# Patient Record
Sex: FEMALE | Race: WHITE | Employment: OTHER | URBAN - METROPOLITAN AREA
[De-identification: names, ages, dates, MRNs, and addresses within clinical notes are randomized per-mention and may not be internally consistent; named-entity substitution may affect disease eponyms.]

---

## 2017-01-17 ENCOUNTER — PROCEDURE VISIT (OUTPATIENT)
Dept: PHYSICAL MEDICINE AND REHAB | Age: 67
End: 2017-01-17

## 2017-01-17 DIAGNOSIS — M79.7 FIBROMYALGIA: Primary | ICD-10-CM

## 2017-01-17 PROCEDURE — 20553 NJX 1/MLT TRIGGER POINTS 3/>: CPT | Performed by: PHYSICAL MEDICINE & REHABILITATION

## 2017-01-26 ENCOUNTER — OFFICE VISIT (OUTPATIENT)
Dept: PHYSICAL MEDICINE AND REHAB | Age: 67
End: 2017-01-26

## 2017-01-26 VITALS
WEIGHT: 210 LBS | BODY MASS INDEX: 39.65 KG/M2 | SYSTOLIC BLOOD PRESSURE: 130 MMHG | HEIGHT: 61 IN | DIASTOLIC BLOOD PRESSURE: 76 MMHG

## 2017-01-26 DIAGNOSIS — M51.36 DDD (DEGENERATIVE DISC DISEASE), LUMBAR: ICD-10-CM

## 2017-01-26 DIAGNOSIS — G56.80 SUPRASCAPULAR ENTRAPMENT NEUROPATHY, UNSPECIFIED LATERALITY: ICD-10-CM

## 2017-01-26 DIAGNOSIS — G89.29 CHRONIC LEFT-SIDED LOW BACK PAIN WITH LEFT-SIDED SCIATICA: ICD-10-CM

## 2017-01-26 DIAGNOSIS — Z79.899 HIGH RISK MEDICATION USE: ICD-10-CM

## 2017-01-26 DIAGNOSIS — G89.29 CHRONIC MIDLINE LOW BACK PAIN WITH LEFT-SIDED SCIATICA: ICD-10-CM

## 2017-01-26 DIAGNOSIS — G89.29 CHRONIC BILATERAL THORACIC BACK PAIN: ICD-10-CM

## 2017-01-26 DIAGNOSIS — M54.42 CHRONIC MIDLINE LOW BACK PAIN WITH LEFT-SIDED SCIATICA: ICD-10-CM

## 2017-01-26 DIAGNOSIS — M54.42 CHRONIC LEFT-SIDED LOW BACK PAIN WITH LEFT-SIDED SCIATICA: ICD-10-CM

## 2017-01-26 DIAGNOSIS — M54.6 CHRONIC BILATERAL THORACIC BACK PAIN: ICD-10-CM

## 2017-01-26 DIAGNOSIS — F41.9 ANXIETY: ICD-10-CM

## 2017-01-26 DIAGNOSIS — M79.10 MYALGIA: Primary | ICD-10-CM

## 2017-01-26 DIAGNOSIS — M54.42 BILATERAL LOW BACK PAIN WITH LEFT-SIDED SCIATICA, UNSPECIFIED CHRONICITY: ICD-10-CM

## 2017-01-26 PROCEDURE — 99214 OFFICE O/P EST MOD 30 MIN: CPT | Performed by: PHYSICAL MEDICINE & REHABILITATION

## 2017-01-26 RX ORDER — TRAMADOL HYDROCHLORIDE 50 MG/1
50 TABLET ORAL EVERY 6 HOURS PRN
Qty: 50 TABLET | Refills: 0 | Status: SHIPPED | OUTPATIENT
Start: 2017-01-26 | End: 2017-03-15

## 2017-01-26 ASSESSMENT — ENCOUNTER SYMPTOMS
CONSTIPATION: 0
CHEST TIGHTNESS: 0
APNEA: 1
BACK PAIN: 1
PHOTOPHOBIA: 0
WHEEZING: 0
STRIDOR: 0
NAUSEA: 0
EYES NEGATIVE: 1
VOMITING: 0
CHOKING: 0
DIARRHEA: 0
SHORTNESS OF BREATH: 1
ALLERGIC/IMMUNOLOGIC NEGATIVE: 1

## 2017-02-06 ENCOUNTER — OFFICE VISIT (OUTPATIENT)
Dept: FAMILY MEDICINE CLINIC | Age: 67
End: 2017-02-06

## 2017-02-06 VITALS
TEMPERATURE: 98 F | HEIGHT: 61 IN | DIASTOLIC BLOOD PRESSURE: 72 MMHG | OXYGEN SATURATION: 96 % | BODY MASS INDEX: 39.53 KG/M2 | SYSTOLIC BLOOD PRESSURE: 128 MMHG | RESPIRATION RATE: 23 BRPM | WEIGHT: 209.4 LBS | HEART RATE: 110 BPM

## 2017-02-06 DIAGNOSIS — H81.93 LABYRINTHINE DISORDER, BILATERAL: Primary | ICD-10-CM

## 2017-02-06 PROCEDURE — 99213 OFFICE O/P EST LOW 20 MIN: CPT | Performed by: FAMILY MEDICINE

## 2017-02-06 RX ORDER — MECLIZINE HYDROCHLORIDE 25 MG/1
TABLET ORAL
Qty: 20 TABLET | Refills: 0 | Status: ON HOLD | OUTPATIENT
Start: 2017-02-06 | End: 2019-01-05 | Stop reason: HOSPADM

## 2017-02-06 ASSESSMENT — ENCOUNTER SYMPTOMS: BACK PAIN: 0

## 2017-02-20 RX ORDER — LIDOCAINE HYDROCHLORIDE 5 MG/ML
50 INJECTION, SOLUTION INFILTRATION; INTRAVENOUS ONCE
Status: COMPLETED | OUTPATIENT
Start: 2017-02-20 | End: 2017-02-21

## 2017-02-20 RX ORDER — METHYLPREDNISOLONE ACETATE 40 MG/ML
40 INJECTION, SUSPENSION INTRA-ARTICULAR; INTRALESIONAL; INTRAMUSCULAR; SOFT TISSUE ONCE
Status: COMPLETED | OUTPATIENT
Start: 2017-02-20 | End: 2017-02-21

## 2017-02-21 ENCOUNTER — HOSPITAL ENCOUNTER (OUTPATIENT)
Dept: INTERVENTIONAL RADIOLOGY/VASCULAR | Age: 67
Discharge: HOME | End: 2017-02-21
Payer: MEDICARE

## 2017-02-21 VITALS
RESPIRATION RATE: 20 BRPM | DIASTOLIC BLOOD PRESSURE: 87 MMHG | OXYGEN SATURATION: 98 % | HEART RATE: 72 BPM | SYSTOLIC BLOOD PRESSURE: 161 MMHG

## 2017-02-21 DIAGNOSIS — M51.36 DDD (DEGENERATIVE DISC DISEASE), LUMBAR: ICD-10-CM

## 2017-02-21 PROCEDURE — 62323 NJX INTERLAMINAR LMBR/SAC: CPT | Performed by: PHYSICAL MEDICINE & REHABILITATION

## 2017-02-21 PROCEDURE — 6360000004 HC RX CONTRAST MEDICATION: Performed by: PHYSICAL MEDICINE & REHABILITATION

## 2017-02-21 PROCEDURE — 6360000002 HC RX W HCPCS: Performed by: PHYSICAL MEDICINE & REHABILITATION

## 2017-02-21 PROCEDURE — 2500000003 HC RX 250 WO HCPCS: Performed by: PHYSICAL MEDICINE & REHABILITATION

## 2017-02-21 RX ADMIN — LIDOCAINE HYDROCHLORIDE 50 ML: 5 INJECTION, SOLUTION INFILTRATION; INTRAVENOUS at 11:19

## 2017-02-21 RX ADMIN — METHYLPREDNISOLONE ACETATE 40 MG: 40 INJECTION, SUSPENSION INTRA-ARTICULAR; INTRALESIONAL; INTRAMUSCULAR; SOFT TISSUE at 11:21

## 2017-02-21 RX ADMIN — IOPAMIDOL 3 ML: 408 INJECTION, SOLUTION INTRATHECAL at 11:20

## 2017-03-07 ENCOUNTER — OFFICE VISIT (OUTPATIENT)
Dept: FAMILY MEDICINE CLINIC | Age: 67
End: 2017-03-07

## 2017-03-07 VITALS
DIASTOLIC BLOOD PRESSURE: 70 MMHG | OXYGEN SATURATION: 97 % | WEIGHT: 213 LBS | RESPIRATION RATE: 26 BRPM | HEIGHT: 61 IN | SYSTOLIC BLOOD PRESSURE: 130 MMHG | HEART RATE: 100 BPM | BODY MASS INDEX: 40.22 KG/M2 | TEMPERATURE: 97.6 F

## 2017-03-07 DIAGNOSIS — E55.9 VITAMIN D DEFICIENCY: ICD-10-CM

## 2017-03-07 DIAGNOSIS — E11.59 TYPE 2 DIABETES MELLITUS WITH OTHER CIRCULATORY COMPLICATIONS (HCC): ICD-10-CM

## 2017-03-07 DIAGNOSIS — I10 ESSENTIAL HYPERTENSION: ICD-10-CM

## 2017-03-07 DIAGNOSIS — J44.1 COPD WITH EXACERBATION (HCC): Primary | ICD-10-CM

## 2017-03-07 LAB
ANION GAP SERPL CALCULATED.3IONS-SCNC: 13 MEQ/L (ref 7–13)
BUN BLDV-MCNC: 13 MG/DL (ref 8–23)
CALCIUM SERPL-MCNC: 9.2 MG/DL (ref 8.6–10.2)
CHLORIDE BLD-SCNC: 104 MEQ/L (ref 98–107)
CO2: 23 MEQ/L (ref 22–29)
CREAT SERPL-MCNC: 0.64 MG/DL (ref 0.5–0.9)
CREATININE URINE: 41.7 MG/DL
GFR AFRICAN AMERICAN: >60
GFR NON-AFRICAN AMERICAN: >60
GLUCOSE BLD-MCNC: 251 MG/DL (ref 74–109)
HBA1C MFR BLD: 8.4 % (ref 4.8–5.9)
MICROALBUMIN UR-MCNC: <1.2 MG/DL
MICROALBUMIN/CREAT UR-RTO: NORMAL MG/G (ref 0–30)
POTASSIUM SERPL-SCNC: 3.9 MEQ/L (ref 3.5–5.1)
SODIUM BLD-SCNC: 140 MEQ/L (ref 132–144)
VITAMIN D 25-HYDROXY: 26.6 NG/ML (ref 30–100)

## 2017-03-07 PROCEDURE — 99213 OFFICE O/P EST LOW 20 MIN: CPT | Performed by: FAMILY MEDICINE

## 2017-03-07 RX ORDER — PREDNISONE 10 MG/1
TABLET ORAL
Qty: 30 TABLET | Refills: 0 | Status: SHIPPED | OUTPATIENT
Start: 2017-03-07 | End: 2017-06-23 | Stop reason: ALTCHOICE

## 2017-03-07 RX ORDER — LANCETS 30 GAUGE
EACH MISCELLANEOUS
Qty: 300 EACH | Refills: 1 | Status: SHIPPED | OUTPATIENT
Start: 2017-03-07 | End: 2017-08-08 | Stop reason: SDUPTHER

## 2017-03-07 RX ORDER — GLUCOSAMINE HCL/CHONDROITIN SU 500-400 MG
CAPSULE ORAL
Qty: 300 STRIP | Refills: 1 | Status: SHIPPED | OUTPATIENT
Start: 2017-03-07 | End: 2017-08-08 | Stop reason: SDUPTHER

## 2017-03-07 RX ORDER — MOXIFLOXACIN HYDROCHLORIDE 400 MG/1
400 TABLET ORAL DAILY
Qty: 7 TABLET | Refills: 0 | Status: SHIPPED | OUTPATIENT
Start: 2017-03-07 | End: 2017-03-14

## 2017-03-07 RX ORDER — FORMOTEROL FUMARATE 20 UG/2ML
2 SOLUTION RESPIRATORY (INHALATION) 2 TIMES DAILY
COMMUNITY
Start: 2016-02-03 | End: 2018-08-01 | Stop reason: SDUPTHER

## 2017-03-07 ASSESSMENT — ENCOUNTER SYMPTOMS
CHEST TIGHTNESS: 1
ABDOMINAL PAIN: 0

## 2017-03-15 ENCOUNTER — OFFICE VISIT (OUTPATIENT)
Dept: FAMILY MEDICINE CLINIC | Age: 67
End: 2017-03-15

## 2017-03-15 ENCOUNTER — CLINICAL DOCUMENTATION (OUTPATIENT)
Dept: FAMILY MEDICINE CLINIC | Age: 67
End: 2017-03-15

## 2017-03-15 VITALS
HEIGHT: 61 IN | OXYGEN SATURATION: 97 % | SYSTOLIC BLOOD PRESSURE: 140 MMHG | DIASTOLIC BLOOD PRESSURE: 64 MMHG | BODY MASS INDEX: 40.22 KG/M2 | WEIGHT: 213 LBS | HEART RATE: 92 BPM | RESPIRATION RATE: 25 BRPM | TEMPERATURE: 98.9 F

## 2017-03-15 DIAGNOSIS — E11.69 DIABETES MELLITUS TYPE 2 IN OBESE (HCC): Primary | ICD-10-CM

## 2017-03-15 DIAGNOSIS — I70.1 RENAL ARTERY STENOSIS (HCC): ICD-10-CM

## 2017-03-15 DIAGNOSIS — E66.01 MORBID OBESITY WITH BMI OF 40.0-44.9, ADULT (HCC): ICD-10-CM

## 2017-03-15 DIAGNOSIS — E66.9 DIABETES MELLITUS TYPE 2 IN OBESE (HCC): Primary | ICD-10-CM

## 2017-03-15 DIAGNOSIS — F41.9 ANXIETY: ICD-10-CM

## 2017-03-15 DIAGNOSIS — E55.9 VITAMIN D DEFICIENCY: ICD-10-CM

## 2017-03-15 PROCEDURE — 99213 OFFICE O/P EST LOW 20 MIN: CPT | Performed by: FAMILY MEDICINE

## 2017-03-15 RX ORDER — LORAZEPAM 0.5 MG/1
0.5 TABLET ORAL EVERY 8 HOURS PRN
Qty: 30 TABLET | Refills: 0 | Status: SHIPPED | OUTPATIENT
Start: 2017-03-15 | End: 2017-10-30 | Stop reason: SDUPTHER

## 2017-03-15 RX ORDER — CHOLECALCIFEROL (VITAMIN D3) 1250 MCG
CAPSULE ORAL
Qty: 8 CAPSULE | Refills: 1 | Status: SHIPPED | OUTPATIENT
Start: 2017-03-15 | End: 2018-05-17

## 2017-03-15 ASSESSMENT — ENCOUNTER SYMPTOMS: ABDOMINAL PAIN: 0

## 2017-03-23 ENCOUNTER — OFFICE VISIT (OUTPATIENT)
Dept: PHYSICAL MEDICINE AND REHAB | Age: 67
End: 2017-03-23

## 2017-03-23 VITALS
WEIGHT: 207 LBS | DIASTOLIC BLOOD PRESSURE: 76 MMHG | SYSTOLIC BLOOD PRESSURE: 138 MMHG | BODY MASS INDEX: 39.08 KG/M2 | HEIGHT: 61 IN

## 2017-03-23 DIAGNOSIS — Z99.89 OSA ON CPAP: ICD-10-CM

## 2017-03-23 DIAGNOSIS — M79.10 MYALGIA: ICD-10-CM

## 2017-03-23 DIAGNOSIS — G47.33 OSA ON CPAP: ICD-10-CM

## 2017-03-23 DIAGNOSIS — M54.6 CHRONIC BILATERAL THORACIC BACK PAIN: ICD-10-CM

## 2017-03-23 DIAGNOSIS — Z79.899 HIGH RISK MEDICATION USE: ICD-10-CM

## 2017-03-23 DIAGNOSIS — M54.42 CHRONIC LEFT-SIDED LOW BACK PAIN WITH LEFT-SIDED SCIATICA: Primary | ICD-10-CM

## 2017-03-23 DIAGNOSIS — G89.29 CHRONIC BILATERAL THORACIC BACK PAIN: ICD-10-CM

## 2017-03-23 DIAGNOSIS — G89.29 CHRONIC LEFT-SIDED LOW BACK PAIN WITH LEFT-SIDED SCIATICA: Primary | ICD-10-CM

## 2017-03-23 DIAGNOSIS — G56.80 SUPRASCAPULAR ENTRAPMENT NEUROPATHY, UNSPECIFIED LATERALITY: ICD-10-CM

## 2017-03-23 DIAGNOSIS — M51.36 DDD (DEGENERATIVE DISC DISEASE), LUMBAR: ICD-10-CM

## 2017-03-23 PROCEDURE — 99213 OFFICE O/P EST LOW 20 MIN: CPT | Performed by: PHYSICAL MEDICINE & REHABILITATION

## 2017-03-23 RX ORDER — TRAMADOL HYDROCHLORIDE 50 MG/1
50 TABLET ORAL EVERY 6 HOURS PRN
Qty: 50 TABLET | Refills: 0 | Status: SHIPPED | OUTPATIENT
Start: 2017-03-23 | End: 2018-02-08 | Stop reason: SDUPTHER

## 2017-03-23 ASSESSMENT — ENCOUNTER SYMPTOMS
NAUSEA: 0
EYES NEGATIVE: 1
APNEA: 1
CHOKING: 0
VOMITING: 0
SHORTNESS OF BREATH: 1
ALLERGIC/IMMUNOLOGIC NEGATIVE: 1
STRIDOR: 0
BACK PAIN: 1
PHOTOPHOBIA: 0
CONSTIPATION: 0
CHEST TIGHTNESS: 0
DIARRHEA: 0
WHEEZING: 1

## 2017-03-28 ENCOUNTER — PROCEDURE VISIT (OUTPATIENT)
Dept: PHYSICAL MEDICINE AND REHAB | Age: 67
End: 2017-03-28

## 2017-03-28 DIAGNOSIS — M79.10 MYALGIA: Primary | ICD-10-CM

## 2017-03-28 PROCEDURE — 20553 NJX 1/MLT TRIGGER POINTS 3/>: CPT | Performed by: PHYSICAL MEDICINE & REHABILITATION

## 2017-04-28 ENCOUNTER — PROCEDURE VISIT (OUTPATIENT)
Dept: PHYSICAL MEDICINE AND REHAB | Age: 67
End: 2017-04-28

## 2017-04-28 DIAGNOSIS — M79.10 MYALGIA: Primary | ICD-10-CM

## 2017-04-28 PROCEDURE — 20553 NJX 1/MLT TRIGGER POINTS 3/>: CPT | Performed by: PHYSICAL MEDICINE & REHABILITATION

## 2017-05-23 ENCOUNTER — OFFICE VISIT (OUTPATIENT)
Dept: PHYSICAL MEDICINE AND REHAB | Age: 67
End: 2017-05-23

## 2017-05-23 VITALS
BODY MASS INDEX: 39.65 KG/M2 | SYSTOLIC BLOOD PRESSURE: 138 MMHG | DIASTOLIC BLOOD PRESSURE: 64 MMHG | WEIGHT: 210 LBS | HEIGHT: 61 IN

## 2017-05-23 DIAGNOSIS — M79.10 MYALGIA: ICD-10-CM

## 2017-05-23 DIAGNOSIS — M79.641 PAIN IN BOTH HANDS: ICD-10-CM

## 2017-05-23 DIAGNOSIS — M54.42 CHRONIC BILATERAL LOW BACK PAIN WITH LEFT-SIDED SCIATICA: ICD-10-CM

## 2017-05-23 DIAGNOSIS — M51.36 DDD (DEGENERATIVE DISC DISEASE), LUMBAR: ICD-10-CM

## 2017-05-23 DIAGNOSIS — G89.29 CHRONIC BILATERAL LOW BACK PAIN WITH LEFT-SIDED SCIATICA: ICD-10-CM

## 2017-05-23 DIAGNOSIS — M54.42 CHRONIC LEFT-SIDED LOW BACK PAIN WITH LEFT-SIDED SCIATICA: Primary | ICD-10-CM

## 2017-05-23 DIAGNOSIS — Z79.899 HIGH RISK MEDICATION USE: ICD-10-CM

## 2017-05-23 DIAGNOSIS — M79.642 PAIN IN BOTH HANDS: ICD-10-CM

## 2017-05-23 DIAGNOSIS — G89.29 CHRONIC LEFT-SIDED LOW BACK PAIN WITH LEFT-SIDED SCIATICA: Primary | ICD-10-CM

## 2017-05-23 DIAGNOSIS — F41.9 ANXIETY: ICD-10-CM

## 2017-05-23 DIAGNOSIS — M25.561 ACUTE PAIN OF RIGHT KNEE: ICD-10-CM

## 2017-05-23 DIAGNOSIS — G56.03 BILATERAL CARPAL TUNNEL SYNDROME: ICD-10-CM

## 2017-05-23 DIAGNOSIS — M54.2 NECK PAIN: ICD-10-CM

## 2017-05-23 PROCEDURE — 99215 OFFICE O/P EST HI 40 MIN: CPT | Performed by: PHYSICAL MEDICINE & REHABILITATION

## 2017-05-23 ASSESSMENT — ENCOUNTER SYMPTOMS
APNEA: 1
ABDOMINAL PAIN: 0
STRIDOR: 0
DIARRHEA: 0
ALLERGIC/IMMUNOLOGIC NEGATIVE: 1
NAUSEA: 1
WHEEZING: 1
CHOKING: 0
CONSTIPATION: 1
CHEST TIGHTNESS: 1
BACK PAIN: 1
VOMITING: 0
PHOTOPHOBIA: 0
SHORTNESS OF BREATH: 1
EYES NEGATIVE: 1

## 2017-05-30 DIAGNOSIS — Z79.899 HIGH RISK MEDICATION USE: ICD-10-CM

## 2017-05-30 LAB
AMPHETAMINE SCREEN, URINE: NORMAL
BARBITURATE SCREEN URINE: NORMAL
BENZODIAZEPINE SCREEN, URINE: NORMAL
CANNABINOID SCREEN URINE: NORMAL
COCAINE METABOLITE SCREEN URINE: NORMAL
Lab: NORMAL
OPIATE SCREEN URINE: NORMAL
PHENCYCLIDINE SCREEN URINE: NORMAL

## 2017-06-01 ENCOUNTER — OFFICE VISIT (OUTPATIENT)
Dept: FAMILY MEDICINE CLINIC | Age: 67
End: 2017-06-01

## 2017-06-01 VITALS
SYSTOLIC BLOOD PRESSURE: 136 MMHG | TEMPERATURE: 98.6 F | DIASTOLIC BLOOD PRESSURE: 60 MMHG | HEART RATE: 102 BPM | WEIGHT: 212.3 LBS | HEIGHT: 61 IN | OXYGEN SATURATION: 93 % | BODY MASS INDEX: 40.08 KG/M2

## 2017-06-01 DIAGNOSIS — J44.1 COPD WITH EXACERBATION (HCC): Primary | ICD-10-CM

## 2017-06-01 PROCEDURE — 99213 OFFICE O/P EST LOW 20 MIN: CPT | Performed by: FAMILY MEDICINE

## 2017-06-01 RX ORDER — MEPOLIZUMAB 100 MG/ML
INJECTION, POWDER, FOR SOLUTION SUBCUTANEOUS
COMMUNITY
Start: 2017-05-08 | End: 2017-08-18 | Stop reason: SDUPTHER

## 2017-06-01 RX ORDER — MOXIFLOXACIN HYDROCHLORIDE 400 MG/1
400 TABLET ORAL DAILY
Qty: 7 TABLET | Refills: 0 | Status: SHIPPED | OUTPATIENT
Start: 2017-06-01 | End: 2017-06-08

## 2017-06-01 RX ORDER — PREDNISONE 10 MG/1
TABLET ORAL
Qty: 30 TABLET | Refills: 0 | Status: SHIPPED | OUTPATIENT
Start: 2017-06-01 | End: 2017-06-23 | Stop reason: ALTCHOICE

## 2017-06-01 RX ORDER — DIGOXIN 250 MCG
TABLET ORAL
COMMUNITY
Start: 2017-03-21 | End: 2017-09-28

## 2017-06-01 ASSESSMENT — ENCOUNTER SYMPTOMS: ABDOMINAL PAIN: 0

## 2017-06-06 ENCOUNTER — PROCEDURE VISIT (OUTPATIENT)
Dept: PHYSICAL MEDICINE AND REHAB | Age: 67
End: 2017-06-06

## 2017-06-06 DIAGNOSIS — M79.7 FIBROMYALGIA: Primary | ICD-10-CM

## 2017-06-06 DIAGNOSIS — M79.10 MYALGIA: ICD-10-CM

## 2017-06-06 PROCEDURE — 20553 NJX 1/MLT TRIGGER POINTS 3/>: CPT | Performed by: PHYSICAL MEDICINE & REHABILITATION

## 2017-06-09 ENCOUNTER — TELEPHONE (OUTPATIENT)
Dept: PHARMACY | Facility: CLINIC | Age: 67
End: 2017-06-09

## 2017-06-15 DIAGNOSIS — E11.69 DIABETES MELLITUS TYPE 2 IN OBESE (HCC): ICD-10-CM

## 2017-06-15 DIAGNOSIS — E55.9 VITAMIN D DEFICIENCY: ICD-10-CM

## 2017-06-15 DIAGNOSIS — E66.9 DIABETES MELLITUS TYPE 2 IN OBESE (HCC): ICD-10-CM

## 2017-06-15 LAB
HBA1C MFR BLD: 9.3 % (ref 4.8–5.9)
T4 FREE: 1.22 NG/DL (ref 0.93–1.7)
TSH SERPL DL<=0.05 MIU/L-ACNC: 2.07 UIU/ML (ref 0.27–4.2)
VITAMIN D 25-HYDROXY: 32.8 NG/ML (ref 30–100)

## 2017-06-19 ENCOUNTER — PROCEDURE VISIT (OUTPATIENT)
Dept: PHYSICAL MEDICINE AND REHAB | Age: 67
End: 2017-06-19

## 2017-06-19 DIAGNOSIS — M54.31 SCIATICA OF RIGHT SIDE: Primary | ICD-10-CM

## 2017-06-19 PROCEDURE — 64445 NJX AA&/STRD SCIATIC NRV IMG: CPT | Performed by: PHYSICAL MEDICINE & REHABILITATION

## 2017-06-23 ENCOUNTER — OFFICE VISIT (OUTPATIENT)
Dept: FAMILY MEDICINE CLINIC | Age: 67
End: 2017-06-23

## 2017-06-23 VITALS
DIASTOLIC BLOOD PRESSURE: 78 MMHG | RESPIRATION RATE: 16 BRPM | TEMPERATURE: 98.3 F | HEART RATE: 93 BPM | WEIGHT: 213.1 LBS | OXYGEN SATURATION: 96 % | HEIGHT: 61 IN | BODY MASS INDEX: 40.23 KG/M2 | SYSTOLIC BLOOD PRESSURE: 144 MMHG

## 2017-06-23 DIAGNOSIS — F41.9 ANXIETY: ICD-10-CM

## 2017-06-23 DIAGNOSIS — E66.9 DIABETES MELLITUS TYPE 2 IN OBESE (HCC): ICD-10-CM

## 2017-06-23 DIAGNOSIS — L30.9 ECZEMA, UNSPECIFIED TYPE: ICD-10-CM

## 2017-06-23 DIAGNOSIS — E11.69 DIABETES MELLITUS TYPE 2 IN OBESE (HCC): Primary | ICD-10-CM

## 2017-06-23 DIAGNOSIS — Z76.0 MEDICATION REFILL: ICD-10-CM

## 2017-06-23 DIAGNOSIS — E11.69 DIABETES MELLITUS TYPE 2 IN OBESE (HCC): ICD-10-CM

## 2017-06-23 DIAGNOSIS — E66.9 DIABETES MELLITUS TYPE 2 IN OBESE (HCC): Primary | ICD-10-CM

## 2017-06-23 LAB
CREATININE URINE: 59.3 MG/DL
MICROALBUMIN UR-MCNC: <1.2 MG/DL
MICROALBUMIN/CREAT UR-RTO: NORMAL MG/G (ref 0–30)

## 2017-06-23 PROCEDURE — 99213 OFFICE O/P EST LOW 20 MIN: CPT | Performed by: FAMILY MEDICINE

## 2017-06-23 RX ORDER — FLUOCINONIDE 0.5 MG/G
OINTMENT TOPICAL
Qty: 1 TUBE | Refills: 1 | Status: SHIPPED | OUTPATIENT
Start: 2017-06-23 | End: 2017-06-30

## 2017-06-23 RX ORDER — ESOMEPRAZOLE MAGNESIUM 40 MG/1
40 CAPSULE, DELAYED RELEASE ORAL DAILY
Qty: 90 CAPSULE | Refills: 1 | Status: SHIPPED | OUTPATIENT
Start: 2017-06-23 | End: 2018-09-07 | Stop reason: SDUPTHER

## 2017-06-23 ASSESSMENT — ENCOUNTER SYMPTOMS: ABDOMINAL PAIN: 0

## 2017-07-11 ENCOUNTER — PROCEDURE VISIT (OUTPATIENT)
Dept: PHYSICAL MEDICINE AND REHAB | Age: 67
End: 2017-07-11

## 2017-07-11 DIAGNOSIS — M79.10 MYALGIA: ICD-10-CM

## 2017-07-11 DIAGNOSIS — M79.7 FIBROMYALGIA: Primary | ICD-10-CM

## 2017-07-11 PROCEDURE — 20553 NJX 1/MLT TRIGGER POINTS 3/>: CPT | Performed by: PHYSICAL MEDICINE & REHABILITATION

## 2017-08-08 ENCOUNTER — OFFICE VISIT (OUTPATIENT)
Dept: PHYSICAL MEDICINE AND REHAB | Age: 67
End: 2017-08-08

## 2017-08-08 VITALS
HEIGHT: 61 IN | DIASTOLIC BLOOD PRESSURE: 72 MMHG | WEIGHT: 206.8 LBS | SYSTOLIC BLOOD PRESSURE: 138 MMHG | BODY MASS INDEX: 39.04 KG/M2

## 2017-08-08 DIAGNOSIS — M79.10 MYALGIA: ICD-10-CM

## 2017-08-08 DIAGNOSIS — G56.80 SUPRASCAPULAR ENTRAPMENT NEUROPATHY, UNSPECIFIED LATERALITY: ICD-10-CM

## 2017-08-08 DIAGNOSIS — M51.36 DDD (DEGENERATIVE DISC DISEASE), LUMBAR: ICD-10-CM

## 2017-08-08 DIAGNOSIS — Z79.899 HIGH RISK MEDICATION USE: ICD-10-CM

## 2017-08-08 DIAGNOSIS — M54.42 MIDLINE LOW BACK PAIN WITH LEFT-SIDED SCIATICA, UNSPECIFIED CHRONICITY: Primary | ICD-10-CM

## 2017-08-08 DIAGNOSIS — G56.03 BILATERAL CARPAL TUNNEL SYNDROME: ICD-10-CM

## 2017-08-08 DIAGNOSIS — M54.2 NECK PAIN: ICD-10-CM

## 2017-08-08 PROCEDURE — 99213 OFFICE O/P EST LOW 20 MIN: CPT | Performed by: PHYSICAL MEDICINE & REHABILITATION

## 2017-08-08 ASSESSMENT — ENCOUNTER SYMPTOMS
ALLERGIC/IMMUNOLOGIC NEGATIVE: 1
VOMITING: 0
CONSTIPATION: 0
ABDOMINAL PAIN: 0
PHOTOPHOBIA: 0
NAUSEA: 0
EYES NEGATIVE: 1
CHOKING: 0
STRIDOR: 0
SHORTNESS OF BREATH: 1
WHEEZING: 1
DIARRHEA: 0
BACK PAIN: 1
CHEST TIGHTNESS: 0
APNEA: 1

## 2017-08-14 ENCOUNTER — PROCEDURE VISIT (OUTPATIENT)
Dept: PHYSICAL MEDICINE AND REHAB | Age: 67
End: 2017-08-14

## 2017-08-14 DIAGNOSIS — M79.10 MYALGIA: ICD-10-CM

## 2017-08-14 DIAGNOSIS — R53.82 CHRONIC FATIGUE: ICD-10-CM

## 2017-08-14 DIAGNOSIS — M79.7 FIBROMYALGIA: Primary | ICD-10-CM

## 2017-08-14 PROCEDURE — 20553 NJX 1/MLT TRIGGER POINTS 3/>: CPT | Performed by: PHYSICAL MEDICINE & REHABILITATION

## 2017-08-18 ENCOUNTER — CARE COORDINATION (OUTPATIENT)
Dept: CARE COORDINATION | Age: 67
End: 2017-08-18

## 2017-08-18 RX ORDER — FLUOCINONIDE 0.5 MG/G
OINTMENT TOPICAL
COMMUNITY
Start: 2017-08-10 | End: 2017-12-31 | Stop reason: SDUPTHER

## 2017-08-18 RX ORDER — CYANOCOBALAMIN 1000 UG/ML
1000 INJECTION INTRAMUSCULAR; SUBCUTANEOUS ONCE
Status: COMPLETED | OUTPATIENT
Start: 2017-08-18 | End: 2017-08-18

## 2017-08-18 RX ADMIN — CYANOCOBALAMIN 1000 MCG: 1000 INJECTION INTRAMUSCULAR; SUBCUTANEOUS at 08:29

## 2017-08-25 RX ORDER — INSULIN GLARGINE 100 [IU]/ML
INJECTION, SOLUTION SUBCUTANEOUS
Qty: 15 ML | Refills: 1 | Status: SHIPPED | OUTPATIENT
Start: 2017-08-25 | End: 2017-09-21 | Stop reason: SDUPTHER

## 2017-09-11 ENCOUNTER — PROCEDURE VISIT (OUTPATIENT)
Dept: PHYSICAL MEDICINE AND REHAB | Age: 67
End: 2017-09-11

## 2017-09-11 DIAGNOSIS — M79.10 MYALGIA: ICD-10-CM

## 2017-09-11 DIAGNOSIS — M79.7 FIBROMYALGIA: Primary | ICD-10-CM

## 2017-09-11 DIAGNOSIS — R53.82 CHRONIC FATIGUE: ICD-10-CM

## 2017-09-11 PROCEDURE — 20553 NJX 1/MLT TRIGGER POINTS 3/>: CPT | Performed by: PHYSICAL MEDICINE & REHABILITATION

## 2017-09-11 PROCEDURE — 96372 THER/PROPH/DIAG INJ SC/IM: CPT | Performed by: PHYSICAL MEDICINE & REHABILITATION

## 2017-09-20 RX ORDER — CYANOCOBALAMIN 1000 UG/ML
1000 INJECTION INTRAMUSCULAR; SUBCUTANEOUS ONCE
Status: COMPLETED | OUTPATIENT
Start: 2017-09-20 | End: 2017-09-20

## 2017-09-20 RX ADMIN — CYANOCOBALAMIN 1000 MCG: 1000 INJECTION INTRAMUSCULAR; SUBCUTANEOUS at 10:20

## 2017-09-21 ENCOUNTER — CARE COORDINATOR VISIT (OUTPATIENT)
Dept: CARE COORDINATION | Age: 67
End: 2017-09-21

## 2017-09-21 ENCOUNTER — OFFICE VISIT (OUTPATIENT)
Dept: FAMILY MEDICINE CLINIC | Age: 67
End: 2017-09-21

## 2017-09-21 VITALS
HEART RATE: 84 BPM | SYSTOLIC BLOOD PRESSURE: 144 MMHG | DIASTOLIC BLOOD PRESSURE: 78 MMHG | BODY MASS INDEX: 40.22 KG/M2 | WEIGHT: 213 LBS | TEMPERATURE: 98.3 F | HEIGHT: 61 IN

## 2017-09-21 DIAGNOSIS — Z76.0 MEDICATION REFILL: ICD-10-CM

## 2017-09-21 DIAGNOSIS — H60.501 ACUTE OTITIS EXTERNA OF RIGHT EAR, UNSPECIFIED TYPE: Primary | ICD-10-CM

## 2017-09-21 PROCEDURE — 99212 OFFICE O/P EST SF 10 MIN: CPT | Performed by: FAMILY MEDICINE

## 2017-09-21 RX ORDER — CEFUROXIME AXETIL 250 MG/1
250 TABLET ORAL 2 TIMES DAILY
Qty: 20 TABLET | Refills: 0 | Status: SHIPPED | OUTPATIENT
Start: 2017-09-21 | End: 2017-09-28

## 2017-09-21 RX ORDER — FLUCONAZOLE 150 MG/1
150 TABLET ORAL ONCE
Qty: 1 TABLET | Refills: 0 | Status: SHIPPED | OUTPATIENT
Start: 2017-09-21 | End: 2017-09-21

## 2017-09-21 RX ORDER — CIPROFLOXACIN AND DEXAMETHASONE 3; 1 MG/ML; MG/ML
4 SUSPENSION/ DROPS AURICULAR (OTIC) 2 TIMES DAILY
Qty: 1 BOTTLE | Refills: 0 | Status: SHIPPED | OUTPATIENT
Start: 2017-09-21 | End: 2017-09-28

## 2017-09-21 ASSESSMENT — ENCOUNTER SYMPTOMS
VOICE CHANGE: 0
NAUSEA: 0
SHORTNESS OF BREATH: 0
SORE THROAT: 0

## 2017-09-28 ENCOUNTER — OFFICE VISIT (OUTPATIENT)
Dept: PULMONOLOGY | Age: 67
End: 2017-09-28

## 2017-09-28 VITALS
DIASTOLIC BLOOD PRESSURE: 70 MMHG | OXYGEN SATURATION: 92 % | TEMPERATURE: 98 F | HEART RATE: 88 BPM | SYSTOLIC BLOOD PRESSURE: 110 MMHG | BODY MASS INDEX: 39.08 KG/M2 | WEIGHT: 207 LBS | HEIGHT: 61 IN

## 2017-09-28 DIAGNOSIS — J45.50 UNCOMPLICATED SEVERE PERSISTENT ASTHMA: ICD-10-CM

## 2017-09-28 DIAGNOSIS — J44.9 CHRONIC OBSTRUCTIVE PULMONARY DISEASE, UNSPECIFIED COPD TYPE (HCC): Primary | ICD-10-CM

## 2017-09-28 DIAGNOSIS — E66.9 OBESITY (BMI 30-39.9): ICD-10-CM

## 2017-09-28 DIAGNOSIS — R09.02 HYPOXEMIA: ICD-10-CM

## 2017-09-28 DIAGNOSIS — G47.33 OSA ON CPAP: ICD-10-CM

## 2017-09-28 DIAGNOSIS — Z99.89 OSA ON CPAP: ICD-10-CM

## 2017-09-28 PROCEDURE — 99214 OFFICE O/P EST MOD 30 MIN: CPT | Performed by: INTERNAL MEDICINE

## 2017-09-28 RX ORDER — THEOPHYLLINE 600 MG/1
TABLET, EXTENDED RELEASE ORAL
Qty: 30 TABLET | Refills: 5 | Status: SHIPPED | OUTPATIENT
Start: 2017-09-28 | End: 2019-01-01

## 2017-09-28 ASSESSMENT — ENCOUNTER SYMPTOMS
EYE DISCHARGE: 0
VOICE CHANGE: 0
COUGH: 1
SINUS PRESSURE: 0
EYE ITCHING: 0
TROUBLE SWALLOWING: 0
WHEEZING: 1
SHORTNESS OF BREATH: 1
DIARRHEA: 0
VOMITING: 0
SORE THROAT: 0
ABDOMINAL PAIN: 0
RHINORRHEA: 0
CHEST TIGHTNESS: 0
NAUSEA: 0

## 2017-10-03 NOTE — CARE COORDINATION
esomeprazole (NEXIUM) 40 MG delayed release capsule Take 1 capsule by mouth daily 6/23/17   Alba Russo MD   traMADol (ULTRAM) 50 MG tablet Take 1 tablet by mouth every 6 hours as needed for Pain MAX #50 MONTHLY.  DO NOT GET NARCOTIC MEDICATIONS FROM ANY OTHER PROVIDER. 3/23/17   Starling Love, DO   LORazepam (ATIVAN) 0.5 MG tablet Take 1 tablet by mouth every 8 hours as needed for Anxiety 3/15/17   Alba Russo MD   Cholecalciferol (VITAMIN D3) 28068 UNITS CAPS 1 po twice weekly 3/15/17   Alba Russo MD   formoterol (PERFOROMIST) 20 MCG/2ML nebulizer solution Inhale 2 mLs into the lungs 2 times daily Inhale 2 mLs into the lungs 2/3/16   Historical Provider, MD   meclizine (ANTIVERT) 25 MG tablet 1 po bid x 10 days 2/6/17   Alba Russo MD   lisinopril (PRINIVIL;ZESTRIL) 20 MG tablet Take 1 tab po QD 11/21/16   Historical Provider, MD   atorvastatin (LIPITOR) 40 MG tablet Take 40 mg by mouth daily    Historical Provider, MD   canagliflozin (INVOKANA) 300 MG TABS tablet Take 1 tablet by mouth every morning (before breakfast) 11/28/16   Alba Russo MD   glucose blood VI test strips (ONE TOUCH ULTRA TEST) strip USE TO TEST TWICE A DAY AND AS NEEDED, IDDM - Dx: E11.9 11/28/16   Alba Russo MD   ONE TOUCH LANCETS MISC USE TO TEST TWICE A DAY AND AS NEEDED, IDDM - Dx: E11.9 11/28/16   Alba Russo MD   Blood Glucose Monitoring Suppl MARCELLUS One Touch Ultra - To Test BID - IDDM - Dx: E11.9 11/28/16   Alba Russo MD   metFORMIN (GLUCOPHAGE) 1000 MG tablet TAKE 1 TABLET TWICE A DAY WITH MEALS 11/16/16   Alba Russo MD   guaiFENesin (MUCINEX) 600 MG extended release tablet Take 1 tablet by mouth 2 times daily  Patient taking differently: Take 600 mg by mouth as needed  10/18/16   Brian Mckoy MD   tiotropium (SPIRIVA RESPIMAT) 2.5 MCG/ACT AERS inhaler Inhale 5 mcg into the lungs Inhale 5 mcg into the lungs 7/13/16   Historical Provider, MD   isosorbide mononitrate (IMDUR) 60 MG CR tablet Take 60 mg by mouth daily  12/22/15   Historical Provider, MD   ipratropium-albuterol (DUONEB) 0.5-2.5 (3) MG/3ML SOLN nebulizer solution Inhale 3 mLs into the lungs every 6 hours as needed for Shortness of Breath 1/5/16   Keerthi Junior MD   budesonide (PULMICORT) 0.5 MG/2ML nebulizer suspension Take 1 ampule by nebulization 2 times daily    Historical Provider, MD   Zoledronic Acid (RECLAST IV) Infuse intravenously    Historical Provider, MD   digoxin (DIGOX) 125 MCG tablet Take 125 mcg by mouth daily    Historical Provider, MD   nitroGLYCERIN (NITROSTAT) 0.4 MG SL tablet Place 0.4 mg under the tongue every 5 minutes as needed. Historical Provider, MD   clopidogrel (PLAVIX) 75 MG tablet Take 75 mg by mouth daily. Historical Provider, MD   fenofibrate (TRICOR) 145 MG tablet Take 145 mg by mouth daily.       Historical Provider, MD   verapamil (VERELAN PM) 240 MG CR capsule Take 240 mg by mouth 2 times daily     Historical Provider, MD       Future Appointments  Date Time Provider Espinoza Morgan   10/23/2017 11:15 AM SCHEDULE, LAB CELESTE ACOSTA PCP TC [facility] LAB [facility] EMERGENCY [facility] AT [place]   10/30/2017 11:30 AM MD CELESTE [facility]   11/8/2017 1:00 PM Valente Blue DO [address]   1/3/2018 1:45 PM Ernesto Liriano MD [facility]

## 2017-10-04 ENCOUNTER — CARE COORDINATION (OUTPATIENT)
Dept: CARE COORDINATION | Age: 67
End: 2017-10-04

## 2017-10-04 NOTE — CARE COORDINATION
bloated and increasing her difficulty breathing and to lessen her hunger. She verbalized understanding and is agreeable to trying. I will mail out the information we discussed today. Care Coordination Interventions    Program Enrollment:  Complex Care   Referral from Primary Care Provider:  No   Suggested Interventions and Community Resources   Diabetes Education:  Declined   Zone Management Tools: In Process (Comment: COPD, DM II)             Goals Addressed             Most Recent     Follow Diabetic Diet   Not on track (10/4/2017)             Care Coordination Goal: Nutrition  Goal: I will follow the recommended diet- diabetic Will limit total carbohydrate intake to 11carb servings (165 grams) per day. Barriers: fear of failure, lack of motivation, overwhelmed by complexity of regimen, stress and medication side effects  Plan for overcoming my barriers: I will keep a food diary. I will read food labels. I ill practice carb counting  Confidence: 7/10            Prior to Admission medications    Medication Sig Start Date End Date Taking? Authorizing Provider   theophylline (UNIPHYL) 600 MG extended release tablet TAKE 1 TABLET ONCE DAILY 9/28/17  Yes Dorothy Santoyo MD   albuterol sulfate (PROAIR RESPICLICK) 127 (90 Base) MCG/ACT aerosol powder inhalation Inhale 2 puffs into the lungs every 6 hours as needed for Wheezing or Shortness of Breath 9/28/17  Yes Dorothy Santoyo MD   insulin glargine (LANTUS SOLOSTAR) 100 UNIT/ML injection pen INJECT 70 UNITS UNDER THE  SKIN NIGHTLY 9/26/17  Yes Samm Romo MD   Insulin Pen Needle (B-D UF III MINI PEN NEEDLES) 31G X 5 MM MISC USE 1 DAILY TO INJECT LANTUS 9/26/17  Yes Samm Romo MD   fluocinonide (LIDEX) 0.05 % ointment  8/10/17  Yes Historical Provider, MD   mepolizumab (NUCALA) 100 MG SOLR injection Inject 100 mg into the skin Received from: [id number]: Inject 100 mg subcutaneously one time only.  monthly   Yes Historical

## 2017-10-23 DIAGNOSIS — E11.69 DIABETES MELLITUS TYPE 2 IN OBESE (HCC): ICD-10-CM

## 2017-10-23 DIAGNOSIS — E66.9 DIABETES MELLITUS TYPE 2 IN OBESE (HCC): ICD-10-CM

## 2017-10-23 LAB — HBA1C MFR BLD: 10.5 % (ref 4.8–5.9)

## 2017-10-30 ENCOUNTER — OFFICE VISIT (OUTPATIENT)
Dept: FAMILY MEDICINE CLINIC | Age: 67
End: 2017-10-30

## 2017-10-30 ENCOUNTER — CARE COORDINATOR VISIT (OUTPATIENT)
Dept: CARE COORDINATION | Age: 67
End: 2017-10-30

## 2017-10-30 VITALS
HEART RATE: 88 BPM | WEIGHT: 213.6 LBS | HEIGHT: 61 IN | OXYGEN SATURATION: 97 % | TEMPERATURE: 97.9 F | SYSTOLIC BLOOD PRESSURE: 138 MMHG | BODY MASS INDEX: 40.33 KG/M2 | RESPIRATION RATE: 15 BRPM | DIASTOLIC BLOOD PRESSURE: 68 MMHG

## 2017-10-30 DIAGNOSIS — E11.69 DIABETES MELLITUS TYPE 2 IN OBESE (HCC): Primary | ICD-10-CM

## 2017-10-30 DIAGNOSIS — E66.9 DIABETES MELLITUS TYPE 2 IN OBESE (HCC): Primary | ICD-10-CM

## 2017-10-30 DIAGNOSIS — F41.9 ANXIETY: ICD-10-CM

## 2017-10-30 DIAGNOSIS — M81.0 OSTEOPOROSIS WITHOUT CURRENT PATHOLOGICAL FRACTURE, UNSPECIFIED OSTEOPOROSIS TYPE: ICD-10-CM

## 2017-10-30 PROCEDURE — 99213 OFFICE O/P EST LOW 20 MIN: CPT | Performed by: FAMILY MEDICINE

## 2017-10-30 RX ORDER — ZOLEDRONIC ACID 5 MG/100ML
5 INJECTION, SOLUTION INTRAVENOUS ONCE
Qty: 100 ML | Refills: 0 | Status: SHIPPED | OUTPATIENT
Start: 2017-10-30 | End: 2017-11-08 | Stop reason: ALTCHOICE

## 2017-10-30 RX ORDER — LORAZEPAM 0.5 MG/1
0.5 TABLET ORAL EVERY 8 HOURS PRN
Qty: 30 TABLET | Refills: 0 | Status: SHIPPED | OUTPATIENT
Start: 2017-10-30 | End: 2018-09-07 | Stop reason: SDUPTHER

## 2017-10-30 ASSESSMENT — ENCOUNTER SYMPTOMS: ABDOMINAL PAIN: 0

## 2017-10-30 NOTE — CARE COORDINATION
Admission medications    Medication Sig Start Date End Date Taking? Authorizing Provider   zoledronic acid (RECLAST) 5 MG/100ML SOLN Infuse 100 mLs intravenously once for 1 dose 10/30/17 10/30/17  Deandre Roberts MD   LORazepam (ATIVAN) 0.5 MG tablet Take 1 tablet by mouth every 8 hours as needed for Anxiety 10/30/17   Deandre Roberts MD   theophylline (UNIPHYL) 600 MG extended release tablet TAKE 1 TABLET ONCE DAILY 9/28/17   Alex Lares MD   albuterol sulfate (PROAIR RESPICLICK) 370 (90 Base) MCG/ACT aerosol powder inhalation Inhale 2 puffs into the lungs every 6 hours as needed for Wheezing or Shortness of Breath 9/28/17   Alex Lares MD   insulin glargine (LANTUS SOLOSTAR) 100 UNIT/ML injection pen INJECT 70 UNITS UNDER THE  SKIN NIGHTLY 9/26/17   Deandre Roberts MD   Insulin Pen Needle (B-D UF III MINI PEN NEEDLES) 31G X 5 MM MISC USE 1 DAILY TO INJECT LANTUS 9/26/17   Deandre Roberts MD   fluocinonide (LIDEX) 0.05 % ointment  8/10/17   Historical Provider, MD   mepolizumab (NUCALA) 100 MG SOLR injection Inject 100 mg into the skin Received from: [id number]: Inject 100 mg subcutaneously one time only. monthly    Historical Provider, MD   esomeprazole (NEXIUM) 40 MG delayed release capsule Take 1 capsule by mouth daily 6/23/17   Deandre Roberts MD   traMADol (ULTRAM) 50 MG tablet Take 1 tablet by mouth every 6 hours as needed for Pain MAX #50 MONTHLY.  DO NOT GET NARCOTIC MEDICATIONS FROM ANY OTHER PROVIDER. 3/23/17   Julio Mckeon, DO   Cholecalciferol (VITAMIN D3) 82303 UNITS CAPS 1 po twice weekly 3/15/17   Deanrde Roberts MD   formoterol (PERFOROMIST) 20 MCG/2ML nebulizer solution Inhale 2 mLs into the lungs 2 times daily Inhale 2 mLs into the lungs 2/3/16   Historical Provider, MD   meclizine (ANTIVERT) 25 MG tablet 1 po bid x 10 days 2/6/17   Deandre Roberts MD   lisinopril (PRINIVIL;ZESTRIL) 20 MG tablet Take 1 tab po QD 11/21/16   Historical Provider, MD atorvastatin (LIPITOR) 40 MG tablet Take 40 mg by mouth daily    Historical Provider, MD   canagliflozin (INVOKANA) 300 MG TABS tablet Take 1 tablet by mouth every morning (before breakfast) 11/28/16   Shaquille Remy MD   glucose blood VI test strips (ONE TOUCH ULTRA TEST) strip USE TO TEST TWICE A DAY AND AS NEEDED, IDDM - Dx: E11.9 11/28/16   Shaquille Remy MD   ONE TOUCH LANCETS MISC USE TO TEST TWICE A DAY AND AS NEEDED, IDDM - Dx: E11.9 11/28/16   Shaquille Remy MD   Blood Glucose Monitoring Suppl MARCELLUS One Touch Ultra - To Test BID - IDDM - Dx: E11.9 11/28/16   Shaquille Remy MD   metFORMIN (GLUCOPHAGE) 1000 MG tablet TAKE 1 TABLET TWICE A DAY WITH MEALS 11/16/16   Shaquille Remy MD   zoledronic acid (RECLAST) 5 MG/100ML SOLN Infuse 100 mLs intravenously once for 1 dose 10/31/16 10/30/18  Shaquille Remy MD   guaiFENesin ([address]) 600 MG extended release tablet Take 1 tablet by mouth 2 times daily  Patient taking differently: Take 600 mg by mouth as needed  10/18/16   Danika Jensen MD   tiotropium (SPIRIVA RESPIMAT) 2.5 MCG/ACT AERS inhaler Inhale 5 mcg into the lungs Inhale 5 mcg into the lungs 7/13/16   Historical Provider, MD   isosorbide mononitrate (IMDUR) 60 MG CR tablet Take 60 mg by mouth daily  12/22/15   Historical Provider, MD   ipratropium-albuterol (DUONEB) 0.5-2.5 (3) MG/3ML SOLN nebulizer solution Inhale 3 mLs into the lungs every 6 hours as needed for Shortness of Breath 1/5/16   Shaquille Remy MD   budesonide (PULMICORT) 0.5 MG/2ML nebulizer suspension Take 1 ampule by nebulization 2 times daily    Historical Provider, MD   digoxin (DIGOX) 125 MCG tablet Take 125 mcg by mouth daily    Historical Provider, MD   nitroGLYCERIN (NITROSTAT) 0.4 MG SL tablet Place 0.4 mg under the tongue every 5 minutes as needed. Historical Provider, MD   clopidogrel (PLAVIX) 75 MG tablet Take 75 mg by mouth daily.       Historical Provider, MD   fenofibrate (TRICOR) 145 MG tablet Take 145

## 2017-10-30 NOTE — PROGRESS NOTES
into the skin Received from: [id number]: Inject 100 mg subcutaneously one time only. monthly      esomeprazole (NEXIUM) 40 MG delayed release capsule Take 1 capsule by mouth daily 90 capsule 1    traMADol (ULTRAM) 50 MG tablet Take 1 tablet by mouth every 6 hours as needed for Pain MAX #50 MONTHLY. DO NOT GET NARCOTIC MEDICATIONS FROM ANY OTHER PROVIDER.  50 tablet 0    LORazepam (ATIVAN) 0.5 MG tablet Take 1 tablet by mouth every 8 hours as needed for Anxiety 30 tablet 0    Cholecalciferol (VITAMIN D3) 90804 UNITS CAPS 1 po twice weekly 8 capsule 1    formoterol (PERFOROMIST) 20 MCG/2ML nebulizer solution Inhale 2 mLs into the lungs 2 times daily Inhale 2 mLs into the lungs      meclizine (ANTIVERT) 25 MG tablet 1 po bid x 10 days 20 tablet 0    lisinopril (PRINIVIL;ZESTRIL) 20 MG tablet Take 1 tab po QD      atorvastatin (LIPITOR) 40 MG tablet Take 40 mg by mouth daily      canagliflozin (INVOKANA) 300 MG TABS tablet Take 1 tablet by mouth every morning (before breakfast) 90 tablet 1    glucose blood VI test strips (ONE TOUCH ULTRA TEST) strip USE TO TEST TWICE A DAY AND AS NEEDED, IDDM - Dx: E11.9 100 each 1    ONE TOUCH LANCETS MISC USE TO TEST TWICE A DAY AND AS NEEDED, IDDM - Dx: E11.9 200 each 3    Blood Glucose Monitoring Suppl MARCELLUS One Touch Ultra - To Test BID - IDDM - Dx: E11.9 1 Device 0    metFORMIN (GLUCOPHAGE) 1000 MG tablet TAKE 1 TABLET TWICE A DAY WITH MEALS 180 tablet 0    guaiFENesin (MUCINEX) 600 MG extended release tablet Take 1 tablet by mouth 2 times daily (Patient taking differently: Take 600 mg by mouth as needed ) 20 tablet 0    tiotropium (SPIRIVA RESPIMAT) 2.5 MCG/ACT AERS inhaler Inhale 5 mcg into the lungs Inhale 5 mcg into the lungs      isosorbide mononitrate (IMDUR) 60 MG CR tablet Take 60 mg by mouth daily       ipratropium-albuterol (DUONEB) 0.5-2.5 (3) MG/3ML SOLN nebulizer solution Inhale 3 mLs into the lungs every 6 hours as needed for Shortness of Breath 36 mL 0    budesonide (PULMICORT) 0.5 MG/2ML nebulizer suspension Take 1 ampule by nebulization 2 times daily      Zoledronic Acid (RECLAST IV) Infuse intravenously      digoxin (DIGOX) 125 MCG tablet Take 125 mcg by mouth daily      nitroGLYCERIN (NITROSTAT) 0.4 MG SL tablet Place 0.4 mg under the tongue every 5 minutes as needed.  clopidogrel (PLAVIX) 75 MG tablet Take 75 mg by mouth daily.  fenofibrate (TRICOR) 145 MG tablet Take 145 mg by mouth daily.  verapamil (VERELAN PM) 240 MG CR capsule Take 240 mg by mouth 2 times daily        No current facility-administered medications for this visit. Family History   Problem Relation Age of Onset    High Blood Pressure Mother     Heart Disease Mother     Cancer Father      LUNG    High Blood Pressure Brother      Past Medical History:   Diagnosis Date    Anxiety     Asthma     Back pain     Bronchitis     CAD (coronary artery disease)     Carpal tunnel syndrome     COPD [facility]chronic obstructive pulmonary disease) [facility])     uses CPAP at night    Emphysema of lung ([address]     Fibromyalgia     GERD (gastroesophageal reflux disease)     Hypertension     Hypothyroidism     Obesity     ROGELIO (obstructive sleep apnea)     Osteoarthritis     Osteoporosis     Restless legs syndrome     SOB (shortness of breath)     Type II or unspecified type diabetes mellitus without mention of complication, not stated as uncontrolled     Unspecified sleep apnea     UTI (urinary tract infection)    Controlled Substances Monitoring:     Attestation: The Prescription Monitoring Report for this patient was reviewed today. Traci Miller MD)  Documentation: Medication contract signed today.  Traci Miller MD)  Objective:   BP (!) 144/68 (Site: Left Arm, Position: Sitting, Cuff Size: Large Adult)   Pulse 88   Temp 97.9 °F (36.6 °C) (Tympanic)   Resp 15   Ht 5' 1\" (1.549 m)   Wt 213 lb 9.6 oz (96.9 kg)   LMP  (LMP Unknown)   SpO2 97%   BMI 40.36 kg/m²     Physical Exam      Lungs:clear and equal breath sounds. No wheezes or rales. Heart:rate reg. No murmur. No gallops     Extremities:no edema in either leg  Gen: In no acute distress      Patient with appropriate affect. Alert  Thought content appropriate  Good eye contact  r  Dorsalis pedis and posterior tibial pulses are symmetric. No fissures between the toes. No open sores on the feet. Tactile sensation intact    Assessment:         1. Diabetes mellitus type 2 in obese ([address]   DIABETES FOOT Garcia Martini MD   2. Anxiety     3.  Osteoporosis without current pathological fracture, unspecified osteoporosis type        Plan:    continue medications for now   Orders Placed This Encounter   Procedures   Adithya Luna MD     Referral Priority:   Urgent     Referral Type:   Consult for Advice and Opinion     Referral Reason:   Specialty Services Required     Referred to Provider:   Aiyana Nicholson MD     Requested Specialty:   Endocrinology     Number of Visits Requested:   1     DIABETES FOOT EXAM     Orders Placed This Encounter   Medications    zoledronic acid (RECLAST) 5 MG/100ML SOLN     Sig: Infuse 100 mLs intravenously once for 1 dose     Dispense:  100 mL     Refill:  0     [facility]    LORazepam (ATIVAN) 0.5 MG tablet     Sig: Take 1 tablet by mouth every 8 hours as needed for Anxiety     Dispense:  30 tablet     Refill:  0   f/u in 3 months

## 2017-11-02 ENCOUNTER — TELEPHONE (OUTPATIENT)
Dept: PHARMACY | Facility: CLINIC | Age: 67
End: 2017-11-02

## 2017-11-02 ENCOUNTER — HOSPITAL ENCOUNTER (OUTPATIENT)
Dept: INFUSION THERAPY | Age: 67
Setting detail: INFUSION SERIES
Discharge: HOME | End: 2017-11-02
Payer: MEDICARE

## 2017-11-02 VITALS — HEART RATE: 94 BPM | DIASTOLIC BLOOD PRESSURE: 67 MMHG | SYSTOLIC BLOOD PRESSURE: 152 MMHG | TEMPERATURE: 97.8 F

## 2017-11-02 PROCEDURE — 96365 THER/PROPH/DIAG IV INF INIT: CPT

## 2017-11-02 PROCEDURE — 96374 THER/PROPH/DIAG INJ IV PUSH: CPT

## 2017-11-02 PROCEDURE — 6360000002 HC RX W HCPCS: Performed by: FAMILY MEDICINE

## 2017-11-02 RX ORDER — ZOLEDRONIC ACID 5 MG/100ML
5 INJECTION, SOLUTION INTRAVENOUS ONCE
Status: COMPLETED | OUTPATIENT
Start: 2017-11-02 | End: 2017-11-02

## 2017-11-02 RX ORDER — SODIUM CHLORIDE 0.9 % (FLUSH) 0.9 %
10 SYRINGE (ML) INJECTION PRN
Status: DISCONTINUED | OUTPATIENT
Start: 2017-11-02 | End: 2017-11-03 | Stop reason: HOSPADM

## 2017-11-02 RX ADMIN — ZOLEDRONIC ACID 5 MG: 5 INJECTION, SOLUTION INTRAVENOUS at 14:55

## 2017-11-02 NOTE — TELEPHONE ENCOUNTER
CLINICAL PHARMACY CONSULT: MEDICATION RECONCILIATION/REVIEW    Jaquelin Martinez is a 75 y.o. female referred to clinical pharmacist for Medication Review. Identified as DM care gap for [employer]: statin therapy. NOTE: Current Outpatient Prescriptions   Medication Sig Dispense Refill    atorvastatin (LIPITOR) 40 MG tablet Take 40 mg by mouth daily       Lab Results   Component Value Date    TRIG 220 (H) 05/16/2015    TRIG 457 (H) 04/06/2015    TRIG 175 11/05/2014     Lab Results   Component Value Date    LDLCALC 73 05/16/2015    [address] see below 04/06/2015    LDLCALC 134 (H) 11/05/2014       Lab Results   Component Value Date    ALT 26 10/13/2016    AST 21 10/13/2016    ALKPHOS 91 10/13/2016    BILITOT 0.4 10/13/2016      The 10-year ASCVD risk score ([address] et al., 2013) is: 25.5%    Values used to calculate the score:      Age: 75 years      Sex: Female      Is Non- : No      Diabetic: Yes      Tobacco smoker: No      Systolic Blood Pressure: 109 mmHg      Is BP treated: Yes      HDL Cholesterol: 40 mg/dL      Total Cholesterol: 157 mg/dL     ASSESSMENT:   - Lipids: Patient has a 10-yr ASCVD risk of >7.5% with DM and is therefore a candidate for high-intensity statin therapy based on updated guidelines.   Appears atorvastatin may be prescribed (entered as historical 11/28/16, possibly ordered by an outside cardiologist?), however Chris Jean is reporting patient has not filled any statin therapy in 2017.     PLAN:  - Follow up with patient and/or patient's pharmacy

## 2017-11-02 NOTE — TELEPHONE ENCOUNTER
Maryan Joseph,  Can you please follow up with patient and/or patient's pharmacy to determine if she is taking atorvastatin (or any statin)?     Thank you,  Noni Da Silva, PharmD, [address]  Direct: [phone]  Department, toll free: [phone], option 7

## 2017-11-07 NOTE — TELEPHONE ENCOUNTER
Patient returning below call. States she is taking atorvastatin 20mg daily - from Dr Trevor Damico and uses [facility]. Asked her what the date is on her current bottle - she states it's 11/19/16 - asked if she's run out, but she states she pours new bottles into old bottles and hasn't run out. Advised her to avoid doing that and that her insurance is reporting she hasn't filled atorvastatin all year - states she just saw cardiology last week and received all new rx's that are being sent to her from [facility]. Is certain that atorvastatin is included in these. Asked that she call back if atorvastatin is not in the prescriptions once received.

## 2017-11-08 ENCOUNTER — OFFICE VISIT (OUTPATIENT)
Dept: PHYSICAL MEDICINE AND REHAB | Age: 67
End: 2017-11-08

## 2017-11-08 VITALS
BODY MASS INDEX: 39.84 KG/M2 | DIASTOLIC BLOOD PRESSURE: 76 MMHG | SYSTOLIC BLOOD PRESSURE: 156 MMHG | WEIGHT: 211 LBS | HEIGHT: 61 IN

## 2017-11-08 DIAGNOSIS — G89.29 CHRONIC BILATERAL LOW BACK PAIN WITH BILATERAL SCIATICA: ICD-10-CM

## 2017-11-08 DIAGNOSIS — M54.2 NECK PAIN: ICD-10-CM

## 2017-11-08 DIAGNOSIS — M79.10 MYALGIA: ICD-10-CM

## 2017-11-08 DIAGNOSIS — M54.42 CHRONIC BILATERAL LOW BACK PAIN WITH BILATERAL SCIATICA: ICD-10-CM

## 2017-11-08 DIAGNOSIS — G89.29 CHRONIC THORACIC SPINE PAIN: ICD-10-CM

## 2017-11-08 DIAGNOSIS — Z79.899 HIGH RISK MEDICATION USE: Primary | ICD-10-CM

## 2017-11-08 DIAGNOSIS — M51.36 DDD (DEGENERATIVE DISC DISEASE), LUMBAR: ICD-10-CM

## 2017-11-08 DIAGNOSIS — M81.8 OTHER OSTEOPOROSIS, UNSPECIFIED PATHOLOGICAL FRACTURE PRESENCE: ICD-10-CM

## 2017-11-08 DIAGNOSIS — G89.29 CHRONIC LEFT-SIDED LOW BACK PAIN WITH LEFT-SIDED SCIATICA: ICD-10-CM

## 2017-11-08 DIAGNOSIS — G56.03 BILATERAL CARPAL TUNNEL SYNDROME: ICD-10-CM

## 2017-11-08 DIAGNOSIS — M54.42 CHRONIC LEFT-SIDED LOW BACK PAIN WITH LEFT-SIDED SCIATICA: ICD-10-CM

## 2017-11-08 DIAGNOSIS — R07.81 RIB PAIN ON RIGHT SIDE: ICD-10-CM

## 2017-11-08 DIAGNOSIS — G56.82 SUPRASCAPULAR ENTRAPMENT NEUROPATHY OF LEFT SIDE: ICD-10-CM

## 2017-11-08 DIAGNOSIS — M54.41 CHRONIC BILATERAL LOW BACK PAIN WITH BILATERAL SCIATICA: ICD-10-CM

## 2017-11-08 DIAGNOSIS — M54.6 CHRONIC THORACIC SPINE PAIN: ICD-10-CM

## 2017-11-08 PROCEDURE — 99213 OFFICE O/P EST LOW 20 MIN: CPT | Performed by: PHYSICAL MEDICINE & REHABILITATION

## 2017-11-08 RX ORDER — HYDROCHLOROTHIAZIDE 25 MG/1
1 TABLET ORAL DAILY
COMMUNITY
Start: 2017-11-06 | End: 2018-05-03

## 2017-11-08 RX ORDER — NITROGLYCERIN 400 UG/1
1 SPRAY ORAL PRN
COMMUNITY
Start: 2017-11-07

## 2017-11-08 RX ORDER — ATORVASTATIN CALCIUM 80 MG/1
1 TABLET, FILM COATED ORAL NIGHTLY
COMMUNITY
Start: 2017-11-06

## 2017-11-08 RX ORDER — IPRATROPIUM BROMIDE 42 UG/1
2 SPRAY, METERED NASAL 2 TIMES DAILY PRN
COMMUNITY
Start: 2017-11-07 | End: 2019-04-18 | Stop reason: ALTCHOICE

## 2017-11-08 ASSESSMENT — ENCOUNTER SYMPTOMS
ALLERGIC/IMMUNOLOGIC NEGATIVE: 1
BACK PAIN: 1
CHEST TIGHTNESS: 0
SHORTNESS OF BREATH: 1
ABDOMINAL PAIN: 0
VOMITING: 0
PHOTOPHOBIA: 0
NAUSEA: 1
CHOKING: 0
CONSTIPATION: 0
DIARRHEA: 0
WHEEZING: 1
EYES NEGATIVE: 1
APNEA: 1
STRIDOR: 0

## 2017-11-08 NOTE — PROGRESS NOTES
Subjective  Rachael Rubio, 75 y.o. female presents today with:  Back Pain TP injections 09/11/17 gave 85-90% reduction in pain lasting 3 weeks and would like to schedule for more     Neck Pain     Leg Pain bilateral    Discuss Medications Tramadol, did not bring medication and advised to bring to every F/U appt. STill doing well with meds /Ultram and injections- she rarely takes the Ativan that he is on. Still no signs of drug abuse or misuse. Back Pain   This is a chronic problem. The current episode started more than 1 year ago. The problem occurs constantly. The problem is unchanged. The pain is present in the lumbar spine, thoracic spine and gluteal. Radiates to: Bilateral legs-pain will alternate from leg to leg. The pain is at a severity of 7/10. The pain is moderate. The pain is the same all the time. The symptoms are aggravated by lying down and sitting. Associated symptoms include leg pain, numbness and weakness. Pertinent negatives include no abdominal pain, chest pain, headaches, paresis, pelvic pain or tingling. Risk factors include history of osteoporosis, obesity and lack of exercise. She has tried home exercises (Tramadol, Tylenol, PT 8 sessions, Injections ) for the symptoms. The treatment provided significant relief. Neck Pain    This is a chronic problem. The current episode started more than 1 year ago. The problem occurs intermittently. The problem has been unchanged. The pain is present in the midline, left side and right side. The quality of the pain is described as aching. The pain is at a severity of 9/10. The pain is severe. The symptoms are aggravated by bending, position and twisting. Stiffness is present all day. Associated symptoms include leg pain, numbness and weakness. Pertinent negatives include no chest pain, headaches, pain with swallowing, paresis, photophobia or tingling.  She has tried home exercises and heat (Tramadol, Tylenol, PT 15 sessions, History Main Topics    Smoking status: Former Smoker     Packs/day: 1.50     Years: 32.00     Types: Cigarettes     Quit date: 2/21/1999    Smokeless tobacco: Never Used    Alcohol use No    Drug use: No    Sexual activity: Yes     Partners: Male     Other Topics Concern    None     Social History Narrative    None     Family History   Problem Relation Age of Onset    High Blood Pressure Mother     Heart Disease Mother     Cancer Father      LUNG    High Blood Pressure Brother        Allergies   Allergen Reactions    Biaxin [Clarithromycin]     Codeine     Morphine Nausea And Vomiting     Effects stomach    Percocet [Oxycodone-Acetaminophen] Nausea And Vomiting       Review of Systems   Constitutional: Positive for fatigue. Negative for activity change. HENT: Positive for postnasal drip. Negative for congestion. Eyes: Negative. Negative for photophobia. Respiratory: Positive for apnea, shortness of breath and wheezing. Negative for choking, chest tightness and stridor. Cardiovascular: Negative for chest pain, palpitations and leg swelling. Gastrointestinal: Positive for nausea. Negative for abdominal pain, constipation, diarrhea and vomiting. Endocrine: Positive for heat intolerance. Negative for cold intolerance. Genitourinary: Negative. Negative for pelvic pain. Musculoskeletal: Positive for arthralgias, back pain, joint swelling, neck pain and neck stiffness. Negative for gait problem. Skin: Negative. Allergic/Immunologic: Negative. Neurological: Positive for weakness, light-headedness and numbness. Negative for dizziness, tingling and headaches. Hematological: Bruises/bleeds easily. Psychiatric/Behavioral: Positive for sleep disturbance. Negative for dysphoric mood. The patient is nervous/anxious.         Objective    Vitals:    11/08/17 1309 11/08/17 1311   BP: (!) 160/80 (!) 156/76   Weight: 211 lb (95.7 kg)    Height: 5' 1\" (1.549 m)      Pain Score: SEVEN normal.        Cervical back: She exhibits tenderness, pain and spasm. She exhibits no bony tenderness and no swelling. Thoracic back: She exhibits tenderness and spasm. Lumbar back: She exhibits decreased range of motion, tenderness, bony tenderness and pain. She exhibits no swelling, no edema, no deformity, no laceration and normal pulse. Back:         Right upper arm: Normal.        Left upper arm: Normal.        Right forearm: Normal.        Left forearm: Normal.        Arms:       Right hand: Normal.        Left hand: Normal.        Right upper leg: Normal.        Left upper leg: Normal.        Right lower leg: Normal.        Left lower leg: Normal.        Legs:       Right foot: Normal.        Left foot: Normal.   Tender areas are indicated by numbered spot         Lymphadenopathy:     She has no cervical adenopathy. Neurological: She is alert and oriented to person, place, and time. She displays abnormal reflex. She displays no atrophy and no tremor. No cranial nerve deficit or sensory deficit. She exhibits normal muscle tone. Coordination normal. She displays no Babinski's sign on the right side. She displays no Babinski's sign on the left side. Skin: Skin is warm, dry and intact. No abrasion, no bruising, no ecchymosis, no laceration, no petechiae and no rash noted. Rash is not macular, not pustular and not urticarial. She is not diaphoretic. No cyanosis or erythema. No pallor. Nails show no clubbing. Psychiatric: She has a normal mood and affect. Her behavior is normal. Judgment and thought content normal. Her mood appears not anxious. Her affect is not angry, not blunt, not labile and not inappropriate. Her speech is not rapid and/or pressured, not delayed, not tangential and not slurred. She is not agitated, not aggressive, not hyperactive, not slowed, not withdrawn, not actively hallucinating and not combative.  Thought content is not paranoid and not delusional. Cognition and memory are normal. Cognition and memory are not impaired. She does not express impulsivity or inappropriate judgment. She does not exhibit a depressed mood. She expresses no homicidal and no suicidal ideation. She expresses no suicidal plans and no homicidal plans. She is communicative. She exhibits normal recent memory and normal remote memory. She is attentive. Vitals reviewed. Ortho Exam  Neurologic Exam     Mental Status   Oriented to person, place, and time. Speech: not slurred   Level of consciousness: alert  Knowledge: good. Able to name object. Able to read. Able to repeat. Able to write. Cranial Nerves     CN III, IV, VI   Pupils are equal, round, and reactive to light. Extraocular motions are normal.     Motor Exam   Muscle bulk: normal  Overall muscle tone: normal      After a thorough review and discussion of the previous medical records, patient comprehensive medical, surgical, and family and social history, Review of Systems, their OARRS, their Screener and Opioid Assessment for Patients with Pain (SOAPP®-R), recent diagnostics, and symptomatic results to previous treatment, it is my impression that the patients is suffering with progressive and severe:    1. High risk medication use - OARRS PM&R 5/2217, 08/5/17 OARRS PM&R, MED CONTRACT 1/26/17     2. Other osteoporosis, unspecified pathological fracture presence     3. Chronic left-sided low back pain with left-sided sciatica     4. Bilateral carpal tunnel syndrome     5. DDD (degenerative disc disease), lumbar     6.  Myalgia  20553 - MD INJECT TRIGGER POINTS, 3 OR GREATER    07096 - MD INJECT TRIGGER POINTS, 3 OR GREATER       I am also concerned by lifestyle and mood issues including:    Past Medical History:   Diagnosis Date    Anxiety     Asthma     Back pain     Bronchitis     CAD (coronary artery disease)     Carpal tunnel syndrome     COPD (chronic obstructive pulmonary disease) (HCC)     uses CPAP at night    Emphysema of lung (HCC)     Fibromyalgia     GERD (gastroesophageal reflux disease)     Hypertension     Hypothyroidism     Obesity     ROGELIO (obstructive sleep apnea)     Osteoarthritis     Osteoporosis     Restless legs syndrome     SOB (shortness of breath)     Type II or unspecified type diabetes mellitus without mention of complication, not stated as uncontrolled     Unspecified sleep apnea     UTI (urinary tract infection)            Given their medication, chronic pain and lifestyle and medications they are at risk for :    Falls, constipation, addiction  Loss of livelyhood due to severe pain, debility, weight gain and  vitamin D deficiency    The patient was educated regarding proper diet, fitness routine, and regulatory restrictions concerning pain medications. Previous notes, comprehensive past medical, surgical, family history, and diagnostics were reviewed. Patient education and councelling were provided regarding off label use,treatment options and medication and injection risks. Current and old OARRS ([place] Automated Prescription Reporting System) records reviewed, all refills reviewed since last visit,  Behavioral agreement/BARB regulations   and Toxicology screen was reviewed with patient and is up to date. There are no current red flags. They are making good progress regarding pain relief, they are performing at a functional level regarding activities of daily living and psychological functioning, they're not having any adverse effects or side effects from the current medications, and I see no findings of aberrant drug taking her addiction related behaviors. Attestation: The Prescription Monitoring Report for this patient was reviewed today. (Livia Cancer, DO)  Documentation: No signs of potential drug abuse or diversion identified., Existing medication contract. (Livia Cancer, DO)       Patient is currently taking:       I am having Ms. Cancino maintain her verapamil,

## 2017-11-09 ENCOUNTER — PROCEDURE VISIT (OUTPATIENT)
Dept: PHYSICAL MEDICINE AND REHAB | Age: 67
End: 2017-11-09

## 2017-11-09 DIAGNOSIS — M79.7 FIBROMYALGIA: Primary | ICD-10-CM

## 2017-11-09 DIAGNOSIS — M79.10 MYALGIA: ICD-10-CM

## 2017-11-09 PROCEDURE — 96372 THER/PROPH/DIAG INJ SC/IM: CPT | Performed by: PHYSICAL MEDICINE & REHABILITATION

## 2017-11-09 PROCEDURE — 20553 NJX 1/MLT TRIGGER POINTS 3/>: CPT | Performed by: PHYSICAL MEDICINE & REHABILITATION

## 2017-11-09 NOTE — TELEPHONE ENCOUNTER
Noted per outside meds:   Dispensed Written Strength Form Quantity Refills Days Supply Provider Pharmacy   ATORVASTATIN TAB 80MG 11/06/2017  80  90  [address]  [place]       =======================================================   For Pharmacy Admin Tracking Only    PHSO: Yes  Total # of Interventions Recommended: 1  - Refills Provided #: 1  - Maintenance Safety Lab Monitoring #: 1  Total Interventions Accepted: 1  Time Spent (min): 5

## 2017-11-15 ENCOUNTER — HOSPITAL ENCOUNTER (OUTPATIENT)
Dept: OCCUPATIONAL THERAPY | Age: 67
Setting detail: THERAPIES SERIES
Discharge: HOME | End: 2017-11-15
Payer: MEDICARE

## 2017-11-15 PROCEDURE — 97166 OT EVAL MOD COMPLEX 45 MIN: CPT

## 2017-11-15 PROCEDURE — G8991 OTHER PT/OT GOAL STATUS: HCPCS

## 2017-11-15 PROCEDURE — G8990 OTHER PT/OT CURRENT STATUS: HCPCS

## 2017-11-15 NOTE — PROGRESS NOTES
[facility] CHRONIC PAIN EVALUATION                                                                                                                   DATE: 11/15/2017                                                                   PHYSICIAN: Bryan                                                                      PATIENT NAME;Veronica Cancino                                              DIAGNOSIS: Multi site pain, osteoporosis, DDD, R rib pain ,chronic low back pain,Suprascapular entrapment neuropathy L side, generalized myalgia    MRN: [id number]    ACCOUNT#: [de-identified]                                                     :1950                     (76 y.o.)                          MEDICATIONS -tramadol    GENDER:  female                PRECAUTIONS:             Osteoporosis, DMII,  COPD    Pt carries portable O2                                      HAND DOMINANCE:right    PRIOR LEVEL OF FUNCTION:        Independent                               DATE OF SX OR INJURy ongoing chronic issues    PREVIOUS/CURRENT TREATMENT FOR SAME PROBLEM:    [x]   YES []     NO approx 2 years ago  CHIEF COMPLAINT: Pt reports neck, L shoulder  And mid thoracic pain primarily posterior ribcage\" where the bumps are\"      PERTINENT MEDICAL HISTORY: As already noted and including L and right ankle fractures remote with fixators still present in the right, Thyroidectomy      SOCIAL SUPPORT: Primary Homemaker but  assists.        ADL/HOBBIES/ROUTINES:Pt reports independent in ADLS , enjoys reading  And spending time with 5 grandchildren (3 local), plans to return pulmonary rehab in a couple of weeks       FALLS: see falls risk assessment form      PAIN SCALE:                    7 /10           Tramadol was not taken               LOCATION:upperback      POSTURAL EXAM/COMPENSATION: Pt noted in standing to have  Large abdominal apron pulling Pt forward and creating mod to sever lumbar and thoracic strain. Pt has hypersensitive lower posterior ribcage, B forward shoulders and head with mild B shoulder elevation    Midline standing tolerance only 2 min  Before having to shift position    LUMBAR/SACROILIAC ASSESSMENT: Pt demonstrated poor mobility throughout the entire lumbosacral system             LEG LENGTH COMPARISON:In supine ( Pt has poor tolerance of supine because of COPD) ASIS difficult to assess secondary to large apron of soft tissue but Pt does have approx 1/2\" leg length discrepancy on the Left in supine. NEUROLOGICAL ASSESSMENT: WNL    with exception of L suprascapular nerve entrapment    SOFT TISSUE ASSESSMENT/MOBILITY: Poor multidirectional soft tissue mobility throughout trunk and ribcage      SCAR/LOCATION/MOBILITY none noted      COMMENTS/CLINICAL IMPRESSIONS: Pt has multiple medical challenges that are chronic and will affect her functional levels. Pt is motivated to improve but did not continue with HEP given in last course of treatment approximately 2 years ago           [address]: __2___ X WEEK X __5___WEEKS OR #____10____VISITS. Complexity:       []  Low Complexity  ¨ History: Brief history including review of medical records relating to the problem  ¨ Exam: 1-3 performance Deficits  ¨ Assistance/Modification: No assistance or modifications required to perform tasks. No comorbities affecting occupational performance  [x]  Medium Complexity  ¨ History: Expanded review of medical records and additional review of physical, cognitive, or psychosocial history related to current functional performance  ¨ Exam: 3-5 performance deficits  ¨ Assistance/Modification: Min/mod assistance or modifications required to perform tasks. May have comorbidities that affect occupational performance.   []  High Complexity  ¨ History: Extensive review of medical records and additional review of physical, cognitive, or psychosocial history related to current functional performance. ¨ Exam: 5 or more performance deficits  ¨ Assistance/Modification: Significant assistance or modifications required to perform tasks. Have comorbidities that affect occupational performance. Pt wants to \"be able to do more stuff\" \"make the bed\" \" do more things around\"    LONG TERM GOALS:          1. Pt will complete daily homemaking tasks using proper body mechanics following education      2. Reduce restrictions of posterior ribcage and L shoulder to increase performance of daily homemaking tasks with decreased pain. 3.Pt will have sustained standing tolerance of 10 or more minutes in midline to allow her to complete homemaking    tasks. Also balance pelvis to further support this activity       4. Pt will follow HEP as instructed with updates as Pt improves                PROBLEMS;    [x]  PAIN                                             [x]   IMPAIRED SKIN/TISSUE MOBILITY               []  EDEMA                                         []    IMP AIRED SENSATION    []  IMPAIRED ROM                           []   DECREASED STRENGTH    []  IMPAIRED FLEXIBILITY              []    IMPAIRED COORDINATION/DEXTERITY    [x]  DECREASED KNOWLEDGE       []  DECREASED USE ___UE FOR ADL/WORK         HEP                                                     ROLE PERFORMANCE    [x]    OTHER decreased stand tolerance for activities, along with decreased endurance    TREATMENT PLAN:    []    Re-evaluation                       [x]  stretching/AROM    [x]    Instruction written HEP         []  Desensitization    [x]    MFR techniques                   []  Strengthening/Graded therapeutic UE    [x]  Pain Mgmt. Tech. Activity    []  Edema mgmt. Tech. []  Coordination/Dexterity retraining    []  Scar Mgmt.                              [x]  Modalities    [x]  Work Simulation Activity         []  ADL Retraining    [x] Age:                                                           Falls:                                                          PMH:                                                           Mental:                                                      Total:                                                         *Patient 4 or younger []   Vestibular []    Signature:                       THERAPY TIME INDIVIDUAL                                       TIME IN 1500                                      TIME OUT 1600                                       MINUTES 60                                  Electronically signed by:  AMI Sharif/CHAI                    Date: 11/15/2017, 6:15 PM        I HEREBY AGREE TO THIS PLAN OF CARE AS MEDICALLY NECESSARY:     Physician signature                                                               Date

## 2017-11-27 ENCOUNTER — HOSPITAL ENCOUNTER (OUTPATIENT)
Dept: OCCUPATIONAL THERAPY | Age: 67
Setting detail: THERAPIES SERIES
Discharge: HOME | End: 2017-11-27
Payer: MEDICARE

## 2017-11-27 PROCEDURE — 97140 MANUAL THERAPY 1/> REGIONS: CPT

## 2017-11-27 PROCEDURE — 97530 THERAPEUTIC ACTIVITIES: CPT

## 2017-11-29 ENCOUNTER — CARE COORDINATOR VISIT (OUTPATIENT)
Dept: CARE COORDINATION | Age: 67
End: 2017-11-29

## 2017-11-29 ENCOUNTER — OFFICE VISIT (OUTPATIENT)
Dept: FAMILY MEDICINE CLINIC | Age: 67
End: 2017-11-29

## 2017-11-29 VITALS
HEART RATE: 93 BPM | OXYGEN SATURATION: 93 % | SYSTOLIC BLOOD PRESSURE: 138 MMHG | DIASTOLIC BLOOD PRESSURE: 82 MMHG | TEMPERATURE: 97.5 F | BODY MASS INDEX: 39.84 KG/M2 | WEIGHT: 211 LBS | HEIGHT: 61 IN | RESPIRATION RATE: 14 BRPM

## 2017-11-29 DIAGNOSIS — J44.1 COPD WITH ACUTE EXACERBATION (HCC): Primary | ICD-10-CM

## 2017-11-29 PROCEDURE — 99213 OFFICE O/P EST LOW 20 MIN: CPT | Performed by: NURSE PRACTITIONER

## 2017-11-29 RX ORDER — PREDNISONE 10 MG/1
TABLET ORAL
Qty: 30 TABLET | Refills: 0 | Status: SHIPPED | OUTPATIENT
Start: 2017-11-29 | End: 2018-02-08 | Stop reason: ALTCHOICE

## 2017-11-29 RX ORDER — INSULIN GLARGINE 100 [IU]/ML
INJECTION, SOLUTION SUBCUTANEOUS
Qty: 45 ML | Refills: 0 | Status: SHIPPED | OUTPATIENT
Start: 2017-11-29 | End: 2018-01-16 | Stop reason: SDUPTHER

## 2017-11-29 RX ORDER — FLUCONAZOLE 150 MG/1
150 TABLET ORAL DAILY
Qty: 3 TABLET | Refills: 0 | Status: SHIPPED | OUTPATIENT
Start: 2017-11-29 | End: 2017-12-07

## 2017-11-29 RX ORDER — GUAIFENESIN 1200 MG/1
1200 TABLET, EXTENDED RELEASE ORAL 2 TIMES DAILY
Qty: 30 TABLET | Refills: 1 | Status: SHIPPED | OUTPATIENT
Start: 2017-11-29 | End: 2018-05-17

## 2017-11-29 RX ORDER — LEVOFLOXACIN 500 MG/1
500 TABLET, FILM COATED ORAL DAILY
Qty: 7 TABLET | Refills: 0 | Status: SHIPPED | OUTPATIENT
Start: 2017-11-29 | End: 2017-12-06

## 2017-11-29 ASSESSMENT — PATIENT HEALTH QUESTIONNAIRE - PHQ9
SUM OF ALL RESPONSES TO PHQ9 QUESTIONS 1 & 2: 0
1. LITTLE INTEREST OR PLEASURE IN DOING THINGS: 0
SUM OF ALL RESPONSES TO PHQ QUESTIONS 1-9: 0

## 2017-11-29 ASSESSMENT — ENCOUNTER SYMPTOMS: DYSPNEA ASSOCIATED WITH: MINIMAL EXERTION

## 2017-11-29 NOTE — CARE COORDINATION
CATHLEEN Serna   atorvastatin (LIPITOR) 80 MG tablet Take 1 tablet by mouth nightly 11/6/17   Historical Provider, MD   hydrochlorothiazide (HYDRODIURIL) 25 MG tablet Take 1 tablet by mouth daily 11/6/17   Historical Provider, MD   ipratropium (ATROVENT) 0.06 % nasal spray 2 sprays by Nasal route 2 times daily as needed 11/7/17   Historical Provider, MD   nitroGLYCERIN (NITROLINGUAL) 0.4 MG/SPRAY 0.4 mg spray  11/7/17   Historical Provider, MD   LORazepam (ATIVAN) 0.5 MG tablet Take 1 tablet by mouth every 8 hours as needed for Anxiety 10/30/17   Froylan Hawthorne MD   theophylline (UNIPHYL) 600 MG extended release tablet TAKE 1 TABLET ONCE DAILY 9/28/17   Lou Cai MD   albuterol sulfate (PROAIR RESPICLICK) 003 (90 Base) MCG/ACT aerosol powder inhalation Inhale 2 puffs into the lungs every 6 hours as needed for Wheezing or Shortness of Breath 9/28/17   Lou Cai MD   Insulin Pen Needle (B-D UF III MINI PEN NEEDLES) 31G X 5 MM MISC USE 1 DAILY TO INJECT LANTUS 9/26/17   Froylan Hawthorne MD   fluocinonide (LIDEX) 0.05 % ointment  8/10/17   Historical Provider, MD   mepolizumab (NUCALA) 100 MG SOLR injection Inject 100 mg into the skin Received from: [address]: Inject 100 mg subcutaneously one time only. monthly    Historical Provider, MD   esomeprazole (NEXIUM) 40 MG delayed release capsule Take 1 capsule by mouth daily 6/23/17   Froylan Hawthorne MD   traMADol (ULTRAM) 50 MG tablet Take 1 tablet by mouth every 6 hours as needed for Pain MAX #50 MONTHLY.  DO NOT GET NARCOTIC MEDICATIONS FROM ANY OTHER PROVIDER. 3/23/17   Maria Luisa Diana,    Cholecalciferol (VITAMIN D3) 43670 UNITS CAPS 1 po twice weekly 3/15/17   Froylan Hawthorne MD   formoterol (PERFOROMIST) 20 MCG/2ML nebulizer solution Inhale 2 mLs into the lungs 2 times daily Inhale 2 mLs into the lungs 2/3/16   Historical Provider, MD   meclizine (ANTIVERT) 25 MG tablet 1 po bid x 10 days 2/6/17   Froylan Hawthorne MD lisinopril (PRINIVIL;ZESTRIL) 20 MG tablet Take 1 tab po QD 11/21/16   Historical Provider, MD   canagliflozin (INVOKANA) 300 MG TABS tablet Take 1 tablet by mouth every morning (before breakfast) 11/28/16   Maya Hall MD   glucose blood VI test strips (ONE TOUCH ULTRA TEST) strip USE TO TEST TWICE A DAY AND AS NEEDED, IDDM - Dx: E11.9 11/28/16   Maya Hall MD   ONE TOUCH LANCETS MISC USE TO TEST TWICE A DAY AND AS NEEDED, IDDM - Dx: E11.9 11/28/16   Maya Hall MD   Blood Glucose Monitoring Suppl MARCELLUS One Touch Ultra - To Test BID - IDDM - Dx: E11.9 11/28/16   Maya Hall MD   metFORMIN (GLUCOPHAGE) 1000 MG tablet TAKE 1 TABLET TWICE A DAY WITH MEALS 11/16/16   Maya Hall MD   zoledronic acid (RECLAST) 5 MG/100ML SOLN Infuse 100 mLs intravenously once for 1 dose 10/31/16 10/30/18  Maya Hall MD   guaiFENesin ([place] [address]) 600 MG extended release tablet Take 1 tablet by mouth 2 times daily  Patient taking differently: Take 600 mg by mouth as needed  10/18/16   Michael Alicea MD   tiotropium (SPIRIVA RESPIMAT) 2.5 MCG/ACT AERS inhaler Inhale 5 mcg into the lungs Inhale 5 mcg into the lungs 7/13/16   Historical Provider, MD   isosorbide mononitrate (IMDUR) 60 MG CR tablet Take 60 mg by mouth daily  12/22/15   Historical Provider, MD   ipratropium-albuterol (DUONEB) 0.5-2.5 (3) MG/3ML SOLN nebulizer solution Inhale 3 mLs into the lungs every 6 hours as needed for Shortness of Breath 1/5/16   Maya Hall MD   budesonide (PULMICORT) 0.5 MG/2ML nebulizer suspension Take 1 ampule by nebulization 2 times daily    Historical Provider, MD   digoxin (DIGOX) 125 MCG tablet Take 125 mcg by mouth daily    Historical Provider, MD   nitroGLYCERIN (NITROSTAT) 0.4 MG SL tablet Place 0.4 mg under the tongue every 5 minutes as needed. Historical Provider, MD   clopidogrel (PLAVIX) 75 MG tablet Take 75 mg by mouth daily.       Historical Provider, MD   fenofibrate (TRICOR) 145 MG tablet Take 145 mg by mouth daily.       Historical Provider, MD   verapamil (VERELAN PM) 240 MG CR capsule Take 240 mg by mouth 2 times daily     Historical Provider, MD       Future Appointments  Date Time Provider Espinoza Morgan   11/30/2017 1:00 PM Daniella Left, OTR/L [address] [facility]   12/5/2017 1:00 PM Daniella Left, OTR/L [address] [facility]   12/7/2017 1:00 PM [facility]h [facility]   12/7/2017 2:00 PM Margarita Purvisción 70 AM OT [address] [place] [address]   12/11/2017 11:30 AM [facility]   12/13/2017 1:00 PM Margarita Purvisción 70 AM OT [address] [place] [address]   12/14/2017 11:30 AM Logan Lr MD [facility]   12/15/2017 1:00 PM Margarita Purvisción 70 AM OT [address] [place] [address]   12/18/2017 1:00 PM Margarita Purvisción 70 AM OT [address] [place] [address]   12/20/2017 1:00 PM Bill Aguilar TOPHER MLOZ AM OT [address] [place] [address]   12/21/2017 10:00 AM Noel Golria MD [facility]   12/27/2017 1:00 PM Margarita Purvisción 70 AM OT [address] [place] [address]   12/29/2017 1:00 PM Bill Aguilar TOPHER MLOZ AM OT [address] [place] [address]   1/3/2018 1:45 PM Ronnie Prince MD [address]   1/30/2018 1:00 PM Logan Lr MD [facility]   2/8/2018 1:30 PM [facility]

## 2017-11-30 ENCOUNTER — HOSPITAL ENCOUNTER (OUTPATIENT)
Dept: OCCUPATIONAL THERAPY | Age: 67
Setting detail: THERAPIES SERIES
Discharge: HOME | End: 2017-11-30
Payer: MEDICARE

## 2017-11-30 ENCOUNTER — TELEPHONE (OUTPATIENT)
Dept: FAMILY MEDICINE CLINIC | Age: 67
End: 2017-11-30

## 2017-11-30 PROCEDURE — 97140 MANUAL THERAPY 1/> REGIONS: CPT

## 2017-11-30 RX ORDER — ZOLEDRONIC ACID 5 MG/100ML
5 INJECTION, SOLUTION INTRAVENOUS ONCE
Qty: 100 ML | Refills: 0 | Status: SHIPPED | OUTPATIENT
Start: 2017-11-30 | End: 2017-12-04 | Stop reason: SDUPTHER

## 2017-11-30 NOTE — PROGRESS NOTES
Occupational Therapy  Daily Treatment Note  Date: 2017  Patient Name: Jv Miguel  :  1950  MRN: [id number]       Subjective Pt reports pain 4-5/10 in neck, and low back. \"I have been able to lay on my back at home. \"  OT Visit Information  Progress Note Counter: 2      Treatment Activities:  Pt provided with MFR tech in side lying and supine on mat. Pt provided with cross hand releases over upper, lower back, and right rib cage. Pt provided with right leg pull in side lying. Pt in supine provided with occipital release and bilateral arm pulls and left leg pull. Pt reports that she was surprised to be able to be positioned on her back this long in a supine position due to her typical inability to properly breath in that position. Pt reports 4/10 pain at end of session in base of left neck and left lower rib cage. Pt tolerated. Assessment Pt making gains to decrease pain for functional acts.                Plan           G-Code     OutComes Score                                           Goals       Therapy Time   Individual Concurrent Group Co-treatment   Time In 1300         Time Out 1400         Minutes 61                 AMI Herrera/L Electronically signed by Kristopher Hatchet, OTR/L on  at 2:22 PM

## 2017-12-04 RX ORDER — ZOLEDRONIC ACID 5 MG/100ML
5 INJECTION, SOLUTION INTRAVENOUS ONCE
Qty: 100 ML | Refills: 0 | Status: SHIPPED | OUTPATIENT
Start: 2017-12-04 | End: 2017-12-07 | Stop reason: SDUPTHER

## 2017-12-05 ENCOUNTER — HOSPITAL ENCOUNTER (OUTPATIENT)
Dept: OCCUPATIONAL THERAPY | Age: 67
Setting detail: THERAPIES SERIES
Discharge: HOME | End: 2017-12-05
Payer: MEDICARE

## 2017-12-05 PROCEDURE — 97530 THERAPEUTIC ACTIVITIES: CPT

## 2017-12-05 PROCEDURE — 97140 MANUAL THERAPY 1/> REGIONS: CPT

## 2017-12-05 NOTE — PROGRESS NOTES
tissue mobility    Lower Extremities:                                []      Suprapatellar & Quadriceps          []  Hamstrings                                []      Upper Quads soft tissue mob. [x] R  Leg pull in elevated sidelying                                []       Bilateral Leg pull                           []  Soft tissue mobility                                                                                                []  Transverse Planes        Following direct tx Pt was instructed in R elevated sidelying for 3-5 min 2xperday and in seated rotation to the L with R cross midline reaching all as part of her HEP.  Pt demonstrated understanding and repeat demonstrated activities              Assessment pt tolerated tx well  And noted \"better breathing and less pain\" following tx               Plan As per tx plan                       Goals As per evaluation       Therapy Time   Individual Concurrent Group Co-treatment   Time In  1300         Time Out  1400         Minutes  60               Electronically signed by AMI Ortiz/L on 12/5/2017 at 5:44 PM  Barney Han OTR/CHAI

## 2017-12-06 ASSESSMENT — ENCOUNTER SYMPTOMS
SORE THROAT: 0
WHEEZING: 1
ASSOCIATED PULMONARY SYMPTOMS: WHEEZE
RHINORRHEA: 0
TROUBLE SWALLOWING: 0
COUGH: 1
HEARTBURN: 0

## 2017-12-06 ASSESSMENT — COPD QUESTIONNAIRES: COPD: 1

## 2017-12-06 NOTE — PROGRESS NOTES
(INVOKANA) 300 MG TABS tablet Take 1 tablet by mouth every morning (before breakfast) 90 tablet 1    glucose blood VI test strips (ONE TOUCH ULTRA TEST) strip USE TO TEST TWICE A DAY AND AS NEEDED, IDDM - Dx: E11.9 100 each 1    ONE TOUCH LANCETS MISC USE TO TEST TWICE A DAY AND AS NEEDED, IDDM - Dx: E11.9 200 each 3    Blood Glucose Monitoring Suppl MARCELLUS One Touch Ultra - To Test BID - IDDM - Dx: E11.9 1 Device 0    metFORMIN (GLUCOPHAGE) 1000 MG tablet TAKE 1 TABLET TWICE A DAY WITH MEALS 180 tablet 0    guaiFENesin (MUCINEX) 600 MG extended release tablet Take 1 tablet by mouth 2 times daily (Patient taking differently: Take 600 mg by mouth as needed ) 20 tablet 0    tiotropium (SPIRIVA RESPIMAT) 2.5 MCG/ACT AERS inhaler Inhale 5 mcg into the lungs Inhale 5 mcg into the lungs      isosorbide mononitrate (IMDUR) 60 MG CR tablet Take 60 mg by mouth daily       ipratropium-albuterol (DUONEB) 0.5-2.5 (3) MG/3ML SOLN nebulizer solution Inhale 3 mLs into the lungs every 6 hours as needed for Shortness of Breath 36 mL 0    budesonide (PULMICORT) 0.5 MG/2ML nebulizer suspension Take 1 ampule by nebulization 2 times daily      digoxin (DIGOX) 125 MCG tablet Take 125 mcg by mouth daily      nitroGLYCERIN (NITROSTAT) 0.4 MG SL tablet Place 0.4 mg under the tongue every 5 minutes as needed.  clopidogrel (PLAVIX) 75 MG tablet Take 75 mg by mouth daily.  fenofibrate (TRICOR) 145 MG tablet Take 145 mg by mouth daily.  verapamil (VERELAN PM) 240 MG CR capsule Take 240 mg by mouth 2 times daily        No current facility-administered medications for this visit. PMH, Surgical Hx, Family Hx, and Social Hx reviewed and updated. Health Maintenance reviewed.     Objective    Vitals:    11/29/17 1104 11/29/17 1108   BP: (!) 148/80 138/82   Site: Right Arm    Position: Sitting    Cuff Size: Medium Adult    Pulse: 93    Resp: 14    Temp: 97.5 °F (36.4 °C)    TempSrc: Oral    SpO2: 93%    Weight: 211 Dispense:  3 tablet     Refill:  0     There are no discontinued medications. Return in about 2 weeks (around 12/13/2017) for [address]. Reviewed with the patient: current clinical status, medications, activities and diet. Side effects, adverse effects of the medication prescribed today, as well as treatment plan/ rationale and result expectations have been discussed with the patient who expresses understanding and desires to proceed. Close follow up to evaluate treatment results and for coordination of care. I have reviewed the patient's medical history in detail and updated the computerized patient record.     Genna Kingston NP'

## 2017-12-07 ENCOUNTER — HOSPITAL ENCOUNTER (OUTPATIENT)
Dept: OCCUPATIONAL THERAPY | Age: 67
Setting detail: THERAPIES SERIES
Discharge: HOME | End: 2017-12-07
Payer: MEDICARE

## 2017-12-07 ENCOUNTER — OFFICE VISIT (OUTPATIENT)
Dept: FAMILY MEDICINE CLINIC | Age: 67
End: 2017-12-07

## 2017-12-07 ENCOUNTER — CARE COORDINATOR VISIT (OUTPATIENT)
Dept: CARE COORDINATION | Age: 67
End: 2017-12-07

## 2017-12-07 DIAGNOSIS — Z01.419 ENCNTR FOR GYN EXAM (GENERAL) (ROUTINE) W/O ABN FINDINGS: Primary | ICD-10-CM

## 2017-12-07 DIAGNOSIS — Z01.419 ENCNTR FOR GYN EXAM (GENERAL) (ROUTINE) W/O ABN FINDINGS: ICD-10-CM

## 2017-12-07 DIAGNOSIS — Z12.4 SCREENING FOR CERVICAL CANCER: ICD-10-CM

## 2017-12-07 PROCEDURE — 97140 MANUAL THERAPY 1/> REGIONS: CPT

## 2017-12-07 PROCEDURE — 99397 PER PM REEVAL EST PAT 65+ YR: CPT | Performed by: NURSE PRACTITIONER

## 2017-12-07 PROCEDURE — 97110 THERAPEUTIC EXERCISES: CPT

## 2017-12-07 RX ORDER — ZOLEDRONIC ACID 5 MG/100ML
5 INJECTION, SOLUTION INTRAVENOUS ONCE
Qty: 100 ML | Refills: 0 | Status: SHIPPED | OUTPATIENT
Start: 2017-12-07 | End: 2018-02-08 | Stop reason: ALTCHOICE

## 2017-12-07 RX ORDER — FLUCONAZOLE 150 MG/1
150 TABLET ORAL ONCE
Qty: 1 TABLET | Refills: 0 | Status: SHIPPED | OUTPATIENT
Start: 2017-12-07 | End: 2017-12-07

## 2017-12-07 ASSESSMENT — ENCOUNTER SYMPTOMS: DYSPNEA ASSOCIATED WITH: EXERTION

## 2017-12-07 NOTE — CARE COORDINATION
Take 240 mg by mouth 2 times daily     Historical Provider, MD       Future Appointments  Date Time Provider Espinoza Morgan   12/11/2017 11:30 AM Alonso Ritchie DO [address]   12/13/2017 1:00 PM Won Dumont [address] AM OT [address] [place] [address]   12/14/2017 11:30 AM Michael Cutler MD [facility]   12/15/2017 1:00 PM Won Dumont [address] AM OT [address] [place] [address]   12/18/2017 1:00 PM Won Dumont [address] AM OT [address] [place] [address]   12/20/2017 1:00 PM Won Dumont [address] AM OT [address] [place] [address]   12/21/2017 10:00 AM Noel Paul MD [facility]   12/27/2017 1:00 PM Won Dumont [address] AM OT [address] [place] [address]   12/29/2017 1:00 PM Won Dumont TOPHER MLOZ AM OT [address] [place] [address]   1/3/2018 1:45 PM Rome Beatty MD [address]   1/30/2018 1:00 PM Michael Cutler MD [facility]   2/8/2018 1:30 PM Alonso Ritchie DO [address]

## 2017-12-11 ENCOUNTER — PROCEDURE VISIT (OUTPATIENT)
Dept: PHYSICAL MEDICINE AND REHAB | Age: 67
End: 2017-12-11

## 2017-12-11 DIAGNOSIS — R53.82 CHRONIC FATIGUE: ICD-10-CM

## 2017-12-11 DIAGNOSIS — M79.7 FIBROMYALGIA: Primary | ICD-10-CM

## 2017-12-11 DIAGNOSIS — M79.10 MYALGIA: ICD-10-CM

## 2017-12-11 PROCEDURE — 96372 THER/PROPH/DIAG INJ SC/IM: CPT | Performed by: PHYSICAL MEDICINE & REHABILITATION

## 2017-12-11 PROCEDURE — 20553 NJX 1/MLT TRIGGER POINTS 3/>: CPT | Performed by: PHYSICAL MEDICINE & REHABILITATION

## 2017-12-13 ENCOUNTER — HOSPITAL ENCOUNTER (OUTPATIENT)
Dept: OCCUPATIONAL THERAPY | Age: 67
Setting detail: THERAPIES SERIES
Discharge: HOME | End: 2017-12-13
Payer: MEDICARE

## 2017-12-13 PROCEDURE — 97140 MANUAL THERAPY 1/> REGIONS: CPT

## 2017-12-13 ASSESSMENT — PAIN DESCRIPTION - PAIN TYPE: TYPE: CHRONIC PAIN

## 2017-12-13 ASSESSMENT — PAIN DESCRIPTION - LOCATION: LOCATION: BACK

## 2017-12-13 ASSESSMENT — PAIN DESCRIPTION - DIRECTION: RADIATING_TOWARDS: SHOULDERS

## 2017-12-13 ASSESSMENT — PAIN DESCRIPTION - ORIENTATION: ORIENTATION: MID

## 2017-12-13 ASSESSMENT — PAIN SCALES - GENERAL: PAINLEVEL_OUTOF10: 7

## 2017-12-13 NOTE — PROGRESS NOTES
Occupational Therapy  Daily Treatment Note  Date: 2017  Patient Name: Federico Garcias  :  1950  MRN: [id number]       Subjective  Treatment started on time. Additional Pertinent Hx: Eval 11/15/17 JOHN Collazo, OTR/L  Diagnosis: Multi-site pain, osteoporosis, DDD, R rib pain, chronic low back pain, suprascapular entrapment neuropathy Left side, generalized myalgia. Progress Note Counter: 5/10    Patient Currently in Pain: Yes  Pain Assessment: 0-10  Pain Level: 7  Pain Type: Chronic pain  Pain Location: Back  Pain Orientation: Mid  Pain Radiating Towards: shoulders    Patient Currently in Pain: Yes   Treatment Activities:  Provided direct treatment of MFR techniques after applying infrared heat treatment to painful thoracic back. MFR Techniques:           Cervical Area:                                 []   Occipital Condyle                             []     Cervical in flexion                                                                                                        []      Lateral flexion                               [x]  Yoke Release                                   []    Cervical in  extension                                     [x]     Cross Hands                                  [x]    Soft tissue mobility                Thoracic Area:                                    [x]       Respiratory Diaph. [x]       Paraspinals                                [x]       Vertical release                 [x]       Lateral thoracic                                [x]       Horizontal release             [x]       Posterior thor. soft tiss.mob.                                []       Unilateral Pectoral            []        David. Horiz. Pecs                                []       Vertical Pecs                     []       Arm Pull                                [x]       Thoracic Inlet transv.         [x]        Soft Tissue mobility

## 2017-12-14 ENCOUNTER — OFFICE VISIT (OUTPATIENT)
Dept: FAMILY MEDICINE CLINIC | Age: 67
End: 2017-12-14

## 2017-12-14 VITALS
HEIGHT: 61 IN | DIASTOLIC BLOOD PRESSURE: 78 MMHG | OXYGEN SATURATION: 93 % | BODY MASS INDEX: 39.84 KG/M2 | HEART RATE: 94 BPM | TEMPERATURE: 98.3 F | SYSTOLIC BLOOD PRESSURE: 138 MMHG | WEIGHT: 211 LBS | RESPIRATION RATE: 15 BRPM

## 2017-12-14 VITALS
BODY MASS INDEX: 39.84 KG/M2 | OXYGEN SATURATION: 93 % | HEART RATE: 98 BPM | SYSTOLIC BLOOD PRESSURE: 130 MMHG | DIASTOLIC BLOOD PRESSURE: 82 MMHG | RESPIRATION RATE: 14 BRPM | WEIGHT: 211 LBS | HEIGHT: 61 IN

## 2017-12-14 DIAGNOSIS — Z12.31 ENCOUNTER FOR SCREENING MAMMOGRAM FOR BREAST CANCER: ICD-10-CM

## 2017-12-14 DIAGNOSIS — J44.9 CHRONIC OBSTRUCTIVE PULMONARY DISEASE, UNSPECIFIED COPD TYPE (HCC): Primary | ICD-10-CM

## 2017-12-14 PROCEDURE — 99212 OFFICE O/P EST SF 10 MIN: CPT | Performed by: FAMILY MEDICINE

## 2017-12-14 ASSESSMENT — ENCOUNTER SYMPTOMS
DIARRHEA: 0
VOMITING: 0
CONSTIPATION: 0
BACK PAIN: 0
SORE THROAT: 0
NAUSEA: 0
ABDOMINAL PAIN: 0

## 2017-12-14 NOTE — PROGRESS NOTES
1 TABLET ONCE DAILY 30 tablet 5    albuterol sulfate (PROAIR RESPICLICK) 384 (90 Base) MCG/ACT aerosol powder inhalation Inhale 2 puffs into the lungs every 6 hours as needed for Wheezing or Shortness of Breath 1 Inhaler 5    Insulin Pen Needle (B-D UF III MINI PEN NEEDLES) 31G X 5 MM MISC USE 1 DAILY TO INJECT LANTUS 100 each 1    fluocinonide (LIDEX) 0.05 % ointment       mepolizumab (NUCALA) 100 MG SOLR injection Inject 100 mg into the skin Received from: [facility] Received Sig: Inject 100 mg subcutaneously one time only. monthly      esomeprazole (NEXIUM) 40 MG delayed release capsule Take 1 capsule by mouth daily 90 capsule 1    traMADol (ULTRAM) 50 MG tablet Take 1 tablet by mouth every 6 hours as needed for Pain MAX #50 MONTHLY. DO NOT GET NARCOTIC MEDICATIONS FROM ANY OTHER PROVIDER.  50 tablet 0    Cholecalciferol (VITAMIN D3) 70204 UNITS CAPS 1 po twice weekly 8 capsule 1    formoterol (PERFOROMIST) 20 MCG/2ML nebulizer solution Inhale 2 mLs into the lungs 2 times daily Inhale 2 mLs into the lungs      meclizine (ANTIVERT) 25 MG tablet 1 po bid x 10 days 20 tablet 0    lisinopril (PRINIVIL;ZESTRIL) 20 MG tablet Take 1 tab po QD      canagliflozin (INVOKANA) 300 MG TABS tablet Take 1 tablet by mouth every morning (before breakfast) 90 tablet 1    glucose blood VI test strips (ONE TOUCH ULTRA TEST) strip USE TO TEST TWICE A DAY AND AS NEEDED, IDDM - Dx: E11.9 100 each 1    ONE TOUCH LANCETS MISC USE TO TEST TWICE A DAY AND AS NEEDED, IDDM - Dx: E11.9 200 each 3    Blood Glucose Monitoring Suppl MARCELLUS One Touch Ultra - To Test BID - IDDM - Dx: E11.9 1 Device 0    metFORMIN (GLUCOPHAGE) 1000 MG tablet TAKE 1 TABLET TWICE A DAY WITH MEALS 180 tablet 0    guaiFENesin (MUCINEX) 600 MG extended release tablet Take 1 tablet by mouth 2 times daily (Patient taking differently: Take 600 mg by mouth as needed ) 20 tablet 0    tiotropium (SPIRIVA RESPIMAT) 2.5 MCG/ACT AERS inhaler Inhale 5 mcg into No wheezes or rales. Heart:rate reg. No murmur. No gallops       Gen: In no acute distress     Assessment:      1. Chronic obstructive pulmonary disease, unspecified COPD type ([facility] [address] [address]     2.  Encounter for screening mammogram for breast cancer  ISELA DIGITAL SCREEN W CAD BILATERAL      Plan:      Orders Placed This Encounter   Procedures    ISELA DIGITAL SCREEN W CAD BILATERAL     Standing Status:   Future     Standing Expiration Date:   12/14/2018     Order Specific Question:   Reason for exam:     Answer:   v76.12     F/u prn or per maintenance

## 2017-12-14 NOTE — PROGRESS NOTES
Subjective  Alia Bell, 75 y.o. female presents today with:  Chief Complaint   Patient presents with   [facility] Gynecologic Exam       Gynecologic Exam   The patient's pertinent negatives include no genital itching, genital lesions, genital odor, genital rash, pelvic pain, vaginal bleeding or vaginal discharge. Pertinent negatives include no abdominal pain, anorexia, back pain, chills, constipation, diarrhea, discolored urine, dysuria, fever, flank pain, frequency, headaches, hematuria, joint pain, joint swelling, nausea, painful intercourse, rash, sore throat, urgency or vomiting. She is not sexually active. She is postmenopausal. There is no history of vaginosis. Review of Systems   Constitutional: Negative for chills and fever. HENT: Negative for sore throat. Gastrointestinal: Negative for abdominal pain, anorexia, constipation, diarrhea, nausea and vomiting. Genitourinary: Negative for dysuria, flank pain, frequency, hematuria, pelvic pain, urgency and vaginal discharge. Musculoskeletal: Negative for back pain and joint pain. Skin: Negative for rash. Neurological: Negative for headaches.        Past Medical History:   Diagnosis Date    Anxiety     Asthma     Back pain     Bronchitis     CAD (coronary artery disease)     Carpal tunnel syndrome     COPD (chronic obstructive pulmonary disease) ([facility])     uses CPAP at night    Emphysema of lung ([facility])     Fibromyalgia     GERD (gastroesophageal reflux disease)     Hypertension     Hypothyroidism     Obesity     ROGELIO (obstructive sleep apnea)     Osteoarthritis     Osteoporosis     Restless legs syndrome     SOB (shortness of breath)     Type II or unspecified type diabetes mellitus without mention of complication, not stated as uncontrolled     Unspecified sleep apnea     UTI (urinary tract infection)      Past Surgical History:   Procedure Laterality Date    ANKLE SURGERY      L    ANKLE SURGERY Right     Plate in right (NITROLINGUAL) 0.4 MG/SPRAY 0.4 mg spray       LORazepam (ATIVAN) 0.5 MG tablet Take 1 tablet by mouth every 8 hours as needed for Anxiety 30 tablet 0    theophylline (UNIPHYL) 600 MG extended release tablet TAKE 1 TABLET ONCE DAILY 30 tablet 5    albuterol sulfate (PROAIR RESPICLICK) 178 (90 Base) MCG/ACT aerosol powder inhalation Inhale 2 puffs into the lungs every 6 hours as needed for Wheezing or Shortness of Breath 1 Inhaler 5    Insulin Pen Needle (B-D UF III MINI PEN NEEDLES) 31G X 5 MM MISC USE 1 DAILY TO INJECT LANTUS 100 each 1    fluocinonide (LIDEX) 0.05 % ointment       mepolizumab (NUCALA) 100 MG SOLR injection Inject 100 mg into the skin Received from: [facility] Received Sig: Inject 100 mg subcutaneously one time only. monthly      esomeprazole (NEXIUM) 40 MG delayed release capsule Take 1 capsule by mouth daily 90 capsule 1    traMADol (ULTRAM) 50 MG tablet Take 1 tablet by mouth every 6 hours as needed for Pain MAX #50 MONTHLY. DO NOT GET NARCOTIC MEDICATIONS FROM ANY OTHER PROVIDER.  50 tablet 0    Cholecalciferol (VITAMIN D3) 08311 UNITS CAPS 1 po twice weekly 8 capsule 1    formoterol (PERFOROMIST) 20 MCG/2ML nebulizer solution Inhale 2 mLs into the lungs 2 times daily Inhale 2 mLs into the lungs      meclizine (ANTIVERT) 25 MG tablet 1 po bid x 10 days 20 tablet 0    lisinopril (PRINIVIL;ZESTRIL) 20 MG tablet Take 1 tab po QD      canagliflozin (INVOKANA) 300 MG TABS tablet Take 1 tablet by mouth every morning (before breakfast) 90 tablet 1    glucose blood VI test strips (ONE TOUCH ULTRA TEST) strip USE TO TEST TWICE A DAY AND AS NEEDED, IDDM - Dx: E11.9 100 each 1    ONE TOUCH LANCETS MISC USE TO TEST TWICE A DAY AND AS NEEDED, IDDM - Dx: E11.9 200 each 3    Blood Glucose Monitoring Suppl MARCELLUS One Touch Ultra - To Test BID - IDDM - Dx: E11.9 1 Device 0    metFORMIN (GLUCOPHAGE) 1000 MG tablet TAKE 1 TABLET TWICE A DAY WITH MEALS 180 tablet 0    guaiFENesin (MUCINEX) 600 Medications Discontinued During This Encounter   Medication Reason    fluconazole (DIFLUCAN) 150 MG tablet      Return in about 1 year (around 12/7/2018). Reviewed with the patient: current clinical status, medications, activities and diet. Side effects, adverse effects of the medication prescribed today, as well as treatment plan/ rationale and result expectations have been discussed with the patient who expresses understanding and desires to proceed. Close follow up to evaluate treatment results and for coordination of care. I have reviewed the patient's medical history in detail and updated the computerized patient record.     Donta Cesar NP'

## 2017-12-15 ENCOUNTER — HOSPITAL ENCOUNTER (OUTPATIENT)
Dept: OCCUPATIONAL THERAPY | Age: 67
Setting detail: THERAPIES SERIES
Discharge: HOME | End: 2017-12-15
Payer: MEDICARE

## 2017-12-15 PROCEDURE — 97140 MANUAL THERAPY 1/> REGIONS: CPT

## 2017-12-15 ASSESSMENT — PAIN DESCRIPTION - ORIENTATION: ORIENTATION: MID

## 2017-12-15 ASSESSMENT — PAIN SCALES - GENERAL: PAINLEVEL_OUTOF10: 5

## 2017-12-15 ASSESSMENT — PAIN DESCRIPTION - PAIN TYPE: TYPE: CHRONIC PAIN

## 2017-12-15 ASSESSMENT — PAIN DESCRIPTION - LOCATION: LOCATION: BACK

## 2017-12-18 ENCOUNTER — HOSPITAL ENCOUNTER (OUTPATIENT)
Dept: OCCUPATIONAL THERAPY | Age: 67
Setting detail: THERAPIES SERIES
Discharge: HOME | End: 2017-12-18
Payer: MEDICARE

## 2017-12-20 ENCOUNTER — HOSPITAL ENCOUNTER (OUTPATIENT)
Dept: OCCUPATIONAL THERAPY | Age: 67
Setting detail: THERAPIES SERIES
Discharge: HOME | End: 2017-12-20
Payer: MEDICARE

## 2017-12-20 PROCEDURE — 97140 MANUAL THERAPY 1/> REGIONS: CPT

## 2017-12-20 ASSESSMENT — PAIN SCALES - GENERAL: PAINLEVEL_OUTOF10: 5

## 2017-12-20 ASSESSMENT — PAIN DESCRIPTION - ORIENTATION: ORIENTATION: MID

## 2017-12-20 ASSESSMENT — PAIN DESCRIPTION - LOCATION: LOCATION: BACK

## 2017-12-20 ASSESSMENT — PAIN DESCRIPTION - PAIN TYPE: TYPE: CHRONIC PAIN

## 2017-12-20 NOTE — PROGRESS NOTES
Occupational Therapy  Daily Treatment Note  Date: 2017  Patient Name: Pillo Randle  :  1950  MRN: [id number]       Subjective Treatment started on time. Patient has her 18 with her again today due to COPD. Additional Pertinent Hx: Eval 11/15/17 JOHN Collazo, OTR/L  Diagnosis: Multi-site pain, osteoporosis, DDD, R rib pain, chronic low back pain, suprascapular entrapment neuropathy Left side, generalized myalgia. Canceled Appointment: 1  Progress Note Counter: 7/10    Patient Currently in Pain: Yes  Pain Level: 10 L LE sciatica, 2/10 thoracic back  Pain Type: Chronic pain  Pain Location: Back  Pain Orientation: Mid      Treatment Activities:  Provided infrared heat treatment to painful areas followed by focus on pain reduction with direct treatment of MFR techniques. MFR Techniques:      Thoracic Area:                                    [x]       Respiratory Diaph. [x]       Paraspinals                                [x]       Vertical release                 [x]       Lateral thoracic                                [x]       Horizontal release             []       Posterior thor. soft tiss.mob.                                []       Unilateral Pectoral            []        David. Horiz. Pecs                                []       Vertical Pecs                     []       Arm Pull                                [x]       Thoracic Inlet transv.         [x]        Soft Tissue mobility                                            plane    Lumbar/Pelvic/Sacral Area:                                [x]      Lumbo/sacral decompression        []  Pelvic floor transv.plane                                []      Psoas release                                []  Scar releases                                [x]      Lateral Obliques                             []   Anterior pelvic tilts                                 []      Posterior Pelvic Tilts                      [] Lumbosacral junction                                 [x]      Dural tube release                                 Decompression                                 [x]      Piriformis Release                         []   Soft tissue mobility    Lower Extremities:                                [x]      Suprapatellar & Quadriceps          []  Hamstrings                                []      Upper Quads soft tissue mob. [x]  Leg pull L                                []       Bilateral Leg pull                           []  Soft tissue mobility                                                                                                [x]  Transverse Planes         Assessment Patient tolerated treatment well on a wedge cushion in side lying for her breathing difficulties. Patient reports decreased pain at the end of the treatment. I discussed with the patient that her abdomen appears swollen, almost like she needs fluid removed. I recommended she see her physician as this swollen appearance can be pushing on her diaphragm and making it that much harder to breath. Post Treatment Pain Screening  Pain at present: 2/10 all over  Scale Used: Numeric Score  Intervention List: Patient able to continue with treatment         Plan Continue with current POC. G-Code 10th visits       Goals  Long term goals 1-4 addressed. Time Frame for Long term goals : 2x per week for 5 weeks, or 10 visits. Long term goal 1: Patient will complete daily homemaking tasks using proper body mechanics following education  Long term goal 2: Reduce restrictions of posterior ribcage and left shoulder to increase performance of daily homemaking tasks with decreased pain. Long term goal 3: Patient will have sustained standing tolerance of 10 or more minutes in midline to allow her to complete homemaking tasks. Also, balance pelvis to further support this activity.   Long term goal 4: Patient will follow HEP as instructed with updates

## 2017-12-26 RX ORDER — CYANOCOBALAMIN 1000 UG/ML
1000 INJECTION INTRAMUSCULAR; SUBCUTANEOUS ONCE
Status: COMPLETED | OUTPATIENT
Start: 2017-12-26 | End: 2017-12-26

## 2017-12-26 RX ADMIN — CYANOCOBALAMIN 1000 MCG: 1000 INJECTION INTRAMUSCULAR; SUBCUTANEOUS at 12:22

## 2017-12-27 ENCOUNTER — HOSPITAL ENCOUNTER (OUTPATIENT)
Dept: OCCUPATIONAL THERAPY | Age: 67
Setting detail: THERAPIES SERIES
Discharge: HOME | End: 2017-12-27
Payer: MEDICARE

## 2017-12-27 PROCEDURE — 97140 MANUAL THERAPY 1/> REGIONS: CPT

## 2017-12-27 ASSESSMENT — PAIN SCALES - GENERAL: PAINLEVEL_OUTOF10: 5

## 2017-12-27 ASSESSMENT — PAIN DESCRIPTION - ORIENTATION: ORIENTATION: MID

## 2017-12-27 ASSESSMENT — PAIN DESCRIPTION - LOCATION: LOCATION: BACK

## 2017-12-27 ASSESSMENT — PAIN DESCRIPTION - PAIN TYPE: TYPE: CHRONIC PAIN

## 2017-12-27 NOTE — PROGRESS NOTES
Occupational Therapy  Daily Treatment Note  Date: 2017  Patient Name: Hazel Vila  :  1950  MRN: [id number]       Subjective Treatment started on time. Additional Pertinent Hx: Eval 11/15/17 JOHN Collazo, OTR/L  Diagnosis: Multi-site pain, osteoporosis, DDD, R rib pain, chronic low back pain, suprascapular entrapment neuropathy Left side, generalized myalgia. Canceled Appointment: 1  Progress Note Counter: 8/10    Pain Level: 5  Pain Type: Chronic pain  Pain Location: Back  Pain Orientation: Mid   Treatment Activities:  Provided infrared heat treatment to mid thoracic back followed by direct treatment of MFR techniques. MFR Techniques:           Hand/Wrist Area:                                []       Forearm Flexors               []        Forearm Extensors                                []       Biceps                               []        Soft Tissue Mobility                                [x]       Arm Pulls Right                          []       Transverse Plane Releases    Thoracic Area:                                    [x]       Respiratory Diaph. [x]       Paraspinals                                [x]       Vertical release                 [x]       Lateral thoracic                                [x]       Horizontal release             [x]       Posterior thor. soft tiss.mob.                                []       Unilateral Pectoral            []        David. Horiz.  Pecs                                []       Vertical Pecs                     []       Arm Pull                                [x]       Thoracic Inlet transv.        []        Soft Tissue mobility                                            plane    Lumbar/Pelvic/Sacral Area:                                []      Lumbo/sacral decompression        []  Pelvic floor transv.plane                                []      Psoas release                                []  Scar releases [x]      Lateral Obliques Both                            []   Anterior pelvic tilts                                 []      Posterior Pelvic Tilts                      []  Lumbosacral junction                                 [x]      Dural tube release                                 Decompression                                 []      Piriformis Release                         []   Soft tissue mobility                Assessment  Patient tolerated treatment well. Patient reports standing and baking cookies and when she tired, she started utilizing ECWS techniques such as sitting on a stool at the counter to drop cookies onto the cookie sheet, and  stirred the cookie dough, and helped \"a lot. \" Patient came in with her 3L02. Post Treatment Pain Screening  Pain at present: 2  Scale Used: Numeric Score  Intervention List: Patient able to continue with treatment         Plan Continue with current POC. G-Code 10th visits     Goals  Long term goals 1-4 addressed. Time Frame for Long term goals : 2x per week for 5 weeks, or 10 visits. Long term goal 1: Patient will complete daily homemaking tasks using proper body mechanics following education  Long term goal 2: Reduce restrictions of posterior ribcage and left shoulder to increase performance of daily homemaking tasks with decreased pain. Long term goal 3: Patient will have sustained standing tolerance of 10 or more minutes in midline to allow her to complete homemaking tasks. Also, balance pelvis to further support this activity. Long term goal 4: Patient will follow HEP as instructed with updates as patient improves.     Therapy Time   Individual Concurrent Group Co-treatment   Time In  1:00 pm         Time Out  2:00 pm         Minutes  60                 JANAE Stone/L  Electronically signed by TOPHER Stone on 12/27/17 at 2:12 PM

## 2017-12-29 ENCOUNTER — HOSPITAL ENCOUNTER (OUTPATIENT)
Dept: OCCUPATIONAL THERAPY | Age: 67
Setting detail: THERAPIES SERIES
Discharge: HOME | End: 2017-12-29
Payer: MEDICARE

## 2017-12-29 PROCEDURE — 97140 MANUAL THERAPY 1/> REGIONS: CPT

## 2017-12-29 ASSESSMENT — PAIN DESCRIPTION - PAIN TYPE: TYPE: CHRONIC PAIN

## 2017-12-29 ASSESSMENT — PAIN DESCRIPTION - ORIENTATION: ORIENTATION: MID

## 2017-12-29 ASSESSMENT — PAIN DESCRIPTION - LOCATION: LOCATION: BACK

## 2017-12-29 ASSESSMENT — PAIN SCALES - GENERAL: PAINLEVEL_OUTOF10: 5

## 2017-12-29 NOTE — PROGRESS NOTES
Occupational Therapy  Daily Treatment Note  Date: 2017  Patient Name: Suleiman Ayala  :  1950  MRN: [id number]       Subjective Treatment started early. Patient wearing her 02 coming in. Patient reports the cold weather makes it hard to breath, (patient has COPD). I have been educating the patient to wear a scarf or mask over her mouth and nose to avoid breathing the cold air due to COPD. Additional Pertinent Hx: Eval 11/15/17 JOHN Collazo, OTR/L   Diagnosis: Multi-site pain, osteoporosis, DDD, R rib pain, chronic low back pain, suprascapular entrapment neuropathy Left side, generalized myalgia. Canceled Appointment: 1  Progress Note Counter: 9/10    Patient Currently in Pain: Yes  Pain Assessment: 0-10  Pain Level: 5  Pain Type: Chronic pain  Pain Location: Back  Pain Orientation: Mid     Treatment Activities:  Provided infrared heat treatment to right shoulder and mid thoracic back painful areas . Provided direct treatment of MFR techniques to reduce pain. Patient generally has c/o of left shoulder pain, not right shoulder pain. MFR Techniques:           Cervical Area:                                 []   Occipital Condyle                             []     Cervical in flexion                                                                                                        []      Lateral flexion                               [x]  Yoke Release                                   []    Cervical in  extension                                     [x]     Cross Hands                                  [x]    Soft tissue mobility        Thoracic Area:                                    [x]       Respiratory Diaph. [x]       Paraspinals                                [x]       Vertical release                 [x]       Lateral thoracic                                [x]       Horizontal release             [x]       Posterior thor. soft tiss.mob. []       Unilateral Pectoral            []        David. Horiz. Pecs                                []       Vertical Pecs                     []       Arm Pull                                [x]       Thoracic Inlet transv. [x]        Soft Tissue mobility                                            plane    Lumbar/Pelvic/Sacral Area:                                []      Lumbo/sacral decompression        []  Pelvic floor transv.plane                                []      Psoas release                                []  Scar releases                                [x]      Lateral Obliques                             []   Anterior pelvic tilts                                 []      Posterior Pelvic Tilts                      []  Lumbosacral junction                                 []      Dural tube release                                 Decompression                                 []      Piriformis Release                         []   Soft tissue mobility              Assessment  Patient tolerated treatment well. Patient was scheduled for 2 more visits even though the POC is for 10, today is 9/10. OTR to see patient on 10th visit for G codes and to determine if we should ask for more visits or discharge. Patient understands. Plan Continue with current POC. G-Code 10th visits       Goals  Long term goals 1-4 addressed. Time Frame for Long term goals : 2x per week for 5 weeks, or 10 visits. Long term goal 1: Patient will complete daily homemaking tasks using proper body mechanics following education  Long term goal 2: Reduce restrictions of posterior ribcage and left shoulder to increase performance of daily homemaking tasks with decreased pain. Long term goal 3: Patient will have sustained standing tolerance of 10 or more minutes in midline to allow her to complete homemaking tasks. Also, balance pelvis to further support this activity.   Long term goal 4: Patient

## 2018-01-02 ENCOUNTER — HOSPITAL ENCOUNTER (OUTPATIENT)
Dept: WOMENS IMAGING | Age: 68
Discharge: HOME | End: 2018-01-02
Payer: MEDICARE

## 2018-01-02 DIAGNOSIS — Z12.31 ENCOUNTER FOR SCREENING MAMMOGRAM FOR BREAST CANCER: ICD-10-CM

## 2018-01-02 PROCEDURE — 77067 SCR MAMMO BI INCL CAD: CPT

## 2018-01-02 RX ORDER — FLUOCINONIDE 0.5 MG/G
OINTMENT TOPICAL
Qty: 30 G | Refills: 0 | Status: SHIPPED | OUTPATIENT
Start: 2018-01-02 | End: 2018-09-07 | Stop reason: SDUPTHER

## 2018-01-03 ENCOUNTER — OFFICE VISIT (OUTPATIENT)
Dept: PULMONOLOGY | Age: 68
End: 2018-01-03

## 2018-01-03 VITALS
BODY MASS INDEX: 40.4 KG/M2 | SYSTOLIC BLOOD PRESSURE: 140 MMHG | WEIGHT: 214 LBS | OXYGEN SATURATION: 94 % | DIASTOLIC BLOOD PRESSURE: 80 MMHG | HEART RATE: 102 BPM | TEMPERATURE: 94.9 F | HEIGHT: 61 IN

## 2018-01-03 DIAGNOSIS — J44.9 CHRONIC OBSTRUCTIVE PULMONARY DISEASE, UNSPECIFIED COPD TYPE (HCC): Primary | ICD-10-CM

## 2018-01-03 DIAGNOSIS — E66.9 OBESITY (BMI 30-39.9): ICD-10-CM

## 2018-01-03 DIAGNOSIS — R09.02 HYPOXEMIA: ICD-10-CM

## 2018-01-03 DIAGNOSIS — G47.33 OSA ON CPAP: ICD-10-CM

## 2018-01-03 DIAGNOSIS — Z99.89 OSA ON CPAP: ICD-10-CM

## 2018-01-03 PROCEDURE — 99214 OFFICE O/P EST MOD 30 MIN: CPT | Performed by: INTERNAL MEDICINE

## 2018-01-03 ASSESSMENT — ENCOUNTER SYMPTOMS
RHINORRHEA: 0
WHEEZING: 1
VOICE CHANGE: 0
EYE DISCHARGE: 0
CHEST TIGHTNESS: 0
SHORTNESS OF BREATH: 1
SINUS PRESSURE: 0
VOMITING: 0
NAUSEA: 0
SORE THROAT: 0
ABDOMINAL PAIN: 0
DIARRHEA: 0
TROUBLE SWALLOWING: 0
COUGH: 1
EYE ITCHING: 0

## 2018-01-03 NOTE — PROGRESS NOTES
Subjective:     Moni White is a 75 y.o. female who complains today of:     Chief Complaint   Patient presents with    Follow-up     copd [place]       HPI  Using bronchodilator with spiriva , proair, duoneb, perforomist, pulmicort. henrik 24. She started [place] shot monthly. Patient is using 2 lit with sleep and prn. C/o shortness of breath , SOB is worse with any exertion. Off and on Wheezing , Cough with  Clear Sputum  No Hemoptysis  No Chest pain or pleuritic pain  No Fever or chills. Occasional Rhinorrhea and  postnasal drip. Patient is using CPAP with 8   centimeters of H2O with heated humidity with 2 lit O2  Patient is using CPAP for about 6-8 hours every night. Patient is using CPAP with   Nasal pillow  Patient said  sleep is restful with the CPAP use. No snoring with CPAP use . No complaint of daytime sleepiness or tiredness with CPAP use  Patient denies taking naps. No sleepiness with driving    Allergies:  Biaxin [clarithromycin];  Codeine; Morphine; and Percocet [oxycodone-acetaminophen]  Past Medical History:   Diagnosis Date    Anxiety     Asthma     Back pain     Bronchitis     CAD (coronary artery disease)     Carpal tunnel syndrome     COPD (chronic obstructive pulmonary disease) (HCC)     uses CPAP at night    Emphysema of lung (HCC)     Fibromyalgia     GERD (gastroesophageal reflux disease)     Hypertension     Hypothyroidism     Obesity     DALTON (obstructive sleep apnea)     Osteoarthritis     Osteoporosis     Restless legs syndrome     SOB (shortness of breath)     Type II or unspecified type diabetes mellitus without mention of complication, not stated as uncontrolled     Unspecified sleep apnea     UTI (urinary tract infection)      Past Surgical History:   Procedure Laterality Date    ANKLE SURGERY      L    ANKLE SURGERY Right     Plate in right ankle    CHOLECYSTECTOMY      COLONOSCOPY  09/11/2012    CORONARY ANGIOPLASTY WITH STENT PLACEMENT      LUNG BIOPSY  09/07/2017    PTCA      THYROIDECTOMY      UPPER GASTROINTESTINAL ENDOSCOPY  09/11/2012    UPPER GASTROINTESTINAL ENDOSCOPY  12/4/14     DR. MAE     Family History   Problem Relation Age of Onset    High Blood Pressure Mother     Heart Disease Mother     Cancer Father      LUNG    High Blood Pressure Brother      Social History     Social History    Marital status:      Spouse name: N/A    Number of children: N/A    Years of education: N/A     Occupational History    Retired      Social History Main Topics    Smoking status: Former Smoker     Packs/day: 1.50     Years: 32.00     Types: Cigarettes     Quit date: 2/21/1999    Smokeless tobacco: Never Used    Alcohol use No    Drug use: No    Sexual activity: Yes     Partners: Male     Other Topics Concern    Not on file     Social History Narrative    No narrative on file         Review of Systems   Constitutional: Negative for chills, diaphoresis, fatigue and fever. HENT: Negative for congestion, mouth sores, nosebleeds, postnasal drip, rhinorrhea, sinus pressure, sneezing, sore throat, trouble swallowing and voice change. Eyes: Negative for discharge, itching and visual disturbance. Respiratory: Positive for cough, shortness of breath and wheezing. Negative for chest tightness. Cardiovascular: Negative for chest pain, palpitations and leg swelling. Gastrointestinal: Negative for abdominal pain, diarrhea, nausea and vomiting. Genitourinary: Negative for difficulty urinating and hematuria. Musculoskeletal: Negative for arthralgias, joint swelling and myalgias. Skin: Negative for rash. Allergic/Immunologic: Negative for environmental allergies. Neurological: Negative for dizziness, tremors, weakness and headaches. Psychiatric/Behavioral: Negative for behavioral problems and sleep disturbance.          Objective:     Vitals:    01/03/18 1339   BP: (!) 140/80   Site: Left Arm   Position: Sitting   Cuff (NITROLINGUAL) 0.4 MG/SPRAY 0.4 mg spray       LORazepam (ATIVAN) 0.5 MG tablet Take 1 tablet by mouth every 8 hours as needed for Anxiety 30 tablet 0    theophylline (UNIPHYL) 600 MG extended release tablet TAKE 1 TABLET ONCE DAILY 30 tablet 5    albuterol sulfate (PROAIR RESPICLICK) 256 (90 Base) MCG/ACT aerosol powder inhalation Inhale 2 puffs into the lungs every 6 hours as needed for Wheezing or Shortness of Breath 1 Inhaler 5    Insulin Pen Needle (B-D UF III MINI PEN NEEDLES) 31G X 5 MM MISC USE 1 DAILY TO INJECT LANTUS 100 each 1    mepolizumab (NUCALA) 100 MG SOLR injection Inject 100 mg into the skin Received from: [facility] Received Sig: Inject 100 mg subcutaneously one time only. monthly      esomeprazole (NEXIUM) 40 MG delayed release capsule Take 1 capsule by mouth daily 90 capsule 1    traMADol (ULTRAM) 50 MG tablet Take 1 tablet by mouth every 6 hours as needed for Pain MAX #50 MONTHLY. DO NOT GET NARCOTIC MEDICATIONS FROM ANY OTHER PROVIDER.  50 tablet 0    Cholecalciferol (VITAMIN D3) 30568 UNITS CAPS 1 po twice weekly 8 capsule 1    formoterol (PERFOROMIST) 20 MCG/2ML nebulizer solution Inhale 2 mLs into the lungs 2 times daily Inhale 2 mLs into the lungs      meclizine (ANTIVERT) 25 MG tablet 1 po bid x 10 days 20 tablet 0    lisinopril (PRINIVIL;ZESTRIL) 20 MG tablet Take 1 tab po QD      canagliflozin (INVOKANA) 300 MG TABS tablet Take 1 tablet by mouth every morning (before breakfast) 90 tablet 1    glucose blood VI test strips (ONE TOUCH ULTRA TEST) strip USE TO TEST TWICE A DAY AND AS NEEDED, IDDM - Dx: E11.9 100 each 1    ONE TOUCH LANCETS MISC USE TO TEST TWICE A DAY AND AS NEEDED, IDDM - Dx: E11.9 200 each 3    Blood Glucose Monitoring Suppl MARCELLUS One Touch Ultra - To Test BID - IDDM - Dx: E11.9 1 Device 0    metFORMIN (GLUCOPHAGE) 1000 MG tablet TAKE 1 TABLET TWICE A DAY WITH MEALS 180 tablet 0    guaiFENesin (MUCINEX) 600 MG extended release tablet Take 1 tablet by

## 2018-01-04 ENCOUNTER — HOSPITAL ENCOUNTER (OUTPATIENT)
Dept: OCCUPATIONAL THERAPY | Age: 68
Setting detail: THERAPIES SERIES
Discharge: HOME | End: 2018-01-04
Payer: MEDICARE

## 2018-01-04 PROCEDURE — 97140 MANUAL THERAPY 1/> REGIONS: CPT

## 2018-01-04 PROCEDURE — G8992 OTHER PT/OT  D/C STATUS: HCPCS

## 2018-01-04 PROCEDURE — 97530 THERAPEUTIC ACTIVITIES: CPT

## 2018-01-04 PROCEDURE — G8991 OTHER PT/OT GOAL STATUS: HCPCS

## 2018-01-04 PROCEDURE — G8990 OTHER PT/OT CURRENT STATUS: HCPCS

## 2018-01-04 ASSESSMENT — PAIN SCALES - GENERAL: PAINLEVEL_OUTOF10: 2

## 2018-01-04 NOTE — PROGRESS NOTES
Occupational Therapy  DischargeTreatment Note  Date: 2018  Patient Name: Issac Marcelino  :  1950  MRN: [id number]       Subjective Pt seen for last visit of tx plan. Pt to continue seeing Dr Meghna Torres and plans to return to Pulmonary rehab, and attend [employer] for weight loss program post D/C  OT Visit Information  Progress Note Counter: 10/10  Pre Treatment Pain Screening  Pain at present: 2  Scale Used: Numeric Score  [facility]  Pain Assessment  Pain Level: 2   Treatment Activities:   Pt was noted to have improve symmetry laterally in her standing balance and leg lengths are now equal. Pt is well versed in her HEP activities and feels that \"everything has gotten a little bit better\". Pt still has significant rib compression due to large abdominal girth and this can still be the reason for posterior rib pain. Pt states her \"breathing has more space\" indicating decreased fascial restrictions throughout her torso. Pt was also directly treated today with MFR                                  MFR Techniques:           Cervical Area:                                 []   Occipital Condyle                             []     Cervical in flexion                                                                                                        []      Lateral flexion                               [x]  Yoke Release                                   []    Cervical in  extension                                     [x]     Cross Hands                                  []    Soft tissue mobility          Thoracic Area:                                    [x]       Respiratory Diaph. [x]       Paraspinals                                []       Vertical release                 []       Lateral thoracic                                []       Horizontal release             []       Posterior thor. soft tiss.mob.                                []       Unilateral Pectoral []        David. Horiz.  Pecs                                []       Vertical Pecs                     []       Arm Pull                                [x]       Thoracic Inlet transv.        []        Soft Tissue mobility                                            plane          Pt tolerated tx well         Assessment Toleratd tx well and has met evaluation goals               Plan D/C today and OT was established as post D/C resource          G-Code   Other 8990 CI              8991 CI               8992 CI            Goals No remaining        Therapy Time   Individual Concurrent Group Co-treatment   Time In  1500         Time Out  1600         Minutes  60              Electronically signed by AMI Figueroa/L on 1/4/2018 at 6:32 PM    Jaja Vaughn OTR/CHAI

## 2018-01-09 ENCOUNTER — APPOINTMENT (OUTPATIENT)
Dept: OCCUPATIONAL THERAPY | Age: 68
End: 2018-01-09
Payer: MEDICARE

## 2018-01-16 ENCOUNTER — OFFICE VISIT (OUTPATIENT)
Dept: SURGERY | Age: 68
End: 2018-01-16

## 2018-01-16 VITALS
HEART RATE: 92 BPM | WEIGHT: 207 LBS | HEIGHT: 61 IN | BODY MASS INDEX: 39.08 KG/M2 | DIASTOLIC BLOOD PRESSURE: 81 MMHG | SYSTOLIC BLOOD PRESSURE: 146 MMHG

## 2018-01-16 DIAGNOSIS — E11.69 DIABETES MELLITUS TYPE 2 IN OBESE (HCC): Primary | ICD-10-CM

## 2018-01-16 DIAGNOSIS — E04.9 GOITER: ICD-10-CM

## 2018-01-16 DIAGNOSIS — I70.1 ATHEROSCLEROSIS OF RENAL ARTERY (HCC): ICD-10-CM

## 2018-01-16 DIAGNOSIS — E89.0 POSTOPERATIVE HYPOTHYROIDISM: ICD-10-CM

## 2018-01-16 DIAGNOSIS — E89.0 S/P PARTIAL THYROIDECTOMY: ICD-10-CM

## 2018-01-16 DIAGNOSIS — E66.9 DIABETES MELLITUS TYPE 2 IN OBESE (HCC): Primary | ICD-10-CM

## 2018-01-16 LAB
GLUCOSE BLD-MCNC: 182 MG/DL
HBA1C MFR BLD: 8 %

## 2018-01-16 PROCEDURE — 99203 OFFICE O/P NEW LOW 30 MIN: CPT | Performed by: INTERNAL MEDICINE

## 2018-01-16 PROCEDURE — 83036 HEMOGLOBIN GLYCOSYLATED A1C: CPT | Performed by: INTERNAL MEDICINE

## 2018-01-16 PROCEDURE — 82962 GLUCOSE BLOOD TEST: CPT | Performed by: INTERNAL MEDICINE

## 2018-01-16 RX ORDER — LEVOTHYROXINE SODIUM 0.03 MG/1
25 TABLET ORAL DAILY
Qty: 90 TABLET | Refills: 3 | Status: SHIPPED | OUTPATIENT
Start: 2018-01-16 | End: 2018-01-16 | Stop reason: SDUPTHER

## 2018-01-16 RX ORDER — LEVOTHYROXINE SODIUM 0.03 MG/1
25 TABLET ORAL DAILY
Qty: 30 TABLET | Refills: 3 | Status: ON HOLD | OUTPATIENT
Start: 2018-01-16 | End: 2018-06-19 | Stop reason: HOSPADM

## 2018-01-26 ASSESSMENT — ENCOUNTER SYMPTOMS
VISUAL CHANGE: 0
BACK PAIN: 1

## 2018-01-26 NOTE — PROGRESS NOTES
tunnel syndrome    Neck pain    Chronic thoracic spine pain     Social History     Social History    Marital status:      Spouse name: N/A    Number of children: N/A    Years of education: N/A     Occupational History    Retired      Social History Main Topics    Smoking status: Former Smoker     Packs/day: 1.50     Years: 32.00     Types: Cigarettes     Quit date: 2/21/1999    Smokeless tobacco: Never Used    Alcohol use No    Drug use: No    Sexual activity: Yes     Partners: Male     Other Topics Concern    Not on file     Social History Narrative    No narrative on file     Past Surgical History:   Procedure Laterality Date    ANKLE SURGERY      L    ANKLE SURGERY Right     Plate in right ankle    CHOLECYSTECTOMY      COLONOSCOPY  09/11/2012    CORONARY ANGIOPLASTY WITH STENT PLACEMENT      LUNG BIOPSY  09/07/2017    PTCA      THYROIDECTOMY      UPPER GASTROINTESTINAL ENDOSCOPY  09/11/2012    UPPER GASTROINTESTINAL ENDOSCOPY  12/4/14     DR. MAE     Family History   Problem Relation Age of Onset    High Blood Pressure Mother     Heart Disease Mother     Cancer Father      LUNG    High Blood Pressure Brother      Allergies   Allergen Reactions    Biaxin [Clarithromycin]     Codeine     Invokana [Canagliflozin]      Yeast infection     Morphine Nausea And Vomiting     Effects stomach    Percocet [Oxycodone-Acetaminophen] Nausea And Vomiting       Current Outpatient Prescriptions:     Liraglutide (VICTOZA) 18 MG/3ML SOPN SC injection, Inject 1.8 mg into the skin daily, Disp: 3 pen, Rfl: 3    levothyroxine (SYNTHROID) 25 MCG tablet, Take 1 tablet by mouth Daily, Disp: 30 tablet, Rfl: 3    insulin glargine (LANTUS SOLOSTAR) 100 UNIT/ML injection pen, INJECT 70 UNITS            SUBCUTANEOUSLY NIGHTLY.     DISCARD [address] [place] [address]., Disp: 90 pen, Rfl: 3    Nebulizers (COMPRESSOR/NEBULIZER) MISC, Nebulizer supply, Disp: 1 each, Rfl: 3    fluocinonide (LIDEX) 0.05 %   meclizine (ANTIVERT) 25 MG tablet, 1 po bid x 10 days, Disp: 20 tablet, Rfl: 0    lisinopril (PRINIVIL;ZESTRIL) 20 MG tablet, Take 1 tab po QD, Disp: , Rfl:     glucose blood VI test strips (ONE TOUCH ULTRA TEST) strip, USE TO TEST TWICE A DAY AND AS NEEDED, IDDM - Dx: E11.9, Disp: 100 each, Rfl: 1    ONE TOUCH LANCETS MISC, USE TO TEST TWICE A DAY AND AS NEEDED, IDDM - Dx: E11.9, Disp: 200 each, Rfl: 3    Blood Glucose Monitoring Suppl MARCELLUS, One Touch Ultra - To Test BID - IDDM - Dx: E11.9, Disp: 1 Device, Rfl: 0    metFORMIN (GLUCOPHAGE) 1000 MG tablet, TAKE 1 TABLET TWICE A DAY WITH MEALS, Disp: 180 tablet, Rfl: 0    guaiFENesin (MUCINEX) 600 MG extended release tablet, Take 1 tablet by mouth 2 times daily (Patient taking differently: Take 600 mg by mouth as needed ), Disp: 20 tablet, Rfl: 0    tiotropium (SPIRIVA RESPIMAT) 2.5 MCG/ACT AERS inhaler, Inhale 5 mcg into the lungs Inhale 5 mcg into the lungs, Disp: , Rfl:     isosorbide mononitrate (IMDUR) 60 MG CR tablet, Take 60 mg by mouth daily , Disp: , Rfl:     ipratropium-albuterol (DUONEB) 0.5-2.5 (3) MG/3ML SOLN nebulizer solution, Inhale 3 mLs into the lungs every 6 hours as needed for Shortness of Breath, Disp: 36 mL, Rfl: 0    budesonide (PULMICORT) 0.5 MG/2ML nebulizer suspension, Take 1 ampule by nebulization 2 times daily, Disp: , Rfl:     digoxin (DIGOX) 125 MCG tablet, Take 125 mcg by mouth daily, Disp: , Rfl:     nitroGLYCERIN (NITROSTAT) 0.4 MG SL tablet, Place 0.4 mg under the tongue every 5 minutes as needed. , Disp: , Rfl:     clopidogrel (PLAVIX) 75 MG tablet, Take 75 mg by mouth daily. , Disp: , Rfl:     fenofibrate (TRICOR) 145 MG tablet, Take 145 mg by mouth daily.   , Disp: , Rfl:     verapamil (VERELAN PM) 240 MG CR capsule, Take 240 mg by mouth 2 times daily , Disp: , Rfl:     zoledronic acid (RECLAST) 5 MG/100ML SOLN, Infuse 100 mLs intravenously once for 1 dose ICD-10 M81.0, Disp: 100 mL, Rfl: 0    Review of Systems   Constitutional: Negative for weight loss. Endocrine: Negative for polydipsia and polyuria. Musculoskeletal: Positive for back pain. Neurological: Positive for dizziness. Psychiatric/Behavioral: Positive for confusion. The patient is nervous/anxious. All other systems reviewed and are negative. Vitals:    01/16/18 1312 01/16/18 1319   BP: (!) 165/78 (!) 146/81   Site: Right Arm    Position: Sitting    Cuff Size: Medium Adult    Pulse: 92    Weight: 207 lb (93.9 kg)    Height: 5' 1\" (1.549 m)        Objective:   Physical Exam   Constitutional: She is oriented to person, place, and time. She appears well-developed and well-nourished. HENT:   Head: Normocephalic and atraumatic. Right Ear: External ear normal.   Left Ear: External ear normal.   Eyes: Conjunctivae are normal. Right eye exhibits no discharge. Left eye exhibits no discharge. No scleral icterus. Neck: Neck supple. Thyromegaly present. Cardiovascular: Normal rate, normal heart sounds and intact distal pulses. Pulmonary/Chest: Effort normal and breath sounds normal. She has no wheezes. She has no rales. Abdominal: Soft. Obese    Musculoskeletal: Normal range of motion. She exhibits no edema. Feet:    Lymphadenopathy:     She has no cervical adenopathy. Neurological: She is alert and oriented to person, place, and time. Skin: Skin is warm and dry. Psychiatric: She has a normal mood and affect.  Her behavior is normal.       Lab Results   Component Value Date     03/07/2017    K 3.9 03/07/2017     03/07/2017    CO2 23 03/07/2017    BUN 13 03/07/2017    CREATININE 0.64 03/07/2017    GLUCOSE 182 01/16/2018    CALCIUM 9.2 03/07/2017    PROT 7.1 10/13/2016    LABALBU 4.4 10/13/2016    BILITOT 0.4 10/13/2016    ALKPHOS 91 10/13/2016    AST 21 10/13/2016    ALT 26 10/13/2016    LABGLOM >60.0 03/07/2017    GFRAA >60.0 03/07/2017    GLOB 2.7 10/13/2016       Lab Results   Component Value Date    TSH 2.070 06/15/2017       Assessment:      1. Diabetes mellitus type 2 in obese (HCC)  POCT Glucose    POCT glycosylated hemoglobin (Hb A1C)    Basic Metabolic Panel    Hemoglobin A1C   2. Postoperative hypothyroidism  T4, Free    TSH without Reflex   3. S/P partial thyroidectomy     4. Goiter  Thyroid Peroxidase Antibody    Basic Metabolic Panel    US HEAD NECK SOFT TISSUE THYROID           Plan:      Orders Placed This Encounter   Procedures    US HEAD NECK SOFT TISSUE THYROID     Standing Status:   Future     Standing Expiration Date:   1/16/2019     Order Specific Question:   Reason for exam:     Answer:   goiter s/p rt thyroidectomy    T4, Free     Standing Status:   Future     Standing Expiration Date:   1/16/2019    TSH without Reflex     Standing Status:   Future     Standing Expiration Date:   1/16/2019    Thyroid Peroxidase Antibody     Standing Status:   Future     Standing Expiration Date:   1/16/2019    Basic Metabolic Panel     Standing Status:   Future     Standing Expiration Date:   1/16/2019    Hemoglobin A1C     Standing Status:   Future     Standing Expiration Date:   1/16/2019    POCT Glucose    POCT glycosylated hemoglobin (Hb A1C)     Orders Placed This Encounter   Medications    DISCONTD: Liraglutide (VICTOZA) 18 MG/3ML SOPN SC injection     Sig: Inject 1.8 mg into the skin daily     Dispense:  9 pen     Refill:  3    DISCONTD: levothyroxine (SYNTHROID) 25 MCG tablet     Sig: Take 1 tablet by mouth Daily     Dispense:  90 tablet     Refill:  3    Liraglutide (VICTOZA) 18 MG/3ML SOPN SC injection     Sig: Inject 1.8 mg into the skin daily     Dispense:  3 pen     Refill:  3    levothyroxine (SYNTHROID) 25 MCG tablet     Sig: Take 1 tablet by mouth Daily     Dispense:  30 tablet     Refill:  3    insulin glargine (LANTUS SOLOSTAR) 100 UNIT/ML injection pen     Sig: INJECT 70 UNITS            SUBCUTANEOUSLY NIGHTLY. DISCARD [address] [place] [address].      Dispense:  90 pen     Refill:  3 Medications Discontinued During This Encounter   Medication Reason    canagliflozin (INVOKANA) 300 MG TABS tablet Side effects    Liraglutide (VICTOZA) 18 MG/3ML SOPN SC injection Reorder    levothyroxine (SYNTHROID) 25 MCG tablet Reorder    LANTUS SOLOSTAR 100 UNIT/ML injection pen Reorder     Diabetes education provided today:  More than 50 % of 30 min spend in face to face  pt education/counseling   Different diabetic medications. Managing high and low sugar readings.   goals for her hbaic of 7 or lower

## 2018-01-27 ENCOUNTER — HOSPITAL ENCOUNTER (OUTPATIENT)
Dept: ULTRASOUND IMAGING | Age: 68
Discharge: HOME | End: 2018-01-27
Payer: MEDICARE

## 2018-01-27 DIAGNOSIS — E04.9 GOITER: ICD-10-CM

## 2018-01-27 PROCEDURE — 76536 US EXAM OF HEAD AND NECK: CPT

## 2018-01-30 ENCOUNTER — OFFICE VISIT (OUTPATIENT)
Dept: FAMILY MEDICINE CLINIC | Age: 68
End: 2018-01-30
Payer: MEDICARE

## 2018-01-30 VITALS
WEIGHT: 206 LBS | OXYGEN SATURATION: 95 % | HEIGHT: 61 IN | HEART RATE: 88 BPM | SYSTOLIC BLOOD PRESSURE: 150 MMHG | BODY MASS INDEX: 38.89 KG/M2 | DIASTOLIC BLOOD PRESSURE: 78 MMHG | TEMPERATURE: 97.9 F | RESPIRATION RATE: 18 BRPM

## 2018-01-30 DIAGNOSIS — H69.92 EUSTACHIAN TUBE DISORDER, LEFT: Primary | ICD-10-CM

## 2018-01-30 DIAGNOSIS — J01.90 ACUTE SINUSITIS, RECURRENCE NOT SPECIFIED, UNSPECIFIED LOCATION: ICD-10-CM

## 2018-01-30 PROCEDURE — 99213 OFFICE O/P EST LOW 20 MIN: CPT | Performed by: FAMILY MEDICINE

## 2018-01-30 RX ORDER — CEFUROXIME AXETIL 250 MG/1
250 TABLET ORAL 2 TIMES DAILY
Qty: 20 TABLET | Refills: 0 | Status: SHIPPED | OUTPATIENT
Start: 2018-01-30 | End: 2018-02-09

## 2018-01-30 ASSESSMENT — ENCOUNTER SYMPTOMS
SHORTNESS OF BREATH: 0
VOICE CHANGE: 0
TROUBLE SWALLOWING: 0

## 2018-01-30 NOTE — PROGRESS NOTES
Subjective:      Patient ID: Ashwin Street is a 75 y.o. female    HPI  Here with cough over the last 2 months along with clear nasal drainage. No fever. No sore throat. Some slight ear ache and blocked sensation of right ear. Slight cough. Review of Systems   Constitutional: Positive for appetite change. Negative for chills. HENT: Negative for dental problem, trouble swallowing and voice change. Respiratory: Negative for shortness of breath. Reviewed allergy, medical, social, surgical, family and med list changes and updated   Files  Social History     Social History    Marital status:      Spouse name: N/A    Number of children: N/A    Years of education: N/A     Occupational History    Retired      Social History Main Topics    Smoking status: Former Smoker     Packs/day: 1.50     Years: 32.00     Types: Cigarettes     Quit date: 2/21/1999    Smokeless tobacco: Never Used    Alcohol use No    Drug use: No    Sexual activity: Yes     Partners: Male     Other Topics Concern    None     Social History Narrative    None     Current Outpatient Prescriptions   Medication Sig Dispense Refill    Liraglutide (VICTOZA) 18 MG/3ML SOPN SC injection Inject 1.8 mg into the skin daily 3 pen 3    levothyroxine (SYNTHROID) 25 MCG tablet Take 1 tablet by mouth Daily 30 tablet 3    insulin glargine (LANTUS SOLOSTAR) 100 UNIT/ML injection pen INJECT 70 UNITS            SUBCUTANEOUSLY NIGHTLY.     DISCARD 28 DAYSAFTER FIRSTUSE. 90 pen 3    Nebulizers (COMPRESSOR/NEBULIZER) MISC Nebulizer supply 1 each 3    fluocinonide (LIDEX) 0.05 % ointment APPLY OINTMENT TOPICALLY TWICE DAILY FOR 2 WEEKS 30 g 0    predniSONE (DELTASONE) 10 MG tablet 4 for 4 days 3 for 3 days 2 for 2 days 1 for 1 day 30 tablet 0    atorvastatin (LIPITOR) 80 MG tablet Take 1 tablet by mouth nightly      hydrochlorothiazide (HYDRODIURIL) 25 MG tablet Take 1 tablet by mouth daily      ipratropium (ATROVENT) 0.06 % nasal spray 2 sprays by Nasal route 2 times daily as needed      nitroGLYCERIN (NITROLINGUAL) 0.4 MG/SPRAY 0.4 mg spray       LORazepam (ATIVAN) 0.5 MG tablet Take 1 tablet by mouth every 8 hours as needed for Anxiety 30 tablet 0    theophylline (UNIPHYL) 600 MG extended release tablet TAKE 1 TABLET ONCE DAILY 30 tablet 5    albuterol sulfate (PROAIR RESPICLICK) 876 (90 Base) MCG/ACT aerosol powder inhalation Inhale 2 puffs into the lungs every 6 hours as needed for Wheezing or Shortness of Breath 1 Inhaler 5    Insulin Pen Needle (B-D UF III MINI PEN NEEDLES) 31G X 5 MM MISC USE 1 DAILY TO INJECT LANTUS 100 each 1    mepolizumab (NUCALA) 100 MG SOLR injection Inject 100 mg into the skin Received from: [facility] Received Sig: Inject 100 mg subcutaneously one time only. monthly      esomeprazole (NEXIUM) 40 MG delayed release capsule Take 1 capsule by mouth daily 90 capsule 1    traMADol (ULTRAM) 50 MG tablet Take 1 tablet by mouth every 6 hours as needed for Pain MAX #50 MONTHLY. DO NOT GET NARCOTIC MEDICATIONS FROM ANY OTHER PROVIDER.  50 tablet 0    Cholecalciferol (VITAMIN D3) 13822 UNITS CAPS 1 po twice weekly 8 capsule 1    formoterol (PERFOROMIST) 20 MCG/2ML nebulizer solution Inhale 2 mLs into the lungs 2 times daily Inhale 2 mLs into the lungs      meclizine (ANTIVERT) 25 MG tablet 1 po bid x 10 days 20 tablet 0    lisinopril (PRINIVIL;ZESTRIL) 20 MG tablet Take 1 tab po QD      glucose blood VI test strips (ONE TOUCH ULTRA TEST) strip USE TO TEST TWICE A DAY AND AS NEEDED, IDDM - Dx: E11.9 100 each 1    ONE TOUCH LANCETS MISC USE TO TEST TWICE A DAY AND AS NEEDED, IDDM - Dx: E11.9 200 each 3    Blood Glucose Monitoring Suppl MARCELLUS One Touch Ultra - To Test BID - IDDM - Dx: E11.9 1 Device 0    metFORMIN (GLUCOPHAGE) 1000 MG tablet TAKE 1 TABLET TWICE A DAY WITH MEALS (Patient taking differently: TAKE 1 TABLET ONCE A DAY WITH MEALS) 180 tablet 0    tiotropium (SPIRIVA RESPIMAT) 2.5 MCG/ACT apnea     UTI (urinary tract infection)      Objective:   BP (!) 158/80 (Site: Left Arm, Position: Sitting, Cuff Size: Large Adult)   Pulse 88   Temp 97.9 °F (36.6 °C)   Resp 18   Ht 5' 1\" (1.549 m)   Wt 206 lb (93.4 kg)   LMP  (LMP Unknown)   SpO2 95%   BMI 38.92 kg/m²     Physical Exam  Heent: T.M normal although slightly retracted on right side. No  pharyngeal injection. No exudate                Nares patent but swollen mucosa noted               No canal swelling or tragus tenderness bilaterally   Neck: no anter cervical adenopathy. No masses. No thyroid asymmetry. Lungs: Clear equal breath sounds. No wheezes or rales. Heart: Rate reg No murmur  Gen: In no acute distress  Assessment:      1. Eustachian tube disorder, left     2.  Acute sinusitis, recurrence not specified, unspecified location         Plan:      Orders Placed This Encounter   Medications    cefUROXime (CEFTIN) 250 MG tablet     Sig: Take 1 tablet by mouth 2 times daily for 10 days     Dispense:  20 tablet     Refill:  0   f/u in 2 weeks if needed other wise in 6 months

## 2018-02-07 ENCOUNTER — CARE COORDINATION (OUTPATIENT)
Dept: FAMILY MEDICINE CLINIC | Age: 68
End: 2018-02-07

## 2018-02-08 ENCOUNTER — OFFICE VISIT (OUTPATIENT)
Dept: PHYSICAL MEDICINE AND REHAB | Age: 68
End: 2018-02-08
Payer: MEDICARE

## 2018-02-08 VITALS
BODY MASS INDEX: 38.4 KG/M2 | SYSTOLIC BLOOD PRESSURE: 148 MMHG | DIASTOLIC BLOOD PRESSURE: 70 MMHG | HEIGHT: 61 IN | WEIGHT: 203.4 LBS

## 2018-02-08 DIAGNOSIS — G89.29 CHRONIC BILATERAL THORACIC BACK PAIN: ICD-10-CM

## 2018-02-08 DIAGNOSIS — G56.80 SUPRASCAPULAR ENTRAPMENT NEUROPATHY, UNSPECIFIED LATERALITY: ICD-10-CM

## 2018-02-08 DIAGNOSIS — M54.42 CHRONIC MIDLINE LOW BACK PAIN WITH LEFT-SIDED SCIATICA: ICD-10-CM

## 2018-02-08 DIAGNOSIS — G56.82 SUPRASCAPULAR ENTRAPMENT NEUROPATHY OF LEFT SIDE: ICD-10-CM

## 2018-02-08 DIAGNOSIS — Z79.899 HIGH RISK MEDICATION USE: ICD-10-CM

## 2018-02-08 DIAGNOSIS — M54.6 CHRONIC BILATERAL THORACIC BACK PAIN: ICD-10-CM

## 2018-02-08 DIAGNOSIS — M79.10 MYALGIA: Primary | ICD-10-CM

## 2018-02-08 DIAGNOSIS — G56.03 BILATERAL CARPAL TUNNEL SYNDROME: ICD-10-CM

## 2018-02-08 DIAGNOSIS — G89.29 CHRONIC MIDLINE LOW BACK PAIN WITH LEFT-SIDED SCIATICA: ICD-10-CM

## 2018-02-08 DIAGNOSIS — M51.36 DDD (DEGENERATIVE DISC DISEASE), LUMBAR: ICD-10-CM

## 2018-02-08 PROCEDURE — 99214 OFFICE O/P EST MOD 30 MIN: CPT | Performed by: PHYSICAL MEDICINE & REHABILITATION

## 2018-02-08 RX ORDER — TRAMADOL HYDROCHLORIDE 50 MG/1
50 TABLET ORAL EVERY 6 HOURS PRN
Qty: 50 TABLET | Refills: 0 | Status: SHIPPED | OUTPATIENT
Start: 2018-02-08 | End: 2019-03-08 | Stop reason: SDUPTHER

## 2018-02-08 ASSESSMENT — ENCOUNTER SYMPTOMS
STRIDOR: 0
APNEA: 1
NAUSEA: 1
PHOTOPHOBIA: 0
BACK PAIN: 1
SINUS PRESSURE: 1
CHOKING: 0
CHEST TIGHTNESS: 0
DIARRHEA: 0
WHEEZING: 0
EYES NEGATIVE: 1
VOMITING: 0
ABDOMINAL PAIN: 0
CONSTIPATION: 0
SHORTNESS OF BREATH: 1
ALLERGIC/IMMUNOLOGIC NEGATIVE: 1

## 2018-02-08 NOTE — PROGRESS NOTES
104 03/07/2017    CO2 23 03/07/2017    BUN 13 03/07/2017    CREATININE 0.64 03/07/2017    CALCIUM 9.2 03/07/2017    LABALBU 4.4 10/13/2016    BILITOT 0.4 10/13/2016    ALKPHOS 91 10/13/2016    AST 21 10/13/2016    ALT 26 10/13/2016     Lab Results   Component Value Date    WBC 13.5 10/14/2016    RBC 5.18 10/14/2016    RBC 4.73 04/24/2012    HGB 14.8 10/14/2016    HCT 45.1 10/14/2016    MCV 87.0 10/14/2016    MCH 28.5 10/14/2016    MCHC 32.8 10/14/2016    RDW 13.0 10/14/2016     10/14/2016    MPV 9.1 08/28/2015       Lab Results   Component Value Date    LABAMPH Neg 05/30/2017    BARBSCNU Neg 05/30/2017    LABBENZ Neg 05/30/2017    CANSU Neg 05/30/2017    COCAIMETSCRU Neg 05/30/2017    PHENCYCLIDINESCREENURINE Neg 83/10/5342    TRICYCLIC not available 68/28/6567    DSCOMMENT see below 05/30/2017       No results found for: CODEINE, MORPHINE, ACETYLMORPHI, OXYCODONE, NOROXYCODONE, NOROXYMU, HYDRCO, NORHYDU, HYDROMO, BUPREN, NORBUPRNOR, FENTA, NORFENT, MEPERIDINE, TAPENU, TAPOSULFUR, METHADONE, LABPROP, TRAM, AMPH, METHAMP, MDMA, ECMDA    Lab Results   Component Value Date    LABCREA 59.3 06/23/2017         , I discussed results with patient. See follow-up plans for new studies. Therapies:  HEP-gentle stretching and relaxation techniques-demonstrated with patient-they are to do them twice a day. They are also advised to make the following lifestyle changes:  Goals      HEMOGLOBIN A1C < 7.0      SOAPP-R GOAL LESS THAN 9            10/26/16 SCORE: 6-LOW RISK   01/26/17 score: 8-low risk   03/23/17 score: 6-low risk  05/23/17 score: 7-low risk  08/08/17 score: 8-low risk  11/08/17 score: 6-low risk            Injections or Epidurals:  Injection options were discussed-Monthly trigger point injections with vitamin B12 and lidocaine. Patient gave verbal consent to ordered injections. See follow-up plans for planned injections.     Supplements:  Vitamin D, Co Q10  Education was given on:   Dietary and Fitness             Follow up with Primary Care Physician regarding their general medical needs. They are to follow up in 2 months to review medication, efficacy of injections, pill counts, OARRS check, SOAPPR assessment, review diagnostics, to review previous and future treatment plans and assess appropriateness for continued therapy.         New Diagnostics  Orders Placed This Encounter   Procedures    EMG    [address]3495 - [place] INJECT TRIGGER POINTS, 3 OR GREATER    28015 - [place] INJECT TRIGGER POINTS, 3 OR GREATER       Lauren Adams, DO

## 2018-02-13 ENCOUNTER — PROCEDURE VISIT (OUTPATIENT)
Dept: PHYSICAL MEDICINE AND REHAB | Age: 68
End: 2018-02-13
Payer: MEDICARE

## 2018-02-13 DIAGNOSIS — M79.7 FIBROMYALGIA: Primary | ICD-10-CM

## 2018-02-13 DIAGNOSIS — R53.82 CHRONIC FATIGUE: ICD-10-CM

## 2018-02-13 PROCEDURE — 20553 NJX 1/MLT TRIGGER POINTS 3/>: CPT | Performed by: PHYSICAL MEDICINE & REHABILITATION

## 2018-02-13 PROCEDURE — 96372 THER/PROPH/DIAG INJ SC/IM: CPT | Performed by: PHYSICAL MEDICINE & REHABILITATION

## 2018-02-22 RX ORDER — CYANOCOBALAMIN 1000 UG/ML
1000 INJECTION INTRAMUSCULAR; SUBCUTANEOUS ONCE
Status: COMPLETED | OUTPATIENT
Start: 2018-02-22 | End: 2018-02-22

## 2018-02-22 RX ADMIN — CYANOCOBALAMIN 1000 MCG: 1000 INJECTION INTRAMUSCULAR; SUBCUTANEOUS at 11:10

## 2018-03-12 ENCOUNTER — TELEPHONE (OUTPATIENT)
Dept: PULMONOLOGY | Age: 68
End: 2018-03-12

## 2018-03-12 DIAGNOSIS — J44.9 CHRONIC OBSTRUCTIVE PULMONARY DISEASE, UNSPECIFIED COPD TYPE (HCC): ICD-10-CM

## 2018-03-15 ENCOUNTER — PROCEDURE VISIT (OUTPATIENT)
Dept: PHYSICAL MEDICINE AND REHAB | Age: 68
End: 2018-03-15
Payer: MEDICARE

## 2018-03-15 DIAGNOSIS — M79.7 FIBROMYALGIA: Primary | ICD-10-CM

## 2018-03-15 DIAGNOSIS — M79.10 MYALGIA: ICD-10-CM

## 2018-03-15 PROCEDURE — 20553 NJX 1/MLT TRIGGER POINTS 3/>: CPT | Performed by: PHYSICAL MEDICINE & REHABILITATION

## 2018-03-15 PROCEDURE — 96372 THER/PROPH/DIAG INJ SC/IM: CPT | Performed by: PHYSICAL MEDICINE & REHABILITATION

## 2018-03-24 ENCOUNTER — OFFICE VISIT (OUTPATIENT)
Dept: PHYSICAL MEDICINE AND REHAB | Age: 68
End: 2018-03-24
Payer: MEDICARE

## 2018-03-24 DIAGNOSIS — M54.12 CERVICAL RADICULOPATHY: ICD-10-CM

## 2018-03-24 DIAGNOSIS — G56.03 BILATERAL CARPAL TUNNEL SYNDROME: Primary | ICD-10-CM

## 2018-03-24 PROCEDURE — 95886 MUSC TEST DONE W/N TEST COMP: CPT | Performed by: PHYSICAL MEDICINE & REHABILITATION

## 2018-03-24 PROCEDURE — 95912 NRV CNDJ TEST 11-12 STUDIES: CPT | Performed by: PHYSICAL MEDICINE & REHABILITATION

## 2018-03-24 NOTE — PATIENT INSTRUCTIONS
uncomfortable or painful. 1. Rotate your wrist up, down, and from side to side. Repeat 4 times. 2. Stretch your fingers far apart. Relax them, and then stretch them again. Repeat 4 times. 3. Stretch your thumb by pulling it back gently, holding it, and then releasing it. Repeat 4 times. How to do the exercises  Prayer stretch    1. Start with your palms together in front of your chest just below your chin. 2. Slowly lower your hands toward your waistline, keeping your hands close to your stomach and your palms together until you feel a mild to moderate stretch under your forearms. 3. Hold for at least 15 to 30 seconds. Repeat 2 to 4 times. Wrist flexor stretch    1. Extend your arm in front of you with your palm up. 2. Bend your wrist, pointing your hand toward the floor. 3. With your other hand, gently bend your wrist farther until you feel a mild to moderate stretch in your forearm. 4. Hold for at least 15 to 30 seconds. Repeat 2 to 4 times. Wrist extensor stretch    1. Repeat steps 1 through 4 of the stretch above, but begin with your extended hand palm down. Follow-up care is a key part of your treatment and safety. Be sure to make and go to all appointments, and call your doctor if you are having problems. It's also a good idea to know your test results and keep a list of the medicines you take. Where can you learn more? Go to [email or web address] and sign in to your [email or web address] account. Enter O285 in the [facility] box to learn more about \"Carpal Tunnel Syndrome: Exercises. \"     If you do not have an account, please click on the \"Sign Up Now\" link. Current as of: March 21, 2017  Content Version: 11.5  © 0022-1938 Healthwise, [employer]. Care instructions adapted under license by [facility] ([facility]).  If you have questions about a medical condition or this instruction, always ask your healthcare professional. Jerri Langston any warranty or liability for unsafe for you to drive. ? · You will be given more specific instructions about recovering from your surgery. They will cover things like diet, wound care, follow-up care, driving, and getting back to your normal routine. When should you call your doctor? · You have questions or concerns. ? · You don't understand how to prepare for your surgery. ? · You become ill before the surgery (such as fever, flu, or a cold). ? · You need to reschedule or have changed your mind about having the surgery. Where can you learn more? Go to [email or web address]"[email or web address].[email or web address] org and sign in to your [employer] account. Enter P859 in the [place] box to learn more about \"Carpal Tunnel Release: Before Your Surgery. \"     If you do not have an account, please click on the \"Sign Up Now\" link. Current as of: March 21, 2017  Content Version: 11.5  © 2820-6106 Healthwise, [employer]. Care instructions adapted under license by [facility] ([facility]). If you have questions about a medical condition or this instruction, always ask your healthcare professional. [place] [address] any warranty or liability for your use of this information.

## 2018-04-10 DIAGNOSIS — E11.69 DIABETES MELLITUS TYPE 2 IN OBESE (HCC): ICD-10-CM

## 2018-04-10 DIAGNOSIS — E04.9 GOITER: ICD-10-CM

## 2018-04-10 DIAGNOSIS — E66.9 DIABETES MELLITUS TYPE 2 IN OBESE (HCC): ICD-10-CM

## 2018-04-10 DIAGNOSIS — E89.0 POSTOPERATIVE HYPOTHYROIDISM: ICD-10-CM

## 2018-04-10 LAB
ANION GAP SERPL CALCULATED.3IONS-SCNC: 16 MEQ/L (ref 7–13)
BUN BLDV-MCNC: 17 MG/DL (ref 8–23)
CALCIUM SERPL-MCNC: 9 MG/DL (ref 8.6–10.2)
CHLORIDE BLD-SCNC: 100 MEQ/L (ref 98–107)
CO2: 28 MEQ/L (ref 22–29)
CREAT SERPL-MCNC: 0.59 MG/DL (ref 0.5–0.9)
GFR AFRICAN AMERICAN: >60
GFR NON-AFRICAN AMERICAN: >60
GLUCOSE BLD-MCNC: 127 MG/DL (ref 74–109)
POTASSIUM SERPL-SCNC: 4.4 MEQ/L (ref 3.5–5.1)
SODIUM BLD-SCNC: 144 MEQ/L (ref 132–144)
T4 FREE: 1.13 NG/DL (ref 0.93–1.7)
TSH SERPL DL<=0.05 MIU/L-ACNC: 2.8 UIU/ML (ref 0.27–4.2)

## 2018-04-11 LAB — HBA1C MFR BLD: 6.8 % (ref 4.8–5.9)

## 2018-04-13 LAB — THYROID PEROXIDASE (TPO) ABS: <10 IU/ML (ref 0–35)

## 2018-04-17 ENCOUNTER — PROCEDURE VISIT (OUTPATIENT)
Dept: PHYSICAL MEDICINE AND REHAB | Age: 68
End: 2018-04-17
Payer: MEDICARE

## 2018-04-17 ENCOUNTER — OFFICE VISIT (OUTPATIENT)
Dept: ENDOCRINOLOGY | Age: 68
End: 2018-04-17
Payer: MEDICARE

## 2018-04-17 VITALS
HEIGHT: 61 IN | DIASTOLIC BLOOD PRESSURE: 74 MMHG | HEART RATE: 96 BPM | WEIGHT: 207 LBS | SYSTOLIC BLOOD PRESSURE: 182 MMHG | BODY MASS INDEX: 39.08 KG/M2

## 2018-04-17 DIAGNOSIS — E11.69 DIABETES MELLITUS TYPE 2 IN OBESE (HCC): Primary | ICD-10-CM

## 2018-04-17 DIAGNOSIS — E04.9 GOITER: ICD-10-CM

## 2018-04-17 DIAGNOSIS — M79.10 MYALGIA: ICD-10-CM

## 2018-04-17 DIAGNOSIS — E04.1 LEFT THYROID NODULE: ICD-10-CM

## 2018-04-17 DIAGNOSIS — M79.7 FIBROMYALGIA: Primary | ICD-10-CM

## 2018-04-17 DIAGNOSIS — E66.9 DIABETES MELLITUS TYPE 2 IN OBESE (HCC): Primary | ICD-10-CM

## 2018-04-17 LAB — GLUCOSE BLD-MCNC: 142 MG/DL

## 2018-04-17 PROCEDURE — 99213 OFFICE O/P EST LOW 20 MIN: CPT | Performed by: INTERNAL MEDICINE

## 2018-04-17 PROCEDURE — 20553 NJX 1/MLT TRIGGER POINTS 3/>: CPT | Performed by: PHYSICAL MEDICINE & REHABILITATION

## 2018-04-17 PROCEDURE — 82962 GLUCOSE BLOOD TEST: CPT | Performed by: INTERNAL MEDICINE

## 2018-04-17 PROCEDURE — 96372 THER/PROPH/DIAG INJ SC/IM: CPT | Performed by: PHYSICAL MEDICINE & REHABILITATION

## 2018-04-26 DIAGNOSIS — E66.9 DIABETES MELLITUS TYPE 2 IN OBESE (HCC): Primary | ICD-10-CM

## 2018-04-26 DIAGNOSIS — E11.69 DIABETES MELLITUS TYPE 2 IN OBESE (HCC): Primary | ICD-10-CM

## 2018-05-03 ENCOUNTER — OFFICE VISIT (OUTPATIENT)
Dept: PULMONOLOGY | Age: 68
End: 2018-05-03
Payer: MEDICARE

## 2018-05-03 VITALS
SYSTOLIC BLOOD PRESSURE: 134 MMHG | DIASTOLIC BLOOD PRESSURE: 70 MMHG | HEART RATE: 90 BPM | HEIGHT: 61 IN | BODY MASS INDEX: 39.46 KG/M2 | WEIGHT: 209 LBS | TEMPERATURE: 98.6 F | OXYGEN SATURATION: 95 % | RESPIRATION RATE: 16 BRPM

## 2018-05-03 DIAGNOSIS — J44.9 CHRONIC OBSTRUCTIVE PULMONARY DISEASE, UNSPECIFIED COPD TYPE (HCC): Primary | ICD-10-CM

## 2018-05-03 DIAGNOSIS — E66.9 OBESITY (BMI 30-39.9): ICD-10-CM

## 2018-05-03 DIAGNOSIS — B37.0 ORAL THRUSH: ICD-10-CM

## 2018-05-03 DIAGNOSIS — J45.40 MODERATE PERSISTENT ASTHMA WITHOUT COMPLICATION: ICD-10-CM

## 2018-05-03 DIAGNOSIS — Z99.89 OSA ON CPAP: ICD-10-CM

## 2018-05-03 DIAGNOSIS — G47.33 OSA ON CPAP: ICD-10-CM

## 2018-05-03 PROCEDURE — 99214 OFFICE O/P EST MOD 30 MIN: CPT | Performed by: INTERNAL MEDICINE

## 2018-05-03 ASSESSMENT — ENCOUNTER SYMPTOMS
VOICE CHANGE: 0
NAUSEA: 0
CHEST TIGHTNESS: 0
SINUS PRESSURE: 0
DIARRHEA: 0
COUGH: 0
ABDOMINAL PAIN: 0
EYE ITCHING: 0
WHEEZING: 1
SHORTNESS OF BREATH: 1
TROUBLE SWALLOWING: 0
RHINORRHEA: 0
EYE DISCHARGE: 0
VOMITING: 0
SORE THROAT: 0

## 2018-05-04 ENCOUNTER — TELEPHONE (OUTPATIENT)
Dept: ENDOCRINOLOGY | Age: 68
End: 2018-05-04

## 2018-05-15 ENCOUNTER — OFFICE VISIT (OUTPATIENT)
Dept: INTERNAL MEDICINE CLINIC | Age: 68
End: 2018-05-15
Payer: MEDICARE

## 2018-05-15 VITALS
HEART RATE: 108 BPM | SYSTOLIC BLOOD PRESSURE: 158 MMHG | RESPIRATION RATE: 18 BRPM | DIASTOLIC BLOOD PRESSURE: 88 MMHG | HEIGHT: 61 IN | BODY MASS INDEX: 39.35 KG/M2 | OXYGEN SATURATION: 98 % | WEIGHT: 208.4 LBS | TEMPERATURE: 98.3 F

## 2018-05-15 DIAGNOSIS — H10.9 CONJUNCTIVITIS OF RIGHT EYE, UNSPECIFIED CONJUNCTIVITIS TYPE: Primary | ICD-10-CM

## 2018-05-15 DIAGNOSIS — J44.9 CHRONIC OBSTRUCTIVE PULMONARY DISEASE, UNSPECIFIED COPD TYPE (HCC): ICD-10-CM

## 2018-05-15 PROCEDURE — 99213 OFFICE O/P EST LOW 20 MIN: CPT | Performed by: NURSE PRACTITIONER

## 2018-05-15 RX ORDER — POLYMYXIN B SULFATE AND TRIMETHOPRIM 1; 10000 MG/ML; [USP'U]/ML
1 SOLUTION OPHTHALMIC EVERY 4 HOURS
Qty: 1 BOTTLE | Refills: 0 | Status: SHIPPED | OUTPATIENT
Start: 2018-05-15 | End: 2018-05-22

## 2018-05-15 RX ORDER — TIOTROPIUM BROMIDE INHALATION SPRAY 3.12 UG/1
2 SPRAY, METERED RESPIRATORY (INHALATION) DAILY
COMMUNITY
Start: 2018-03-19 | End: 2019-01-01 | Stop reason: ALTCHOICE

## 2018-05-15 RX ORDER — IPRATROPIUM BROMIDE AND ALBUTEROL SULFATE 2.5; .5 MG/3ML; MG/3ML
3 SOLUTION RESPIRATORY (INHALATION)
COMMUNITY
Start: 2018-03-28 | End: 2018-11-26 | Stop reason: SDUPTHER

## 2018-05-15 ASSESSMENT — ENCOUNTER SYMPTOMS
TROUBLE SWALLOWING: 0
EYE REDNESS: 1
COUGH: 1
SINUS PRESSURE: 0
CHEST TIGHTNESS: 0
EYE PAIN: 0
NAUSEA: 0
SORE THROAT: 0
EYE DISCHARGE: 1
SHORTNESS OF BREATH: 1
DIARRHEA: 0
ABDOMINAL PAIN: 0
VOMITING: 0
RHINORRHEA: 0
WHEEZING: 1
PHOTOPHOBIA: 0
EYE ITCHING: 1

## 2018-05-17 ENCOUNTER — HOSPITAL ENCOUNTER (OUTPATIENT)
Dept: PREADMISSION TESTING | Age: 68
Discharge: HOME | End: 2018-05-21
Payer: MEDICARE

## 2018-05-17 VITALS
RESPIRATION RATE: 16 BRPM | WEIGHT: 209.2 LBS | SYSTOLIC BLOOD PRESSURE: 137 MMHG | HEIGHT: 61 IN | OXYGEN SATURATION: 95 % | HEART RATE: 87 BPM | DIASTOLIC BLOOD PRESSURE: 61 MMHG | BODY MASS INDEX: 39.5 KG/M2 | TEMPERATURE: 98.4 F

## 2018-05-17 DIAGNOSIS — Z87.891 FORMER SMOKER, STOPPED SMOKING IN DISTANT PAST: Chronic | ICD-10-CM

## 2018-05-17 DIAGNOSIS — G56.01 RIGHT CARPAL TUNNEL SYNDROME: ICD-10-CM

## 2018-05-17 LAB
DIGOXIN LEVEL: 0.6 NG/ML (ref 0.8–2)
THEOPHYLLINE LEVEL: 9.8 UG/ML (ref 10–20)

## 2018-05-17 PROCEDURE — 80162 ASSAY OF DIGOXIN TOTAL: CPT

## 2018-05-17 PROCEDURE — 80198 ASSAY OF THEOPHYLLINE: CPT

## 2018-05-17 RX ORDER — SODIUM CHLORIDE, SODIUM LACTATE, POTASSIUM CHLORIDE, CALCIUM CHLORIDE 600; 310; 30; 20 MG/100ML; MG/100ML; MG/100ML; MG/100ML
INJECTION, SOLUTION INTRAVENOUS CONTINUOUS
Status: CANCELLED | OUTPATIENT
Start: 2018-05-17

## 2018-05-17 RX ORDER — LIDOCAINE HYDROCHLORIDE 10 MG/ML
1 INJECTION, SOLUTION EPIDURAL; INFILTRATION; INTRACAUDAL; PERINEURAL
Status: CANCELLED | OUTPATIENT
Start: 2018-05-17 | End: 2018-05-17

## 2018-05-17 RX ORDER — SODIUM CHLORIDE 0.9 % (FLUSH) 0.9 %
10 SYRINGE (ML) INJECTION PRN
Status: CANCELLED | OUTPATIENT
Start: 2018-05-17

## 2018-05-17 RX ORDER — SODIUM CHLORIDE 0.9 % (FLUSH) 0.9 %
10 SYRINGE (ML) INJECTION EVERY 12 HOURS SCHEDULED
Status: CANCELLED | OUTPATIENT
Start: 2018-05-17

## 2018-05-17 ASSESSMENT — ENCOUNTER SYMPTOMS
CONSTIPATION: 0
BLURRED VISION: 0
COUGH: 1
WHEEZING: 1
DOUBLE VISION: 0
EYES NEGATIVE: 1
VOMITING: 0
DIARRHEA: 0
HEARTBURN: 0
STRIDOR: 0
BACK PAIN: 1
ABDOMINAL PAIN: 0
NAUSEA: 0
SORE THROAT: 0

## 2018-05-24 ENCOUNTER — ANESTHESIA (OUTPATIENT)
Dept: OPERATING ROOM | Age: 68
End: 2018-05-24
Payer: MEDICARE

## 2018-05-24 ENCOUNTER — ANESTHESIA EVENT (OUTPATIENT)
Dept: OPERATING ROOM | Age: 68
End: 2018-05-24
Payer: MEDICARE

## 2018-05-24 ENCOUNTER — PREP FOR PROCEDURE (OUTPATIENT)
Dept: ORTHOPEDIC SURGERY | Age: 68
End: 2018-05-24

## 2018-05-24 ENCOUNTER — HOSPITAL ENCOUNTER (OUTPATIENT)
Age: 68
Setting detail: OUTPATIENT SURGERY
Discharge: HOME | End: 2018-05-24
Attending: ORTHOPAEDIC SURGERY | Admitting: ORTHOPAEDIC SURGERY
Payer: MEDICARE

## 2018-05-24 VITALS
HEIGHT: 61 IN | DIASTOLIC BLOOD PRESSURE: 78 MMHG | BODY MASS INDEX: 39.46 KG/M2 | TEMPERATURE: 97.2 F | WEIGHT: 209 LBS | HEART RATE: 82 BPM | SYSTOLIC BLOOD PRESSURE: 155 MMHG | OXYGEN SATURATION: 95 % | RESPIRATION RATE: 15 BRPM

## 2018-05-24 VITALS
SYSTOLIC BLOOD PRESSURE: 125 MMHG | OXYGEN SATURATION: 98 % | RESPIRATION RATE: 10 BRPM | DIASTOLIC BLOOD PRESSURE: 62 MMHG

## 2018-05-24 LAB
GLUCOSE BLD-MCNC: 152 MG/DL (ref 60–115)
GLUCOSE BLD-MCNC: 153 MG/DL (ref 60–115)
PERFORMED ON: ABNORMAL
PERFORMED ON: ABNORMAL

## 2018-05-24 PROCEDURE — 7100000000 HC PACU RECOVERY - FIRST 15 MIN: Performed by: ORTHOPAEDIC SURGERY

## 2018-05-24 PROCEDURE — 3600000013 HC SURGERY LEVEL 3 ADDTL 15MIN: Performed by: ORTHOPAEDIC SURGERY

## 2018-05-24 PROCEDURE — 2500000003 HC RX 250 WO HCPCS

## 2018-05-24 PROCEDURE — 2580000003 HC RX 258: Performed by: ORTHOPAEDIC SURGERY

## 2018-05-24 PROCEDURE — 6370000000 HC RX 637 (ALT 250 FOR IP)

## 2018-05-24 PROCEDURE — 7100000001 HC PACU RECOVERY - ADDTL 15 MIN: Performed by: ORTHOPAEDIC SURGERY

## 2018-05-24 PROCEDURE — 7100000011 HC PHASE II RECOVERY - ADDTL 15 MIN: Performed by: ORTHOPAEDIC SURGERY

## 2018-05-24 PROCEDURE — 2580000003 HC RX 258

## 2018-05-24 PROCEDURE — 3700000000 HC ANESTHESIA ATTENDED CARE: Performed by: ORTHOPAEDIC SURGERY

## 2018-05-24 PROCEDURE — 3700000001 HC ADD 15 MINUTES (ANESTHESIA): Performed by: ORTHOPAEDIC SURGERY

## 2018-05-24 PROCEDURE — 6360000002 HC RX W HCPCS: Performed by: NURSE ANESTHETIST, CERTIFIED REGISTERED

## 2018-05-24 PROCEDURE — 6360000002 HC RX W HCPCS: Performed by: ANESTHESIOLOGY

## 2018-05-24 PROCEDURE — 3600000003 HC SURGERY LEVEL 3 BASE: Performed by: ORTHOPAEDIC SURGERY

## 2018-05-24 PROCEDURE — 7100000010 HC PHASE II RECOVERY - FIRST 15 MIN: Performed by: ORTHOPAEDIC SURGERY

## 2018-05-24 PROCEDURE — 2500000003 HC RX 250 WO HCPCS: Performed by: ORTHOPAEDIC SURGERY

## 2018-05-24 PROCEDURE — 6360000002 HC RX W HCPCS: Performed by: ORTHOPAEDIC SURGERY

## 2018-05-24 PROCEDURE — 2580000003 HC RX 258: Performed by: ANESTHESIOLOGY

## 2018-05-24 PROCEDURE — 2500000003 HC RX 250 WO HCPCS: Performed by: NURSE ANESTHETIST, CERTIFIED REGISTERED

## 2018-05-24 RX ORDER — SODIUM CHLORIDE, SODIUM LACTATE, POTASSIUM CHLORIDE, CALCIUM CHLORIDE 600; 310; 30; 20 MG/100ML; MG/100ML; MG/100ML; MG/100ML
INJECTION, SOLUTION INTRAVENOUS CONTINUOUS
Status: DISCONTINUED | OUTPATIENT
Start: 2018-05-24 | End: 2018-05-24 | Stop reason: SDUPTHER

## 2018-05-24 RX ORDER — LIDOCAINE HYDROCHLORIDE 5 MG/ML
INJECTION, SOLUTION INFILTRATION; INTRAVENOUS PRN
Status: DISCONTINUED | OUTPATIENT
Start: 2018-05-24 | End: 2018-05-24 | Stop reason: SDUPTHER

## 2018-05-24 RX ORDER — SODIUM CHLORIDE, SODIUM LACTATE, POTASSIUM CHLORIDE, CALCIUM CHLORIDE 600; 310; 30; 20 MG/100ML; MG/100ML; MG/100ML; MG/100ML
INJECTION, SOLUTION INTRAVENOUS CONTINUOUS
Status: DISCONTINUED | OUTPATIENT
Start: 2018-05-24 | End: 2018-05-24 | Stop reason: HOSPADM

## 2018-05-24 RX ORDER — OXYCODONE HYDROCHLORIDE AND ACETAMINOPHEN 5; 325 MG/1; MG/1
1 TABLET ORAL EVERY 4 HOURS PRN
Status: CANCELLED | OUTPATIENT
Start: 2018-05-24

## 2018-05-24 RX ORDER — MORPHINE SULFATE 2 MG/ML
4 INJECTION, SOLUTION INTRAMUSCULAR; INTRAVENOUS
Status: CANCELLED | OUTPATIENT
Start: 2018-05-24

## 2018-05-24 RX ORDER — MORPHINE SULFATE 2 MG/ML
2 INJECTION, SOLUTION INTRAMUSCULAR; INTRAVENOUS
Status: CANCELLED | OUTPATIENT
Start: 2018-05-24

## 2018-05-24 RX ORDER — SODIUM CHLORIDE, SODIUM LACTATE, POTASSIUM CHLORIDE, CALCIUM CHLORIDE 600; 310; 30; 20 MG/100ML; MG/100ML; MG/100ML; MG/100ML
INJECTION, SOLUTION INTRAVENOUS
Status: COMPLETED
Start: 2018-05-24 | End: 2018-05-24

## 2018-05-24 RX ORDER — MAGNESIUM HYDROXIDE 1200 MG/15ML
LIQUID ORAL CONTINUOUS PRN
Status: DISCONTINUED | OUTPATIENT
Start: 2018-05-24 | End: 2018-05-24 | Stop reason: HOSPADM

## 2018-05-24 RX ORDER — MEPERIDINE HYDROCHLORIDE 25 MG/ML
12.5 INJECTION INTRAMUSCULAR; INTRAVENOUS; SUBCUTANEOUS EVERY 5 MIN PRN
Status: DISCONTINUED | OUTPATIENT
Start: 2018-05-24 | End: 2018-05-24 | Stop reason: HOSPADM

## 2018-05-24 RX ORDER — ONDANSETRON 2 MG/ML
4 INJECTION INTRAMUSCULAR; INTRAVENOUS EVERY 6 HOURS PRN
Status: CANCELLED | OUTPATIENT
Start: 2018-05-24

## 2018-05-24 RX ORDER — DIPHENHYDRAMINE HYDROCHLORIDE 50 MG/ML
12.5 INJECTION INTRAMUSCULAR; INTRAVENOUS
Status: DISCONTINUED | OUTPATIENT
Start: 2018-05-24 | End: 2018-05-24 | Stop reason: HOSPADM

## 2018-05-24 RX ORDER — ACETAMINOPHEN 325 MG/1
650 TABLET ORAL EVERY 4 HOURS PRN
Status: CANCELLED | OUTPATIENT
Start: 2018-05-24

## 2018-05-24 RX ORDER — LIDOCAINE HYDROCHLORIDE 10 MG/ML
1 INJECTION, SOLUTION EPIDURAL; INFILTRATION; INTRACAUDAL; PERINEURAL
Status: DISCONTINUED | OUTPATIENT
Start: 2018-05-24 | End: 2018-05-24 | Stop reason: HOSPADM

## 2018-05-24 RX ORDER — ONDANSETRON 2 MG/ML
4 INJECTION INTRAMUSCULAR; INTRAVENOUS EVERY 6 HOURS PRN
Status: DISCONTINUED | OUTPATIENT
Start: 2018-05-24 | End: 2018-05-24 | Stop reason: HOSPADM

## 2018-05-24 RX ORDER — LIDOCAINE HYDROCHLORIDE 10 MG/ML
INJECTION, SOLUTION EPIDURAL; INFILTRATION; INTRACAUDAL; PERINEURAL
Status: COMPLETED
Start: 2018-05-24 | End: 2018-05-24

## 2018-05-24 RX ORDER — ONDANSETRON 2 MG/ML
INJECTION INTRAMUSCULAR; INTRAVENOUS PRN
Status: DISCONTINUED | OUTPATIENT
Start: 2018-05-24 | End: 2018-05-24 | Stop reason: SDUPTHER

## 2018-05-24 RX ORDER — FENTANYL CITRATE 50 UG/ML
INJECTION, SOLUTION INTRAMUSCULAR; INTRAVENOUS PRN
Status: DISCONTINUED | OUTPATIENT
Start: 2018-05-24 | End: 2018-05-24 | Stop reason: SDUPTHER

## 2018-05-24 RX ORDER — ACETAMINOPHEN 325 MG/1
650 TABLET ORAL EVERY 4 HOURS PRN
Status: DISCONTINUED | OUTPATIENT
Start: 2018-05-24 | End: 2018-05-24 | Stop reason: HOSPADM

## 2018-05-24 RX ORDER — PROPOFOL 10 MG/ML
INJECTION, EMULSION INTRAVENOUS PRN
Status: DISCONTINUED | OUTPATIENT
Start: 2018-05-24 | End: 2018-05-24 | Stop reason: SDUPTHER

## 2018-05-24 RX ORDER — OXYCODONE HYDROCHLORIDE AND ACETAMINOPHEN 5; 325 MG/1; MG/1
2 TABLET ORAL EVERY 4 HOURS PRN
Status: DISCONTINUED | OUTPATIENT
Start: 2018-05-24 | End: 2018-05-24 | Stop reason: HOSPADM

## 2018-05-24 RX ORDER — LIDOCAINE HYDROCHLORIDE 5 MG/ML
INJECTION, SOLUTION INFILTRATION; INTRAVENOUS PRN
Status: DISCONTINUED | OUTPATIENT
Start: 2018-05-24 | End: 2018-05-24

## 2018-05-24 RX ORDER — SODIUM CHLORIDE 9 MG/ML
50 INJECTION, SOLUTION INTRAVENOUS CONTINUOUS
Status: ACTIVE | OUTPATIENT
Start: 2018-05-24 | End: 2018-05-24

## 2018-05-24 RX ORDER — SODIUM CHLORIDE 0.9 % (FLUSH) 0.9 %
10 SYRINGE (ML) INJECTION PRN
Status: DISCONTINUED | OUTPATIENT
Start: 2018-05-24 | End: 2018-05-24 | Stop reason: HOSPADM

## 2018-05-24 RX ORDER — HYDROCODONE BITARTRATE AND ACETAMINOPHEN 5; 325 MG/1; MG/1
1 TABLET ORAL PRN
Status: DISCONTINUED | OUTPATIENT
Start: 2018-05-24 | End: 2018-05-24 | Stop reason: HOSPADM

## 2018-05-24 RX ORDER — MORPHINE SULFATE 4 MG/ML
4 INJECTION, SOLUTION INTRAMUSCULAR; INTRAVENOUS
Status: DISCONTINUED | OUTPATIENT
Start: 2018-05-24 | End: 2018-05-24 | Stop reason: RX

## 2018-05-24 RX ORDER — BUPIVACAINE HYDROCHLORIDE 5 MG/ML
INJECTION, SOLUTION EPIDURAL; INTRACAUDAL PRN
Status: DISCONTINUED | OUTPATIENT
Start: 2018-05-24 | End: 2018-05-24 | Stop reason: HOSPADM

## 2018-05-24 RX ORDER — FENTANYL CITRATE 50 UG/ML
50 INJECTION, SOLUTION INTRAMUSCULAR; INTRAVENOUS EVERY 10 MIN PRN
Status: DISCONTINUED | OUTPATIENT
Start: 2018-05-24 | End: 2018-05-24 | Stop reason: HOSPADM

## 2018-05-24 RX ORDER — SODIUM CHLORIDE 0.9 % (FLUSH) 0.9 %
10 SYRINGE (ML) INJECTION PRN
Status: CANCELLED | OUTPATIENT
Start: 2018-05-24

## 2018-05-24 RX ORDER — MIDAZOLAM HYDROCHLORIDE 1 MG/ML
INJECTION INTRAMUSCULAR; INTRAVENOUS PRN
Status: DISCONTINUED | OUTPATIENT
Start: 2018-05-24 | End: 2018-05-24 | Stop reason: SDUPTHER

## 2018-05-24 RX ORDER — SODIUM CHLORIDE 0.9 % (FLUSH) 0.9 %
10 SYRINGE (ML) INJECTION EVERY 12 HOURS SCHEDULED
Status: CANCELLED | OUTPATIENT
Start: 2018-05-24

## 2018-05-24 RX ORDER — MORPHINE SULFATE 2 MG/ML
2 INJECTION, SOLUTION INTRAMUSCULAR; INTRAVENOUS
Status: DISCONTINUED | OUTPATIENT
Start: 2018-05-24 | End: 2018-05-24 | Stop reason: RX

## 2018-05-24 RX ORDER — HYDROCODONE BITARTRATE AND ACETAMINOPHEN 5; 325 MG/1; MG/1
2 TABLET ORAL PRN
Status: DISCONTINUED | OUTPATIENT
Start: 2018-05-24 | End: 2018-05-24 | Stop reason: HOSPADM

## 2018-05-24 RX ORDER — PROPOFOL 10 MG/ML
INJECTION, EMULSION INTRAVENOUS CONTINUOUS PRN
Status: DISCONTINUED | OUTPATIENT
Start: 2018-05-24 | End: 2018-05-24 | Stop reason: SDUPTHER

## 2018-05-24 RX ORDER — GINSENG 100 MG
CAPSULE ORAL PRN
Status: DISCONTINUED | OUTPATIENT
Start: 2018-05-24 | End: 2018-05-24 | Stop reason: HOSPADM

## 2018-05-24 RX ORDER — ONDANSETRON 2 MG/ML
4 INJECTION INTRAMUSCULAR; INTRAVENOUS
Status: COMPLETED | OUTPATIENT
Start: 2018-05-24 | End: 2018-05-24

## 2018-05-24 RX ORDER — METOCLOPRAMIDE HYDROCHLORIDE 5 MG/ML
10 INJECTION INTRAMUSCULAR; INTRAVENOUS
Status: DISCONTINUED | OUTPATIENT
Start: 2018-05-24 | End: 2018-05-24 | Stop reason: HOSPADM

## 2018-05-24 RX ORDER — OXYCODONE HYDROCHLORIDE AND ACETAMINOPHEN 5; 325 MG/1; MG/1
1 TABLET ORAL EVERY 4 HOURS PRN
Status: DISCONTINUED | OUTPATIENT
Start: 2018-05-24 | End: 2018-05-24 | Stop reason: HOSPADM

## 2018-05-24 RX ORDER — SODIUM CHLORIDE 0.9 % (FLUSH) 0.9 %
10 SYRINGE (ML) INJECTION EVERY 12 HOURS SCHEDULED
Status: DISCONTINUED | OUTPATIENT
Start: 2018-05-24 | End: 2018-05-24 | Stop reason: HOSPADM

## 2018-05-24 RX ORDER — OXYCODONE HYDROCHLORIDE AND ACETAMINOPHEN 5; 325 MG/1; MG/1
2 TABLET ORAL EVERY 4 HOURS PRN
Status: CANCELLED | OUTPATIENT
Start: 2018-05-24

## 2018-05-24 RX ORDER — LIDOCAINE HYDROCHLORIDE 10 MG/ML
1 INJECTION, SOLUTION EPIDURAL; INFILTRATION; INTRACAUDAL; PERINEURAL
Status: DISCONTINUED | OUTPATIENT
Start: 2018-05-24 | End: 2018-05-24 | Stop reason: SDUPTHER

## 2018-05-24 RX ORDER — SODIUM CHLORIDE 9 MG/ML
50 INJECTION, SOLUTION INTRAVENOUS CONTINUOUS
Status: CANCELLED | OUTPATIENT
Start: 2018-05-24 | End: 2018-05-24

## 2018-05-24 RX ADMIN — LIDOCAINE HYDROCHLORIDE 30 ML: 5 INJECTION, SOLUTION INFILTRATION at 07:33

## 2018-05-24 RX ADMIN — PROPOFOL 50 MCG/KG/MIN: 10 INJECTION, EMULSION INTRAVENOUS at 07:32

## 2018-05-24 RX ADMIN — FENTANYL CITRATE 25 MCG: 50 INJECTION, SOLUTION INTRAMUSCULAR; INTRAVENOUS at 07:50

## 2018-05-24 RX ADMIN — SODIUM CHLORIDE, SODIUM LACTATE, POTASSIUM CHLORIDE, CALCIUM CHLORIDE: 600; 310; 30; 20 INJECTION, SOLUTION INTRAVENOUS at 06:29

## 2018-05-24 RX ADMIN — FENTANYL CITRATE 25 MCG: 50 INJECTION, SOLUTION INTRAMUSCULAR; INTRAVENOUS at 07:42

## 2018-05-24 RX ADMIN — MIDAZOLAM HYDROCHLORIDE 2 MG: 1 INJECTION, SOLUTION INTRAMUSCULAR; INTRAVENOUS at 07:26

## 2018-05-24 RX ADMIN — FENTANYL CITRATE 25 MCG: 50 INJECTION, SOLUTION INTRAMUSCULAR; INTRAVENOUS at 07:46

## 2018-05-24 RX ADMIN — PROPOFOL 50 MG: 10 INJECTION, EMULSION INTRAVENOUS at 07:32

## 2018-05-24 RX ADMIN — ONDANSETRON 4 MG: 2 INJECTION INTRAMUSCULAR; INTRAVENOUS at 08:10

## 2018-05-24 RX ADMIN — SODIUM CHLORIDE, POTASSIUM CHLORIDE, SODIUM LACTATE AND CALCIUM CHLORIDE: 600; 310; 30; 20 INJECTION, SOLUTION INTRAVENOUS at 07:19

## 2018-05-24 RX ADMIN — SODIUM CHLORIDE, POTASSIUM CHLORIDE, SODIUM LACTATE AND CALCIUM CHLORIDE: 600; 310; 30; 20 INJECTION, SOLUTION INTRAVENOUS at 06:29

## 2018-05-24 RX ADMIN — LIDOCAINE HYDROCHLORIDE 0.1 ML: 10 INJECTION, SOLUTION EPIDURAL; INFILTRATION; INTRACAUDAL; PERINEURAL at 06:28

## 2018-05-24 RX ADMIN — ONDANSETRON 4 MG: 2 INJECTION INTRAMUSCULAR; INTRAVENOUS at 07:42

## 2018-05-24 RX ADMIN — Medication 2 G: at 07:27

## 2018-05-24 RX ADMIN — FENTANYL CITRATE 25 MCG: 50 INJECTION, SOLUTION INTRAMUSCULAR; INTRAVENOUS at 07:33

## 2018-05-24 ASSESSMENT — PULMONARY FUNCTION TESTS
PIF_VALUE: 1
PIF_VALUE: 0
PIF_VALUE: 1

## 2018-05-24 ASSESSMENT — PAIN SCALES - GENERAL: PAINLEVEL_OUTOF10: 0

## 2018-05-24 ASSESSMENT — ENCOUNTER SYMPTOMS: SHORTNESS OF BREATH: 1

## 2018-05-24 ASSESSMENT — COPD QUESTIONNAIRES: CAT_SEVERITY: NO INTERVAL CHANGE

## 2018-06-12 ENCOUNTER — PROCEDURE VISIT (OUTPATIENT)
Dept: PHYSICAL MEDICINE AND REHAB | Age: 68
End: 2018-06-12
Payer: MEDICARE

## 2018-06-12 DIAGNOSIS — M54.6 CHRONIC THORACIC SPINE PAIN: Primary | ICD-10-CM

## 2018-06-12 DIAGNOSIS — M79.7 FIBROMYALGIA: ICD-10-CM

## 2018-06-12 DIAGNOSIS — G89.29 CHRONIC THORACIC SPINE PAIN: Primary | ICD-10-CM

## 2018-06-12 PROCEDURE — 20553 NJX 1/MLT TRIGGER POINTS 3/>: CPT | Performed by: PHYSICAL MEDICINE & REHABILITATION

## 2018-06-12 PROCEDURE — 96372 THER/PROPH/DIAG INJ SC/IM: CPT | Performed by: PHYSICAL MEDICINE & REHABILITATION

## 2018-06-15 ENCOUNTER — HOSPITAL ENCOUNTER (OUTPATIENT)
Dept: PREADMISSION TESTING | Age: 68
Discharge: HOME | End: 2018-06-19
Payer: MEDICARE

## 2018-06-15 VITALS
DIASTOLIC BLOOD PRESSURE: 72 MMHG | OXYGEN SATURATION: 95 % | SYSTOLIC BLOOD PRESSURE: 181 MMHG | HEART RATE: 91 BPM | RESPIRATION RATE: 18 BRPM | HEIGHT: 61 IN | TEMPERATURE: 98.6 F | BODY MASS INDEX: 39.42 KG/M2 | WEIGHT: 208.8 LBS

## 2018-06-15 DIAGNOSIS — E07.9 THYROID MASS: ICD-10-CM

## 2018-06-15 LAB
ABO/RH: NORMAL
ANION GAP SERPL CALCULATED.3IONS-SCNC: 15 MEQ/L (ref 7–13)
ANTIBODY SCREEN: NORMAL
BUN BLDV-MCNC: 20 MG/DL (ref 8–23)
CALCIUM SERPL-MCNC: 9.1 MG/DL (ref 8.6–10.2)
CHLORIDE BLD-SCNC: 101 MEQ/L (ref 98–107)
CO2: 25 MEQ/L (ref 22–29)
CREAT SERPL-MCNC: 0.55 MG/DL (ref 0.5–0.9)
DIGOXIN LEVEL: <0.3 NG/ML (ref 0.8–2)
GFR AFRICAN AMERICAN: >60
GFR NON-AFRICAN AMERICAN: >60
GLUCOSE BLD-MCNC: 187 MG/DL (ref 74–109)
HCT VFR BLD CALC: 42.8 % (ref 37–47)
HEMOGLOBIN: 14.4 G/DL (ref 12–16)
MCH RBC QN AUTO: 29.9 PG (ref 27–31.3)
MCHC RBC AUTO-ENTMCNC: 33.5 % (ref 33–37)
MCV RBC AUTO: 89 FL (ref 82–100)
PDW BLD-RTO: 12.8 % (ref 11.5–14.5)
PLATELET # BLD: 249 K/UL (ref 130–400)
POTASSIUM SERPL-SCNC: 3.8 MEQ/L (ref 3.5–5.1)
RBC # BLD: 4.81 M/UL (ref 4.2–5.4)
SODIUM BLD-SCNC: 141 MEQ/L (ref 132–144)
THEOPHYLLINE LEVEL: 0.9 UG/ML (ref 10–20)
WBC # BLD: 10.9 K/UL (ref 4.8–10.8)

## 2018-06-15 PROCEDURE — 86850 RBC ANTIBODY SCREEN: CPT

## 2018-06-15 PROCEDURE — 80048 BASIC METABOLIC PNL TOTAL CA: CPT

## 2018-06-15 PROCEDURE — 86901 BLOOD TYPING SEROLOGIC RH(D): CPT

## 2018-06-15 PROCEDURE — 85027 COMPLETE CBC AUTOMATED: CPT

## 2018-06-15 PROCEDURE — 80162 ASSAY OF DIGOXIN TOTAL: CPT

## 2018-06-15 PROCEDURE — 80198 ASSAY OF THEOPHYLLINE: CPT

## 2018-06-15 PROCEDURE — 86900 BLOOD TYPING SEROLOGIC ABO: CPT

## 2018-06-15 RX ORDER — SODIUM CHLORIDE 0.9 % (FLUSH) 0.9 %
10 SYRINGE (ML) INJECTION EVERY 12 HOURS SCHEDULED
Status: CANCELLED | OUTPATIENT
Start: 2018-06-15

## 2018-06-15 RX ORDER — SODIUM CHLORIDE 0.9 % (FLUSH) 0.9 %
10 SYRINGE (ML) INJECTION PRN
Status: CANCELLED | OUTPATIENT
Start: 2018-06-15

## 2018-06-15 RX ORDER — SODIUM CHLORIDE, SODIUM LACTATE, POTASSIUM CHLORIDE, CALCIUM CHLORIDE 600; 310; 30; 20 MG/100ML; MG/100ML; MG/100ML; MG/100ML
INJECTION, SOLUTION INTRAVENOUS CONTINUOUS
Status: CANCELLED | OUTPATIENT
Start: 2018-06-15

## 2018-06-15 RX ORDER — LIDOCAINE HYDROCHLORIDE 10 MG/ML
1 INJECTION, SOLUTION EPIDURAL; INFILTRATION; INTRACAUDAL; PERINEURAL
Status: CANCELLED | OUTPATIENT
Start: 2018-06-15 | End: 2018-06-15

## 2018-06-15 ASSESSMENT — ENCOUNTER SYMPTOMS
BACK PAIN: 1
SHORTNESS OF BREATH: 1
SORE THROAT: 0
DOUBLE VISION: 0
WHEEZING: 0
HEARTBURN: 0
CONSTIPATION: 0
NAUSEA: 0
VOMITING: 0
STRIDOR: 0
BLURRED VISION: 0
DIARRHEA: 0
COUGH: 1
EYES NEGATIVE: 1
ABDOMINAL PAIN: 0

## 2018-06-18 ENCOUNTER — ANESTHESIA EVENT (OUTPATIENT)
Dept: OPERATING ROOM | Age: 68
End: 2018-06-18
Payer: MEDICARE

## 2018-06-19 ENCOUNTER — HOSPITAL ENCOUNTER (OUTPATIENT)
Age: 68
Discharge: HOME | End: 2018-06-20
Attending: OTOLARYNGOLOGY | Admitting: OTOLARYNGOLOGY
Payer: MEDICARE

## 2018-06-19 ENCOUNTER — ANESTHESIA (OUTPATIENT)
Dept: OPERATING ROOM | Age: 68
End: 2018-06-19
Payer: MEDICARE

## 2018-06-19 VITALS — DIASTOLIC BLOOD PRESSURE: 50 MMHG | SYSTOLIC BLOOD PRESSURE: 90 MMHG | TEMPERATURE: 97.9 F | OXYGEN SATURATION: 93 %

## 2018-06-19 PROBLEM — Z48.89 ENCOUNTER FOR POSTOPERATIVE WOUND CARE: Status: ACTIVE | Noted: 2018-06-19

## 2018-06-19 LAB
CALCIUM SERPL-MCNC: 8.7 MG/DL (ref 8.6–10.2)
GLUCOSE BLD-MCNC: 178 MG/DL
GLUCOSE BLD-MCNC: 178 MG/DL (ref 60–115)
GLUCOSE BLD-MCNC: 233 MG/DL (ref 60–115)
GLUCOSE BLD-MCNC: 286 MG/DL (ref 60–115)
PARATHYROID HORMONE INTACT: 59.9 PG/ML (ref 15–65)
PERFORMED ON: ABNORMAL

## 2018-06-19 PROCEDURE — 94640 AIRWAY INHALATION TREATMENT: CPT

## 2018-06-19 PROCEDURE — 6370000000 HC RX 637 (ALT 250 FOR IP): Performed by: OTOLARYNGOLOGY

## 2018-06-19 PROCEDURE — 2720000010 HC SURG SUPPLY STERILE: Performed by: OTOLARYNGOLOGY

## 2018-06-19 PROCEDURE — 3600000004 HC SURGERY LEVEL 4 BASE: Performed by: OTOLARYNGOLOGY

## 2018-06-19 PROCEDURE — 88307 TISSUE EXAM BY PATHOLOGIST: CPT

## 2018-06-19 PROCEDURE — A6402 STERILE GAUZE <= 16 SQ IN: HCPCS | Performed by: OTOLARYNGOLOGY

## 2018-06-19 PROCEDURE — 2580000003 HC RX 258: Performed by: NURSE PRACTITIONER

## 2018-06-19 PROCEDURE — 3700000000 HC ANESTHESIA ATTENDED CARE: Performed by: OTOLARYNGOLOGY

## 2018-06-19 PROCEDURE — 3600000014 HC SURGERY LEVEL 4 ADDTL 15MIN: Performed by: OTOLARYNGOLOGY

## 2018-06-19 PROCEDURE — 2500000003 HC RX 250 WO HCPCS: Performed by: OTOLARYNGOLOGY

## 2018-06-19 PROCEDURE — 6360000002 HC RX W HCPCS: Performed by: OTOLARYNGOLOGY

## 2018-06-19 PROCEDURE — 82310 ASSAY OF CALCIUM: CPT

## 2018-06-19 PROCEDURE — 99236 HOSP IP/OBS SAME DATE HI 85: CPT | Performed by: INTERNAL MEDICINE

## 2018-06-19 PROCEDURE — 6360000002 HC RX W HCPCS: Performed by: ANESTHESIOLOGY

## 2018-06-19 PROCEDURE — 2500000003 HC RX 250 WO HCPCS: Performed by: NURSE PRACTITIONER

## 2018-06-19 PROCEDURE — 6370000000 HC RX 637 (ALT 250 FOR IP): Performed by: INTERNAL MEDICINE

## 2018-06-19 PROCEDURE — G0378 HOSPITAL OBSERVATION PER HR: HCPCS

## 2018-06-19 PROCEDURE — 83970 ASSAY OF PARATHORMONE: CPT

## 2018-06-19 PROCEDURE — 2580000003 HC RX 258: Performed by: OTOLARYNGOLOGY

## 2018-06-19 PROCEDURE — 3700000001 HC ADD 15 MINUTES (ANESTHESIA): Performed by: OTOLARYNGOLOGY

## 2018-06-19 PROCEDURE — 7100000000 HC PACU RECOVERY - FIRST 15 MIN: Performed by: OTOLARYNGOLOGY

## 2018-06-19 PROCEDURE — 7100000001 HC PACU RECOVERY - ADDTL 15 MIN: Performed by: OTOLARYNGOLOGY

## 2018-06-19 RX ORDER — LIDOCAINE HYDROCHLORIDE AND EPINEPHRINE 10; 10 MG/ML; UG/ML
INJECTION, SOLUTION INFILTRATION; PERINEURAL PRN
Status: DISCONTINUED | OUTPATIENT
Start: 2018-06-19 | End: 2018-06-19 | Stop reason: HOSPADM

## 2018-06-19 RX ORDER — INSULIN GLARGINE 100 [IU]/ML
50 INJECTION, SOLUTION SUBCUTANEOUS NIGHTLY
Status: DISCONTINUED | OUTPATIENT
Start: 2018-06-19 | End: 2018-06-19

## 2018-06-19 RX ORDER — ONDANSETRON 2 MG/ML
4 INJECTION INTRAMUSCULAR; INTRAVENOUS EVERY 6 HOURS PRN
Status: DISCONTINUED | OUTPATIENT
Start: 2018-06-19 | End: 2018-06-20 | Stop reason: HOSPADM

## 2018-06-19 RX ORDER — ALBUTEROL SULFATE 90 UG/1
2 AEROSOL, METERED RESPIRATORY (INHALATION) EVERY 6 HOURS PRN
Status: DISCONTINUED | OUTPATIENT
Start: 2018-06-19 | End: 2018-06-20 | Stop reason: HOSPADM

## 2018-06-19 RX ORDER — LIDOCAINE HYDROCHLORIDE 10 MG/ML
INJECTION, SOLUTION EPIDURAL; INFILTRATION; INTRACAUDAL; PERINEURAL
Status: COMPLETED
Start: 2018-06-19 | End: 2018-06-19

## 2018-06-19 RX ORDER — MEPERIDINE HYDROCHLORIDE 25 MG/ML
12.5 INJECTION INTRAMUSCULAR; INTRAVENOUS; SUBCUTANEOUS EVERY 5 MIN PRN
Status: DISCONTINUED | OUTPATIENT
Start: 2018-06-19 | End: 2018-06-19 | Stop reason: HOSPADM

## 2018-06-19 RX ORDER — MAGNESIUM HYDROXIDE 1200 MG/15ML
LIQUID ORAL CONTINUOUS PRN
Status: COMPLETED | OUTPATIENT
Start: 2018-06-19 | End: 2018-06-19

## 2018-06-19 RX ORDER — EPHEDRINE SULFATE 50 MG/ML
INJECTION, SOLUTION INTRAVENOUS PRN
Status: DISCONTINUED | OUTPATIENT
Start: 2018-06-19 | End: 2018-06-19 | Stop reason: SDUPTHER

## 2018-06-19 RX ORDER — SODIUM CHLORIDE 0.9 % (FLUSH) 0.9 %
10 SYRINGE (ML) INJECTION EVERY 12 HOURS SCHEDULED
Status: DISCONTINUED | OUTPATIENT
Start: 2018-06-19 | End: 2018-06-19 | Stop reason: HOSPADM

## 2018-06-19 RX ORDER — DEXTROSE MONOHYDRATE 25 G/50ML
12.5 INJECTION, SOLUTION INTRAVENOUS PRN
Status: DISCONTINUED | OUTPATIENT
Start: 2018-06-19 | End: 2018-06-20 | Stop reason: HOSPADM

## 2018-06-19 RX ORDER — HYDROCODONE BITARTRATE AND ACETAMINOPHEN 5; 325 MG/1; MG/1
1 TABLET ORAL PRN
Status: DISCONTINUED | OUTPATIENT
Start: 2018-06-19 | End: 2018-06-19 | Stop reason: HOSPADM

## 2018-06-19 RX ORDER — TRAMADOL HYDROCHLORIDE 50 MG/1
1 TABLET ORAL EVERY 6 HOURS PRN
COMMUNITY
Start: 2018-05-24 | End: 2019-06-11

## 2018-06-19 RX ORDER — DEXAMETHASONE SODIUM PHOSPHATE 4 MG/ML
INJECTION, SOLUTION INTRA-ARTICULAR; INTRALESIONAL; INTRAMUSCULAR; INTRAVENOUS; SOFT TISSUE PRN
Status: DISCONTINUED | OUTPATIENT
Start: 2018-06-19 | End: 2018-06-19 | Stop reason: SDUPTHER

## 2018-06-19 RX ORDER — LEVOTHYROXINE SODIUM 0.12 MG/1
125 TABLET ORAL DAILY
Status: DISCONTINUED | OUTPATIENT
Start: 2018-06-20 | End: 2018-06-20 | Stop reason: HOSPADM

## 2018-06-19 RX ORDER — LIDOCAINE HYDROCHLORIDE 10 MG/ML
INJECTION, SOLUTION EPIDURAL; INFILTRATION; INTRACAUDAL; PERINEURAL PRN
Status: DISCONTINUED | OUTPATIENT
Start: 2018-06-19 | End: 2018-06-19 | Stop reason: SDUPTHER

## 2018-06-19 RX ORDER — LABETALOL HYDROCHLORIDE 5 MG/ML
INJECTION, SOLUTION INTRAVENOUS
Status: DISPENSED
Start: 2018-06-19 | End: 2018-06-20

## 2018-06-19 RX ORDER — DIGOXIN 125 MCG
125 TABLET ORAL
Status: DISCONTINUED | OUTPATIENT
Start: 2018-06-19 | End: 2018-06-20 | Stop reason: HOSPADM

## 2018-06-19 RX ORDER — LEVOTHYROXINE SODIUM 0.12 MG/1
125 TABLET ORAL DAILY
Qty: 30 TABLET | Refills: 3 | Status: SHIPPED | OUTPATIENT
Start: 2018-06-20 | End: 2018-07-03 | Stop reason: SDUPTHER

## 2018-06-19 RX ORDER — SODIUM CHLORIDE, SODIUM LACTATE, POTASSIUM CHLORIDE, CALCIUM CHLORIDE 600; 310; 30; 20 MG/100ML; MG/100ML; MG/100ML; MG/100ML
INJECTION, SOLUTION INTRAVENOUS CONTINUOUS
Status: DISCONTINUED | OUTPATIENT
Start: 2018-06-19 | End: 2018-06-19

## 2018-06-19 RX ORDER — HYDROCODONE BITARTRATE AND ACETAMINOPHEN 5; 325 MG/1; MG/1
2 TABLET ORAL PRN
Status: DISCONTINUED | OUTPATIENT
Start: 2018-06-19 | End: 2018-06-19 | Stop reason: HOSPADM

## 2018-06-19 RX ORDER — ALBUTEROL SULFATE 2.5 MG/3ML
2.5 SOLUTION RESPIRATORY (INHALATION) ONCE
Status: COMPLETED | OUTPATIENT
Start: 2018-06-19 | End: 2018-06-19

## 2018-06-19 RX ORDER — NICOTINE POLACRILEX 4 MG
15 LOZENGE BUCCAL PRN
Status: DISCONTINUED | OUTPATIENT
Start: 2018-06-19 | End: 2018-06-20 | Stop reason: HOSPADM

## 2018-06-19 RX ORDER — ONDANSETRON 2 MG/ML
4 INJECTION INTRAMUSCULAR; INTRAVENOUS
Status: DISCONTINUED | OUTPATIENT
Start: 2018-06-19 | End: 2018-06-19 | Stop reason: HOSPADM

## 2018-06-19 RX ORDER — SODIUM CHLORIDE 0.9 % (FLUSH) 0.9 %
10 SYRINGE (ML) INJECTION PRN
Status: DISCONTINUED | OUTPATIENT
Start: 2018-06-19 | End: 2018-06-20 | Stop reason: HOSPADM

## 2018-06-19 RX ORDER — ROCURONIUM BROMIDE 10 MG/ML
INJECTION, SOLUTION INTRAVENOUS PRN
Status: DISCONTINUED | OUTPATIENT
Start: 2018-06-19 | End: 2018-06-19 | Stop reason: SDUPTHER

## 2018-06-19 RX ORDER — ACETAMINOPHEN 325 MG/1
650 TABLET ORAL EVERY 4 HOURS PRN
Status: DISCONTINUED | OUTPATIENT
Start: 2018-06-19 | End: 2018-06-20 | Stop reason: HOSPADM

## 2018-06-19 RX ORDER — LABETALOL HYDROCHLORIDE 5 MG/ML
10 INJECTION, SOLUTION INTRAVENOUS
Status: DISCONTINUED | OUTPATIENT
Start: 2018-06-19 | End: 2018-06-20 | Stop reason: HOSPADM

## 2018-06-19 RX ORDER — PANTOPRAZOLE SODIUM 40 MG/1
40 TABLET, DELAYED RELEASE ORAL DAILY
Status: DISCONTINUED | OUTPATIENT
Start: 2018-06-19 | End: 2018-06-20 | Stop reason: HOSPADM

## 2018-06-19 RX ORDER — LIDOCAINE HYDROCHLORIDE 10 MG/ML
1 INJECTION, SOLUTION EPIDURAL; INFILTRATION; INTRACAUDAL; PERINEURAL
Status: COMPLETED | OUTPATIENT
Start: 2018-06-19 | End: 2018-06-19

## 2018-06-19 RX ORDER — WOUND DRESSING ADHESIVE - LIQUID
LIQUID MISCELLANEOUS PRN
Status: DISCONTINUED | OUTPATIENT
Start: 2018-06-19 | End: 2018-06-19 | Stop reason: HOSPADM

## 2018-06-19 RX ORDER — FORMOTEROL FUMARATE 20 UG/2ML
2 SOLUTION RESPIRATORY (INHALATION) 2 TIMES DAILY
Status: DISCONTINUED | OUTPATIENT
Start: 2018-06-19 | End: 2018-06-20 | Stop reason: HOSPADM

## 2018-06-19 RX ORDER — FENTANYL CITRATE 50 UG/ML
INJECTION, SOLUTION INTRAMUSCULAR; INTRAVENOUS PRN
Status: DISCONTINUED | OUTPATIENT
Start: 2018-06-19 | End: 2018-06-19 | Stop reason: SDUPTHER

## 2018-06-19 RX ORDER — ONDANSETRON 2 MG/ML
INJECTION INTRAMUSCULAR; INTRAVENOUS PRN
Status: DISCONTINUED | OUTPATIENT
Start: 2018-06-19 | End: 2018-06-19 | Stop reason: SDUPTHER

## 2018-06-19 RX ORDER — INSULIN GLARGINE 100 [IU]/ML
70 INJECTION, SOLUTION SUBCUTANEOUS NIGHTLY
Status: DISCONTINUED | OUTPATIENT
Start: 2018-06-20 | End: 2018-06-20 | Stop reason: HOSPADM

## 2018-06-19 RX ORDER — DIPHENHYDRAMINE HYDROCHLORIDE 50 MG/ML
12.5 INJECTION INTRAMUSCULAR; INTRAVENOUS
Status: DISCONTINUED | OUTPATIENT
Start: 2018-06-19 | End: 2018-06-19 | Stop reason: HOSPADM

## 2018-06-19 RX ORDER — PROPOFOL 10 MG/ML
INJECTION, EMULSION INTRAVENOUS PRN
Status: DISCONTINUED | OUTPATIENT
Start: 2018-06-19 | End: 2018-06-19 | Stop reason: SDUPTHER

## 2018-06-19 RX ORDER — SODIUM CHLORIDE 0.9 % (FLUSH) 0.9 %
10 SYRINGE (ML) INJECTION EVERY 12 HOURS SCHEDULED
Status: DISCONTINUED | OUTPATIENT
Start: 2018-06-19 | End: 2018-06-20 | Stop reason: HOSPADM

## 2018-06-19 RX ORDER — LEVOTHYROXINE SODIUM 0.03 MG/1
25 TABLET ORAL DAILY
Status: DISCONTINUED | OUTPATIENT
Start: 2018-06-20 | End: 2018-06-19 | Stop reason: ALTCHOICE

## 2018-06-19 RX ORDER — ALBUTEROL SULFATE 2.5 MG/3ML
2.5 SOLUTION RESPIRATORY (INHALATION) EVERY 6 HOURS PRN
Status: DISCONTINUED | OUTPATIENT
Start: 2018-06-19 | End: 2018-06-19

## 2018-06-19 RX ORDER — SUCCINYLCHOLINE/SOD CL,ISO/PF 100 MG/5ML
SYRINGE (ML) INTRAVENOUS PRN
Status: DISCONTINUED | OUTPATIENT
Start: 2018-06-19 | End: 2018-06-19 | Stop reason: SDUPTHER

## 2018-06-19 RX ORDER — REMIFENTANIL HYDROCHLORIDE 1 MG/ML
INJECTION, POWDER, LYOPHILIZED, FOR SOLUTION INTRAVENOUS PRN
Status: DISCONTINUED | OUTPATIENT
Start: 2018-06-19 | End: 2018-06-19 | Stop reason: SDUPTHER

## 2018-06-19 RX ORDER — SODIUM CHLORIDE 0.9 % (FLUSH) 0.9 %
10 SYRINGE (ML) INJECTION PRN
Status: DISCONTINUED | OUTPATIENT
Start: 2018-06-19 | End: 2018-06-19 | Stop reason: HOSPADM

## 2018-06-19 RX ORDER — MIDAZOLAM HYDROCHLORIDE 1 MG/ML
INJECTION INTRAMUSCULAR; INTRAVENOUS PRN
Status: DISCONTINUED | OUTPATIENT
Start: 2018-06-19 | End: 2018-06-19 | Stop reason: SDUPTHER

## 2018-06-19 RX ORDER — FENTANYL CITRATE 50 UG/ML
50 INJECTION, SOLUTION INTRAMUSCULAR; INTRAVENOUS EVERY 10 MIN PRN
Status: COMPLETED | OUTPATIENT
Start: 2018-06-19 | End: 2018-06-19

## 2018-06-19 RX ORDER — HYDRALAZINE HYDROCHLORIDE 20 MG/ML
INJECTION INTRAMUSCULAR; INTRAVENOUS
Status: DISCONTINUED
Start: 2018-06-19 | End: 2018-06-19 | Stop reason: WASHOUT

## 2018-06-19 RX ORDER — ISOSORBIDE MONONITRATE 60 MG/1
60 TABLET, EXTENDED RELEASE ORAL DAILY
Status: DISCONTINUED | OUTPATIENT
Start: 2018-06-19 | End: 2018-06-20 | Stop reason: HOSPADM

## 2018-06-19 RX ORDER — BUDESONIDE 0.5 MG/2ML
500 INHALANT ORAL 2 TIMES DAILY
Status: DISCONTINUED | OUTPATIENT
Start: 2018-06-19 | End: 2018-06-20 | Stop reason: HOSPADM

## 2018-06-19 RX ORDER — METOCLOPRAMIDE HYDROCHLORIDE 5 MG/ML
10 INJECTION INTRAMUSCULAR; INTRAVENOUS
Status: DISCONTINUED | OUTPATIENT
Start: 2018-06-19 | End: 2018-06-19 | Stop reason: HOSPADM

## 2018-06-19 RX ORDER — DEXTROSE MONOHYDRATE 50 MG/ML
100 INJECTION, SOLUTION INTRAVENOUS PRN
Status: DISCONTINUED | OUTPATIENT
Start: 2018-06-19 | End: 2018-06-20 | Stop reason: HOSPADM

## 2018-06-19 RX ADMIN — Medication 2 G: at 11:45

## 2018-06-19 RX ADMIN — EPHEDRINE SULFATE 5 MG: 50 INJECTION, SOLUTION INTRAVENOUS at 13:45

## 2018-06-19 RX ADMIN — EPHEDRINE SULFATE 5 MG: 50 INJECTION, SOLUTION INTRAVENOUS at 14:10

## 2018-06-19 RX ADMIN — FENTANYL CITRATE 50 MCG: 50 INJECTION, SOLUTION INTRAMUSCULAR; INTRAVENOUS at 17:29

## 2018-06-19 RX ADMIN — ONDANSETRON 4 MG: 2 INJECTION INTRAMUSCULAR; INTRAVENOUS at 14:24

## 2018-06-19 RX ADMIN — FENTANYL CITRATE 50 MCG: 50 INJECTION, SOLUTION INTRAMUSCULAR; INTRAVENOUS at 15:11

## 2018-06-19 RX ADMIN — FENTANYL CITRATE 50 MCG: 50 INJECTION, SOLUTION INTRAMUSCULAR; INTRAVENOUS at 15:22

## 2018-06-19 RX ADMIN — DEXAMETHASONE SODIUM PHOSPHATE 8 MG: 4 INJECTION, SOLUTION INTRA-ARTICULAR; INTRALESIONAL; INTRAMUSCULAR; INTRAVENOUS; SOFT TISSUE at 12:05

## 2018-06-19 RX ADMIN — BUDESONIDE 500 MCG: 0.5 SUSPENSION RESPIRATORY (INHALATION) at 23:22

## 2018-06-19 RX ADMIN — INSULIN GLARGINE 50 UNITS: 100 INJECTION, SOLUTION SUBCUTANEOUS at 20:32

## 2018-06-19 RX ADMIN — Medication 10 ML: at 20:31

## 2018-06-19 RX ADMIN — LIDOCAINE HYDROCHLORIDE 1 ML: 10 INJECTION, SOLUTION EPIDURAL; INFILTRATION; INTRACAUDAL; PERINEURAL at 10:25

## 2018-06-19 RX ADMIN — FENTANYL CITRATE 50 MCG: 50 INJECTION, SOLUTION INTRAMUSCULAR; INTRAVENOUS at 11:40

## 2018-06-19 RX ADMIN — HYDROMORPHONE HYDROCHLORIDE 0.5 MG: 1 INJECTION, SOLUTION INTRAMUSCULAR; INTRAVENOUS; SUBCUTANEOUS at 16:01

## 2018-06-19 RX ADMIN — EPHEDRINE SULFATE 10 MG: 50 INJECTION, SOLUTION INTRAVENOUS at 12:02

## 2018-06-19 RX ADMIN — ALBUTEROL SULFATE 2.5 MG: 2.5 SOLUTION RESPIRATORY (INHALATION) at 17:28

## 2018-06-19 RX ADMIN — HYDROMORPHONE HYDROCHLORIDE 0.5 MG: 1 INJECTION, SOLUTION INTRAMUSCULAR; INTRAVENOUS; SUBCUTANEOUS at 15:00

## 2018-06-19 RX ADMIN — FENTANYL CITRATE 50 MCG: 50 INJECTION, SOLUTION INTRAMUSCULAR; INTRAVENOUS at 16:29

## 2018-06-19 RX ADMIN — FENTANYL CITRATE 50 MCG: 50 INJECTION, SOLUTION INTRAMUSCULAR; INTRAVENOUS at 14:19

## 2018-06-19 RX ADMIN — SODIUM CHLORIDE, POTASSIUM CHLORIDE, SODIUM LACTATE AND CALCIUM CHLORIDE: 600; 310; 30; 20 INJECTION, SOLUTION INTRAVENOUS at 13:11

## 2018-06-19 RX ADMIN — MIDAZOLAM HYDROCHLORIDE 2 MG: 1 INJECTION INTRAMUSCULAR; INTRAVENOUS at 11:32

## 2018-06-19 RX ADMIN — EPHEDRINE SULFATE 10 MG: 50 INJECTION, SOLUTION INTRAVENOUS at 12:17

## 2018-06-19 RX ADMIN — LIDOCAINE HYDROCHLORIDE 50 MG: 10 INJECTION, SOLUTION EPIDURAL; INFILTRATION; INTRACAUDAL; PERINEURAL at 11:40

## 2018-06-19 RX ADMIN — PANTOPRAZOLE SODIUM 40 MG: 40 TABLET, DELAYED RELEASE ORAL at 20:30

## 2018-06-19 RX ADMIN — EPHEDRINE SULFATE 10 MG: 50 INJECTION, SOLUTION INTRAVENOUS at 13:15

## 2018-06-19 RX ADMIN — ACETAMINOPHEN 650 MG: 325 TABLET ORAL at 20:30

## 2018-06-19 RX ADMIN — DIGOXIN 125 MCG: 0.12 TABLET ORAL at 20:30

## 2018-06-19 RX ADMIN — FORMOTEROL FUMARATE DIHYDRATE 20 MCG: 20 SOLUTION RESPIRATORY (INHALATION) at 23:22

## 2018-06-19 RX ADMIN — SODIUM CHLORIDE, POTASSIUM CHLORIDE, SODIUM LACTATE AND CALCIUM CHLORIDE 1000 ML: 600; 310; 30; 20 INJECTION, SOLUTION INTRAVENOUS at 10:25

## 2018-06-19 RX ADMIN — ROCURONIUM BROMIDE 5 MG: 10 INJECTION, SOLUTION INTRAVENOUS at 11:40

## 2018-06-19 RX ADMIN — ISOSORBIDE MONONITRATE 60 MG: 60 TABLET, EXTENDED RELEASE ORAL at 20:30

## 2018-06-19 RX ADMIN — REMIFENTANIL HYDROCHLORIDE 50 MCG: 1 INJECTION, POWDER, LYOPHILIZED, FOR SOLUTION INTRAVENOUS at 11:46

## 2018-06-19 RX ADMIN — PROPOFOL 160 MG: 10 INJECTION, EMULSION INTRAVENOUS at 11:40

## 2018-06-19 RX ADMIN — Medication 100 MG: at 11:41

## 2018-06-19 RX ADMIN — REMIFENTANIL HYDROCHLORIDE 50 MCG: 1 INJECTION, POWDER, LYOPHILIZED, FOR SOLUTION INTRAVENOUS at 12:28

## 2018-06-19 ASSESSMENT — PULMONARY FUNCTION TESTS
PIF_VALUE: 32
PIF_VALUE: 31
PIF_VALUE: 38
PIF_VALUE: 28
PIF_VALUE: 32
PIF_VALUE: 31
PIF_VALUE: 33
PIF_VALUE: 32
PIF_VALUE: 27
PIF_VALUE: 32
PIF_VALUE: 23
PIF_VALUE: 31
PIF_VALUE: 32
PIF_VALUE: 19
PIF_VALUE: 32
PIF_VALUE: 32
PIF_VALUE: 31
PIF_VALUE: 34
PIF_VALUE: 0
PIF_VALUE: 31
PIF_VALUE: 32
PIF_VALUE: 31
PIF_VALUE: 32
PIF_VALUE: 30
PIF_VALUE: 32
PIF_VALUE: 33
PIF_VALUE: 17
PIF_VALUE: 26
PIF_VALUE: 33
PIF_VALUE: 4
PIF_VALUE: 32
PIF_VALUE: 0
PIF_VALUE: 30
PIF_VALUE: 33
PIF_VALUE: 17
PIF_VALUE: 31
PIF_VALUE: 32
PIF_VALUE: 31
PIF_VALUE: 32
PIF_VALUE: 34
PIF_VALUE: 29
PIF_VALUE: 32
PIF_VALUE: 30
PIF_VALUE: 31
PIF_VALUE: 28
PIF_VALUE: 32
PIF_VALUE: 0
PIF_VALUE: 32
PIF_VALUE: 28
PIF_VALUE: 3
PIF_VALUE: 30
PIF_VALUE: 30
PIF_VALUE: 32
PIF_VALUE: 31
PIF_VALUE: 31
PIF_VALUE: 34
PIF_VALUE: 33
PIF_VALUE: 30
PIF_VALUE: 28
PIF_VALUE: 30
PIF_VALUE: 33
PIF_VALUE: 31
PIF_VALUE: 29
PIF_VALUE: 31
PIF_VALUE: 32
PIF_VALUE: 31
PIF_VALUE: 30
PIF_VALUE: 31
PIF_VALUE: 32
PIF_VALUE: 33
PIF_VALUE: 12
PIF_VALUE: 32
PIF_VALUE: 4
PIF_VALUE: 31
PIF_VALUE: 30
PIF_VALUE: 14
PIF_VALUE: 31
PIF_VALUE: 0
PIF_VALUE: 30
PIF_VALUE: 32
PIF_VALUE: 33
PIF_VALUE: 30
PIF_VALUE: 31
PIF_VALUE: 30
PIF_VALUE: 33
PIF_VALUE: 29
PIF_VALUE: 31
PIF_VALUE: 32
PIF_VALUE: 5
PIF_VALUE: 32
PIF_VALUE: 32
PIF_VALUE: 30
PIF_VALUE: 31
PIF_VALUE: 1
PIF_VALUE: 17
PIF_VALUE: 34
PIF_VALUE: 32
PIF_VALUE: 31
PIF_VALUE: 30
PIF_VALUE: 28
PIF_VALUE: 31
PIF_VALUE: 24
PIF_VALUE: 30
PIF_VALUE: 33
PIF_VALUE: 31
PIF_VALUE: 30
PIF_VALUE: 35
PIF_VALUE: 29
PIF_VALUE: 30
PIF_VALUE: 32
PIF_VALUE: 32
PIF_VALUE: 23
PIF_VALUE: 32
PIF_VALUE: 30
PIF_VALUE: 32
PIF_VALUE: 30
PIF_VALUE: 30
PIF_VALUE: 31
PIF_VALUE: 32
PIF_VALUE: 41
PIF_VALUE: 32
PIF_VALUE: 34
PIF_VALUE: 32
PIF_VALUE: 30
PIF_VALUE: 31
PIF_VALUE: 32
PIF_VALUE: 31
PIF_VALUE: 31
PIF_VALUE: 34
PIF_VALUE: 31
PIF_VALUE: 32
PIF_VALUE: 35
PIF_VALUE: 31
PIF_VALUE: 31
PIF_VALUE: 18
PIF_VALUE: 35
PIF_VALUE: 32
PIF_VALUE: 31
PIF_VALUE: 31
PIF_VALUE: 32
PIF_VALUE: 31
PIF_VALUE: 30
PIF_VALUE: 1
PIF_VALUE: 2
PIF_VALUE: 32
PIF_VALUE: 35
PIF_VALUE: 31
PIF_VALUE: 4
PIF_VALUE: 3
PIF_VALUE: 32
PIF_VALUE: 31
PIF_VALUE: 31
PIF_VALUE: 1
PIF_VALUE: 31
PIF_VALUE: 30
PIF_VALUE: 31

## 2018-06-19 ASSESSMENT — PAIN SCALES - GENERAL
PAINLEVEL_OUTOF10: 8
PAINLEVEL_OUTOF10: 8
PAINLEVEL_OUTOF10: 5
PAINLEVEL_OUTOF10: 7
PAINLEVEL_OUTOF10: 5
PAINLEVEL_OUTOF10: 4
PAINLEVEL_OUTOF10: 4
PAINLEVEL_OUTOF10: 5

## 2018-06-19 ASSESSMENT — PAIN - FUNCTIONAL ASSESSMENT: PAIN_FUNCTIONAL_ASSESSMENT: 0-10

## 2018-06-19 ASSESSMENT — PAIN DESCRIPTION - LOCATION: LOCATION: NECK

## 2018-06-19 ASSESSMENT — PAIN DESCRIPTION - DESCRIPTORS: DESCRIPTORS: ACHING;CONSTANT

## 2018-06-19 ASSESSMENT — PAIN DESCRIPTION - PAIN TYPE: TYPE: SURGICAL PAIN

## 2018-06-19 ASSESSMENT — ENCOUNTER SYMPTOMS: SHORTNESS OF BREATH: 1

## 2018-06-19 ASSESSMENT — COPD QUESTIONNAIRES: CAT_SEVERITY: MILD

## 2018-06-19 ASSESSMENT — PAIN DESCRIPTION - ORIENTATION: ORIENTATION: ANTERIOR;LOWER

## 2018-06-20 VITALS
BODY MASS INDEX: 39.41 KG/M2 | RESPIRATION RATE: 20 BRPM | TEMPERATURE: 97.9 F | HEART RATE: 88 BPM | HEIGHT: 61 IN | OXYGEN SATURATION: 97 % | SYSTOLIC BLOOD PRESSURE: 147 MMHG | WEIGHT: 208.75 LBS | DIASTOLIC BLOOD PRESSURE: 76 MMHG

## 2018-06-20 LAB
GLUCOSE BLD-MCNC: 199 MG/DL (ref 60–115)
GLUCOSE BLD-MCNC: 207 MG/DL (ref 60–115)
PERFORMED ON: ABNORMAL
PERFORMED ON: ABNORMAL

## 2018-06-20 PROCEDURE — G0378 HOSPITAL OBSERVATION PER HR: HCPCS

## 2018-06-20 PROCEDURE — 94640 AIRWAY INHALATION TREATMENT: CPT

## 2018-06-20 PROCEDURE — 6370000000 HC RX 637 (ALT 250 FOR IP): Performed by: OTOLARYNGOLOGY

## 2018-06-20 PROCEDURE — 2580000003 HC RX 258: Performed by: OTOLARYNGOLOGY

## 2018-06-20 PROCEDURE — 2700000000 HC OXYGEN THERAPY PER DAY

## 2018-06-20 PROCEDURE — 6360000002 HC RX W HCPCS: Performed by: OTOLARYNGOLOGY

## 2018-06-20 PROCEDURE — 6370000000 HC RX 637 (ALT 250 FOR IP): Performed by: INTERNAL MEDICINE

## 2018-06-20 RX ADMIN — BUDESONIDE 500 MCG: 0.5 SUSPENSION RESPIRATORY (INHALATION) at 07:53

## 2018-06-20 RX ADMIN — DIGOXIN 125 MCG: 0.12 TABLET ORAL at 11:52

## 2018-06-20 RX ADMIN — LEVOTHYROXINE SODIUM 125 MCG: 125 TABLET ORAL at 05:54

## 2018-06-20 RX ADMIN — INSULIN LISPRO 2 UNITS: 100 INJECTION, SOLUTION INTRAVENOUS; SUBCUTANEOUS at 11:54

## 2018-06-20 RX ADMIN — FORMOTEROL FUMARATE DIHYDRATE 20 MCG: 20 SOLUTION RESPIRATORY (INHALATION) at 07:55

## 2018-06-20 RX ADMIN — PANTOPRAZOLE SODIUM 40 MG: 40 TABLET, DELAYED RELEASE ORAL at 08:34

## 2018-06-20 RX ADMIN — METFORMIN HYDROCHLORIDE 500 MG: 500 TABLET, FILM COATED ORAL at 08:34

## 2018-06-20 RX ADMIN — INSULIN LISPRO 4 UNITS: 100 INJECTION, SOLUTION INTRAVENOUS; SUBCUTANEOUS at 08:37

## 2018-06-20 RX ADMIN — ACETAMINOPHEN 650 MG: 325 TABLET ORAL at 08:34

## 2018-06-20 RX ADMIN — ISOSORBIDE MONONITRATE 60 MG: 60 TABLET, EXTENDED RELEASE ORAL at 08:34

## 2018-06-20 RX ADMIN — Medication 10 ML: at 08:34

## 2018-06-20 RX ADMIN — ACETAMINOPHEN 650 MG: 325 TABLET ORAL at 00:34

## 2018-06-20 ASSESSMENT — PAIN SCALES - GENERAL
PAINLEVEL_OUTOF10: 4
PAINLEVEL_OUTOF10: 3

## 2018-06-20 ASSESSMENT — PAIN DESCRIPTION - ORIENTATION: ORIENTATION: ANTERIOR

## 2018-06-20 ASSESSMENT — PAIN DESCRIPTION - LOCATION: LOCATION: NECK

## 2018-06-27 ENCOUNTER — HOSPITAL ENCOUNTER (OUTPATIENT)
Dept: GENERAL RADIOLOGY | Age: 68
Discharge: HOME | End: 2018-06-29
Payer: MEDICARE

## 2018-06-27 ENCOUNTER — OFFICE VISIT (OUTPATIENT)
Dept: PULMONOLOGY | Age: 68
End: 2018-06-27
Payer: MEDICARE

## 2018-06-27 VITALS
HEART RATE: 98 BPM | WEIGHT: 207 LBS | BODY MASS INDEX: 39.08 KG/M2 | TEMPERATURE: 97.5 F | DIASTOLIC BLOOD PRESSURE: 74 MMHG | SYSTOLIC BLOOD PRESSURE: 138 MMHG | HEIGHT: 61 IN | OXYGEN SATURATION: 97 % | RESPIRATION RATE: 16 BRPM

## 2018-06-27 DIAGNOSIS — G47.33 OSA ON CPAP: ICD-10-CM

## 2018-06-27 DIAGNOSIS — J44.1 CHRONIC OBSTRUCTIVE PULMONARY DISEASE WITH ACUTE EXACERBATION (HCC): ICD-10-CM

## 2018-06-27 DIAGNOSIS — J45.40 MODERATE PERSISTENT ASTHMA WITHOUT COMPLICATION: ICD-10-CM

## 2018-06-27 DIAGNOSIS — J44.1 CHRONIC OBSTRUCTIVE PULMONARY DISEASE WITH ACUTE EXACERBATION (HCC): Primary | ICD-10-CM

## 2018-06-27 DIAGNOSIS — Z99.89 OSA ON CPAP: ICD-10-CM

## 2018-06-27 DIAGNOSIS — R09.02 HYPOXEMIA: ICD-10-CM

## 2018-06-27 DIAGNOSIS — R91.1 LUNG NODULE: ICD-10-CM

## 2018-06-27 PROCEDURE — 99214 OFFICE O/P EST MOD 30 MIN: CPT | Performed by: PHYSICIAN ASSISTANT

## 2018-06-27 PROCEDURE — 71046 X-RAY EXAM CHEST 2 VIEWS: CPT

## 2018-06-27 RX ORDER — LEVOFLOXACIN 500 MG/1
500 TABLET, FILM COATED ORAL DAILY
Qty: 10 TABLET | Refills: 0 | Status: SHIPPED | OUTPATIENT
Start: 2018-06-27 | End: 2018-07-07

## 2018-06-27 RX ORDER — BENZONATATE 200 MG/1
200 CAPSULE ORAL 3 TIMES DAILY PRN
Qty: 60 CAPSULE | Refills: 0 | Status: SHIPPED | OUTPATIENT
Start: 2018-06-27 | End: 2018-07-04

## 2018-06-27 RX ORDER — PREDNISONE 10 MG/1
TABLET ORAL
Qty: 40 TABLET | Refills: 0 | Status: SHIPPED | OUTPATIENT
Start: 2018-06-27 | End: 2018-07-17 | Stop reason: ALTCHOICE

## 2018-06-27 ASSESSMENT — ENCOUNTER SYMPTOMS
SINUS PAIN: 0
TROUBLE SWALLOWING: 0
SORE THROAT: 0
VOICE CHANGE: 0
WHEEZING: 1
ABDOMINAL PAIN: 0
STRIDOR: 0
CHEST TIGHTNESS: 0
SHORTNESS OF BREATH: 1
RHINORRHEA: 0
COUGH: 1
BACK PAIN: 0
SINUS PRESSURE: 0

## 2018-06-29 DIAGNOSIS — E04.9 GOITER: ICD-10-CM

## 2018-06-29 LAB
T4 FREE: 1.35 NG/DL (ref 0.93–1.7)
TSH SERPL DL<=0.05 MIU/L-ACNC: 0.65 UIU/ML (ref 0.27–4.2)

## 2018-07-03 ENCOUNTER — OFFICE VISIT (OUTPATIENT)
Dept: ENDOCRINOLOGY | Age: 68
End: 2018-07-03
Payer: MEDICARE

## 2018-07-03 VITALS
DIASTOLIC BLOOD PRESSURE: 86 MMHG | HEIGHT: 61 IN | WEIGHT: 207 LBS | BODY MASS INDEX: 39.08 KG/M2 | HEART RATE: 95 BPM | SYSTOLIC BLOOD PRESSURE: 164 MMHG

## 2018-07-03 DIAGNOSIS — E89.0 POSTOPERATIVE HYPOTHYROIDISM: Primary | ICD-10-CM

## 2018-07-03 LAB
GLUCOSE BLD-MCNC: 379 MG/DL
HBA1C MFR BLD: 9 %

## 2018-07-03 PROCEDURE — 99214 OFFICE O/P EST MOD 30 MIN: CPT | Performed by: INTERNAL MEDICINE

## 2018-07-03 PROCEDURE — 83036 HEMOGLOBIN GLYCOSYLATED A1C: CPT | Performed by: INTERNAL MEDICINE

## 2018-07-03 PROCEDURE — 82962 GLUCOSE BLOOD TEST: CPT | Performed by: INTERNAL MEDICINE

## 2018-07-03 RX ORDER — LEVOTHYROXINE SODIUM 0.12 MG/1
125 TABLET ORAL DAILY
Qty: 90 TABLET | Refills: 3 | Status: SHIPPED | OUTPATIENT
Start: 2018-07-03 | End: 2019-06-12 | Stop reason: SDUPTHER

## 2018-07-05 ENCOUNTER — TELEPHONE (OUTPATIENT)
Dept: PULMONOLOGY | Age: 68
End: 2018-07-05

## 2018-07-05 ASSESSMENT — ENCOUNTER SYMPTOMS: WHEEZING: 1

## 2018-07-05 NOTE — PROGRESS NOTES
artery stenosis (HCC)    Obesity (BMI 30-39. 9)    Osteoporosis    Anxiety    Thyroid disease    Suprascapular entrapment neuropathy    Midline low back pain with left-sided sciatica    Bilateral low back pain with sciatica    Rib pain on right side    Myalgia    High risk medication use - 11/07/17 OARRS PM&R, 02/07/18 OARRS PM&R, 05/30/17 Urine Drug Screen: negative PM&R, MED CONTRACT 1/26/17    Rib sprain    Acute pain of right knee    Acute right ankle pain    COPD exacerbation (HCC)    Chronic bilateral low back pain with sciatica    History of MRSA infection of lungs 9/2016    Osteoporosis    DDD (degenerative disc disease), lumbar    Chronic left-sided low back pain with left-sided sciatica    Bilateral carpal tunnel syndrome    Neck pain    Chronic thoracic spine pain    Moderate persistent asthma without complication    Right carpal tunnel syndrome    Former smoker, stopped smoking in distant past    Thyroid mass    Encounter for postoperative wound care       Allergies   Allergen Reactions    Biaxin [Clarithromycin] Nausea Only     Nausea with diarrhea    Codeine Nausea Only     Felt like floating in air    Invokana [Canagliflozin]      Yeast infection        Current Outpatient Prescriptions:     insulin glargine (LANTUS SOLOSTAR) 100 UNIT/ML injection pen, 90 units at bedtime, Disp: 30 pen, Rfl: 3    insulin lispro (HUMALOG KWIKPEN) 100 UNIT/ML pen, 10-15 units each meals, Disp: 15 pen, Rfl: 3    Insulin Pen Needle (NOVOFINE) 32G X 6 MM MISC, qid, Disp: 300 each, Rfl: 3    levothyroxine (SYNTHROID) 125 MCG tablet, Take 1 tablet by mouth Daily, Disp: 90 tablet, Rfl: 3    predniSONE (DELTASONE) 10 MG tablet, 4 tablets daily for 4 days, 3 tablets daily for 4 days, 2 tablets daily for 4 days, then 1 tablet daily for 4 days, Disp: 40 tablet, Rfl: 0    levofloxacin (LEVAQUIN) 500 MG tablet, Take 1 tablet by mouth daily for 10 days, Disp: 10 tablet, Rfl: 0    traMADol (ULTRAM) Date:   7/3/2019    TSH without Reflex     Standing Status:   Future     Standing Expiration Date:   7/3/2019    T4     Standing Status:   Future     Standing Expiration Date:   7/3/2019    POCT Glucose    POCT glycosylated hemoglobin (Hb A1C)     Increase lantus dose   Add Humalog   continue victoza     Orders Placed This Encounter   Medications    insulin glargine (LANTUS SOLOSTAR) 100 UNIT/ML injection pen     Si units at bedtime     Dispense:  30 pen     Refill:  3    insulin lispro (HUMALOG KWIKPEN) 100 UNIT/ML pen     Sig: 10-15 units each meals     Dispense:  15 pen     Refill:  3    Insulin Pen Needle (NOVOFINE) 32G X 6 MM MISC     Sig: qid     Dispense:  300 each     Refill:  3    levothyroxine (SYNTHROID) 125 MCG tablet     Sig: Take 1 tablet by mouth Daily     Dispense:  90 tablet     Refill:  3     hbaic goal of 7 or lower       Diabetes education provided today:  More than 50 % of 25 min spend in pt education/counseling review of labs and pathology   Managing high and low sugar readings.

## 2018-07-12 ENCOUNTER — PROCEDURE VISIT (OUTPATIENT)
Dept: PHYSICAL MEDICINE AND REHAB | Age: 68
End: 2018-07-12
Payer: MEDICARE

## 2018-07-12 DIAGNOSIS — M79.10 MYALGIA: Primary | ICD-10-CM

## 2018-07-12 DIAGNOSIS — M79.7 FIBROMYALGIA: ICD-10-CM

## 2018-07-12 PROCEDURE — 20553 NJX 1/MLT TRIGGER POINTS 3/>: CPT | Performed by: PHYSICAL MEDICINE & REHABILITATION

## 2018-07-12 PROCEDURE — 96372 THER/PROPH/DIAG INJ SC/IM: CPT | Performed by: PHYSICAL MEDICINE & REHABILITATION

## 2018-07-17 ENCOUNTER — OFFICE VISIT (OUTPATIENT)
Dept: PULMONOLOGY | Age: 68
End: 2018-07-17
Payer: MEDICARE

## 2018-07-17 VITALS
BODY MASS INDEX: 40.4 KG/M2 | HEART RATE: 107 BPM | TEMPERATURE: 98.5 F | OXYGEN SATURATION: 97 % | DIASTOLIC BLOOD PRESSURE: 80 MMHG | WEIGHT: 214 LBS | RESPIRATION RATE: 16 BRPM | HEIGHT: 61 IN | SYSTOLIC BLOOD PRESSURE: 142 MMHG

## 2018-07-17 DIAGNOSIS — J32.4 PANSINUSITIS, UNSPECIFIED CHRONICITY: ICD-10-CM

## 2018-07-17 DIAGNOSIS — J44.1 CHRONIC OBSTRUCTIVE PULMONARY DISEASE WITH ACUTE EXACERBATION (HCC): Primary | ICD-10-CM

## 2018-07-17 DIAGNOSIS — B37.0 THRUSH: ICD-10-CM

## 2018-07-17 PROCEDURE — 99214 OFFICE O/P EST MOD 30 MIN: CPT | Performed by: PHYSICIAN ASSISTANT

## 2018-07-17 RX ORDER — BROMPHENIRAMINE MALEATE, PSEUDOEPHEDRINE HYDROCHLORIDE, AND DEXTROMETHORPHAN HYDROBROMIDE 2; 30; 10 MG/5ML; MG/5ML; MG/5ML
5 SYRUP ORAL 4 TIMES DAILY PRN
Qty: 120 ML | Refills: 0 | Status: SHIPPED | OUTPATIENT
Start: 2018-07-17 | End: 2018-09-07

## 2018-07-17 RX ORDER — FLUTICASONE PROPIONATE 50 MCG
2 SPRAY, SUSPENSION (ML) NASAL DAILY
Qty: 1 BOTTLE | Refills: 0 | Status: SHIPPED | OUTPATIENT
Start: 2018-07-17

## 2018-07-17 RX ORDER — PREDNISONE 10 MG/1
TABLET ORAL
Qty: 80 TABLET | Refills: 0 | Status: SHIPPED | OUTPATIENT
Start: 2018-07-17 | End: 2018-09-07

## 2018-07-17 ASSESSMENT — ENCOUNTER SYMPTOMS
ABDOMINAL PAIN: 0
TROUBLE SWALLOWING: 0
SHORTNESS OF BREATH: 1
SORE THROAT: 1
CHEST TIGHTNESS: 0
WHEEZING: 0
BACK PAIN: 0
RHINORRHEA: 1
COUGH: 1
SINUS PRESSURE: 0
STRIDOR: 0
SINUS PAIN: 1
VOICE CHANGE: 0

## 2018-07-17 NOTE — PROGRESS NOTES
Subjective     Rohith Barnard 75 y.o. female presents 7/18/18 with   Chief Complaint   Patient presents with    Follow-up     f/u for Cough and SOB. HPI  This is a 74-year-old female patient following up today for persistent cough, wheeze and shortness of breath. Patient recently finished her prednisone and antibiotic therapy and reports that her symptoms are flaring up again. She did miss one nucala injection. [facility] and did have a biopsy of a lung nodule done last year that was negative for malignancy at that time and they are planning follow-up CT scan this fall to monitor for stability. Patient reports when she is on prednisone she feels better and then as she tapers off her symptoms increase again. Patient also reports significant nasal drainage and ear discomfort. Patient denies any chest pain and leg swelling. She reports she has been compliant with CPAP therapy.     Reviewed the following history:    Past Medical History:   Diagnosis Date    Anxiety     Asthma     Back pain     Bronchitis     CAD (coronary artery disease)     Carpal tunnel syndrome     COPD (chronic obstructive pulmonary disease) (HCC)     uses CPAP at night    Emphysema of lung (HCC)     Fibromyalgia     GERD (gastroesophageal reflux disease)     Hyperlipidemia     Hypertension     Hypothyroidism     Obesity     ROGELIO (obstructive sleep apnea)     Osteoarthritis     Osteoporosis     PONV (postoperative nausea and vomiting)     Restless legs syndrome     SOB (shortness of breath)     Type II or unspecified type diabetes mellitus without mention of complication, not stated as uncontrolled     Unspecified sleep apnea     UTI (urinary tract infection)      Past Surgical History:   Procedure Laterality Date    ANKLE SURGERY Left     hardware in & removed    ANKLE SURGERY Right     Plate in right ankle    CARPAL TUNNEL RELEASE Left 2015    CHOLECYSTECTOMY      COLONOSCOPY  09/11/2012    CORONARY ANGIOPLASTY WITH STENT PLACEMENT      ENDOSCOPY, COLON, DIAGNOSTIC      LUNG BIOPSY  09/07/2017    pleural biopsy    AL REVISE MEDIAN N/CARPAL TUNNEL SURG Right 5/24/2018    RIGHT WRIST CARPAL TUNNEL RELEASE, ( TO BE IN ROOM 12 WITH ROOM 2 TEAM) performed by Forrest Lafleur MD at [address] [facility] Left 6/19/2018    LEFT JOSELIN THYROIDECTOMY performed by Ninoska Damico MD at [address] [place] [address] [facility]CA      THYROIDECTOMY      partial    UPPER GASTROINTESTINAL ENDOSCOPY  09/11/2012    UPPER GASTROINTESTINAL ENDOSCOPY  12/4/14     DR. MAE     Family History   Problem Relation Age of Onset    High Blood Pressure Mother     Heart Disease Mother     Cancer Father         LUNG    High Blood Pressure Brother     COPD Brother     Heart Disease Brother     Heart Attack Brother     COPD Sister     Heart Disease Sister     Arthritis Maternal Aunt     COPD Sister     No Known Problems Daughter     No Known Problems Son        Allergies   Allergen Reactions    Biaxin [Clarithromycin] Nausea Only     Nausea with diarrhea    Codeine Nausea Only     Felt like floating in air    Invokana [Canagliflozin]      Yeast infection        Current Outpatient Prescriptions   Medication Sig Dispense Refill    predniSONE (DELTASONE) 10 MG tablet 4 tablets daily for 4 days, 3 tablets daily for 4 days, 2 tablets daily for 4 days, then 1 tablet daily thereafter 80 tablet 0    brompheniramine-pseudoephedrine-DM (BROMFED DM) 30-2-10 MG/5ML syrup Take 5 mLs by mouth 4 times daily as needed for Congestion or Cough 120 mL 0    fluticasone (FLONASE) 50 MCG/ACT nasal spray 2 sprays by Nasal route daily 1 Bottle 0    insulin aspart (NOVOLOG FLEXPEN) 100 UNIT/ML injection pen Inject 10-15 Units into the skin 3 times daily (before meals) 5 pen 3    insulin glargine (LANTUS SOLOSTAR) 100 UNIT/ML injection pen 90 units at bedtime 30 pen 3    Insulin Pen Needle (NOVOFINE) 32G X 6 MM MISC qid feeling. In addition patient was noted to have thrush on exam.  She reports she has nystatin at home and she will restart therapy. Advised to use it 4 times daily for 2 weeks. The risks, benefits, and alternatives of steroid use were gone over in great detail including: hyperglycemia, weight gain, osteoporosis, and AVN. The patient wished to proceed with treatment. Patient will notify us of any changes in his respiratory status that would warrant evaluation sooner otherwise we'll see him for follow-up in 3 weeks    Reviewed with the patient: current clinical status, medications, activities and diet. Side effects, adverse effects of the medication prescribed today, as well as treatment plan and result expectations have been discussed with the patient who expresses understanding and desires to proceed. Close follow up to evaluate treatment results and for coordination of care. I have reviewed the patient's medical history in detail and updated the computerized patient record. Return in about 3 weeks (around 8/7/2018) for re-evaluation.     Ashley De La Fuente PA-C

## 2018-07-19 ENCOUNTER — TELEPHONE (OUTPATIENT)
Dept: PULMONOLOGY | Age: 68
End: 2018-07-19

## 2018-07-19 DIAGNOSIS — B95.1 GROUP BETA STREP POSITIVE: Primary | ICD-10-CM

## 2018-07-19 RX ORDER — AMOXICILLIN AND CLAVULANATE POTASSIUM 875; 125 MG/1; MG/1
1 TABLET, FILM COATED ORAL 2 TIMES DAILY
Qty: 20 TABLET | Refills: 0 | Status: SHIPPED | OUTPATIENT
Start: 2018-07-19 | End: 2018-07-29

## 2018-07-20 LAB
CULTURE, RESPIRATORY: ABNORMAL
CULTURE, RESPIRATORY: ABNORMAL
GRAM STAIN RESULT: ABNORMAL
ORGANISM: ABNORMAL

## 2018-08-01 ENCOUNTER — OFFICE VISIT (OUTPATIENT)
Dept: PHYSICAL MEDICINE AND REHAB | Age: 68
End: 2018-08-01
Payer: MEDICARE

## 2018-08-01 VITALS
SYSTOLIC BLOOD PRESSURE: 132 MMHG | DIASTOLIC BLOOD PRESSURE: 72 MMHG | BODY MASS INDEX: 40.4 KG/M2 | HEIGHT: 61 IN | WEIGHT: 214 LBS

## 2018-08-01 DIAGNOSIS — M54.41 CHRONIC BILATERAL LOW BACK PAIN WITH BILATERAL SCIATICA: ICD-10-CM

## 2018-08-01 DIAGNOSIS — M79.641 PAIN IN BOTH HANDS: ICD-10-CM

## 2018-08-01 DIAGNOSIS — G89.29 CHRONIC MIDLINE LOW BACK PAIN WITH LEFT-SIDED SCIATICA: ICD-10-CM

## 2018-08-01 DIAGNOSIS — G89.29 CHRONIC BILATERAL LOW BACK PAIN WITH BILATERAL SCIATICA: ICD-10-CM

## 2018-08-01 DIAGNOSIS — M54.42 CHRONIC MIDLINE LOW BACK PAIN WITH LEFT-SIDED SCIATICA: ICD-10-CM

## 2018-08-01 DIAGNOSIS — M79.10 MYALGIA: ICD-10-CM

## 2018-08-01 DIAGNOSIS — Z79.899 HIGH RISK MEDICATION USE: ICD-10-CM

## 2018-08-01 DIAGNOSIS — M54.6 CHRONIC THORACIC SPINE PAIN: ICD-10-CM

## 2018-08-01 DIAGNOSIS — M79.642 PAIN IN BOTH HANDS: ICD-10-CM

## 2018-08-01 DIAGNOSIS — G56.82 SUPRASCAPULAR ENTRAPMENT NEUROPATHY OF LEFT SIDE: Primary | ICD-10-CM

## 2018-08-01 DIAGNOSIS — M54.42 CHRONIC BILATERAL LOW BACK PAIN WITH BILATERAL SCIATICA: ICD-10-CM

## 2018-08-01 DIAGNOSIS — G89.29 CHRONIC THORACIC SPINE PAIN: ICD-10-CM

## 2018-08-01 PROCEDURE — 99213 OFFICE O/P EST LOW 20 MIN: CPT | Performed by: PHYSICAL MEDICINE & REHABILITATION

## 2018-08-01 RX ORDER — FORMOTEROL FUMARATE 20 UG/2ML
2 SOLUTION RESPIRATORY (INHALATION) DAILY
COMMUNITY
Start: 2018-07-23

## 2018-08-01 ASSESSMENT — ENCOUNTER SYMPTOMS
EYES NEGATIVE: 1
CHEST TIGHTNESS: 0
BACK PAIN: 1
PHOTOPHOBIA: 0
CONSTIPATION: 0
WHEEZING: 0
ABDOMINAL PAIN: 0
APNEA: 1
STRIDOR: 0
ALLERGIC/IMMUNOLOGIC NEGATIVE: 1
NAUSEA: 0
CHOKING: 0
VOMITING: 0
DIARRHEA: 0
SHORTNESS OF BREATH: 1
SINUS PRESSURE: 1

## 2018-08-01 NOTE — PROGRESS NOTES
a severity of 9/10. The pain is severe. The symptoms are aggravated by bending, position and twisting. Stiffness is present all day. Associated symptoms include headaches, leg pain, numbness and weakness. Pertinent negatives include no chest pain, pain with swallowing, paresis, photophobia or tingling. She has tried home exercises and heat (Tramadol, Tylenol, PT 15 sessions, Injections ) for the symptoms. The treatment provided significant relief. Knee Pain    The incident occurred more than 1 week ago. The incident occurred in the yard. The injury mechanism was a fall. The pain is present in the right leg, right foot, right ankle, right knee and right hip. The pain is at a severity of 7/10. The pain is severe. Associated symptoms include numbness. Pertinent negatives include no inability to bear weight, loss of motion, muscle weakness or tingling. She reports no foreign bodies present. The symptoms are aggravated by palpation, movement and weight bearing. She has tried ice for the symptoms. The treatment provided mild relief.        Past Medical History:   Diagnosis Date    Anxiety     Asthma     Back pain     Bronchitis     CAD (coronary artery disease)     Carpal tunnel syndrome     [facility]) [facility])     uses CPAP at night    Emphysema of lung ([facility])     Fibromyalgia     GERD (gastroesophageal reflux disease)     Hyperlipidemia     Hypertension     Hypothyroidism     Obesity     ROGELIO (obstructive sleep apnea)     Osteoarthritis     Osteoporosis     PONV (postoperative nausea and vomiting)     Restless legs syndrome     SOB (shortness of breath)     Type II or unspecified type diabetes mellitus without mention of complication, not stated as uncontrolled     Unspecified sleep apnea     UTI (urinary tract infection)      Past Surgical History:   Procedure Laterality Date    ANKLE SURGERY Left     hardware in & removed    ANKLE SURGERY Right     Plate in right ankle    CARPAL TUNNEL RELEASE Left 2015    CHOLECYSTECTOMY      COLONOSCOPY  09/11/2012    CORONARY ANGIOPLASTY WITH STENT PLACEMENT      ENDOSCOPY, COLON, DIAGNOSTIC      LUNG BIOPSY  09/07/2017    pleural biopsy    CA REVISE MEDIAN N/CARPAL TUNNEL SURG Right 5/24/2018    RIGHT WRIST CARPAL TUNNEL RELEASE, ( TO BE IN ROOM 12 WITH ROOM 2 TEAM) performed by Selina Goncalves MD at [address] [facility] Left 6/19/2018    LEFT JOSELIN THYROIDECTOMY performed by Cesar Veloz MD at [address] [place] [address] [facility]      THYROIDECTOMY      partial    UPPER GASTROINTESTINAL ENDOSCOPY  09/11/2012    UPPER GASTROINTESTINAL ENDOSCOPY  12/4/14     DR. MAE     Social History     Social History    Marital status:      Spouse name: N/A    Number of children: N/A    Years of education: N/A     Occupational History    Retired      Social History Main Topics    Smoking status: Former Smoker     Packs/day: 1.50     Years: 32.00     Types: Cigarettes     Quit date: 2/21/2001    Smokeless tobacco: Never Used    Alcohol use No    Drug use: No    Sexual activity: Yes     Partners: Male     Other Topics Concern    None     Social History Narrative    None     Family History   Problem Relation Age of Onset    High Blood Pressure Mother     Heart Disease Mother     Cancer Father         LUNG    High Blood Pressure Brother     COPD Brother     Heart Disease Brother     Heart Attack Brother     COPD Sister     Heart Disease Sister     Arthritis Maternal [de-identified]     COPD Sister     No Known Problems Daughter     No Known Problems Son        Allergies   Allergen Reactions    Biaxin [Clarithromycin] Nausea Only     Nausea with diarrhea    Codeine Nausea Only     Felt like floating in air    Invokana [Canagliflozin]      Yeast infection        Review of Systems   Constitutional: Positive for fatigue. Negative for activity change. HENT: Positive for postnasal drip and sinus pressure. Negative for congestion. thyromegaly present. Cardiovascular: Normal rate, regular rhythm, normal heart sounds and intact distal pulses. Exam reveals no gallop, no friction rub and no decreased pulses. No murmur heard. Pulmonary/Chest: Accessory muscle usage present. No apnea, no tachypnea and no bradypnea. No respiratory distress. She has decreased breath sounds. She has no wheezes. She has no rales. She exhibits no tenderness. On NC O2   Abdominal: Soft. Bowel sounds are normal. She exhibits no distension and no mass. There is no tenderness. There is no rebound and no guarding. Musculoskeletal: She exhibits tenderness. She exhibits no edema. Right shoulder: Normal.        Left shoulder: Normal.        Right elbow: Normal.       Left elbow: Normal.        Right wrist: Normal.        Left wrist: Normal.        Right hip: Normal.        Left hip: Normal.        Right knee: Normal.        Left knee: Normal.        Right ankle: Normal. Achilles tendon normal.        Left ankle: Normal. Achilles tendon normal.        Cervical back: She exhibits tenderness, pain and spasm. She exhibits no bony tenderness and no swelling. Thoracic back: She exhibits tenderness and spasm. Lumbar back: She exhibits decreased range of motion, tenderness, bony tenderness and pain. She exhibits no swelling, no edema, no deformity, no laceration and normal pulse. Back:         Right upper arm: Normal.        Left upper arm: Normal.        Right forearm: Normal.        Left forearm: Normal.        Arms:       Right hand: Decreased sensation noted. Decreased sensation is present in the medial distribution. Left hand: Decreased sensation noted. Decreased sensation is present in the medial distribution.         Right upper leg: Normal.        Left upper leg: Normal.        Right lower leg: Normal.        Left lower leg: Normal.        Legs:       Right foot: Normal.        Left foot: Normal.   Tender areas are indicated by numbered spot         Lymphadenopathy:     She has no cervical adenopathy. Neurological: She is alert and oriented to person, place, and time. She displays abnormal reflex. She displays no atrophy and no tremor. No cranial nerve deficit or sensory deficit. She exhibits normal muscle tone. Coordination normal. She displays no Babinski's sign on the right side. She displays no Babinski's sign on the left side. Skin: Skin is warm, dry and intact. No abrasion, no bruising, no ecchymosis, no laceration, no petechiae and no rash noted. Rash is not macular, not pustular and not urticarial. She is not diaphoretic. No cyanosis or erythema. No pallor. Nails show no clubbing. Psychiatric: She has a normal mood and affect. Her behavior is normal. Judgment and thought content normal. Her mood appears not anxious. Her affect is not angry, not blunt, not labile and not inappropriate. Her speech is not rapid and/or pressured, not delayed, not tangential and not slurred. She is not agitated, not aggressive, not hyperactive, not slowed, not withdrawn, not actively hallucinating and not combative. Thought content is not paranoid and not delusional. Cognition and memory are normal. Cognition and memory are not impaired. She does not express impulsivity or inappropriate judgment. She does not exhibit a depressed mood. She expresses no homicidal and no suicidal ideation. She expresses no suicidal plans and no homicidal plans. She is communicative. She exhibits normal recent memory and normal remote memory. She is attentive. Vitals reviewed. Ortho Exam  Neurologic Exam     Mental Status   Oriented to person, place, and time. Speech: not slurred     Cranial Nerves     CN III, IV, VI   Pupils are equal, round, and reactive to light.   Extraocular motions are normal.         After a thorough review and discussion of the previous medical records, patient comprehensive medical, surgical, and family and social history, Review of Systems, their OARRS, their Screener and Opioid Assessment for Patients with Pain (SOAPP®-R), recent diagnostics, and symptomatic results to previous treatment, it is my impression that the patients is suffering with progressive and severe:     Diagnosis Orders   1. Suprascapular entrapment neuropathy of left side     2. Chronic midline low back pain with left-sided sciatica     3. Chronic bilateral low back pain with bilateral sciatica     4. High risk medication use - 11/07/17 OARRS PM&R, 02/07/18 OARRS PM&R, 05/30/17 Urine Drug Screen: negative PM&R, MED CONTRACT 1/26/17     5. Chronic thoracic spine pain     6. Myalgia  CO INJECT TRIGGER POINTS, 3 OR GREATER   7. Pain in both hands  diclofenac sodium (VOLTAREN) 1 % GEL       I am also concerned by lifestyle and mood issues including:    Past Medical History:   Diagnosis Date    Anxiety     Asthma     Back pain     Bronchitis     CAD (coronary artery disease)     Carpal tunnel syndrome     COPD [facility]chronic obstructive pulmonary disease) ([facility])     uses CPAP at night    Emphysema of lung ([facility])     Fibromyalgia     GERD (gastroesophageal reflux disease)     Hyperlipidemia     Hypertension     Hypothyroidism     Obesity     ROGELIO (obstructive sleep apnea)     Osteoarthritis     Osteoporosis     PONV (postoperative nausea and vomiting)     Restless legs syndrome     SOB (shortness of breath)     Type II or unspecified type diabetes mellitus without mention of complication, not stated as uncontrolled     Unspecified sleep apnea     UTI (urinary tract infection)            Given their medication, chronic pain and lifestyle and medications they are at risk for :    Falls, constipation, addiction  Loss of livelyhood due to severe pain, debility, weight gain and  vitamin D deficiency    The patient was educated regarding proper diet, fitness routine, and regulatory restrictions concerning pain medications.         Previous notes, comprehensive past Medications:    Orders Placed This Encounter   Medications    diclofenac sodium (VOLTAREN) 1 % GEL     Sig: Apply 4 g topically 4 times daily Generic equivalent acceptable, unless otherwise noted. Dispense:  5 Tube     Refill:  5        -which helps with pain and function. Otherwise, continue the current pain medications that I have prescibed. Radiologic:   Old films reviewed  ,     I discussed results with patients. see Follow up plans below  For any new studies. Care Everywhere Updates:  requested and reviewed. No new issues noted. Labs:  Previous labs reviewed     Lab Results   Component Value Date     06/15/2018    K 3.8 06/15/2018     06/15/2018    CO2 25 06/15/2018    BUN 20 06/15/2018    CREATININE 0.55 06/15/2018    CALCIUM 8.7 06/19/2018    LABALBU 4.1 05/07/2018    BILITOT 0.5 05/07/2018    ALKPHOS 72 05/07/2018    AST 18 05/07/2018    ALT 20 05/07/2018     Lab Results   Component Value Date    WBC 10.9 06/15/2018    RBC 4.81 06/15/2018    RBC 4.98 05/07/2018    HGB 14.4 06/15/2018    HCT 42.8 06/15/2018    MCV 89.0 06/15/2018    MCH 29.9 06/15/2018    MCHC 33.5 06/15/2018    RDW 12.8 06/15/2018     06/15/2018    MPV 10.5 05/07/2018       Lab Results   Component Value Date    LABAMPH Neg 05/30/2017    BARBSCNU Neg 05/30/2017    LABBENZ Neg 05/30/2017    CANSU Neg 05/30/2017    COCAIMETSCRU Neg 05/30/2017    PHENCYCLIDINESCREENURINE Neg 40/08/7559    TRICYCLIC not available 82/85/4075    DSCOMMENT see below 05/30/2017       No results found for: CODEINE, MORPHINE, ACETYLMORPHI, OXYCODONE, NOROXYCODONE, NOROXYMU, HYDRCO, NORHYDU, HYDROMO, BUPREN, NORBUPRNOR, FENTA, NORFENT, MEPERIDINE, TAPENU, TAPOSULFUR, METHADONE, LABPROP, TRAM, AMPH, METHAMP, MDMA, ECMDA    Lab Results   Component Value Date    LABCREA 59.3 06/23/2017         , I discussed results with patient. See follow-up plans for new studies.     Therapies:  HEP-gentle stretching and relaxation

## 2018-08-06 ENCOUNTER — PROCEDURE VISIT (OUTPATIENT)
Dept: PHYSICAL MEDICINE AND REHAB | Age: 68
End: 2018-08-06
Payer: MEDICARE

## 2018-08-06 DIAGNOSIS — M54.81 BILATERAL OCCIPITAL NEURALGIA: Primary | ICD-10-CM

## 2018-08-06 PROCEDURE — 64405 NJX AA&/STRD GR OCPL NRV: CPT | Performed by: PHYSICAL MEDICINE & REHABILITATION

## 2018-08-06 RX ORDER — LIDOCAINE HYDROCHLORIDE 5 MG/ML
20 INJECTION, SOLUTION INFILTRATION; INTRAVENOUS ONCE
Status: COMPLETED | OUTPATIENT
Start: 2018-08-06 | End: 2018-08-06

## 2018-08-06 RX ADMIN — LIDOCAINE HYDROCHLORIDE 20 ML: 5 INJECTION, SOLUTION INFILTRATION; INTRAVENOUS at 16:25

## 2018-08-09 ENCOUNTER — HOSPITAL ENCOUNTER (OUTPATIENT)
Dept: GENERAL RADIOLOGY | Age: 68
Discharge: HOME | End: 2018-08-11
Payer: MEDICARE

## 2018-08-09 ENCOUNTER — OFFICE VISIT (OUTPATIENT)
Dept: PULMONOLOGY | Age: 68
End: 2018-08-09
Payer: MEDICARE

## 2018-08-09 VITALS
BODY MASS INDEX: 40.4 KG/M2 | OXYGEN SATURATION: 94 % | TEMPERATURE: 99.3 F | RESPIRATION RATE: 16 BRPM | SYSTOLIC BLOOD PRESSURE: 134 MMHG | HEIGHT: 61 IN | DIASTOLIC BLOOD PRESSURE: 80 MMHG | WEIGHT: 214 LBS | HEART RATE: 114 BPM

## 2018-08-09 DIAGNOSIS — Z99.89 OSA ON CPAP: ICD-10-CM

## 2018-08-09 DIAGNOSIS — R09.02 HYPOXIA: ICD-10-CM

## 2018-08-09 DIAGNOSIS — E66.01 MORBID OBESITY WITH BMI OF 40.0-44.9, ADULT (HCC): ICD-10-CM

## 2018-08-09 DIAGNOSIS — J45.40 MODERATE PERSISTENT ASTHMA WITHOUT COMPLICATION: ICD-10-CM

## 2018-08-09 DIAGNOSIS — J45.40 MODERATE PERSISTENT ASTHMA WITHOUT COMPLICATION: Primary | ICD-10-CM

## 2018-08-09 DIAGNOSIS — E66.01 CLASS 3 SEVERE OBESITY DUE TO EXCESS CALORIES WITH SERIOUS COMORBIDITY AND BODY MASS INDEX (BMI) OF 40.0 TO 44.9 IN ADULT (HCC): ICD-10-CM

## 2018-08-09 DIAGNOSIS — G47.33 OSA ON CPAP: ICD-10-CM

## 2018-08-09 PROCEDURE — 99215 OFFICE O/P EST HI 40 MIN: CPT | Performed by: INTERNAL MEDICINE

## 2018-08-09 PROCEDURE — 71046 X-RAY EXAM CHEST 2 VIEWS: CPT

## 2018-08-09 RX ORDER — PREDNISONE 10 MG/1
TABLET ORAL
Qty: 40 TABLET | Refills: 0 | Status: SHIPPED | OUTPATIENT
Start: 2018-08-09 | End: 2018-09-07

## 2018-08-09 RX ORDER — DOXYCYCLINE HYCLATE 100 MG
100 TABLET ORAL 2 TIMES DAILY
Qty: 20 TABLET | Refills: 0 | Status: SHIPPED | OUTPATIENT
Start: 2018-08-09 | End: 2018-08-09 | Stop reason: SDUPTHER

## 2018-08-09 RX ORDER — DOXYCYCLINE HYCLATE 100 MG
100 TABLET ORAL 2 TIMES DAILY
Qty: 20 TABLET | Refills: 0 | Status: SHIPPED | OUTPATIENT
Start: 2018-08-09 | End: 2018-08-19

## 2018-08-09 ASSESSMENT — ENCOUNTER SYMPTOMS
TROUBLE SWALLOWING: 0
VOICE CHANGE: 0
DIARRHEA: 0
ABDOMINAL PAIN: 0
CHEST TIGHTNESS: 0
SHORTNESS OF BREATH: 1
COUGH: 1
RHINORRHEA: 0
SORE THROAT: 0
SINUS PRESSURE: 1
WHEEZING: 1
SINUS PAIN: 1
NAUSEA: 0
VOMITING: 0
EYE ITCHING: 0
EYE DISCHARGE: 0

## 2018-08-09 NOTE — PROGRESS NOTES
Subjective:     Irma Cooper is a 75 y.o. female who complains today of:     Chief Complaint   Patient presents with    Follow-up     three week f/u for RESPIRATORY CULTURE. HPI  Patient is having persistent cough , any time , yellow mucus. low grade fever 99, c/o sinus pressure, ear hurting   C/o shortness of breath with Any exertion. more than usual.  Off and on Wheezing , more than usual  Cough with  thick yellowish  Sputum  No Hemoptysis  No Chest tightness   No Chest pain with radiation  or pleuritic pain  No Fever or chills. Complaint of  postnasal drip. Using bronchodilator with DuoNeb 4 times a day when necessary, Perforomist twice a day, Pulmicort twice a day, Spiriva 2 puffs daily. Theophylline 600 mg daily. And prednisone 10 mg daily patient is also on Nucala 100 mg injection  Patient has a sputum culture done which showed usual respiratory karen. Patient is using CPAP with  8 centimeters of H2O with heated humidity with 2 lit . Patient is using CPAP for about 7-8 hours every night. Patient is using CPAP with nasal pillow  , no need for CPAP supplies. Patient is compliant with CPAP therapy and benefiting with CPAP use. No snoring with CPAP use    Allergies:  Biaxin [clarithromycin];  Codeine; and Invokana [canagliflozin]  Past Medical History:   Diagnosis Date    Anxiety     Asthma     Back pain     Bronchitis     CAD (coronary artery disease)     Carpal tunnel syndrome     [facility])     uses CPAP at night    Emphysema of lung ([facility])     Fibromyalgia     GERD (gastroesophageal reflux disease)     Hyperlipidemia     Hypertension     Hypothyroidism     Obesity     ROGELIO (obstructive sleep apnea)     Osteoarthritis     Osteoporosis     PONV (postoperative nausea and vomiting)     Restless legs syndrome     SOB (shortness of breath)     Type II or unspecified type diabetes mellitus without mention of complication, not stated as uncontrolled     Unspecified sleep apnea     UTI (urinary tract infection)      Past Surgical History:   Procedure Laterality Date    ANKLE SURGERY Left     hardware in & removed    ANKLE SURGERY Right     Plate in right ankle    CARPAL TUNNEL RELEASE Left 2015    CHOLECYSTECTOMY      COLONOSCOPY  09/11/2012    CORONARY ANGIOPLASTY WITH STENT PLACEMENT      ENDOSCOPY, COLON, DIAGNOSTIC      LUNG BIOPSY  09/07/2017    pleural biopsy    NC REVISE MEDIAN N/CARPAL TUNNEL SURG Right 5/24/2018    RIGHT WRIST CARPAL TUNNEL RELEASE, ( TO BE IN ROOM 12 WITH ROOM 2 TEAM) performed by Diana Sosa MD at [address] [facility] Left 6/19/2018    LEFT JOSELIN THYROIDECTOMY performed by Isa San MD at [address] [place] [address] [facility]      THYROIDECTOMY      partial    UPPER GASTROINTESTINAL ENDOSCOPY  09/11/2012    UPPER GASTROINTESTINAL ENDOSCOPY  12/4/14     DR. MAE     Family History   Problem Relation Age of Onset    High Blood Pressure Mother     Heart Disease Mother     Cancer Father         LUNG    High Blood Pressure Brother     COPD Brother     Heart Disease Brother     Heart Attack Brother     COPD Sister     Heart Disease Sister     Arthritis Maternal [address]     COPD Sister     No Known Problems Daughter     No Known Problems Son      Social History     Social History    Marital status:      Spouse name: N/A    Number of children: N/A    Years of education: N/A     Occupational History    Retired      Social History Main Topics    Smoking status: Former Smoker     Packs/day: 1.50     Years: 32.00     Types: Cigarettes     Quit date: 2/21/2001    Smokeless tobacco: Never Used    Alcohol use No    Drug use: No    Sexual activity: Yes     Partners: Male     Other Topics Concern    Not on file     Social History Narrative    No narrative on file         Review of Systems   Constitutional: Negative for chills, diaphoresis, fatigue and fever.    HENT: Positive for ear pain, sinus pain and sinus pressure. Negative for congestion, mouth sores, nosebleeds, postnasal drip, rhinorrhea, sneezing, sore throat, trouble swallowing and voice change. Eyes: Negative for discharge, itching and visual disturbance. Respiratory: Positive for cough, shortness of breath and wheezing. Negative for chest tightness. Cardiovascular: Negative for chest pain, palpitations and leg swelling. Gastrointestinal: Negative for abdominal pain, diarrhea, nausea and vomiting. Genitourinary: Negative for difficulty urinating and hematuria. Musculoskeletal: Negative for arthralgias, joint swelling and myalgias. Skin: Negative for rash. Allergic/Immunologic: Negative for environmental allergies and food allergies. Neurological: Negative for dizziness, tremors, weakness and headaches. Psychiatric/Behavioral: Negative for behavioral problems and sleep disturbance. Objective:     Vitals:    08/09/18 1512   BP: 134/80   Pulse: 114   Resp: 16   Temp: 99.3 °F (37.4 °C)   TempSrc: Tympanic   SpO2: 94%   Weight: 214 lb (97.1 kg)   Height: 5' 1\" (1.549 m)         Physical Exam   Constitutional: She is oriented to person, place, and time. She appears well-developed and well-nourished. No distress. obese   HENT:   Head: Normocephalic and atraumatic. Nose: Nose normal.   Mouth/Throat: Oropharynx is clear and moist.   Eyes: EOM are normal. Pupils are equal, round, and reactive to light. Neck: No JVD present. No tracheal deviation present. No thyromegaly present. Cardiovascular: Normal rate and regular rhythm. Exam reveals no gallop and no friction rub. No murmur heard. Pulmonary/Chest: No respiratory distress. She has wheezes (scattered ). She has no rales. She exhibits no tenderness. diminished Breath sound bilaterally. Abdominal: She exhibits no distension. There is no tenderness. There is no rebound. Musculoskeletal: Normal range of motion. She exhibits no edema.

## 2018-08-13 DIAGNOSIS — R93.89 ABNORMAL CXR: Primary | ICD-10-CM

## 2018-08-16 ENCOUNTER — TELEPHONE (OUTPATIENT)
Dept: PHARMACY | Facility: CLINIC | Age: 68
End: 2018-08-16

## 2018-08-16 NOTE — TELEPHONE ENCOUNTER
American: No      Diabetic: Yes      Tobacco smoker: No      Systolic Blood Pressure: 355 mmHg      Is BP treated: Yes      HDL Cholesterol: 32 mg/dL      Total Cholesterol: 150 mg/dL      ASSESSMENT:  Hyperlipidemia Goal: Patient has a 10-yr ASCVD risk of >7.5% with DM and is therefore a candidate for high-intensity statin therapy based on updated guidelines. Patient is prescribed high-intensity statin therapy. Patient's most recent ALT is 20. PLAN:  Per 11/2/17 encounter from [facility], [facility] - refilled Atorvastatin in November 2017 for 90 day supply. [employer] report from August 2018 has her marked as compliant last year. Unable to see fill history in dispense record. Will have Sindi follow up with pharmacy to confirm fill in 2018.       Thank you,    Shelley Hooker, PharmD  [address] [facility] Pharmacist  [phone] or [phone] (Option 7)    For Pharmacy Admin Tracking Only    PHSO: Yes  Total # of Interventions Recommended: 0  - Maintenance Safety Lab Monitoring #: 1  Total Interventions Accepted: 0  Time Spent (min): 10

## 2018-08-20 ENCOUNTER — HOSPITAL ENCOUNTER (OUTPATIENT)
Dept: CT IMAGING | Age: 68
Discharge: HOME | End: 2018-08-22
Payer: MEDICARE

## 2018-08-20 VITALS — DIASTOLIC BLOOD PRESSURE: 95 MMHG | SYSTOLIC BLOOD PRESSURE: 167 MMHG | HEART RATE: 96 BPM | RESPIRATION RATE: 14 BRPM

## 2018-08-20 DIAGNOSIS — R93.89 ABNORMAL CXR: ICD-10-CM

## 2018-08-20 PROCEDURE — 6360000004 HC RX CONTRAST MEDICATION: Performed by: INTERNAL MEDICINE

## 2018-08-20 PROCEDURE — 71260 CT THORAX DX C+: CPT

## 2018-08-20 PROCEDURE — 2580000003 HC RX 258: Performed by: INTERNAL MEDICINE

## 2018-08-20 RX ORDER — SODIUM CHLORIDE 0.9 % (FLUSH) 0.9 %
10 SYRINGE (ML) INJECTION ONCE
Status: COMPLETED | OUTPATIENT
Start: 2018-08-20 | End: 2018-08-20

## 2018-08-20 RX ADMIN — IOPAMIDOL 75 ML: 755 INJECTION, SOLUTION INTRAVENOUS at 15:36

## 2018-08-20 RX ADMIN — Medication 10 ML: at 15:42

## 2018-08-28 ENCOUNTER — PROCEDURE VISIT (OUTPATIENT)
Dept: PHYSICAL MEDICINE AND REHAB | Age: 68
End: 2018-08-28

## 2018-08-28 DIAGNOSIS — M89.8X1 CHRONIC PAIN OF SCAPULA: Primary | ICD-10-CM

## 2018-08-28 DIAGNOSIS — G89.29 CHRONIC PAIN OF SCAPULA: Primary | ICD-10-CM

## 2018-08-30 ENCOUNTER — OFFICE VISIT (OUTPATIENT)
Dept: PULMONOLOGY | Age: 68
End: 2018-08-30
Payer: MEDICARE

## 2018-08-30 VITALS
HEIGHT: 61 IN | BODY MASS INDEX: 40.59 KG/M2 | WEIGHT: 215 LBS | TEMPERATURE: 98.4 F | HEART RATE: 96 BPM | DIASTOLIC BLOOD PRESSURE: 78 MMHG | RESPIRATION RATE: 16 BRPM | OXYGEN SATURATION: 96 % | SYSTOLIC BLOOD PRESSURE: 152 MMHG

## 2018-08-30 DIAGNOSIS — R09.02 HYPOXIA: ICD-10-CM

## 2018-08-30 DIAGNOSIS — G47.33 OSA ON CPAP: ICD-10-CM

## 2018-08-30 DIAGNOSIS — J44.1 COPD EXACERBATION (HCC): Primary | ICD-10-CM

## 2018-08-30 DIAGNOSIS — Z99.89 OSA ON CPAP: ICD-10-CM

## 2018-08-30 DIAGNOSIS — R91.8 MULTIPLE LUNG NODULES: ICD-10-CM

## 2018-08-30 DIAGNOSIS — E66.9 OBESITY (BMI 30-39.9): ICD-10-CM

## 2018-08-30 PROCEDURE — 99215 OFFICE O/P EST HI 40 MIN: CPT | Performed by: INTERNAL MEDICINE

## 2018-08-30 RX ORDER — PREDNISONE 10 MG/1
TABLET ORAL
Qty: 40 TABLET | Refills: 0 | Status: SHIPPED | OUTPATIENT
Start: 2018-08-30 | End: 2019-01-15

## 2018-08-30 RX ORDER — DOXYCYCLINE HYCLATE 100 MG
100 TABLET ORAL 2 TIMES DAILY
Qty: 20 TABLET | Refills: 0 | Status: SHIPPED | OUTPATIENT
Start: 2018-08-30 | End: 2018-09-09

## 2018-08-30 ASSESSMENT — ENCOUNTER SYMPTOMS
DIARRHEA: 0
CHEST TIGHTNESS: 0
VOICE CHANGE: 0
NAUSEA: 0
RHINORRHEA: 0
SHORTNESS OF BREATH: 1
WHEEZING: 1
SORE THROAT: 0
COUGH: 1
TROUBLE SWALLOWING: 0
SINUS PRESSURE: 0
ABDOMINAL PAIN: 0
EYE DISCHARGE: 0
VOMITING: 0
EYE ITCHING: 0

## 2018-08-30 NOTE — PROGRESS NOTES
needed 1 po bid x 10 days) 20 tablet 0    glucose blood VI test strips (ONE TOUCH ULTRA TEST) strip USE TO TEST TWICE A DAY AND AS NEEDED, IDDM - Dx: E11.9 100 each 1    ONE TOUCH LANCETS MISC USE TO TEST TWICE A DAY AND AS NEEDED, IDDM - Dx: E11.9 200 each 3    Blood Glucose Monitoring Suppl MARCELLUS One Touch Ultra - To Test BID - IDDM - Dx: E11.9 1 Device 0    isosorbide mononitrate (IMDUR) 60 MG CR tablet Take 60 mg by mouth daily       budesonide (PULMICORT) 0.5 MG/2ML nebulizer suspension Take 1 ampule by nebulization 2 times daily      digoxin (DIGOX) 125 MCG tablet Take 125 mcg by mouth Daily with lunch       clopidogrel (PLAVIX) 75 MG tablet Take 75 mg by mouth daily.  fenofibrate (TRICOR) 145 MG tablet Take 145 mg by mouth every evening       verapamil (VERELAN PM) 240 MG CR capsule Take 240 mg by mouth 2 times daily        No current facility-administered medications for this visit. Results for orders placed during the hospital encounter of 08/09/18   XR CHEST STANDARD (2 VW)    Narrative EXAMINATION: XR CHEST (2 VIEWS):     DATE AND TIME: 8/9/2018 at 4:48 PM.     CLINICAL HISTORY: SHORTNESS OF BREATH. MODERATE PERSISTENT ASTHMA WITHOUT COMPLICATION. COMPARISONS: 6/27/2018. FINDINGS: Questionable vague opacity in the right lower lung zone on today's exam. The possibility of a small focal infiltrate or lung nodule is raised. Consider correlation with CT chest. Increased markings at the right base as well. Impression ABNORMAL, SOMEWHAT ROUNDED OPACITY AT THE RIGHT BASE. OF CONCERN IS A POSSIBLE LUNG NODULE. CONSIDER CORRELATION WITH CT.           ]  Results for orders placed during the hospital encounter of 10/13/16   XR Chest Portable    Narrative EXAMINATION: CHEST PORTABLE VIEW  CLINICAL HISTORY: Short of breath  COMPARISONS: September 6, 2016  FINDINGS:  Single  views of the chest is submitted. The cardiac silhouette is enlarged, unchanged configuration.  The mediastinum

## 2018-09-07 ENCOUNTER — OFFICE VISIT (OUTPATIENT)
Dept: FAMILY MEDICINE CLINIC | Age: 68
End: 2018-09-07
Payer: MEDICARE

## 2018-09-07 VITALS
HEART RATE: 109 BPM | SYSTOLIC BLOOD PRESSURE: 148 MMHG | HEIGHT: 61 IN | OXYGEN SATURATION: 99 % | TEMPERATURE: 97.6 F | WEIGHT: 215 LBS | BODY MASS INDEX: 40.59 KG/M2 | RESPIRATION RATE: 14 BRPM | DIASTOLIC BLOOD PRESSURE: 88 MMHG

## 2018-09-07 DIAGNOSIS — R35.0 URINARY FREQUENCY: ICD-10-CM

## 2018-09-07 DIAGNOSIS — K21.9 GASTROESOPHAGEAL REFLUX DISEASE WITHOUT ESOPHAGITIS: ICD-10-CM

## 2018-09-07 DIAGNOSIS — R35.0 URINARY FREQUENCY: Primary | ICD-10-CM

## 2018-09-07 DIAGNOSIS — F41.9 ANXIETY: ICD-10-CM

## 2018-09-07 DIAGNOSIS — E55.9 VITAMIN D DEFICIENCY: ICD-10-CM

## 2018-09-07 DIAGNOSIS — L30.9 ECZEMA, UNSPECIFIED TYPE: ICD-10-CM

## 2018-09-07 LAB
BILIRUBIN, POC: ABNORMAL
BLOOD URINE, POC: ABNORMAL
CLARITY, POC: CLEAR
COLOR, POC: YELLOW
GLUCOSE URINE, POC: ABNORMAL
KETONES, POC: ABNORMAL
LEUKOCYTE EST, POC: ABNORMAL
NITRITE, POC: ABNORMAL
PH, POC: 5
PROTEIN, POC: ABNORMAL
SPECIFIC GRAVITY, POC: 1.03
UROBILINOGEN, POC: ABNORMAL
VITAMIN D 25-HYDROXY: 19.6 NG/ML (ref 30–100)

## 2018-09-07 PROCEDURE — 81003 URINALYSIS AUTO W/O SCOPE: CPT | Performed by: FAMILY MEDICINE

## 2018-09-07 PROCEDURE — 99214 OFFICE O/P EST MOD 30 MIN: CPT | Performed by: FAMILY MEDICINE

## 2018-09-07 RX ORDER — LORAZEPAM 0.5 MG/1
0.5 TABLET ORAL EVERY 8 HOURS PRN
Qty: 21 TABLET | Refills: 0 | Status: SHIPPED | OUTPATIENT
Start: 2018-09-07 | End: 2019-01-15 | Stop reason: SDUPTHER

## 2018-09-07 RX ORDER — FLUCONAZOLE 150 MG/1
150 TABLET ORAL ONCE
Qty: 1 TABLET | Refills: 1 | Status: SHIPPED | OUTPATIENT
Start: 2018-09-07 | End: 2018-09-07

## 2018-09-07 RX ORDER — NITROFURANTOIN 25; 75 MG/1; MG/1
100 CAPSULE ORAL 2 TIMES DAILY
Qty: 14 CAPSULE | Refills: 0 | Status: SHIPPED | OUTPATIENT
Start: 2018-09-07 | End: 2018-09-07 | Stop reason: CLARIF

## 2018-09-07 RX ORDER — ESOMEPRAZOLE MAGNESIUM 40 MG/1
40 CAPSULE, DELAYED RELEASE ORAL DAILY
Qty: 90 CAPSULE | Refills: 1 | Status: SHIPPED | OUTPATIENT
Start: 2018-09-07 | End: 2019-04-06 | Stop reason: SDUPTHER

## 2018-09-07 RX ORDER — FLUOCINONIDE 0.5 MG/G
OINTMENT TOPICAL
Qty: 30 G | Refills: 1 | Status: SHIPPED | OUTPATIENT
Start: 2018-09-07

## 2018-09-07 RX ORDER — NITROFURANTOIN 25; 75 MG/1; MG/1
100 CAPSULE ORAL 2 TIMES DAILY
Qty: 14 CAPSULE | Refills: 0 | Status: SHIPPED | OUTPATIENT
Start: 2018-09-07 | End: 2018-09-14

## 2018-09-07 ASSESSMENT — ENCOUNTER SYMPTOMS
COLOR CHANGE: 1
ABDOMINAL PAIN: 0

## 2018-09-07 ASSESSMENT — PATIENT HEALTH QUESTIONNAIRE - PHQ9
SUM OF ALL RESPONSES TO PHQ QUESTIONS 1-9: 0
2. FEELING DOWN, DEPRESSED OR HOPELESS: 0
1. LITTLE INTEREST OR PLEASURE IN DOING THINGS: 0
SUM OF ALL RESPONSES TO PHQ QUESTIONS 1-9: 0
SUM OF ALL RESPONSES TO PHQ9 QUESTIONS 1 & 2: 0

## 2018-09-07 NOTE — PROGRESS NOTES
Subjective:      Patient ID: Gloria Goodrich is a 75 y.o. female    Urinary Frequency    This is a recurrent problem. The current episode started more than 1 month ago. Associated symptoms include frequency. Pertinent negatives include no chills. Mental Health Problem     Additional symptoms of the illness do not include abdominal pain. Rash     Gastroesophageal Reflux   She reports no abdominal pain or no chest pain. Here with worsening rash of left arm over the last few months which she has had in past. Requesting refill on topical steroid which does seem to keep things in check. Currently taking prednisone therapy for copd exacerbation as has been seeing pulmonary frequently recently as has had multiple antibiotics and steroid tapers over the last few months. Has noted more frequent urination and some dysuria over the last few months intermittently. Has had some vaginal itching as well and thinks may be yeast related. Currently on doxycycline. Would like vitamin d level checked as has been low in past.  Has felt more anxiety recently related to recent recurrent respiratory infections and would like to get refill on ativan which she rarely uses   Still taking nexium for gerd which is stable     Review of Systems   Constitutional: Positive for activity change. Negative for chills. Cardiovascular: Negative for chest pain. Gastrointestinal: Negative for abdominal pain. Genitourinary: Positive for frequency. Negative for difficulty urinating. Musculoskeletal: Negative for joint swelling. Skin: Positive for color change and rash. Neurological: Negative for dizziness.      Reviewed allergy, medical, social, surgical, family and med list changes and updated   Files  Social History     Social History    Marital status:      Spouse name: N/A    Number of children: N/A    Years of education: N/A     Occupational History    Retired      Social History Main Topics    Smoking status: Former Smoker     Packs/day: 1.50     Years: 32.00     Types: Cigarettes     Quit date: 2/21/2001    Smokeless tobacco: Never Used    Alcohol use No    Drug use: No    Sexual activity: Yes     Partners: Male     Other Topics Concern    None     Social History Narrative    None     Current Outpatient Prescriptions   Medication Sig Dispense Refill    HYDROcodone-homatropine (HYCODAN) 5-1.5 MG/5ML syrup Take 5 mLs by mouth. [place] OXYGEN Inhale into the lungs 2 liters at night and during the day 3 liters      doxycycline hyclate (VIBRA-TABS) 100 MG tablet Take 1 tablet by mouth 2 times daily for 10 days 20 tablet 0    predniSONE (DELTASONE) 10 MG tablet 4 tablets daily for 4 days, 3 tablets daily for 4 days, 2 tablets daily for 4 days, then 1 tablet daily for 4 days 40 tablet 0    formoterol (PERFOROMIST) 20 MCG/2ML nebulizer solution Inhale 2 mLs into the lungs daily      diclofenac sodium (VOLTAREN) 1 % GEL Apply 4 g topically 4 times daily Generic equivalent acceptable, unless otherwise noted. 5 Tube 5    fluticasone (FLONASE) 50 MCG/ACT nasal spray 2 sprays by Nasal route daily 1 Bottle 0    insulin aspart (NOVOLOG FLEXPEN) 100 UNIT/ML injection pen Inject 10-15 Units into the skin 3 times daily (before meals) 5 pen 3    insulin glargine (LANTUS SOLOSTAR) 100 UNIT/ML injection pen 90 units at bedtime 30 pen 3    Insulin Pen Needle (NOVOFINE) 32G X 6 MM MISC qid 300 each 3    levothyroxine (SYNTHROID) 125 MCG tablet Take 1 tablet by mouth Daily 90 tablet 3    traMADol (ULTRAM) 50 MG tablet Take 1 tablet by mouth every 6 hours as needed. .      CPAP Machine MISC by Does not apply route      SPIRIVA RESPIMAT 2.5 MCG/ACT AERS inhaler 2 puffs daily       ipratropium-albuterol (DUONEB) 0.5-2.5 (3) MG/3ML SOLN nebulizer solution Inhale 3 mLs into the lungs      Insulin Pen Needle (B-D UF III MINI PEN NEEDLES) 31G X 5 MM MISC USE 1 DAILY TO INJECT LANTUS 100 each 1    nystatin (MYCOSTATIN)  isosorbide mononitrate (IMDUR) 60 MG CR tablet Take 60 mg by mouth daily       budesonide (PULMICORT) 0.5 MG/2ML nebulizer suspension Take 1 ampule by nebulization 2 times daily      digoxin (DIGOX) 125 MCG tablet Take 125 mcg by mouth Daily with lunch       clopidogrel (PLAVIX) 75 MG tablet Take 75 mg by mouth daily.  fenofibrate (TRICOR) 145 MG tablet Take 145 mg by mouth every evening       verapamil (VERELAN PM) 240 MG CR capsule Take 240 mg by mouth 2 times daily        No current facility-administered medications for this visit. Family History   Problem Relation Age of Onset    High Blood Pressure Mother     Heart Disease Mother     Cancer Father         LUNG    High Blood Pressure Brother     COPD Brother     Heart Disease Brother     Heart Attack Brother    [place] COPD Sister     Heart Disease Sister     Arthritis Maternal [de-identified]     COPD Sister     No Known Problems Daughter     No Known Problems Son      Past Medical History:   Diagnosis Date    Anxiety     Asthma     Back pain     Bronchitis     CAD (coronary artery disease)     Carpal tunnel syndrome     COPD (chronic obstructive pulmonary disease) (HCC)     uses CPAP at night    Emphysema of lung ([address]     Fibromyalgia     GERD (gastroesophageal reflux disease)     Hyperlipidemia     Hypertension     Hypothyroidism     Obesity     ROGELIO (obstructive sleep apnea)     Osteoarthritis     Osteoporosis     PONV (postoperative nausea and vomiting)     Restless legs syndrome     SOB (shortness of breath)     Type II or unspecified type diabetes mellitus without mention of complication, not stated as uncontrolled     Unspecified sleep apnea     UTI (urinary tract infection)      Controlled Substances Monitoring:     RX Monitoring 9/7/2018   Attestation The Prescription Monitoring Report for this patient was reviewed today. Documentation No signs of potential drug abuse or diversion identified.    Acute Pain Prescriptions -   Chronic Pain -   Medication Contracts -     Objective:   BP (!) 168/88   Pulse 109   Temp 97.6 °F (36.4 °C)   Resp 14   Ht 5' 1\" (1.549 m)   Wt 215 lb (97.5 kg)   LMP 05/17/2001   SpO2 99%   BMI 40.62 kg/m²     Physical Exam   Skin:        Xerotic looking flat to slightly raised erythema     Lungs:Clear  Equal b.s bilaterally  Heart:  Rate reg   1/6 syst murmur across precordium   Back:       No  CVA tenderness  Abdomen: B.S present   Soft            NoTenderness          No Rebound                    No hepatosplenomegaly. No masses. Gen:      No acute distress  Skin:      Mild erythema along left forearm which is flat to slightly raised. Extends to elbow   Assessment:          Diagnosis Orders   1. Urinary frequency  POCT Urinalysis No Micro (Auto)   2. Eczema, unspecified type     3. Anxiety  LORazepam (ATIVAN) 0.5 MG tablet   4. Gastroesophageal reflux disease without esophagitis     5. Vitamin D deficiency  Vitamin D 25 Hydroxy      Plan:      Orders Placed This Encounter   Medications    esomeprazole (NEXIUM) 40 MG delayed release capsule     Sig: Take 1 capsule by mouth daily     Dispense:  90 capsule     Refill:  1    LORazepam (ATIVAN) 0.5 MG tablet     Sig: Take 1 tablet by mouth every 8 hours as needed for Anxiety for up to 21 days. .     Dispense:  21 tablet     Refill:  0    fluocinonide (LIDEX) 0.05 % ointment     Sig: APPLY OINTMENT TOPICALLY TWICE DAILY FOR 2 WEEKS     Dispense:  30 g     Refill:  1     Please consider 90 day supplies to promote better adherence    fluconazole (DIFLUCAN) 150 MG tablet     Sig: Take 1 tablet by mouth once for 1 dose     Dispense:  1 tablet     Refill:  1    nitrofurantoin, macrocrystal-monohydrate, (MACROBID) 100 MG capsule     Sig: Take 1 capsule by mouth 2 times daily for 7 days     Dispense:  14 capsule     Refill:  0   f/u dependent on above

## 2018-09-09 LAB — URINE CULTURE, ROUTINE: NORMAL

## 2018-09-21 RX ORDER — LIDOCAINE HYDROCHLORIDE 5 MG/ML
30 INJECTION, SOLUTION INFILTRATION; INTRAVENOUS ONCE
Status: COMPLETED | OUTPATIENT
Start: 2018-09-21 | End: 2018-09-21

## 2018-09-21 RX ADMIN — LIDOCAINE HYDROCHLORIDE 30 ML: 5 INJECTION, SOLUTION INFILTRATION; INTRAVENOUS at 12:46

## 2018-09-25 ENCOUNTER — PROCEDURE VISIT (OUTPATIENT)
Dept: PHYSICAL MEDICINE AND REHAB | Age: 68
End: 2018-09-25
Payer: MEDICARE

## 2018-09-25 DIAGNOSIS — M79.10 MYALGIA: ICD-10-CM

## 2018-09-25 DIAGNOSIS — M79.7 FIBROMYALGIA: ICD-10-CM

## 2018-09-25 PROCEDURE — 20553 NJX 1/MLT TRIGGER POINTS 3/>: CPT | Performed by: PHYSICAL MEDICINE & REHABILITATION

## 2018-09-28 RX ORDER — LIDOCAINE HYDROCHLORIDE 5 MG/ML
30 INJECTION, SOLUTION INFILTRATION; INTRAVENOUS ONCE
Status: COMPLETED | OUTPATIENT
Start: 2018-09-28 | End: 2018-09-28

## 2018-09-28 RX ADMIN — LIDOCAINE HYDROCHLORIDE 30 ML: 5 INJECTION, SOLUTION INFILTRATION; INTRAVENOUS at 09:21

## 2018-10-02 RX ORDER — ZOLEDRONIC ACID 5 MG/100ML
5 INJECTION, SOLUTION INTRAVENOUS ONCE
Qty: 100 ML | Refills: 0 | Status: ON HOLD | OUTPATIENT
Start: 2018-10-02 | End: 2019-01-05 | Stop reason: HOSPADM

## 2018-10-02 RX ORDER — ZOLEDRONIC ACID 5 MG/100ML
5 INJECTION, SOLUTION INTRAVENOUS ONCE
Qty: 100 ML | Refills: 0 | Status: SHIPPED | OUTPATIENT
Start: 2018-10-02 | End: 2018-10-02 | Stop reason: SDUPTHER

## 2018-10-02 RX ORDER — ERGOCALCIFEROL 1.25 MG/1
50000 CAPSULE ORAL
Qty: 16 CAPSULE | Refills: 0 | Status: SHIPPED | OUTPATIENT
Start: 2018-10-04 | End: 2019-01-01 | Stop reason: SDUPTHER

## 2018-10-08 ENCOUNTER — TELEPHONE (OUTPATIENT)
Dept: ENDOCRINOLOGY | Age: 68
End: 2018-10-08

## 2018-10-15 RX ORDER — ZOLEDRONIC ACID 5 MG/100ML
5 INJECTION, SOLUTION INTRAVENOUS ONCE
Status: CANCELLED | OUTPATIENT
Start: 2018-10-16

## 2018-10-16 ENCOUNTER — HOSPITAL ENCOUNTER (OUTPATIENT)
Dept: INFUSION THERAPY | Age: 68
Setting detail: INFUSION SERIES
Discharge: HOME | End: 2018-10-16
Payer: MEDICARE

## 2018-10-16 ENCOUNTER — HOSPITAL ENCOUNTER (EMERGENCY)
Age: 68
Discharge: HOME | End: 2018-10-16
Payer: MEDICARE

## 2018-10-16 VITALS
SYSTOLIC BLOOD PRESSURE: 164 MMHG | HEART RATE: 111 BPM | OXYGEN SATURATION: 96 % | TEMPERATURE: 98 F | RESPIRATION RATE: 20 BRPM | WEIGHT: 215 LBS | HEIGHT: 61 IN | DIASTOLIC BLOOD PRESSURE: 97 MMHG | BODY MASS INDEX: 40.59 KG/M2

## 2018-10-16 DIAGNOSIS — I10 ESSENTIAL HYPERTENSION: Primary | ICD-10-CM

## 2018-10-16 LAB
ALBUMIN SERPL-MCNC: 4.2 G/DL (ref 3.9–4.9)
ALP BLD-CCNC: 81 U/L (ref 40–130)
ALT SERPL-CCNC: 27 U/L (ref 0–33)
ANION GAP SERPL CALCULATED.3IONS-SCNC: 15 MEQ/L (ref 7–13)
AST SERPL-CCNC: 16 U/L (ref 0–35)
BACTERIA: ABNORMAL /HPF
BASOPHILS ABSOLUTE: 0.1 K/UL (ref 0–0.2)
BASOPHILS RELATIVE PERCENT: 0.9 %
BILIRUB SERPL-MCNC: <0.2 MG/DL (ref 0–1.2)
BILIRUBIN URINE: NEGATIVE
BLOOD, URINE: NEGATIVE
BUN BLDV-MCNC: 17 MG/DL (ref 8–23)
CALCIUM SERPL-MCNC: 10 MG/DL (ref 8.6–10.2)
CHLORIDE BLD-SCNC: 102 MEQ/L (ref 98–107)
CLARITY: CLEAR
CO2: 28 MEQ/L (ref 22–29)
COLOR: YELLOW
CREAT SERPL-MCNC: 0.68 MG/DL (ref 0.5–0.9)
EOSINOPHILS ABSOLUTE: 0.1 K/UL (ref 0–0.7)
EOSINOPHILS RELATIVE PERCENT: 0.6 %
EPITHELIAL CELLS, UA: ABNORMAL /HPF
GFR AFRICAN AMERICAN: >60
GFR NON-AFRICAN AMERICAN: >60
GLOBULIN: 3.6 G/DL (ref 2.3–3.5)
GLUCOSE BLD-MCNC: 150 MG/DL (ref 74–109)
GLUCOSE URINE: 500 MG/DL
HCT VFR BLD CALC: 48.2 % (ref 37–47)
HEMOGLOBIN: 16.3 G/DL (ref 12–16)
KETONES, URINE: NEGATIVE MG/DL
LEUKOCYTE ESTERASE, URINE: ABNORMAL
LYMPHOCYTES ABSOLUTE: 2 K/UL (ref 1–4.8)
LYMPHOCYTES RELATIVE PERCENT: 20.5 %
MCH RBC QN AUTO: 30.3 PG (ref 27–31.3)
MCHC RBC AUTO-ENTMCNC: 33.8 % (ref 33–37)
MCV RBC AUTO: 89.7 FL (ref 82–100)
MONOCYTES ABSOLUTE: 0.7 K/UL (ref 0.2–0.8)
MONOCYTES RELATIVE PERCENT: 7.4 %
MUCUS: PRESENT
NEUTROPHILS ABSOLUTE: 6.7 K/UL (ref 1.4–6.5)
NEUTROPHILS RELATIVE PERCENT: 70.6 %
NITRITE, URINE: NEGATIVE
PDW BLD-RTO: 13.4 % (ref 11.5–14.5)
PH UA: 6 (ref 5–9)
PLATELET # BLD: 263 K/UL (ref 130–400)
POTASSIUM SERPL-SCNC: 4.4 MEQ/L (ref 3.5–5.1)
PROTEIN UA: NEGATIVE MG/DL
RBC # BLD: 5.37 M/UL (ref 4.2–5.4)
RBC UA: ABNORMAL /HPF (ref 0–2)
SODIUM BLD-SCNC: 145 MEQ/L (ref 132–144)
SPECIFIC GRAVITY UA: 1.02 (ref 1–1.03)
T4 FREE: 1.49 NG/DL (ref 0.93–1.7)
TOTAL PROTEIN: 7.8 G/DL (ref 6.4–8.1)
TSH REFLEX: 13.66 UIU/ML (ref 0.27–4.2)
URINE REFLEX TO CULTURE: YES
UROBILINOGEN, URINE: 0.2 E.U./DL
WBC # BLD: 9.6 K/UL (ref 4.8–10.8)
WBC UA: ABNORMAL /HPF (ref 0–5)

## 2018-10-16 PROCEDURE — 84439 ASSAY OF FREE THYROXINE: CPT

## 2018-10-16 PROCEDURE — 6370000000 HC RX 637 (ALT 250 FOR IP): Performed by: NURSE PRACTITIONER

## 2018-10-16 PROCEDURE — 85025 COMPLETE CBC W/AUTO DIFF WBC: CPT

## 2018-10-16 PROCEDURE — 84443 ASSAY THYROID STIM HORMONE: CPT

## 2018-10-16 PROCEDURE — 99283 EMERGENCY DEPT VISIT LOW MDM: CPT

## 2018-10-16 PROCEDURE — 87086 URINE CULTURE/COLONY COUNT: CPT

## 2018-10-16 PROCEDURE — 36415 COLL VENOUS BLD VENIPUNCTURE: CPT

## 2018-10-16 PROCEDURE — 80053 COMPREHEN METABOLIC PANEL: CPT

## 2018-10-16 PROCEDURE — 81001 URINALYSIS AUTO W/SCOPE: CPT

## 2018-10-16 RX ORDER — CLONIDINE HYDROCHLORIDE 0.1 MG/1
0.2 TABLET ORAL ONCE
Status: COMPLETED | OUTPATIENT
Start: 2018-10-16 | End: 2018-10-16

## 2018-10-16 RX ORDER — ZOLEDRONIC ACID 5 MG/100ML
5 INJECTION, SOLUTION INTRAVENOUS ONCE
Status: DISCONTINUED | OUTPATIENT
Start: 2018-10-16 | End: 2018-10-17 | Stop reason: HOSPADM

## 2018-10-16 RX ORDER — AMLODIPINE BESYLATE 5 MG/1
5 TABLET ORAL DAILY
Qty: 30 TABLET | Refills: 0 | Status: ON HOLD | OUTPATIENT
Start: 2018-10-16 | End: 2018-12-29

## 2018-10-16 RX ORDER — SODIUM CHLORIDE 0.9 % (FLUSH) 0.9 %
10 SYRINGE (ML) INJECTION PRN
Status: DISCONTINUED | OUTPATIENT
Start: 2018-10-16 | End: 2018-10-17 | Stop reason: HOSPADM

## 2018-10-16 RX ORDER — SODIUM CHLORIDE 0.9 % (FLUSH) 0.9 %
10 SYRINGE (ML) INJECTION PRN
Status: DISCONTINUED | OUTPATIENT
Start: 2018-10-16 | End: 2018-10-16

## 2018-10-16 RX ADMIN — CLONIDINE HYDROCHLORIDE 0.2 MG: 0.1 TABLET ORAL at 15:03

## 2018-10-16 ASSESSMENT — ENCOUNTER SYMPTOMS
SORE THROAT: 0
EYE PAIN: 0
BACK PAIN: 0
SHORTNESS OF BREATH: 0
RHINORRHEA: 0
ABDOMINAL PAIN: 0
VOMITING: 0
NAUSEA: 0
DIARRHEA: 0
PHOTOPHOBIA: 0
COUGH: 0

## 2018-10-16 NOTE — PROGRESS NOTES
1315. Pt to out patient infusion via w/c with spouse. States she has had reclast previously. Allergies and home meds reviewed. B/P (left upper arm with large cuff) 228/103. B/P (right lower arm) 236/107. Pt asymptomatic. States she felt light headed \"the other day\" and has sinus pressure a lot. AxO X 3. No noted distress. States she took her verapamil today about 1000.

## 2018-10-16 NOTE — PROGRESS NOTES
1345. B/P (right lower arm) 259/123. Bp (left upper arm with large cuff) 242/119. Checked with different Dinamap with B/P reading error. Explained to pt of Dr. Maxx Hutton stating to go to ER. Pt understands and is agreeable.  States she started on a new COPD med \"Daliresp\" and wonders if ir is elevating her B/P.

## 2018-10-16 NOTE — ED PROVIDER NOTES
findings:        Interpretation per the Radiologist below, if available at the time ofthis note:    No orders to display         ED BEDSIDE ULTRASOUND:   Performed by ED Physician - none    LABS:  Labs Reviewed   CBC WITH AUTO DIFFERENTIAL - Abnormal; Notable for the following:        Result Value    Hemoglobin 16.3 (*)     Hematocrit 48.2 (*)     Neutrophils # 6.7 (*)     All other components within normal limits   COMPREHENSIVE METABOLIC PANEL - Abnormal; Notable for the following:     Sodium 145 (*)     Anion Gap 15 (*)     Glucose 150 (*)     Globulin 3.6 (*)     All other components within normal limits   URINE RT REFLEX TO CULTURE - Abnormal; Notable for the following:     Glucose, Ur 500 (*)     Leukocyte Esterase, Urine SMALL (*)     All other components within normal limits   TSH WITH REFLEX - Abnormal; Notable for the following:     TSH 13.660 (*)     All other components within normal limits   MICROSCOPIC URINALYSIS - Abnormal; Notable for the following:     WBC, UA 6-10 (*)     All other components within normal limits   URINE CULTURE   T4, FREE       All other labs were within normal range or not returned as of this dictation. EMERGENCY DEPARTMENT COURSE and DIFFERENTIAL DIAGNOSIS/MDM:   Vitals:    Vitals:    10/16/18 1412 10/16/18 1503 10/16/18 1519 10/16/18 1536   BP: (!) 203/106 (!) 192/91 (!) 177/91 (!) 164/97   Pulse:   103 111   Resp:   18 20   Temp:       TempSrc:       SpO2:   96% 96%   Weight:       Height:                MDM on exam the patient is nontoxic in no distress. She does have a long-standing history of hypertension as well as COPD and other comorbidities. I spent an extensive time discussing the patient's concerns regarding her health with her. She again has no symptoms of her hypertension. Given her recent diagnosis of lung mass with biopsy and need for surgical biopsy rather than bronchoscopy biopsy she is expressing some concern.   I'll also obtain laboratory studies for 1817    Attending Supervisory Note/Shared Visit   I have personally performed a face to face diagnostic evaluation on this patient. I have reviewed the mid-levels findings and agree.   History and Exam by me shows Hypertension      Anton Pace DO  Attending Emergency Physician         Anton Pace DO  10/16/18 1840

## 2018-10-16 NOTE — PROGRESS NOTES
330. Dr. Cyndi Braun notified of pt's elevated B/P and stated to send pt to ER, that cardiology takes care of pt's B/P. Pt states that her cardiologist is Dr. Anastasiya Park. Dr. Anastasiya Park notified of pt with order to receive Reclast and elevated B/P's. No order received. Stated to have pt relax and check B/P again.

## 2018-10-18 LAB — URINE CULTURE, ROUTINE: NORMAL

## 2018-10-22 ENCOUNTER — HOSPITAL ENCOUNTER (OUTPATIENT)
Dept: INFUSION THERAPY | Age: 68
Setting detail: INFUSION SERIES
Discharge: HOME | End: 2018-10-22
Payer: MEDICARE

## 2018-10-22 VITALS
HEART RATE: 108 BPM | RESPIRATION RATE: 18 BRPM | DIASTOLIC BLOOD PRESSURE: 78 MMHG | SYSTOLIC BLOOD PRESSURE: 169 MMHG | TEMPERATURE: 96.9 F

## 2018-10-22 DIAGNOSIS — E89.0 POSTOPERATIVE HYPOTHYROIDISM: ICD-10-CM

## 2018-10-22 LAB
ANION GAP SERPL CALCULATED.3IONS-SCNC: 15 MEQ/L (ref 7–13)
BUN BLDV-MCNC: 21 MG/DL (ref 8–23)
CALCIUM SERPL-MCNC: 9 MG/DL (ref 8.6–10.2)
CHLORIDE BLD-SCNC: 99 MEQ/L (ref 98–107)
CO2: 27 MEQ/L (ref 22–29)
CREAT SERPL-MCNC: 0.76 MG/DL (ref 0.5–0.9)
GFR AFRICAN AMERICAN: >60
GFR NON-AFRICAN AMERICAN: >60
GLUCOSE BLD-MCNC: 318 MG/DL (ref 74–109)
HBA1C MFR BLD: 9.3 % (ref 4.8–5.9)
POTASSIUM SERPL-SCNC: 4.8 MEQ/L (ref 3.5–5.1)
SODIUM BLD-SCNC: 141 MEQ/L (ref 132–144)
T4 TOTAL: 7.3 UG/DL (ref 4.5–11.7)
TSH SERPL DL<=0.05 MIU/L-ACNC: 13.25 UIU/ML (ref 0.27–4.2)

## 2018-10-22 PROCEDURE — 6360000002 HC RX W HCPCS: Performed by: FAMILY MEDICINE

## 2018-10-22 PROCEDURE — 96365 THER/PROPH/DIAG IV INF INIT: CPT

## 2018-10-22 RX ORDER — ZOLEDRONIC ACID 5 MG/100ML
5 INJECTION, SOLUTION INTRAVENOUS ONCE
Status: COMPLETED | OUTPATIENT
Start: 2018-10-22 | End: 2018-10-22

## 2018-10-22 RX ADMIN — ZOLEDRONIC ACID 5 MG: 5 INJECTION, SOLUTION INTRAVENOUS at 13:42

## 2018-10-22 NOTE — PROGRESS NOTES
Infusion complete and tolerated well   Left unit walking  All equipment used in the care for this patient has been cleaned.

## 2018-10-24 ENCOUNTER — OFFICE VISIT (OUTPATIENT)
Dept: ENDOCRINOLOGY | Age: 68
End: 2018-10-24
Payer: MEDICARE

## 2018-10-24 VITALS
OXYGEN SATURATION: 98 % | WEIGHT: 215 LBS | HEIGHT: 61 IN | RESPIRATION RATE: 14 BRPM | SYSTOLIC BLOOD PRESSURE: 158 MMHG | DIASTOLIC BLOOD PRESSURE: 80 MMHG | HEART RATE: 108 BPM | BODY MASS INDEX: 40.59 KG/M2

## 2018-10-24 DIAGNOSIS — E11.65 UNCONTROLLED TYPE 2 DIABETES MELLITUS WITH HYPERGLYCEMIA (HCC): Primary | ICD-10-CM

## 2018-10-24 DIAGNOSIS — E03.9 HYPOTHYROIDISM, UNSPECIFIED TYPE: ICD-10-CM

## 2018-10-24 DIAGNOSIS — E89.0 POSTOPERATIVE HYPOTHYROIDISM: ICD-10-CM

## 2018-10-24 LAB — GLUCOSE BLD-MCNC: 149 MG/DL

## 2018-10-24 PROCEDURE — 99213 OFFICE O/P EST LOW 20 MIN: CPT | Performed by: INTERNAL MEDICINE

## 2018-10-24 PROCEDURE — 82962 GLUCOSE BLOOD TEST: CPT | Performed by: INTERNAL MEDICINE

## 2018-11-01 ENCOUNTER — OFFICE VISIT (OUTPATIENT)
Dept: PHYSICAL MEDICINE AND REHAB | Age: 68
End: 2018-11-01
Payer: MEDICARE

## 2018-11-01 VITALS
WEIGHT: 219 LBS | DIASTOLIC BLOOD PRESSURE: 60 MMHG | HEIGHT: 61 IN | SYSTOLIC BLOOD PRESSURE: 122 MMHG | BODY MASS INDEX: 41.35 KG/M2

## 2018-11-01 DIAGNOSIS — M79.10 MYALGIA: ICD-10-CM

## 2018-11-01 DIAGNOSIS — M54.42 CHRONIC MIDLINE LOW BACK PAIN WITH LEFT-SIDED SCIATICA: ICD-10-CM

## 2018-11-01 DIAGNOSIS — G89.29 CHRONIC BILATERAL LOW BACK PAIN WITH RIGHT-SIDED SCIATICA: ICD-10-CM

## 2018-11-01 DIAGNOSIS — G56.82 SUPRASCAPULAR ENTRAPMENT NEUROPATHY OF LEFT SIDE: Primary | ICD-10-CM

## 2018-11-01 DIAGNOSIS — Z79.899 HIGH RISK MEDICATION USE: ICD-10-CM

## 2018-11-01 DIAGNOSIS — M54.41 CHRONIC BILATERAL LOW BACK PAIN WITH RIGHT-SIDED SCIATICA: ICD-10-CM

## 2018-11-01 DIAGNOSIS — C34.31 MALIGNANT NEOPLASM OF LOWER LOBE OF RIGHT LUNG (HCC): ICD-10-CM

## 2018-11-01 DIAGNOSIS — E66.01 CLASS 3 SEVERE OBESITY DUE TO EXCESS CALORIES WITH SERIOUS COMORBIDITY AND BODY MASS INDEX (BMI) OF 40.0 TO 44.9 IN ADULT (HCC): ICD-10-CM

## 2018-11-01 DIAGNOSIS — E66.01 MORBID OBESITY WITH BMI OF 40.0-44.9, ADULT (HCC): ICD-10-CM

## 2018-11-01 DIAGNOSIS — G89.29 CHRONIC MIDLINE LOW BACK PAIN WITH LEFT-SIDED SCIATICA: ICD-10-CM

## 2018-11-01 PROCEDURE — 99215 OFFICE O/P EST HI 40 MIN: CPT | Performed by: PHYSICAL MEDICINE & REHABILITATION

## 2018-11-01 RX ORDER — TOPIRAMATE 25 MG/1
25 TABLET ORAL NIGHTLY
Qty: 60 TABLET | Refills: 3 | Status: SHIPPED | OUTPATIENT
Start: 2018-11-01 | End: 2019-06-19 | Stop reason: SDUPTHER

## 2018-11-01 ASSESSMENT — ENCOUNTER SYMPTOMS
NAUSEA: 0
SINUS PRESSURE: 1
ABDOMINAL PAIN: 0
SHORTNESS OF BREATH: 1
PHOTOPHOBIA: 0
CHOKING: 0
APNEA: 1
BACK PAIN: 1
ALLERGIC/IMMUNOLOGIC NEGATIVE: 1
STRIDOR: 0
WHEEZING: 0
CHEST TIGHTNESS: 0
CONSTIPATION: 0
EYES NEGATIVE: 1
VOMITING: 0
DIARRHEA: 0

## 2018-11-01 NOTE — PROGRESS NOTES
no chemosis, no discharge and no exudate. Left eye exhibits no chemosis, no discharge and no exudate. No scleral icterus. Neck: Normal range of motion. Neck supple. No JVD present. No neck rigidity. No tracheal deviation and no edema present. No thyromegaly present. Cardiovascular: Normal rate, regular rhythm, normal heart sounds and intact distal pulses. Exam reveals no gallop, no friction rub and no decreased pulses. No murmur heard. Pulmonary/Chest: Accessory muscle usage present. No apnea, no tachypnea and no bradypnea. No respiratory distress. She has decreased breath sounds. She has no wheezes. She has no rales. She exhibits no tenderness. On NC O2   Abdominal: Soft. Bowel sounds are normal. She exhibits no distension and no mass. There is no tenderness. There is no rebound and no guarding. Musculoskeletal: She exhibits tenderness. She exhibits no edema. Right shoulder: Normal.        Left shoulder: Normal.        Right elbow: Normal.       Left elbow: Normal.        Right wrist: Normal.        Left wrist: Normal.        Right hip: Normal.        Left hip: Normal.        Right knee: Normal.        Left knee: Normal.        Right ankle: Normal. Achilles tendon normal.        Left ankle: Normal. Achilles tendon normal.        Cervical back: She exhibits tenderness, pain and spasm. She exhibits no bony tenderness and no swelling. Thoracic back: She exhibits tenderness and spasm. Lumbar back: She exhibits decreased range of motion, tenderness, bony tenderness and pain. She exhibits no swelling, no edema, no deformity, no laceration and normal pulse. Back:         Right upper arm: Normal.        Left upper arm: Normal.        Right forearm: Normal.        Left forearm: Normal.        Arms:       Right hand: Decreased sensation noted. Decreased sensation is present in the medial distribution. Left hand: Decreased sensation noted.  Decreased sensation is present in

## 2018-11-04 PROBLEM — E07.9 THYROID MASS: Status: RESOLVED | Noted: 2018-06-15 | Resolved: 2018-11-04

## 2018-11-04 PROBLEM — E89.0 POSTOPERATIVE HYPOTHYROIDISM: Status: ACTIVE | Noted: 2018-11-04

## 2018-11-04 NOTE — PROGRESS NOTES
Subjective:      Patient ID: Gabriel Dent is a 70 y.o. female. 3 month f/u on diabetes hypothyroidism  Diabetes   She presents for her follow-up diabetic visit. She has type 2 diabetes mellitus. Associated symptoms include fatigue. Diabetic complications include heart disease. Risk factors for coronary artery disease include diabetes mellitus and obesity. Current diabetic treatment includes insulin injections (lantus novolog metformin plus victoza ). She is currently taking insulin pre-breakfast, pre-lunch, pre-dinner and at bedtime. Her overall blood glucose range is 180-200 mg/dl. (Lab Results       Component                Value               Date                       LABA1C                   9.3 (H)             10/22/2018            )       Morbid obesity Body mass index is 40.62 kg/m². Hypothyroidism s/p thyroidectomy  on replacement with synthroid 125 mcg daily last tsh was high      reviewed labs    Results for David Garcia (MRN [id number]) as of 11/4/2018 12:05   Ref.  Range 10/22/2018 10:57   Sodium Latest Ref Range: 132 - 144 mEq/L 141   Potassium Latest Ref Range: 3.5 - 5.1 mEq/L 4.8   Chloride Latest Ref Range: 98 - 107 mEq/L 99   CO2 Latest Ref Range: 22 - 29 mEq/L 27   BUN Latest Ref Range: 8 - 23 mg/dL 21   Creatinine Latest Ref Range: 0.50 - 0.90 mg/dL 0.76   Anion Gap Latest Ref Range: 7 - 13 mEq/L 15 (H)   GFR Non- Latest Ref Range: >60  >60.0   GFR African American Latest Ref Range: >60  >60.0   Glucose Latest Ref Range: 74 - 109 mg/dL 318 (H)   Calcium Latest Ref Range: 8.6 - 10.2 mg/dL 9.0   Hemoglobin A1C Latest Ref Range: 4.8 - 5.9 % 9.3 (H)   T4, Total Latest Ref Range: 4.5 - 11.7 ug/dL 7.3   TSH Latest Ref Range: 0.270 - 4.200 uIU/mL 13.250 (H)     Patient Active Problem List   Diagnosis    COPD (chronic obstructive pulmonary disease) (HCC)    Asthma    Diabetes mellitus type 2 in obese (HCC)    ROGELIO on CPAP    CAD (coronary artery disease)    HTN , Disp: , Rfl:     fenofibrate (TRICOR) 145 MG tablet, Take 145 mg by mouth every evening , Disp: , Rfl:     verapamil (VERELAN PM) 240 MG CR capsule, Take 240 mg by mouth 2 times daily , Disp: , Rfl:     topiramate (TOPAMAX) 25 MG tablet, Take 1 tablet by mouth nightly, Disp: 60 tablet, Rfl: 3    zoledronic acid (RECLAST) 5 MG/100ML SOLN, Infuse 100 mLs intravenously once for 1 dose ICD-10 M81.0, Disp: 100 mL, Rfl: 0    LORazepam (ATIVAN) 0.5 MG tablet, Take 1 tablet by mouth every 8 hours as needed for Anxiety for up to 21 days. ., Disp: 21 tablet, Rfl: 0      Review of Systems   Constitutional: Positive for fatigue. Cardiovascular: Negative. Endocrine: Negative. Vitals:    10/24/18 1513   BP: (!) 158/80   Site: Right Upper Arm   Position: Sitting   Cuff Size: Large Adult   Pulse: 108   Resp: 14   SpO2: 98%   Weight: 215 lb (97.5 kg)   Height: 5' 1\" (1.549 m)       Objective:   Physical Exam   Constitutional: She appears well-developed and well-nourished. HENT:   Head: Normocephalic and atraumatic. Eyes: Conjunctivae are normal.   Neck: Neck supple. Cardiovascular: Normal rate. Pulmonary/Chest: Effort normal.   Abdominal:   obese   Musculoskeletal: Normal range of motion. Neurological: She is alert. Psychiatric: She has a normal mood and affect. Assessment:       Diagnosis Orders   1. Uncontrolled type 2 diabetes mellitus with hyperglycemia (HCC)  Microalbumin / Creatinine Urine Ratio    POCT Glucose    Basic Metabolic Panel    Hemoglobin A1C   2.  Hypothyroidism, unspecified type  T4, Free    TSH without Reflex           Plan:      Orders Placed This Encounter   Procedures    Microalbumin / Creatinine Urine Ratio     Standing Status:   Future     Standing Expiration Date:   10/24/2019    T4, Free     Standing Status:   Future     Standing Expiration Date:   10/24/2019    TSH without Reflex     Standing Status:   Future     Standing Expiration Date:   10/24/2019   [facility]

## 2018-11-05 ENCOUNTER — PROCEDURE VISIT (OUTPATIENT)
Dept: PHYSICAL MEDICINE AND REHAB | Age: 68
End: 2018-11-05
Payer: MEDICARE

## 2018-11-05 DIAGNOSIS — M79.10 MYALGIA: ICD-10-CM

## 2018-11-05 DIAGNOSIS — M79.7 FIBROMYALGIA: Primary | ICD-10-CM

## 2018-11-05 PROCEDURE — 20553 NJX 1/MLT TRIGGER POINTS 3/>: CPT | Performed by: PHYSICAL MEDICINE & REHABILITATION

## 2018-11-05 RX ORDER — LIDOCAINE HYDROCHLORIDE 5 MG/ML
20 INJECTION, SOLUTION INFILTRATION; INTRAVENOUS ONCE
Status: COMPLETED | OUTPATIENT
Start: 2018-11-05 | End: 2018-11-05

## 2018-11-05 RX ADMIN — LIDOCAINE HYDROCHLORIDE 20 ML: 5 INJECTION, SOLUTION INFILTRATION; INTRAVENOUS at 13:14

## 2018-11-26 RX ORDER — IPRATROPIUM BROMIDE AND ALBUTEROL SULFATE 2.5; .5 MG/3ML; MG/3ML
3 SOLUTION RESPIRATORY (INHALATION) EVERY 6 HOURS
Qty: 360 ML | Refills: 3 | Status: SHIPPED | OUTPATIENT
Start: 2018-11-26

## 2018-12-05 ENCOUNTER — PROCEDURE VISIT (OUTPATIENT)
Dept: PHYSICAL MEDICINE AND REHAB | Age: 68
End: 2018-12-05
Payer: MEDICARE

## 2018-12-05 DIAGNOSIS — M79.10 MYALGIA: ICD-10-CM

## 2018-12-05 DIAGNOSIS — M79.7 FIBROMYALGIA: Primary | ICD-10-CM

## 2018-12-05 PROCEDURE — 20553 NJX 1/MLT TRIGGER POINTS 3/>: CPT | Performed by: PHYSICAL MEDICINE & REHABILITATION

## 2018-12-05 RX ORDER — LIDOCAINE HYDROCHLORIDE 10 MG/ML
16 INJECTION, SOLUTION INFILTRATION; PERINEURAL ONCE
Status: COMPLETED | OUTPATIENT
Start: 2018-12-05 | End: 2018-12-05

## 2018-12-05 RX ADMIN — LIDOCAINE HYDROCHLORIDE 16 ML: 10 INJECTION, SOLUTION INFILTRATION; PERINEURAL at 14:42

## 2018-12-13 ENCOUNTER — OFFICE VISIT (OUTPATIENT)
Dept: PULMONOLOGY | Age: 68
End: 2018-12-13
Payer: MEDICARE

## 2018-12-13 VITALS
HEIGHT: 61 IN | WEIGHT: 215 LBS | TEMPERATURE: 97.9 F | HEART RATE: 108 BPM | OXYGEN SATURATION: 93 % | RESPIRATION RATE: 16 BRPM | DIASTOLIC BLOOD PRESSURE: 80 MMHG | SYSTOLIC BLOOD PRESSURE: 158 MMHG | BODY MASS INDEX: 40.59 KG/M2

## 2018-12-13 DIAGNOSIS — Z99.89 OSA ON CPAP: ICD-10-CM

## 2018-12-13 DIAGNOSIS — G47.33 OSA ON CPAP: ICD-10-CM

## 2018-12-13 DIAGNOSIS — C34.31 MALIGNANT NEOPLASM OF LOWER LOBE OF RIGHT LUNG (HCC): ICD-10-CM

## 2018-12-13 DIAGNOSIS — J44.1 COPD EXACERBATION (HCC): Primary | ICD-10-CM

## 2018-12-13 DIAGNOSIS — R09.02 HYPOXIA: ICD-10-CM

## 2018-12-13 DIAGNOSIS — J45.40 MODERATE PERSISTENT ASTHMA WITHOUT COMPLICATION: ICD-10-CM

## 2018-12-13 PROCEDURE — 99215 OFFICE O/P EST HI 40 MIN: CPT | Performed by: INTERNAL MEDICINE

## 2018-12-13 RX ORDER — LEVOFLOXACIN 750 MG/1
750 TABLET ORAL DAILY
Qty: 7 TABLET | Refills: 0 | Status: SHIPPED | OUTPATIENT
Start: 2018-12-13 | End: 2018-12-23

## 2018-12-13 ASSESSMENT — ENCOUNTER SYMPTOMS
EYE DISCHARGE: 0
EYE ITCHING: 0
NAUSEA: 0
SORE THROAT: 0
TROUBLE SWALLOWING: 0
SHORTNESS OF BREATH: 1
DIARRHEA: 0
VOMITING: 0
VOICE CHANGE: 0
ABDOMINAL PAIN: 0
WHEEZING: 1
SINUS PRESSURE: 0
COUGH: 1
CHEST TIGHTNESS: 0
RHINORRHEA: 0

## 2018-12-13 NOTE — PROGRESS NOTES
breath)     Type II or unspecified type diabetes mellitus without mention of complication, not stated as uncontrolled     Unspecified sleep apnea     UTI (urinary tract infection)      Past Surgical History:   Procedure Laterality Date    ANKLE SURGERY Left     hardware in & removed    ANKLE SURGERY Right     Plate in right ankle    CARPAL TUNNEL RELEASE Left 2015    CHOLECYSTECTOMY      COLONOSCOPY  09/11/2012    CORONARY ANGIOPLASTY WITH STENT PLACEMENT      ENDOSCOPY, COLON, DIAGNOSTIC      LUNG BIOPSY  09/07/2017    pleural biopsy    IN REVISE MEDIAN N/CARPAL TUNNEL SURG Right 5/24/2018    RIGHT WRIST CARPAL TUNNEL RELEASE, ( TO BE IN ROOM 12 WITH ROOM 2 TEAM) performed by Rocky Berry MD at [address] [place] [address] Left 6/19/2018    LEFT JOSELIN THYROIDECTOMY performed by Porsha Diana MD at [place] [address]      partial    UPPER GASTROINTESTINAL ENDOSCOPY  09/11/2012    UPPER GASTROINTESTINAL ENDOSCOPY  12/4/14     DR. MAE     Family History   Problem Relation Age of Onset    High Blood Pressure Mother     Heart Disease Mother     Cancer Father         LUNG    High Blood Pressure Brother     COPD Brother     Heart Disease Brother     Heart Attack Brother     COPD Sister     Heart Disease Sister     Arthritis Maternal [de-identified]     COPD Sister     No Known Problems Daughter     No Known Problems Son      Social History     Social History    Marital status:      Spouse name: N/A    Number of children: N/A    Years of education: N/A     Occupational History    Retired      Social History Main Topics    Smoking status: Former Smoker     Packs/day: 1.50     Years: 32.00     Types: Cigarettes     Quit date: 2/21/2001    Smokeless tobacco: Never Used    Alcohol use No    Drug use: No    Sexual activity: Yes     Partners: Male     Other Topics Concern    Not on file     Social History Narrative    No narrative on file         Review of Systems daily.  Theophylline 600 mg daily. She is also on daliresp 250 mcg daily. She is having nausea and shortness of breath and wheezing than usual.  She'll be started on antibiotic with Levaquin 750 mg daily for 7 days and prednisone taper dose. Patient advised to go to ER if symptoms worsen. 2. Moderate persistent asthma without complication  She is on Nucala shot  once a month and prednisone 20 mg daily. She thinks shot was working before but lately in the last few months she think it is not working well. 3. ROGELIO on CPAP  Patient is using CPAP with 8 cm of H2O with 4 L O2 bled for about 8 hours every night. She is  using nasal pillow. She is feeling better with CPAP therapy use her sleep is restful. She'll continue CPAP therapy as before. No new issues with CPAP. No need for CPAP supplies    4. Hypoxia  She is on 42 O2 24-hour a day. Continue O2 to keep saturation 90% or above's     5. Malignant neoplasm of lower lobe of right lung [facility])  Patient is diagnose with lung cancer and will go for stereotactic radiation therapy on 1/10/18 at [facility]. She did not have a biopsy done due to risk of pneumothorax. I spent over 40 with this patient, greater the 50% of this time was spent in counseling and/or coordinating of care.       Return in about 4 months (around 4/13/2019) for asthma, COPD, rogelio, hypoxia on O2.       Chaitanya Bah MD

## 2018-12-14 RX ORDER — LIRAGLUTIDE 6 MG/ML
INJECTION SUBCUTANEOUS
Qty: 27 ML | Refills: 3 | Status: ON HOLD | OUTPATIENT
Start: 2018-12-14 | End: 2019-01-07 | Stop reason: HOSPADM

## 2018-12-28 ENCOUNTER — HOSPITAL ENCOUNTER (INPATIENT)
Age: 68
LOS: 10 days | Discharge: HOME | DRG: 191 | End: 2019-01-07
Attending: INTERNAL MEDICINE | Admitting: INTERNAL MEDICINE
Payer: MEDICARE

## 2018-12-28 ENCOUNTER — APPOINTMENT (OUTPATIENT)
Dept: CT IMAGING | Age: 68
DRG: 191 | End: 2018-12-28
Payer: MEDICARE

## 2018-12-28 ENCOUNTER — APPOINTMENT (OUTPATIENT)
Dept: GENERAL RADIOLOGY | Age: 68
DRG: 191 | End: 2018-12-28
Payer: MEDICARE

## 2018-12-28 DIAGNOSIS — R09.02 HYPOXIA: ICD-10-CM

## 2018-12-28 DIAGNOSIS — R91.8 RIGHT LOWER LOBE LUNG MASS: ICD-10-CM

## 2018-12-28 DIAGNOSIS — J44.1 COPD EXACERBATION (HCC): Primary | ICD-10-CM

## 2018-12-28 DIAGNOSIS — Z78.9 FAILURE OF OUTPATIENT TREATMENT: ICD-10-CM

## 2018-12-28 LAB
ALBUMIN SERPL-MCNC: 3.6 G/DL (ref 3.9–4.9)
ALP BLD-CCNC: 61 U/L (ref 40–130)
ALT SERPL-CCNC: 29 U/L (ref 0–33)
ANION GAP SERPL CALCULATED.3IONS-SCNC: 13 MEQ/L (ref 7–13)
AST SERPL-CCNC: 16 U/L (ref 0–35)
BASOPHILS ABSOLUTE: 0.1 K/UL (ref 0–0.2)
BASOPHILS RELATIVE PERCENT: 0.8 %
BILIRUB SERPL-MCNC: 0.3 MG/DL (ref 0–1.2)
BUN BLDV-MCNC: 13 MG/DL (ref 8–23)
CALCIUM SERPL-MCNC: 8.8 MG/DL (ref 8.6–10.2)
CHLORIDE BLD-SCNC: 101 MEQ/L (ref 98–107)
CO2: 29 MEQ/L (ref 22–29)
CREAT SERPL-MCNC: 0.52 MG/DL (ref 0.5–0.9)
EOSINOPHILS ABSOLUTE: 0 K/UL (ref 0–0.7)
EOSINOPHILS RELATIVE PERCENT: 0.5 %
GFR AFRICAN AMERICAN: >60
GFR NON-AFRICAN AMERICAN: >60
GLOBULIN: 3.1 G/DL (ref 2.3–3.5)
GLUCOSE BLD-MCNC: 279 MG/DL (ref 74–109)
GLUCOSE BLD-MCNC: 483 MG/DL (ref 60–115)
GLUCOSE BLD-MCNC: 487 MG/DL (ref 60–115)
HCT VFR BLD CALC: 38.6 % (ref 37–47)
HEMOGLOBIN: 13 G/DL (ref 12–16)
LYMPHOCYTES ABSOLUTE: 1 K/UL (ref 1–4.8)
LYMPHOCYTES RELATIVE PERCENT: 13.6 %
MCH RBC QN AUTO: 30.3 PG (ref 27–31.3)
MCHC RBC AUTO-ENTMCNC: 33.8 % (ref 33–37)
MCV RBC AUTO: 89.6 FL (ref 82–100)
MONOCYTES ABSOLUTE: 0.6 K/UL (ref 0.2–0.8)
MONOCYTES RELATIVE PERCENT: 8.1 %
NEUTROPHILS ABSOLUTE: 5.5 K/UL (ref 1.4–6.5)
NEUTROPHILS RELATIVE PERCENT: 77 %
PDW BLD-RTO: 13.8 % (ref 11.5–14.5)
PERFORMED ON: ABNORMAL
PERFORMED ON: ABNORMAL
PLATELET # BLD: 243 K/UL (ref 130–400)
POTASSIUM SERPL-SCNC: 3.3 MEQ/L (ref 3.5–5.1)
PRO-BNP: 211 PG/ML
RBC # BLD: 4.3 M/UL (ref 4.2–5.4)
SODIUM BLD-SCNC: 143 MEQ/L (ref 132–144)
TOTAL CK: 113 U/L (ref 0–170)
TOTAL PROTEIN: 6.7 G/DL (ref 6.4–8.1)
TROPONIN: <0.01 NG/ML (ref 0–0.01)
WBC # BLD: 7 K/UL (ref 4.8–10.8)

## 2018-12-28 PROCEDURE — 82550 ASSAY OF CK (CPK): CPT

## 2018-12-28 PROCEDURE — 6370000000 HC RX 637 (ALT 250 FOR IP): Performed by: INTERNAL MEDICINE

## 2018-12-28 PROCEDURE — 83880 ASSAY OF NATRIURETIC PEPTIDE: CPT

## 2018-12-28 PROCEDURE — 36415 COLL VENOUS BLD VENIPUNCTURE: CPT

## 2018-12-28 PROCEDURE — 96365 THER/PROPH/DIAG IV INF INIT: CPT

## 2018-12-28 PROCEDURE — 85025 COMPLETE CBC W/AUTO DIFF WBC: CPT

## 2018-12-28 PROCEDURE — 96375 TX/PRO/DX INJ NEW DRUG ADDON: CPT

## 2018-12-28 PROCEDURE — 71275 CT ANGIOGRAPHY CHEST: CPT

## 2018-12-28 PROCEDURE — 6360000002 HC RX W HCPCS: Performed by: INTERNAL MEDICINE

## 2018-12-28 PROCEDURE — 6370000000 HC RX 637 (ALT 250 FOR IP): Performed by: PHYSICIAN ASSISTANT

## 2018-12-28 PROCEDURE — 1210000000 HC MED SURG R&B

## 2018-12-28 PROCEDURE — 6360000004 HC RX CONTRAST MEDICATION: Performed by: PHYSICIAN ASSISTANT

## 2018-12-28 PROCEDURE — 94760 N-INVAS EAR/PLS OXIMETRY 1: CPT

## 2018-12-28 PROCEDURE — 84484 ASSAY OF TROPONIN QUANT: CPT

## 2018-12-28 PROCEDURE — 94640 AIRWAY INHALATION TREATMENT: CPT

## 2018-12-28 PROCEDURE — 80053 COMPREHEN METABOLIC PANEL: CPT

## 2018-12-28 PROCEDURE — 6360000002 HC RX W HCPCS: Performed by: PHYSICIAN ASSISTANT

## 2018-12-28 PROCEDURE — 87040 BLOOD CULTURE FOR BACTERIA: CPT

## 2018-12-28 PROCEDURE — 94664 DEMO&/EVAL PT USE INHALER: CPT

## 2018-12-28 PROCEDURE — 2580000003 HC RX 258: Performed by: INTERNAL MEDICINE

## 2018-12-28 PROCEDURE — 99285 EMERGENCY DEPT VISIT HI MDM: CPT

## 2018-12-28 PROCEDURE — 71046 X-RAY EXAM CHEST 2 VIEWS: CPT

## 2018-12-28 RX ORDER — THEOPHYLLINE 400 MG/1
600 TABLET, EXTENDED RELEASE ORAL EVERY EVENING
Status: DISCONTINUED | OUTPATIENT
Start: 2018-12-28 | End: 2019-01-07 | Stop reason: HOSPADM

## 2018-12-28 RX ORDER — VERAPAMIL HYDROCHLORIDE 240 MG/1
240 TABLET, FILM COATED, EXTENDED RELEASE ORAL 2 TIMES DAILY
Status: DISCONTINUED | OUTPATIENT
Start: 2018-12-28 | End: 2019-01-07 | Stop reason: HOSPADM

## 2018-12-28 RX ORDER — BUDESONIDE 0.5 MG/2ML
500 INHALANT ORAL 2 TIMES DAILY
Status: DISCONTINUED | OUTPATIENT
Start: 2018-12-28 | End: 2019-01-07 | Stop reason: HOSPADM

## 2018-12-28 RX ORDER — ALBUTEROL SULFATE 2.5 MG/3ML
2.5 SOLUTION RESPIRATORY (INHALATION) EVERY 6 HOURS PRN
Status: DISCONTINUED | OUTPATIENT
Start: 2018-12-28 | End: 2018-12-28

## 2018-12-28 RX ORDER — IPRATROPIUM BROMIDE AND ALBUTEROL SULFATE 2.5; .5 MG/3ML; MG/3ML
1 SOLUTION RESPIRATORY (INHALATION)
Status: DISCONTINUED | OUTPATIENT
Start: 2018-12-28 | End: 2018-12-28

## 2018-12-28 RX ORDER — METHYLPREDNISOLONE SODIUM SUCCINATE 125 MG/2ML
125 INJECTION, POWDER, LYOPHILIZED, FOR SOLUTION INTRAMUSCULAR; INTRAVENOUS ONCE
Status: COMPLETED | OUTPATIENT
Start: 2018-12-28 | End: 2018-12-28

## 2018-12-28 RX ORDER — IPRATROPIUM BROMIDE AND ALBUTEROL SULFATE 2.5; .5 MG/3ML; MG/3ML
1 SOLUTION RESPIRATORY (INHALATION)
Status: COMPLETED | OUTPATIENT
Start: 2018-12-28 | End: 2018-12-28

## 2018-12-28 RX ORDER — SODIUM CHLORIDE 0.9 % (FLUSH) 0.9 %
10 SYRINGE (ML) INJECTION EVERY 12 HOURS SCHEDULED
Status: DISCONTINUED | OUTPATIENT
Start: 2018-12-28 | End: 2019-01-07 | Stop reason: HOSPADM

## 2018-12-28 RX ORDER — SODIUM CHLORIDE 0.9 % (FLUSH) 0.9 %
10 SYRINGE (ML) INJECTION PRN
Status: DISCONTINUED | OUTPATIENT
Start: 2018-12-28 | End: 2019-01-07 | Stop reason: HOSPADM

## 2018-12-28 RX ORDER — DEXTROSE MONOHYDRATE 25 G/50ML
12.5 INJECTION, SOLUTION INTRAVENOUS PRN
Status: DISCONTINUED | OUTPATIENT
Start: 2018-12-28 | End: 2018-12-30 | Stop reason: ALTCHOICE

## 2018-12-28 RX ORDER — MAGNESIUM SULFATE IN WATER 40 MG/ML
4 INJECTION, SOLUTION INTRAVENOUS ONCE
Status: COMPLETED | OUTPATIENT
Start: 2018-12-28 | End: 2018-12-28

## 2018-12-28 RX ORDER — NICOTINE POLACRILEX 4 MG
15 LOZENGE BUCCAL PRN
Status: DISCONTINUED | OUTPATIENT
Start: 2018-12-28 | End: 2019-01-07 | Stop reason: HOSPADM

## 2018-12-28 RX ORDER — ATORVASTATIN CALCIUM 80 MG/1
80 TABLET, FILM COATED ORAL NIGHTLY
Status: DISCONTINUED | OUTPATIENT
Start: 2018-12-28 | End: 2019-01-07 | Stop reason: HOSPADM

## 2018-12-28 RX ORDER — FAMOTIDINE 20 MG/1
20 TABLET, FILM COATED ORAL 2 TIMES DAILY
Status: DISCONTINUED | OUTPATIENT
Start: 2018-12-28 | End: 2018-12-29

## 2018-12-28 RX ORDER — IPRATROPIUM BROMIDE AND ALBUTEROL SULFATE 2.5; .5 MG/3ML; MG/3ML
1 SOLUTION RESPIRATORY (INHALATION) 4 TIMES DAILY
Status: DISCONTINUED | OUTPATIENT
Start: 2018-12-29 | End: 2018-12-28

## 2018-12-28 RX ORDER — LEVOTHYROXINE SODIUM 0.12 MG/1
125 TABLET ORAL DAILY
Status: DISCONTINUED | OUTPATIENT
Start: 2018-12-29 | End: 2019-01-07 | Stop reason: HOSPADM

## 2018-12-28 RX ORDER — PREDNISONE 20 MG/1
40 TABLET ORAL DAILY
Status: DISCONTINUED | OUTPATIENT
Start: 2018-12-28 | End: 2018-12-29

## 2018-12-28 RX ORDER — INSULIN GLARGINE 100 [IU]/ML
70 INJECTION, SOLUTION SUBCUTANEOUS NIGHTLY
Status: DISCONTINUED | OUTPATIENT
Start: 2018-12-28 | End: 2018-12-30

## 2018-12-28 RX ORDER — ONDANSETRON 2 MG/ML
4 INJECTION INTRAMUSCULAR; INTRAVENOUS EVERY 6 HOURS PRN
Status: DISCONTINUED | OUTPATIENT
Start: 2018-12-28 | End: 2019-01-07 | Stop reason: HOSPADM

## 2018-12-28 RX ORDER — TOPIRAMATE 25 MG/1
25 TABLET ORAL NIGHTLY
Status: DISCONTINUED | OUTPATIENT
Start: 2018-12-28 | End: 2018-12-29

## 2018-12-28 RX ORDER — PREDNISONE 20 MG/1
40 TABLET ORAL 2 TIMES DAILY
Status: DISCONTINUED | OUTPATIENT
Start: 2018-12-28 | End: 2018-12-28

## 2018-12-28 RX ORDER — DEXTROSE MONOHYDRATE 50 MG/ML
100 INJECTION, SOLUTION INTRAVENOUS PRN
Status: DISCONTINUED | OUTPATIENT
Start: 2018-12-28 | End: 2019-01-07 | Stop reason: HOSPADM

## 2018-12-28 RX ORDER — IPRATROPIUM BROMIDE AND ALBUTEROL SULFATE 2.5; .5 MG/3ML; MG/3ML
1 SOLUTION RESPIRATORY (INHALATION) EVERY 4 HOURS
Status: DISCONTINUED | OUTPATIENT
Start: 2018-12-28 | End: 2018-12-31

## 2018-12-28 RX ORDER — ALBUTEROL SULFATE 2.5 MG/3ML
2.5 SOLUTION RESPIRATORY (INHALATION)
Status: DISCONTINUED | OUTPATIENT
Start: 2018-12-28 | End: 2019-01-07 | Stop reason: HOSPADM

## 2018-12-28 RX ADMIN — FAMOTIDINE 20 MG: 20 TABLET ORAL at 21:29

## 2018-12-28 RX ADMIN — PREDNISONE 40 MG: 20 TABLET ORAL at 21:28

## 2018-12-28 RX ADMIN — IPRATROPIUM BROMIDE AND ALBUTEROL SULFATE 1 AMPULE: .5; 3 SOLUTION RESPIRATORY (INHALATION) at 17:24

## 2018-12-28 RX ADMIN — METHYLPREDNISOLONE SODIUM SUCCINATE 125 MG: 125 INJECTION, POWDER, FOR SOLUTION INTRAMUSCULAR; INTRAVENOUS at 14:28

## 2018-12-28 RX ADMIN — IPRATROPIUM BROMIDE AND ALBUTEROL SULFATE 1 AMPULE: .5; 3 SOLUTION RESPIRATORY (INHALATION) at 19:14

## 2018-12-28 RX ADMIN — IPRATROPIUM BROMIDE AND ALBUTEROL SULFATE 1 AMPULE: 2.5; .5 SOLUTION RESPIRATORY (INHALATION) at 14:32

## 2018-12-28 RX ADMIN — IOPAMIDOL 100 ML: 612 INJECTION, SOLUTION INTRAVENOUS at 16:34

## 2018-12-28 RX ADMIN — BUDESONIDE 500 MCG: 0.5 SUSPENSION RESPIRATORY (INHALATION) at 20:36

## 2018-12-28 RX ADMIN — ATORVASTATIN CALCIUM 80 MG: 80 TABLET, FILM COATED ORAL at 21:29

## 2018-12-28 RX ADMIN — IPRATROPIUM BROMIDE AND ALBUTEROL SULFATE 1 AMPULE: .5; 3 SOLUTION RESPIRATORY (INHALATION) at 23:15

## 2018-12-28 RX ADMIN — IPRATROPIUM BROMIDE AND ALBUTEROL SULFATE 1 AMPULE: 2.5; .5 SOLUTION RESPIRATORY (INHALATION) at 14:27

## 2018-12-28 RX ADMIN — INSULIN GLARGINE 70 UNITS: 100 INJECTION, SOLUTION SUBCUTANEOUS at 22:34

## 2018-12-28 RX ADMIN — Medication 10 ML: at 21:34

## 2018-12-28 RX ADMIN — VERAPAMIL HYDROCHLORIDE 240 MG: 240 TABLET, FILM COATED, EXTENDED RELEASE ORAL at 21:28

## 2018-12-28 RX ADMIN — IPRATROPIUM BROMIDE AND ALBUTEROL SULFATE 1 AMPULE: 2.5; .5 SOLUTION RESPIRATORY (INHALATION) at 14:42

## 2018-12-28 RX ADMIN — MAGNESIUM SULFATE HEPTAHYDRATE 4 G: 40 INJECTION, SOLUTION INTRAVENOUS at 16:16

## 2018-12-28 RX ADMIN — ENOXAPARIN SODIUM 40 MG: 40 INJECTION SUBCUTANEOUS at 21:29

## 2018-12-28 ASSESSMENT — ENCOUNTER SYMPTOMS
SORE THROAT: 0
WHEEZING: 1
RHINORRHEA: 0
CONSTIPATION: 0
ABDOMINAL DISTENTION: 0
COLOR CHANGE: 0
COUGH: 1
ABDOMINAL PAIN: 0
VOMITING: 0
NAUSEA: 0
SHORTNESS OF BREATH: 1
EYE DISCHARGE: 0

## 2018-12-28 ASSESSMENT — PAIN SCALES - GENERAL
PAINLEVEL_OUTOF10: 0
PAINLEVEL_OUTOF10: 6

## 2018-12-29 LAB
ANION GAP SERPL CALCULATED.3IONS-SCNC: 11 MEQ/L (ref 7–13)
ANISOCYTOSIS: ABNORMAL
BANDED NEUTROPHILS RELATIVE PERCENT: 1 % (ref 5–11)
BASOPHILS ABSOLUTE: 0 K/UL (ref 0–0.2)
BASOPHILS RELATIVE PERCENT: 1 %
BUN BLDV-MCNC: 14 MG/DL (ref 8–23)
CALCIUM SERPL-MCNC: 8.8 MG/DL (ref 8.6–10.2)
CHLORIDE BLD-SCNC: 102 MEQ/L (ref 98–107)
CO2: 27 MEQ/L (ref 22–29)
CREAT SERPL-MCNC: 0.5 MG/DL (ref 0.5–0.9)
EOSINOPHILS ABSOLUTE: 0 K/UL (ref 0–0.7)
EOSINOPHILS RELATIVE PERCENT: 0 %
GFR AFRICAN AMERICAN: >60
GFR NON-AFRICAN AMERICAN: >60
GLUCOSE BLD-MCNC: 294 MG/DL (ref 60–115)
GLUCOSE BLD-MCNC: 332 MG/DL (ref 74–109)
GLUCOSE BLD-MCNC: 351 MG/DL (ref 60–115)
GLUCOSE BLD-MCNC: 380 MG/DL (ref 60–115)
GLUCOSE BLD-MCNC: 396 MG/DL (ref 60–115)
GLUCOSE BLD-MCNC: 421 MG/DL (ref 60–115)
GLUCOSE BLD-MCNC: 430 MG/DL (ref 60–115)
HCT VFR BLD CALC: 37.2 % (ref 37–47)
HEMOGLOBIN: 12.7 G/DL (ref 12–16)
LYMPHOCYTES ABSOLUTE: 0.2 K/UL (ref 1–4.8)
LYMPHOCYTES RELATIVE PERCENT: 3 %
MCH RBC QN AUTO: 31 PG (ref 27–31.3)
MCHC RBC AUTO-ENTMCNC: 34.2 % (ref 33–37)
MCV RBC AUTO: 90.5 FL (ref 82–100)
MICROCYTES: ABNORMAL
MONOCYTES ABSOLUTE: 0.1 K/UL (ref 0.2–0.8)
MONOCYTES RELATIVE PERCENT: 1.9 %
NEUTROPHILS ABSOLUTE: 7 K/UL (ref 1.4–6.5)
NEUTROPHILS RELATIVE PERCENT: 94 %
NUCLEATED RED BLOOD CELLS: 2 /100 WBC
PDW BLD-RTO: 14 % (ref 11.5–14.5)
PERFORMED ON: ABNORMAL
PLATELET # BLD: 264 K/UL (ref 130–400)
PLATELET SLIDE REVIEW: NORMAL
POTASSIUM REFLEX MAGNESIUM: 4.2 MEQ/L (ref 3.5–5.1)
PROCALCITONIN: 0.07 NG/ML (ref 0–0.15)
RBC # BLD: 4.11 M/UL (ref 4.2–5.4)
REASON FOR REJECTION: NORMAL
REJECTED TEST: NORMAL
SODIUM BLD-SCNC: 140 MEQ/L (ref 132–144)
WBC # BLD: 7.4 K/UL (ref 4.8–10.8)

## 2018-12-29 PROCEDURE — 2500000003 HC RX 250 WO HCPCS: Performed by: INTERNAL MEDICINE

## 2018-12-29 PROCEDURE — 36415 COLL VENOUS BLD VENIPUNCTURE: CPT

## 2018-12-29 PROCEDURE — 2580000003 HC RX 258: Performed by: INTERNAL MEDICINE

## 2018-12-29 PROCEDURE — 6370000000 HC RX 637 (ALT 250 FOR IP): Performed by: INTERNAL MEDICINE

## 2018-12-29 PROCEDURE — 84145 PROCALCITONIN (PCT): CPT

## 2018-12-29 PROCEDURE — 94640 AIRWAY INHALATION TREATMENT: CPT

## 2018-12-29 PROCEDURE — 99222 1ST HOSP IP/OBS MODERATE 55: CPT | Performed by: INTERNAL MEDICINE

## 2018-12-29 PROCEDURE — 82010 KETONE BODYS QUAN: CPT

## 2018-12-29 PROCEDURE — 6360000002 HC RX W HCPCS: Performed by: INTERNAL MEDICINE

## 2018-12-29 PROCEDURE — 85025 COMPLETE CBC W/AUTO DIFF WBC: CPT

## 2018-12-29 PROCEDURE — 80048 BASIC METABOLIC PNL TOTAL CA: CPT

## 2018-12-29 PROCEDURE — 94668 MNPJ CHEST WALL SBSQ: CPT

## 2018-12-29 PROCEDURE — 94761 N-INVAS EAR/PLS OXIMETRY MLT: CPT

## 2018-12-29 PROCEDURE — 2700000000 HC OXYGEN THERAPY PER DAY

## 2018-12-29 PROCEDURE — 1210000000 HC MED SURG R&B

## 2018-12-29 PROCEDURE — 94667 MNPJ CHEST WALL 1ST: CPT

## 2018-12-29 RX ORDER — ACETAMINOPHEN 325 MG/1
650 TABLET ORAL EVERY 4 HOURS PRN
Status: DISCONTINUED | OUTPATIENT
Start: 2018-12-29 | End: 2019-01-07 | Stop reason: HOSPADM

## 2018-12-29 RX ORDER — PANTOPRAZOLE SODIUM 40 MG/1
40 TABLET, DELAYED RELEASE ORAL
Status: DISCONTINUED | OUTPATIENT
Start: 2018-12-30 | End: 2019-01-07 | Stop reason: HOSPADM

## 2018-12-29 RX ORDER — FORMOTEROL FUMARATE 20 UG/2ML
20 SOLUTION RESPIRATORY (INHALATION) 2 TIMES DAILY
Status: DISCONTINUED | OUTPATIENT
Start: 2018-12-29 | End: 2019-01-07 | Stop reason: HOSPADM

## 2018-12-29 RX ORDER — LORAZEPAM 0.5 MG/1
0.5 TABLET ORAL NIGHTLY PRN
Status: DISCONTINUED | OUTPATIENT
Start: 2018-12-29 | End: 2019-01-07 | Stop reason: HOSPADM

## 2018-12-29 RX ORDER — CLOPIDOGREL BISULFATE 75 MG/1
75 TABLET ORAL DAILY
Status: DISCONTINUED | OUTPATIENT
Start: 2018-12-29 | End: 2019-01-07 | Stop reason: HOSPADM

## 2018-12-29 RX ORDER — DIGOXIN 125 MCG
125 TABLET ORAL
Status: DISCONTINUED | OUTPATIENT
Start: 2018-12-29 | End: 2019-01-07 | Stop reason: HOSPADM

## 2018-12-29 RX ORDER — GUAIFENESIN 600 MG/1
600 TABLET, EXTENDED RELEASE ORAL 2 TIMES DAILY
Status: DISCONTINUED | OUTPATIENT
Start: 2018-12-29 | End: 2019-01-07 | Stop reason: HOSPADM

## 2018-12-29 RX ORDER — ISOSORBIDE MONONITRATE 30 MG/1
60 TABLET, EXTENDED RELEASE ORAL DAILY
Status: DISCONTINUED | OUTPATIENT
Start: 2018-12-29 | End: 2019-01-07 | Stop reason: HOSPADM

## 2018-12-29 RX ORDER — METHYLPREDNISOLONE SODIUM SUCCINATE 40 MG/ML
40 INJECTION, POWDER, LYOPHILIZED, FOR SOLUTION INTRAMUSCULAR; INTRAVENOUS EVERY 8 HOURS
Status: DISCONTINUED | OUTPATIENT
Start: 2018-12-29 | End: 2019-01-03

## 2018-12-29 RX ADMIN — FAMOTIDINE 20 MG: 20 TABLET ORAL at 08:49

## 2018-12-29 RX ADMIN — INSULIN LISPRO 10 UNITS: 100 INJECTION, SOLUTION INTRAVENOUS; SUBCUTANEOUS at 16:53

## 2018-12-29 RX ADMIN — BUDESONIDE 500 MCG: 0.5 SUSPENSION RESPIRATORY (INHALATION) at 08:11

## 2018-12-29 RX ADMIN — VERAPAMIL HYDROCHLORIDE 240 MG: 240 TABLET, FILM COATED, EXTENDED RELEASE ORAL at 21:47

## 2018-12-29 RX ADMIN — ISOSORBIDE MONONITRATE 60 MG: 30 TABLET, EXTENDED RELEASE ORAL at 16:40

## 2018-12-29 RX ADMIN — IPRATROPIUM BROMIDE AND ALBUTEROL SULFATE 1 AMPULE: .5; 3 SOLUTION RESPIRATORY (INHALATION) at 08:11

## 2018-12-29 RX ADMIN — METHYLPREDNISOLONE SODIUM SUCCINATE 40 MG: 40 INJECTION, POWDER, FOR SOLUTION INTRAMUSCULAR; INTRAVENOUS at 14:40

## 2018-12-29 RX ADMIN — DOXYCYCLINE 100 MG: 100 INJECTION, POWDER, LYOPHILIZED, FOR SOLUTION INTRAVENOUS at 16:52

## 2018-12-29 RX ADMIN — IPRATROPIUM BROMIDE AND ALBUTEROL SULFATE 1 AMPULE: .5; 3 SOLUTION RESPIRATORY (INHALATION) at 05:16

## 2018-12-29 RX ADMIN — IPRATROPIUM BROMIDE AND ALBUTEROL SULFATE 1 AMPULE: .5; 3 SOLUTION RESPIRATORY (INHALATION) at 23:53

## 2018-12-29 RX ADMIN — IPRATROPIUM BROMIDE AND ALBUTEROL SULFATE 1 AMPULE: .5; 3 SOLUTION RESPIRATORY (INHALATION) at 11:27

## 2018-12-29 RX ADMIN — INSULIN LISPRO 10 UNITS: 100 INJECTION, SOLUTION INTRAVENOUS; SUBCUTANEOUS at 12:28

## 2018-12-29 RX ADMIN — LEVOTHYROXINE SODIUM 125 MCG: 125 TABLET ORAL at 06:05

## 2018-12-29 RX ADMIN — INSULIN LISPRO 6 UNITS: 100 INJECTION, SOLUTION INTRAVENOUS; SUBCUTANEOUS at 08:50

## 2018-12-29 RX ADMIN — GUAIFENESIN 600 MG: 600 TABLET, EXTENDED RELEASE ORAL at 21:47

## 2018-12-29 RX ADMIN — IPRATROPIUM BROMIDE AND ALBUTEROL SULFATE 1 AMPULE: .5; 3 SOLUTION RESPIRATORY (INHALATION) at 19:17

## 2018-12-29 RX ADMIN — PREDNISONE 40 MG: 20 TABLET ORAL at 08:49

## 2018-12-29 RX ADMIN — DIGOXIN 125 MCG: 125 TABLET ORAL at 16:52

## 2018-12-29 RX ADMIN — Medication 10 ML: at 08:49

## 2018-12-29 RX ADMIN — METHYLPREDNISOLONE SODIUM SUCCINATE 40 MG: 40 INJECTION, POWDER, FOR SOLUTION INTRAMUSCULAR; INTRAVENOUS at 21:47

## 2018-12-29 RX ADMIN — CLOPIDOGREL BISULFATE 75 MG: 75 TABLET ORAL at 16:40

## 2018-12-29 RX ADMIN — INSULIN LISPRO 10 UNITS: 100 INJECTION, SOLUTION INTRAVENOUS; SUBCUTANEOUS at 16:55

## 2018-12-29 RX ADMIN — ENOXAPARIN SODIUM 40 MG: 40 INJECTION SUBCUTANEOUS at 08:49

## 2018-12-29 RX ADMIN — Medication 10 ML: at 22:03

## 2018-12-29 RX ADMIN — THEOPHYLLINE 600 MG: 400 TABLET, EXTENDED RELEASE ORAL at 17:43

## 2018-12-29 RX ADMIN — GUAIFENESIN 600 MG: 600 TABLET, EXTENDED RELEASE ORAL at 14:40

## 2018-12-29 RX ADMIN — INSULIN LISPRO 10 UNITS: 100 INJECTION, SOLUTION INTRAVENOUS; SUBCUTANEOUS at 12:29

## 2018-12-29 RX ADMIN — BUDESONIDE 500 MCG: 0.5 SUSPENSION RESPIRATORY (INHALATION) at 19:20

## 2018-12-29 RX ADMIN — LORAZEPAM 0.5 MG: 0.5 TABLET ORAL at 21:59

## 2018-12-29 RX ADMIN — VERAPAMIL HYDROCHLORIDE 240 MG: 240 TABLET, FILM COATED, EXTENDED RELEASE ORAL at 08:48

## 2018-12-29 RX ADMIN — ATORVASTATIN CALCIUM 80 MG: 80 TABLET, FILM COATED ORAL at 21:47

## 2018-12-29 RX ADMIN — ACETAMINOPHEN 650 MG: 325 TABLET ORAL at 15:06

## 2018-12-29 RX ADMIN — IPRATROPIUM BROMIDE AND ALBUTEROL SULFATE 1 AMPULE: .5; 3 SOLUTION RESPIRATORY (INHALATION) at 15:58

## 2018-12-29 RX ADMIN — FORMOTEROL FUMARATE DIHYDRATE 20 MCG: 20 SOLUTION RESPIRATORY (INHALATION) at 19:20

## 2018-12-29 RX ADMIN — INSULIN GLARGINE 70 UNITS: 100 INJECTION, SOLUTION SUBCUTANEOUS at 21:50

## 2018-12-29 ASSESSMENT — PAIN SCALES - GENERAL
PAINLEVEL_OUTOF10: 0
PAINLEVEL_OUTOF10: 5

## 2018-12-30 LAB
ANION GAP SERPL CALCULATED.3IONS-SCNC: 10 MEQ/L (ref 7–13)
ANION GAP SERPL CALCULATED.3IONS-SCNC: 10 MEQ/L (ref 7–13)
ANION GAP SERPL CALCULATED.3IONS-SCNC: 9 MEQ/L (ref 7–13)
BASE EXCESS ARTERIAL: 3 (ref -3–3)
BASOPHILS ABSOLUTE: 0 K/UL (ref 0–0.2)
BASOPHILS RELATIVE PERCENT: 0.4 %
BETA-HYDROXYBUTYRATE: 4 MG/DL (ref 0.2–2.8)
BUN BLDV-MCNC: 18 MG/DL (ref 8–23)
BUN BLDV-MCNC: 18 MG/DL (ref 8–23)
BUN BLDV-MCNC: 20 MG/DL (ref 8–23)
CALCIUM SERPL-MCNC: 8.9 MG/DL (ref 8.6–10.2)
CALCIUM SERPL-MCNC: 9 MG/DL (ref 8.6–10.2)
CALCIUM SERPL-MCNC: 9.1 MG/DL (ref 8.6–10.2)
CHLORIDE BLD-SCNC: 98 MEQ/L (ref 98–107)
CO2: 28 MEQ/L (ref 22–29)
CREAT SERPL-MCNC: 0.64 MG/DL (ref 0.5–0.9)
CREAT SERPL-MCNC: 0.81 MG/DL (ref 0.5–0.9)
CREAT SERPL-MCNC: 0.87 MG/DL (ref 0.5–0.9)
EOSINOPHILS ABSOLUTE: 0 K/UL (ref 0–0.7)
EOSINOPHILS RELATIVE PERCENT: 0 %
GFR AFRICAN AMERICAN: >60
GFR NON-AFRICAN AMERICAN: >60
GLUCOSE BLD-MCNC: 190 MG/DL (ref 60–115)
GLUCOSE BLD-MCNC: 225 MG/DL (ref 60–115)
GLUCOSE BLD-MCNC: 226 MG/DL (ref 60–115)
GLUCOSE BLD-MCNC: 266 MG/DL (ref 74–109)
GLUCOSE BLD-MCNC: 275 MG/DL (ref 60–115)
GLUCOSE BLD-MCNC: 294 MG/DL (ref 60–115)
GLUCOSE BLD-MCNC: 295 MG/DL (ref 60–115)
GLUCOSE BLD-MCNC: 308 MG/DL (ref 60–115)
GLUCOSE BLD-MCNC: 317 MG/DL (ref 74–109)
GLUCOSE BLD-MCNC: 318 MG/DL (ref 74–109)
GLUCOSE BLD-MCNC: 336 MG/DL (ref 60–115)
GLUCOSE BLD-MCNC: 361 MG/DL (ref 60–115)
GLUCOSE BLD-MCNC: 381 MG/DL (ref 60–115)
GLUCOSE BLD-MCNC: 422 MG/DL (ref 60–115)
HCO3 ARTERIAL: 27.6 MMOL/L (ref 21–29)
HCT VFR BLD CALC: 38.3 % (ref 37–47)
HEMOGLOBIN: 13 G/DL (ref 12–16)
LACTATE: 1.19 MMOL/L (ref 0.4–2)
LYMPHOCYTES ABSOLUTE: 0.8 K/UL (ref 1–4.8)
LYMPHOCYTES RELATIVE PERCENT: 5.9 %
MAGNESIUM: 2.1 MG/DL (ref 1.7–2.3)
MAGNESIUM: 2.3 MG/DL (ref 1.7–2.3)
MCH RBC QN AUTO: 30.5 PG (ref 27–31.3)
MCHC RBC AUTO-ENTMCNC: 33.9 % (ref 33–37)
MCV RBC AUTO: 90 FL (ref 82–100)
MONOCYTES ABSOLUTE: 0.4 K/UL (ref 0.2–0.8)
MONOCYTES RELATIVE PERCENT: 2.9 %
NEUTROPHILS ABSOLUTE: 11.5 K/UL (ref 1.4–6.5)
NEUTROPHILS RELATIVE PERCENT: 90.8 %
O2 SAT, ARTERIAL: 97 % (ref 93–100)
PCO2 ARTERIAL: 42 MM HG (ref 35–45)
PDW BLD-RTO: 13.6 % (ref 11.5–14.5)
PERFORMED ON: ABNORMAL
PH ARTERIAL: 7.43 (ref 7.35–7.45)
PHOSPHORUS: 2.4 MG/DL (ref 2.5–4.5)
PHOSPHORUS: 3 MG/DL (ref 2.5–4.5)
PLATELET # BLD: 285 K/UL (ref 130–400)
PO2 ARTERIAL: 89 MM HG (ref 75–108)
POC FIO2: 3
POC SAMPLE TYPE: ABNORMAL
POTASSIUM REFLEX MAGNESIUM: 4.9 MEQ/L (ref 3.5–5.1)
POTASSIUM SERPL-SCNC: 4.2 MEQ/L (ref 3.5–5.1)
POTASSIUM SERPL-SCNC: 4.6 MEQ/L (ref 3.5–5.1)
RBC # BLD: 4.26 M/UL (ref 4.2–5.4)
SODIUM BLD-SCNC: 135 MEQ/L (ref 132–144)
SODIUM BLD-SCNC: 136 MEQ/L (ref 132–144)
SODIUM BLD-SCNC: 136 MEQ/L (ref 132–144)
TCO2 ARTERIAL: 29 (ref 22–29)
WBC # BLD: 12.7 K/UL (ref 4.8–10.8)

## 2018-12-30 PROCEDURE — 2580000003 HC RX 258: Performed by: INTERNAL MEDICINE

## 2018-12-30 PROCEDURE — 6370000000 HC RX 637 (ALT 250 FOR IP): Performed by: INTERNAL MEDICINE

## 2018-12-30 PROCEDURE — 94669 MECHANICAL CHEST WALL OSCILL: CPT

## 2018-12-30 PROCEDURE — 85025 COMPLETE CBC W/AUTO DIFF WBC: CPT

## 2018-12-30 PROCEDURE — 6360000002 HC RX W HCPCS: Performed by: INTERNAL MEDICINE

## 2018-12-30 PROCEDURE — 82948 REAGENT STRIP/BLOOD GLUCOSE: CPT

## 2018-12-30 PROCEDURE — 80048 BASIC METABOLIC PNL TOTAL CA: CPT

## 2018-12-30 PROCEDURE — 83605 ASSAY OF LACTIC ACID: CPT

## 2018-12-30 PROCEDURE — 83735 ASSAY OF MAGNESIUM: CPT

## 2018-12-30 PROCEDURE — 36415 COLL VENOUS BLD VENIPUNCTURE: CPT

## 2018-12-30 PROCEDURE — 2500000003 HC RX 250 WO HCPCS: Performed by: INTERNAL MEDICINE

## 2018-12-30 PROCEDURE — 94640 AIRWAY INHALATION TREATMENT: CPT

## 2018-12-30 PROCEDURE — 84100 ASSAY OF PHOSPHORUS: CPT

## 2018-12-30 PROCEDURE — 1210000000 HC MED SURG R&B

## 2018-12-30 PROCEDURE — 99222 1ST HOSP IP/OBS MODERATE 55: CPT | Performed by: PHYSICIAN ASSISTANT

## 2018-12-30 PROCEDURE — 2700000000 HC OXYGEN THERAPY PER DAY

## 2018-12-30 PROCEDURE — 82803 BLOOD GASES ANY COMBINATION: CPT

## 2018-12-30 PROCEDURE — 99232 SBSQ HOSP IP/OBS MODERATE 35: CPT | Performed by: INTERNAL MEDICINE

## 2018-12-30 PROCEDURE — 94761 N-INVAS EAR/PLS OXIMETRY MLT: CPT

## 2018-12-30 RX ORDER — SODIUM CHLORIDE 9 MG/ML
INJECTION, SOLUTION INTRAVENOUS CONTINUOUS
Status: DISCONTINUED | OUTPATIENT
Start: 2018-12-30 | End: 2018-12-30

## 2018-12-30 RX ORDER — MAGNESIUM SULFATE 1 G/100ML
1 INJECTION INTRAVENOUS PRN
Status: DISCONTINUED | OUTPATIENT
Start: 2018-12-30 | End: 2018-12-30

## 2018-12-30 RX ORDER — DEXTROSE MONOHYDRATE 25 G/50ML
12.5 INJECTION, SOLUTION INTRAVENOUS PRN
Status: DISCONTINUED | OUTPATIENT
Start: 2018-12-30 | End: 2019-01-07 | Stop reason: HOSPADM

## 2018-12-30 RX ORDER — DEXTROSE, SODIUM CHLORIDE, AND POTASSIUM CHLORIDE 5; .45; .15 G/100ML; G/100ML; G/100ML
INJECTION INTRAVENOUS CONTINUOUS PRN
Status: DISCONTINUED | OUTPATIENT
Start: 2018-12-30 | End: 2018-12-30

## 2018-12-30 RX ORDER — NICOTINE POLACRILEX 4 MG
15 LOZENGE BUCCAL PRN
Status: DISCONTINUED | OUTPATIENT
Start: 2018-12-30 | End: 2019-01-07 | Stop reason: HOSPADM

## 2018-12-30 RX ORDER — INSULIN GLARGINE 100 [IU]/ML
90 INJECTION, SOLUTION SUBCUTANEOUS NIGHTLY
Status: DISCONTINUED | OUTPATIENT
Start: 2018-12-30 | End: 2018-12-31

## 2018-12-30 RX ORDER — POTASSIUM CHLORIDE 7.45 MG/ML
10 INJECTION INTRAVENOUS PRN
Status: DISCONTINUED | OUTPATIENT
Start: 2018-12-30 | End: 2018-12-30

## 2018-12-30 RX ORDER — DEXTROSE MONOHYDRATE 25 G/50ML
12.5 INJECTION, SOLUTION INTRAVENOUS PRN
Status: DISCONTINUED | OUTPATIENT
Start: 2018-12-30 | End: 2018-12-30

## 2018-12-30 RX ORDER — DEXTROSE MONOHYDRATE 50 MG/ML
100 INJECTION, SOLUTION INTRAVENOUS PRN
Status: DISCONTINUED | OUTPATIENT
Start: 2018-12-30 | End: 2019-01-07 | Stop reason: HOSPADM

## 2018-12-30 RX ADMIN — METHYLPREDNISOLONE SODIUM SUCCINATE 40 MG: 40 INJECTION, POWDER, FOR SOLUTION INTRAMUSCULAR; INTRAVENOUS at 22:19

## 2018-12-30 RX ADMIN — IPRATROPIUM BROMIDE AND ALBUTEROL SULFATE 1 AMPULE: .5; 3 SOLUTION RESPIRATORY (INHALATION) at 14:12

## 2018-12-30 RX ADMIN — IPRATROPIUM BROMIDE AND ALBUTEROL SULFATE 1 AMPULE: .5; 3 SOLUTION RESPIRATORY (INHALATION) at 09:34

## 2018-12-30 RX ADMIN — ATORVASTATIN CALCIUM 80 MG: 80 TABLET, FILM COATED ORAL at 21:06

## 2018-12-30 RX ADMIN — SODIUM CHLORIDE: 9 INJECTION, SOLUTION INTRAVENOUS at 05:27

## 2018-12-30 RX ADMIN — VERAPAMIL HYDROCHLORIDE 240 MG: 240 TABLET, FILM COATED, EXTENDED RELEASE ORAL at 21:07

## 2018-12-30 RX ADMIN — THEOPHYLLINE 600 MG: 400 TABLET, EXTENDED RELEASE ORAL at 17:03

## 2018-12-30 RX ADMIN — LEVOTHYROXINE SODIUM 125 MCG: 125 TABLET ORAL at 06:08

## 2018-12-30 RX ADMIN — DOXYCYCLINE 100 MG: 100 INJECTION, POWDER, LYOPHILIZED, FOR SOLUTION INTRAVENOUS at 09:45

## 2018-12-30 RX ADMIN — IPRATROPIUM BROMIDE AND ALBUTEROL SULFATE 1 AMPULE: .5; 3 SOLUTION RESPIRATORY (INHALATION) at 05:01

## 2018-12-30 RX ADMIN — INSULIN GLARGINE 90 UNITS: 100 INJECTION, SOLUTION SUBCUTANEOUS at 21:12

## 2018-12-30 RX ADMIN — IPRATROPIUM BROMIDE AND ALBUTEROL SULFATE 1 AMPULE: .5; 3 SOLUTION RESPIRATORY (INHALATION) at 22:51

## 2018-12-30 RX ADMIN — SODIUM CHLORIDE 2 UNITS/HR: 9 INJECTION, SOLUTION INTRAVENOUS at 05:27

## 2018-12-30 RX ADMIN — FORMOTEROL FUMARATE DIHYDRATE 20 MCG: 20 SOLUTION RESPIRATORY (INHALATION) at 09:34

## 2018-12-30 RX ADMIN — BUDESONIDE 500 MCG: 0.5 SUSPENSION RESPIRATORY (INHALATION) at 09:34

## 2018-12-30 RX ADMIN — BUDESONIDE 500 MCG: 0.5 SUSPENSION RESPIRATORY (INHALATION) at 19:32

## 2018-12-30 RX ADMIN — DOXYCYCLINE 100 MG: 100 INJECTION, POWDER, LYOPHILIZED, FOR SOLUTION INTRAVENOUS at 21:08

## 2018-12-30 RX ADMIN — GUAIFENESIN 600 MG: 600 TABLET, EXTENDED RELEASE ORAL at 21:06

## 2018-12-30 RX ADMIN — SODIUM CHLORIDE 3 UNITS/HR: 9 INJECTION, SOLUTION INTRAVENOUS at 06:31

## 2018-12-30 RX ADMIN — ROFLUMILAST 250 MCG: 500 TABLET ORAL at 13:58

## 2018-12-30 RX ADMIN — LORAZEPAM 0.5 MG: 0.5 TABLET ORAL at 22:48

## 2018-12-30 RX ADMIN — Medication 10 ML: at 21:09

## 2018-12-30 RX ADMIN — GUAIFENESIN 600 MG: 600 TABLET, EXTENDED RELEASE ORAL at 09:35

## 2018-12-30 RX ADMIN — ENOXAPARIN SODIUM 40 MG: 40 INJECTION SUBCUTANEOUS at 09:37

## 2018-12-30 RX ADMIN — PANTOPRAZOLE SODIUM 40 MG: 40 TABLET, DELAYED RELEASE ORAL at 06:45

## 2018-12-30 RX ADMIN — Medication 10 ML: at 09:34

## 2018-12-30 RX ADMIN — METHYLPREDNISOLONE SODIUM SUCCINATE 40 MG: 40 INJECTION, POWDER, FOR SOLUTION INTRAMUSCULAR; INTRAVENOUS at 13:58

## 2018-12-30 RX ADMIN — INSULIN LISPRO 20 UNITS: 100 INJECTION, SOLUTION INTRAVENOUS; SUBCUTANEOUS at 12:10

## 2018-12-30 RX ADMIN — CLOPIDOGREL BISULFATE 75 MG: 75 TABLET ORAL at 09:35

## 2018-12-30 RX ADMIN — DIGOXIN 125 MCG: 125 TABLET ORAL at 12:10

## 2018-12-30 RX ADMIN — ISOSORBIDE MONONITRATE 60 MG: 30 TABLET, EXTENDED RELEASE ORAL at 09:34

## 2018-12-30 RX ADMIN — IPRATROPIUM BROMIDE AND ALBUTEROL SULFATE 1 AMPULE: .5; 3 SOLUTION RESPIRATORY (INHALATION) at 17:55

## 2018-12-30 RX ADMIN — METHYLPREDNISOLONE SODIUM SUCCINATE 40 MG: 40 INJECTION, POWDER, FOR SOLUTION INTRAMUSCULAR; INTRAVENOUS at 06:08

## 2018-12-30 RX ADMIN — VERAPAMIL HYDROCHLORIDE 240 MG: 240 TABLET, FILM COATED, EXTENDED RELEASE ORAL at 09:35

## 2018-12-30 RX ADMIN — FORMOTEROL FUMARATE DIHYDRATE 20 MCG: 20 SOLUTION RESPIRATORY (INHALATION) at 19:32

## 2018-12-30 ASSESSMENT — ENCOUNTER SYMPTOMS
ABDOMINAL PAIN: 0
SHORTNESS OF BREATH: 1
VOMITING: 0
RHINORRHEA: 0
DIARRHEA: 0
EYE REDNESS: 0
NAUSEA: 0
SORE THROAT: 0
EYE PAIN: 0
WHEEZING: 1
COUGH: 0
CHEST TIGHTNESS: 1
SINUS PRESSURE: 0

## 2018-12-30 ASSESSMENT — PAIN SCALES - GENERAL: PAINLEVEL_OUTOF10: 0

## 2018-12-31 LAB
ANION GAP SERPL CALCULATED.3IONS-SCNC: 12 MEQ/L (ref 7–13)
BASOPHILS ABSOLUTE: 0 K/UL (ref 0–0.2)
BASOPHILS RELATIVE PERCENT: 0.1 %
BUN BLDV-MCNC: 23 MG/DL (ref 8–23)
CALCIUM SERPL-MCNC: 8.9 MG/DL (ref 8.6–10.2)
CHLORIDE BLD-SCNC: 103 MEQ/L (ref 98–107)
CO2: 26 MEQ/L (ref 22–29)
CREAT SERPL-MCNC: 0.69 MG/DL (ref 0.5–0.9)
EOSINOPHILS ABSOLUTE: 0 K/UL (ref 0–0.7)
EOSINOPHILS RELATIVE PERCENT: 0 %
GFR AFRICAN AMERICAN: >60
GFR NON-AFRICAN AMERICAN: >60
GLUCOSE BLD-MCNC: 192 MG/DL (ref 60–115)
GLUCOSE BLD-MCNC: 223 MG/DL (ref 60–115)
GLUCOSE BLD-MCNC: 279 MG/DL (ref 60–115)
GLUCOSE BLD-MCNC: 349 MG/DL (ref 60–115)
GLUCOSE BLD-MCNC: 354 MG/DL (ref 74–109)
HCT VFR BLD CALC: 35.2 % (ref 37–47)
HEMOGLOBIN: 12.1 G/DL (ref 12–16)
LYMPHOCYTES ABSOLUTE: 0.5 K/UL (ref 1–4.8)
LYMPHOCYTES RELATIVE PERCENT: 4.6 %
MCH RBC QN AUTO: 30.9 PG (ref 27–31.3)
MCHC RBC AUTO-ENTMCNC: 34.4 % (ref 33–37)
MCV RBC AUTO: 89.9 FL (ref 82–100)
MONOCYTES ABSOLUTE: 0.3 K/UL (ref 0.2–0.8)
MONOCYTES RELATIVE PERCENT: 2.3 %
NEUTROPHILS ABSOLUTE: 10.3 K/UL (ref 1.4–6.5)
NEUTROPHILS RELATIVE PERCENT: 93 %
PDW BLD-RTO: 13.7 % (ref 11.5–14.5)
PERFORMED ON: ABNORMAL
PLATELET # BLD: 252 K/UL (ref 130–400)
POTASSIUM REFLEX MAGNESIUM: 4.6 MEQ/L (ref 3.5–5.1)
RBC # BLD: 3.91 M/UL (ref 4.2–5.4)
SODIUM BLD-SCNC: 141 MEQ/L (ref 132–144)
WBC # BLD: 11 K/UL (ref 4.8–10.8)

## 2018-12-31 PROCEDURE — 36415 COLL VENOUS BLD VENIPUNCTURE: CPT

## 2018-12-31 PROCEDURE — 6370000000 HC RX 637 (ALT 250 FOR IP): Performed by: PHYSICIAN ASSISTANT

## 2018-12-31 PROCEDURE — 94664 DEMO&/EVAL PT USE INHALER: CPT

## 2018-12-31 PROCEDURE — 85025 COMPLETE CBC W/AUTO DIFF WBC: CPT

## 2018-12-31 PROCEDURE — 99232 SBSQ HOSP IP/OBS MODERATE 35: CPT | Performed by: PHYSICIAN ASSISTANT

## 2018-12-31 PROCEDURE — 2580000003 HC RX 258: Performed by: INTERNAL MEDICINE

## 2018-12-31 PROCEDURE — 6360000002 HC RX W HCPCS: Performed by: INTERNAL MEDICINE

## 2018-12-31 PROCEDURE — 80048 BASIC METABOLIC PNL TOTAL CA: CPT

## 2018-12-31 PROCEDURE — 94640 AIRWAY INHALATION TREATMENT: CPT

## 2018-12-31 PROCEDURE — 99232 SBSQ HOSP IP/OBS MODERATE 35: CPT | Performed by: INTERNAL MEDICINE

## 2018-12-31 PROCEDURE — 1210000000 HC MED SURG R&B

## 2018-12-31 PROCEDURE — 2500000003 HC RX 250 WO HCPCS: Performed by: INTERNAL MEDICINE

## 2018-12-31 PROCEDURE — 94761 N-INVAS EAR/PLS OXIMETRY MLT: CPT

## 2018-12-31 PROCEDURE — 6370000000 HC RX 637 (ALT 250 FOR IP): Performed by: INTERNAL MEDICINE

## 2018-12-31 PROCEDURE — 2700000000 HC OXYGEN THERAPY PER DAY

## 2018-12-31 PROCEDURE — 94669 MECHANICAL CHEST WALL OSCILL: CPT

## 2018-12-31 RX ORDER — IPRATROPIUM BROMIDE AND ALBUTEROL SULFATE 2.5; .5 MG/3ML; MG/3ML
1 SOLUTION RESPIRATORY (INHALATION) 4 TIMES DAILY
Status: DISCONTINUED | OUTPATIENT
Start: 2018-12-31 | End: 2019-01-03

## 2018-12-31 RX ORDER — INSULIN GLARGINE 100 [IU]/ML
110 INJECTION, SOLUTION SUBCUTANEOUS NIGHTLY
Status: DISCONTINUED | OUTPATIENT
Start: 2018-12-31 | End: 2019-01-03

## 2018-12-31 RX ORDER — FLUCONAZOLE 150 MG/1
150 TABLET ORAL ONCE
Status: COMPLETED | OUTPATIENT
Start: 2018-12-31 | End: 2018-12-31

## 2018-12-31 RX ADMIN — DIGOXIN 125 MCG: 125 TABLET ORAL at 12:37

## 2018-12-31 RX ADMIN — ROFLUMILAST 250 MCG: 500 TABLET ORAL at 08:17

## 2018-12-31 RX ADMIN — METHYLPREDNISOLONE SODIUM SUCCINATE 40 MG: 40 INJECTION, POWDER, FOR SOLUTION INTRAMUSCULAR; INTRAVENOUS at 21:18

## 2018-12-31 RX ADMIN — ISOSORBIDE MONONITRATE 60 MG: 30 TABLET, EXTENDED RELEASE ORAL at 08:17

## 2018-12-31 RX ADMIN — DOXYCYCLINE 100 MG: 100 INJECTION, POWDER, LYOPHILIZED, FOR SOLUTION INTRAVENOUS at 05:56

## 2018-12-31 RX ADMIN — FORMOTEROL FUMARATE DIHYDRATE 20 MCG: 20 SOLUTION RESPIRATORY (INHALATION) at 19:32

## 2018-12-31 RX ADMIN — FORMOTEROL FUMARATE DIHYDRATE 20 MCG: 20 SOLUTION RESPIRATORY (INHALATION) at 07:47

## 2018-12-31 RX ADMIN — VERAPAMIL HYDROCHLORIDE 240 MG: 240 TABLET, FILM COATED, EXTENDED RELEASE ORAL at 21:19

## 2018-12-31 RX ADMIN — FLUCONAZOLE 150 MG: 150 TABLET ORAL at 17:35

## 2018-12-31 RX ADMIN — IPRATROPIUM BROMIDE AND ALBUTEROL SULFATE 1 AMPULE: .5; 3 SOLUTION RESPIRATORY (INHALATION) at 19:33

## 2018-12-31 RX ADMIN — DOXYCYCLINE 100 MG: 100 INJECTION, POWDER, LYOPHILIZED, FOR SOLUTION INTRAVENOUS at 17:58

## 2018-12-31 RX ADMIN — PANTOPRAZOLE SODIUM 40 MG: 40 TABLET, DELAYED RELEASE ORAL at 05:55

## 2018-12-31 RX ADMIN — ATORVASTATIN CALCIUM 80 MG: 80 TABLET, FILM COATED ORAL at 21:19

## 2018-12-31 RX ADMIN — ALBUTEROL SULFATE 2.5 MG: 2.5 SOLUTION RESPIRATORY (INHALATION) at 05:50

## 2018-12-31 RX ADMIN — Medication 10 ML: at 14:42

## 2018-12-31 RX ADMIN — ALBUTEROL SULFATE 2.5 MG: 2.5 SOLUTION RESPIRATORY (INHALATION) at 23:08

## 2018-12-31 RX ADMIN — VERAPAMIL HYDROCHLORIDE 240 MG: 240 TABLET, FILM COATED, EXTENDED RELEASE ORAL at 08:17

## 2018-12-31 RX ADMIN — IPRATROPIUM BROMIDE AND ALBUTEROL SULFATE 1 AMPULE: .5; 3 SOLUTION RESPIRATORY (INHALATION) at 07:47

## 2018-12-31 RX ADMIN — ENOXAPARIN SODIUM 40 MG: 40 INJECTION SUBCUTANEOUS at 08:18

## 2018-12-31 RX ADMIN — LEVOTHYROXINE SODIUM 125 MCG: 125 TABLET ORAL at 05:55

## 2018-12-31 RX ADMIN — BUDESONIDE 500 MCG: 0.5 SUSPENSION RESPIRATORY (INHALATION) at 07:47

## 2018-12-31 RX ADMIN — CLOPIDOGREL BISULFATE 75 MG: 75 TABLET ORAL at 08:17

## 2018-12-31 RX ADMIN — THEOPHYLLINE 600 MG: 400 TABLET, EXTENDED RELEASE ORAL at 17:59

## 2018-12-31 RX ADMIN — IPRATROPIUM BROMIDE AND ALBUTEROL SULFATE 1 AMPULE: .5; 3 SOLUTION RESPIRATORY (INHALATION) at 15:55

## 2018-12-31 RX ADMIN — METHYLPREDNISOLONE SODIUM SUCCINATE 40 MG: 40 INJECTION, POWDER, FOR SOLUTION INTRAMUSCULAR; INTRAVENOUS at 05:54

## 2018-12-31 RX ADMIN — GUAIFENESIN 600 MG: 600 TABLET, EXTENDED RELEASE ORAL at 08:17

## 2018-12-31 RX ADMIN — METHYLPREDNISOLONE SODIUM SUCCINATE 40 MG: 40 INJECTION, POWDER, FOR SOLUTION INTRAMUSCULAR; INTRAVENOUS at 14:38

## 2018-12-31 RX ADMIN — IPRATROPIUM BROMIDE AND ALBUTEROL SULFATE 1 AMPULE: .5; 3 SOLUTION RESPIRATORY (INHALATION) at 11:48

## 2018-12-31 RX ADMIN — INSULIN GLARGINE 110 UNITS: 100 INJECTION, SOLUTION SUBCUTANEOUS at 21:19

## 2018-12-31 RX ADMIN — LORAZEPAM 0.5 MG: 0.5 TABLET ORAL at 22:40

## 2018-12-31 RX ADMIN — BUDESONIDE 500 MCG: 0.5 SUSPENSION RESPIRATORY (INHALATION) at 19:33

## 2018-12-31 RX ADMIN — GUAIFENESIN 600 MG: 600 TABLET, EXTENDED RELEASE ORAL at 21:18

## 2018-12-31 ASSESSMENT — PAIN SCALES - GENERAL
PAINLEVEL_OUTOF10: 0
PAINLEVEL_OUTOF10: 0

## 2019-01-01 ENCOUNTER — OFFICE VISIT (OUTPATIENT)
Dept: FAMILY MEDICINE CLINIC | Age: 69
End: 2019-01-01
Payer: MEDICARE

## 2019-01-01 ENCOUNTER — HOSPITAL ENCOUNTER (OUTPATIENT)
Dept: OCCUPATIONAL THERAPY | Age: 69
Setting detail: THERAPIES SERIES
Discharge: HOME | End: 2019-07-15
Payer: MEDICARE

## 2019-01-01 ENCOUNTER — HOSPITAL ENCOUNTER (OUTPATIENT)
Dept: PULMONOLOGY | Age: 69
Setting detail: THERAPIES SERIES
Discharge: HOME | End: 2019-07-29
Payer: MEDICARE

## 2019-01-01 ENCOUNTER — OFFICE VISIT (OUTPATIENT)
Dept: PHYSICAL MEDICINE AND REHAB | Age: 69
End: 2019-01-01
Payer: MEDICARE

## 2019-01-01 ENCOUNTER — HOSPITAL ENCOUNTER (OUTPATIENT)
Dept: GENERAL RADIOLOGY | Age: 69
Discharge: HOME | End: 2019-12-06
Payer: MEDICARE

## 2019-01-01 ENCOUNTER — TELEPHONE (OUTPATIENT)
Dept: FAMILY MEDICINE CLINIC | Age: 69
End: 2019-01-01

## 2019-01-01 ENCOUNTER — HOSPITAL ENCOUNTER (OUTPATIENT)
Dept: OCCUPATIONAL THERAPY | Age: 69
Setting detail: THERAPIES SERIES
Discharge: HOME | End: 2019-07-17
Payer: MEDICARE

## 2019-01-01 ENCOUNTER — APPOINTMENT (OUTPATIENT)
Dept: OCCUPATIONAL THERAPY | Age: 69
End: 2019-01-01
Payer: MEDICARE

## 2019-01-01 ENCOUNTER — OFFICE VISIT (OUTPATIENT)
Dept: GASTROENTEROLOGY | Age: 69
End: 2019-01-01
Payer: MEDICARE

## 2019-01-01 ENCOUNTER — HOSPITAL ENCOUNTER (OUTPATIENT)
Dept: OCCUPATIONAL THERAPY | Age: 69
Setting detail: THERAPIES SERIES
Discharge: HOME | End: 2019-07-22
Payer: MEDICARE

## 2019-01-01 ENCOUNTER — HOSPITAL ENCOUNTER (OUTPATIENT)
Dept: PULMONOLOGY | Age: 69
Setting detail: THERAPIES SERIES
Discharge: HOME | End: 2019-10-08
Payer: MEDICARE

## 2019-01-01 ENCOUNTER — HOSPITAL ENCOUNTER (OUTPATIENT)
Dept: PULMONOLOGY | Age: 69
Setting detail: THERAPIES SERIES
Discharge: HOME | End: 2019-09-09
Payer: MEDICARE

## 2019-01-01 ENCOUNTER — OFFICE VISIT (OUTPATIENT)
Dept: PULMONOLOGY | Age: 69
End: 2019-01-01
Payer: MEDICARE

## 2019-01-01 ENCOUNTER — HOSPITAL ENCOUNTER (OUTPATIENT)
Dept: PULMONOLOGY | Age: 69
Setting detail: THERAPIES SERIES
Discharge: HOME | End: 2019-10-01
Payer: MEDICARE

## 2019-01-01 ENCOUNTER — HOSPITAL ENCOUNTER (OUTPATIENT)
Dept: OCCUPATIONAL THERAPY | Age: 69
Setting detail: THERAPIES SERIES
Discharge: HOME | End: 2019-07-19
Payer: MEDICARE

## 2019-01-01 ENCOUNTER — HOSPITAL ENCOUNTER (OUTPATIENT)
Dept: PULMONOLOGY | Age: 69
Setting detail: THERAPIES SERIES
Discharge: HOME | End: 2019-10-29
Payer: MEDICARE

## 2019-01-01 ENCOUNTER — HOSPITAL ENCOUNTER (OUTPATIENT)
Dept: PULMONOLOGY | Age: 69
Setting detail: THERAPIES SERIES
Discharge: HOME | End: 2019-07-18
Payer: MEDICARE

## 2019-01-01 ENCOUNTER — HOSPITAL ENCOUNTER (OUTPATIENT)
Dept: PULMONOLOGY | Age: 69
Setting detail: THERAPIES SERIES
Discharge: HOME | End: 2019-10-10
Payer: MEDICARE

## 2019-01-01 ENCOUNTER — PROCEDURE VISIT (OUTPATIENT)
Dept: PHYSICAL MEDICINE AND REHAB | Age: 69
End: 2019-01-01
Payer: MEDICARE

## 2019-01-01 ENCOUNTER — HOSPITAL ENCOUNTER (OUTPATIENT)
Dept: PULMONOLOGY | Age: 69
Setting detail: THERAPIES SERIES
Discharge: HOME | End: 2019-09-26
Payer: MEDICARE

## 2019-01-01 ENCOUNTER — HOSPITAL ENCOUNTER (OUTPATIENT)
Age: 69
Setting detail: OUTPATIENT SURGERY
Discharge: HOME | End: 2019-08-01
Attending: SPECIALIST | Admitting: SPECIALIST
Payer: MEDICARE

## 2019-01-01 ENCOUNTER — TELEPHONE (OUTPATIENT)
Dept: PHARMACY | Facility: CLINIC | Age: 69
End: 2019-01-01

## 2019-01-01 ENCOUNTER — HOSPITAL ENCOUNTER (OUTPATIENT)
Dept: PULMONOLOGY | Age: 69
Discharge: HOME | End: 2019-12-03

## 2019-01-01 ENCOUNTER — HOSPITAL ENCOUNTER (OUTPATIENT)
Dept: PULMONOLOGY | Age: 69
Setting detail: THERAPIES SERIES
Discharge: HOME | End: 2019-09-16
Payer: MEDICARE

## 2019-01-01 ENCOUNTER — HOSPITAL ENCOUNTER (OUTPATIENT)
Dept: INFUSION THERAPY | Age: 69
Setting detail: INFUSION SERIES
Discharge: HOME | End: 2019-10-08
Payer: MEDICARE

## 2019-01-01 ENCOUNTER — HOSPITAL ENCOUNTER (OUTPATIENT)
Dept: PULMONOLOGY | Age: 69
Setting detail: THERAPIES SERIES
Discharge: HOME | End: 2019-07-30
Payer: MEDICARE

## 2019-01-01 ENCOUNTER — HOSPITAL ENCOUNTER (OUTPATIENT)
Dept: PULMONOLOGY | Age: 69
Setting detail: THERAPIES SERIES
Discharge: HOME | End: 2019-08-13
Payer: MEDICARE

## 2019-01-01 ENCOUNTER — HOSPITAL ENCOUNTER (OUTPATIENT)
Dept: PULMONOLOGY | Age: 69
Setting detail: THERAPIES SERIES
Discharge: HOME | End: 2019-09-03
Payer: MEDICARE

## 2019-01-01 ENCOUNTER — HOSPITAL ENCOUNTER (OUTPATIENT)
Dept: CT IMAGING | Age: 69
Discharge: HOME | End: 2019-09-29
Payer: MEDICARE

## 2019-01-01 ENCOUNTER — HOSPITAL ENCOUNTER (OUTPATIENT)
Dept: PULMONOLOGY | Age: 69
Setting detail: THERAPIES SERIES
Discharge: HOME | End: 2019-11-12
Payer: MEDICARE

## 2019-01-01 ENCOUNTER — HOSPITAL ENCOUNTER (OUTPATIENT)
Dept: PULMONOLOGY | Age: 69
Setting detail: THERAPIES SERIES
Discharge: HOME | End: 2019-12-10
Payer: MEDICARE

## 2019-01-01 ENCOUNTER — OFFICE VISIT (OUTPATIENT)
Dept: ENDOCRINOLOGY | Age: 69
End: 2019-01-01
Payer: MEDICARE

## 2019-01-01 ENCOUNTER — HOSPITAL ENCOUNTER (OUTPATIENT)
Dept: PULMONOLOGY | Age: 69
Setting detail: THERAPIES SERIES
Discharge: HOME | End: 2019-11-19
Payer: MEDICARE

## 2019-01-01 ENCOUNTER — HOSPITAL ENCOUNTER (OUTPATIENT)
Dept: PULMONOLOGY | Age: 69
Setting detail: THERAPIES SERIES
Discharge: HOME | End: 2019-07-23
Payer: MEDICARE

## 2019-01-01 ENCOUNTER — ANESTHESIA (OUTPATIENT)
Dept: ENDOSCOPY | Age: 69
End: 2019-01-01
Payer: MEDICARE

## 2019-01-01 ENCOUNTER — HOSPITAL ENCOUNTER (OUTPATIENT)
Dept: PULMONOLOGY | Age: 69
Setting detail: THERAPIES SERIES
Discharge: HOME | End: 2019-08-06
Payer: MEDICARE

## 2019-01-01 ENCOUNTER — HOSPITAL ENCOUNTER (OUTPATIENT)
Dept: OCCUPATIONAL THERAPY | Age: 69
Setting detail: THERAPIES SERIES
End: 2019-01-01
Payer: MEDICARE

## 2019-01-01 ENCOUNTER — HOSPITAL ENCOUNTER (OUTPATIENT)
Dept: PULMONOLOGY | Age: 69
Setting detail: THERAPIES SERIES
Discharge: HOME | End: 2019-10-15
Payer: MEDICARE

## 2019-01-01 ENCOUNTER — HOSPITAL ENCOUNTER (OUTPATIENT)
Dept: PULMONOLOGY | Age: 69
Setting detail: THERAPIES SERIES
Discharge: HOME | End: 2019-09-10
Payer: MEDICARE

## 2019-01-01 ENCOUNTER — HOSPITAL ENCOUNTER (OUTPATIENT)
Dept: OCCUPATIONAL THERAPY | Age: 69
Setting detail: THERAPIES SERIES
Discharge: HOME | End: 2019-07-24
Payer: MEDICARE

## 2019-01-01 ENCOUNTER — HOSPITAL ENCOUNTER (OUTPATIENT)
Dept: PULMONOLOGY | Age: 69
Setting detail: THERAPIES SERIES
Discharge: HOME | End: 2019-12-05
Payer: MEDICARE

## 2019-01-01 ENCOUNTER — HOSPITAL ENCOUNTER (OUTPATIENT)
Dept: PULMONOLOGY | Age: 69
Setting detail: THERAPIES SERIES
Discharge: HOME | End: 2019-08-22
Payer: MEDICARE

## 2019-01-01 ENCOUNTER — HOSPITAL ENCOUNTER (OUTPATIENT)
Dept: PULMONOLOGY | Age: 69
Setting detail: THERAPIES SERIES
Discharge: HOME | End: 2019-12-12
Payer: MEDICARE

## 2019-01-01 ENCOUNTER — HOSPITAL ENCOUNTER (OUTPATIENT)
Dept: PULMONOLOGY | Age: 69
Setting detail: THERAPIES SERIES
Discharge: HOME | End: 2019-10-24
Payer: MEDICARE

## 2019-01-01 ENCOUNTER — HOSPITAL ENCOUNTER (OUTPATIENT)
Dept: NUCLEAR MEDICINE | Age: 69
Discharge: HOME | End: 2019-09-15
Payer: MEDICARE

## 2019-01-01 ENCOUNTER — HOSPITAL ENCOUNTER (OUTPATIENT)
Dept: PULMONOLOGY | Age: 69
Setting detail: THERAPIES SERIES
Discharge: HOME | End: 2019-08-19
Payer: MEDICARE

## 2019-01-01 ENCOUNTER — HOSPITAL ENCOUNTER (OUTPATIENT)
Dept: PULMONOLOGY | Age: 69
Setting detail: THERAPIES SERIES
Discharge: HOME | End: 2019-09-24
Payer: MEDICARE

## 2019-01-01 ENCOUNTER — HOSPITAL ENCOUNTER (OUTPATIENT)
Dept: PULMONOLOGY | Age: 69
Setting detail: THERAPIES SERIES
Discharge: HOME | End: 2019-10-22
Payer: MEDICARE

## 2019-01-01 ENCOUNTER — ANESTHESIA EVENT (OUTPATIENT)
Dept: ENDOSCOPY | Age: 69
End: 2019-01-01
Payer: MEDICARE

## 2019-01-01 VITALS
WEIGHT: 176.5 LBS | BODY MASS INDEX: 33.32 KG/M2 | HEIGHT: 61 IN | SYSTOLIC BLOOD PRESSURE: 140 MMHG | DIASTOLIC BLOOD PRESSURE: 60 MMHG

## 2019-01-01 VITALS
HEIGHT: 61 IN | OXYGEN SATURATION: 92 % | SYSTOLIC BLOOD PRESSURE: 128 MMHG | BODY MASS INDEX: 34.55 KG/M2 | HEART RATE: 77 BPM | DIASTOLIC BLOOD PRESSURE: 58 MMHG | WEIGHT: 183 LBS | TEMPERATURE: 97.9 F

## 2019-01-01 VITALS
SYSTOLIC BLOOD PRESSURE: 160 MMHG | RESPIRATION RATE: 22 BRPM | DIASTOLIC BLOOD PRESSURE: 73 MMHG | OXYGEN SATURATION: 94 %

## 2019-01-01 VITALS
WEIGHT: 178 LBS | BODY MASS INDEX: 33.61 KG/M2 | HEART RATE: 85 BPM | RESPIRATION RATE: 16 BRPM | DIASTOLIC BLOOD PRESSURE: 64 MMHG | HEIGHT: 61 IN | OXYGEN SATURATION: 95 % | SYSTOLIC BLOOD PRESSURE: 156 MMHG

## 2019-01-01 VITALS
RESPIRATION RATE: 14 BRPM | DIASTOLIC BLOOD PRESSURE: 60 MMHG | BODY MASS INDEX: 34.55 KG/M2 | TEMPERATURE: 97.9 F | SYSTOLIC BLOOD PRESSURE: 132 MMHG | HEART RATE: 89 BPM | WEIGHT: 183 LBS | HEIGHT: 61 IN | OXYGEN SATURATION: 97 %

## 2019-01-01 VITALS
BODY MASS INDEX: 34.17 KG/M2 | HEIGHT: 61 IN | RESPIRATION RATE: 16 BRPM | OXYGEN SATURATION: 90 % | HEART RATE: 86 BPM | WEIGHT: 181 LBS | DIASTOLIC BLOOD PRESSURE: 70 MMHG | TEMPERATURE: 97.9 F | SYSTOLIC BLOOD PRESSURE: 144 MMHG

## 2019-01-01 VITALS
HEART RATE: 91 BPM | HEIGHT: 61 IN | SYSTOLIC BLOOD PRESSURE: 175 MMHG | WEIGHT: 176 LBS | DIASTOLIC BLOOD PRESSURE: 70 MMHG | BODY MASS INDEX: 33.23 KG/M2

## 2019-01-01 VITALS
TEMPERATURE: 98 F | HEART RATE: 81 BPM | DIASTOLIC BLOOD PRESSURE: 60 MMHG | HEIGHT: 61 IN | WEIGHT: 184 LBS | OXYGEN SATURATION: 95 % | SYSTOLIC BLOOD PRESSURE: 110 MMHG | RESPIRATION RATE: 16 BRPM | BODY MASS INDEX: 34.74 KG/M2

## 2019-01-01 VITALS
BODY MASS INDEX: 34.17 KG/M2 | WEIGHT: 181 LBS | OXYGEN SATURATION: 95 % | TEMPERATURE: 97.9 F | RESPIRATION RATE: 16 BRPM | HEIGHT: 61 IN | DIASTOLIC BLOOD PRESSURE: 60 MMHG | HEART RATE: 74 BPM | SYSTOLIC BLOOD PRESSURE: 132 MMHG

## 2019-01-01 VITALS — BODY MASS INDEX: 33.42 KG/M2 | HEIGHT: 61 IN | WEIGHT: 177 LBS

## 2019-01-01 VITALS
HEIGHT: 61 IN | BODY MASS INDEX: 34.17 KG/M2 | WEIGHT: 181 LBS | HEART RATE: 90 BPM | OXYGEN SATURATION: 95 % | DIASTOLIC BLOOD PRESSURE: 76 MMHG | TEMPERATURE: 98.6 F | RESPIRATION RATE: 20 BRPM | SYSTOLIC BLOOD PRESSURE: 147 MMHG

## 2019-01-01 VITALS
SYSTOLIC BLOOD PRESSURE: 142 MMHG | TEMPERATURE: 98.4 F | RESPIRATION RATE: 18 BRPM | HEART RATE: 90 BPM | DIASTOLIC BLOOD PRESSURE: 78 MMHG

## 2019-01-01 VITALS
WEIGHT: 177 LBS | DIASTOLIC BLOOD PRESSURE: 62 MMHG | HEIGHT: 61 IN | BODY MASS INDEX: 33.42 KG/M2 | SYSTOLIC BLOOD PRESSURE: 142 MMHG

## 2019-01-01 VITALS — OXYGEN SATURATION: 92 % | BODY MASS INDEX: 34.8 KG/M2 | WEIGHT: 184.2 LBS | HEART RATE: 94 BPM

## 2019-01-01 DIAGNOSIS — M54.41 CHRONIC BILATERAL LOW BACK PAIN WITH RIGHT-SIDED SCIATICA: ICD-10-CM

## 2019-01-01 DIAGNOSIS — G89.29 CHRONIC THORACIC SPINE PAIN: ICD-10-CM

## 2019-01-01 DIAGNOSIS — R10.13 DYSPEPSIA: Primary | ICD-10-CM

## 2019-01-01 DIAGNOSIS — Z79.899 HIGH RISK MEDICATION USE: ICD-10-CM

## 2019-01-01 DIAGNOSIS — R10.13 DYSPEPSIA: ICD-10-CM

## 2019-01-01 DIAGNOSIS — R91.8 RIGHT LOWER LOBE LUNG MASS: ICD-10-CM

## 2019-01-01 DIAGNOSIS — J45.40 MODERATE PERSISTENT ASTHMA WITHOUT COMPLICATION: Primary | ICD-10-CM

## 2019-01-01 DIAGNOSIS — F41.9 ANXIETY: Primary | ICD-10-CM

## 2019-01-01 DIAGNOSIS — K22.70 BARRETT'S ESOPHAGUS WITHOUT DYSPLASIA: Primary | ICD-10-CM

## 2019-01-01 DIAGNOSIS — M79.642 PAIN IN BOTH HANDS: ICD-10-CM

## 2019-01-01 DIAGNOSIS — M54.42 CHRONIC MIDLINE LOW BACK PAIN WITH LEFT-SIDED SCIATICA: ICD-10-CM

## 2019-01-01 DIAGNOSIS — C34.31 MALIGNANT NEOPLASM OF LOWER LOBE OF RIGHT LUNG (HCC): ICD-10-CM

## 2019-01-01 DIAGNOSIS — M54.42 CHRONIC BILATERAL LOW BACK PAIN WITH BILATERAL SCIATICA: ICD-10-CM

## 2019-01-01 DIAGNOSIS — M54.2 NECK PAIN: Primary | ICD-10-CM

## 2019-01-01 DIAGNOSIS — M79.10 MYALGIA: ICD-10-CM

## 2019-01-01 DIAGNOSIS — G56.03 BILATERAL CARPAL TUNNEL SYNDROME: ICD-10-CM

## 2019-01-01 DIAGNOSIS — E55.9 VITAMIN D DEFICIENCY: ICD-10-CM

## 2019-01-01 DIAGNOSIS — G89.29 CHRONIC BILATERAL LOW BACK PAIN WITH RIGHT-SIDED SCIATICA: ICD-10-CM

## 2019-01-01 DIAGNOSIS — M54.41 CHRONIC BILATERAL LOW BACK PAIN WITH BILATERAL SCIATICA: ICD-10-CM

## 2019-01-01 DIAGNOSIS — R09.02 HYPOXIA: ICD-10-CM

## 2019-01-01 DIAGNOSIS — Z99.89 OSA ON CPAP: ICD-10-CM

## 2019-01-01 DIAGNOSIS — M54.6 CHRONIC THORACIC SPINE PAIN: ICD-10-CM

## 2019-01-01 DIAGNOSIS — Z00.00 ROUTINE GENERAL MEDICAL EXAMINATION AT A HEALTH CARE FACILITY: Primary | ICD-10-CM

## 2019-01-01 DIAGNOSIS — S23.41XS SPRAIN OF COSTAL CARTILAGE, SEQUELA: ICD-10-CM

## 2019-01-01 DIAGNOSIS — G89.29 CHRONIC MIDLINE LOW BACK PAIN WITH LEFT-SIDED SCIATICA: ICD-10-CM

## 2019-01-01 DIAGNOSIS — M54.2 NECK PAIN: ICD-10-CM

## 2019-01-01 DIAGNOSIS — R07.81 RIB PAIN ON RIGHT SIDE: Primary | ICD-10-CM

## 2019-01-01 DIAGNOSIS — R07.81 RIB PAIN ON RIGHT SIDE: ICD-10-CM

## 2019-01-01 DIAGNOSIS — J44.9 CHRONIC OBSTRUCTIVE PULMONARY DISEASE, UNSPECIFIED COPD TYPE (HCC): ICD-10-CM

## 2019-01-01 DIAGNOSIS — E89.0 POSTOPERATIVE HYPOTHYROIDISM: ICD-10-CM

## 2019-01-01 DIAGNOSIS — M51.36 DDD (DEGENERATIVE DISC DISEASE), LUMBAR: ICD-10-CM

## 2019-01-01 DIAGNOSIS — F41.8 SITUATIONAL ANXIETY: Primary | ICD-10-CM

## 2019-01-01 DIAGNOSIS — G47.33 OSA ON CPAP: ICD-10-CM

## 2019-01-01 DIAGNOSIS — L98.9 SKIN LESION OF LEFT ARM: ICD-10-CM

## 2019-01-01 DIAGNOSIS — G89.29 CHRONIC BILATERAL LOW BACK PAIN WITH BILATERAL SCIATICA: ICD-10-CM

## 2019-01-01 DIAGNOSIS — J44.9 ASTHMA-COPD OVERLAP SYNDROME (HCC): Primary | ICD-10-CM

## 2019-01-01 DIAGNOSIS — M54.32 SCIATICA OF LEFT SIDE: Primary | ICD-10-CM

## 2019-01-01 DIAGNOSIS — E66.9 OBESITY (BMI 30-39.9): ICD-10-CM

## 2019-01-01 DIAGNOSIS — M79.7 FIBROMYALGIA: Primary | ICD-10-CM

## 2019-01-01 DIAGNOSIS — E11.65 UNCONTROLLED TYPE 2 DIABETES MELLITUS WITH HYPERGLYCEMIA (HCC): Primary | ICD-10-CM

## 2019-01-01 DIAGNOSIS — M79.641 PAIN IN BOTH HANDS: ICD-10-CM

## 2019-01-01 DIAGNOSIS — C34.31 MALIGNANT NEOPLASM OF LOWER LOBE OF RIGHT LUNG (HCC): Primary | ICD-10-CM

## 2019-01-01 DIAGNOSIS — M54.17 LUMBOSACRAL RADICULOPATHY AT S1: ICD-10-CM

## 2019-01-01 DIAGNOSIS — E55.9 VITAMIN D DEFICIENCY: Primary | ICD-10-CM

## 2019-01-01 DIAGNOSIS — R52 PAIN: ICD-10-CM

## 2019-01-01 DIAGNOSIS — L60.0 IGTN (INGROWING TOE NAIL): ICD-10-CM

## 2019-01-01 DIAGNOSIS — G56.01 RIGHT CARPAL TUNNEL SYNDROME: ICD-10-CM

## 2019-01-01 DIAGNOSIS — I70.1 RENAL ARTERY STENOSIS (HCC): ICD-10-CM

## 2019-01-01 DIAGNOSIS — K31.84 GASTROPARESIS: ICD-10-CM

## 2019-01-01 DIAGNOSIS — G56.82 SUPRASCAPULAR ENTRAPMENT NEUROPATHY OF LEFT SIDE: Primary | ICD-10-CM

## 2019-01-01 LAB
ALBUMIN: 3.8 G/DL (ref 3.4–5)
ALP BLD-CCNC: 65 U/L (ref 33–136)
ALT SERPL-CCNC: 38 U/L (ref 7–45)
ANION GAP SERPL CALCULATED.3IONS-SCNC: 12 MEQ/L (ref 7–13)
ANION GAP SERPL CALCULATED.3IONS-SCNC: 14 MMOL/L (ref 10–20)
ANION GAP SERPL CALCULATED.3IONS-SCNC: 16 MEQ/L (ref 9–15)
AST SERPL-CCNC: 18 U/L (ref 9–39)
BANDED NEUTROPHILS RELATIVE PERCENT: 6 % (ref 5–11)
BASOPHILS ABSOLUTE: 0 K/UL (ref 0–0.2)
BASOPHILS RELATIVE PERCENT: 0.1 %
BICARBONATE: 28 MMOL/L (ref 21–32)
BILIRUB SERPL-MCNC: 0.3 MG/DL (ref 0–1.2)
BILIRUBIN DIRECT: 0.1 MG/DL (ref 0–0.3)
BUN BLDV-MCNC: 16 MG/DL (ref 8–23)
BUN BLDV-MCNC: 24 MG/DL (ref 8–23)
CALCIUM SERPL-MCNC: 8.6 MG/DL (ref 8.6–10.2)
CALCIUM SERPL-MCNC: 9.1 MG/DL (ref 8.6–10.3)
CALCIUM SERPL-MCNC: 9.4 MG/DL (ref 8.5–9.9)
CHLORIDE BLD-SCNC: 102 MMOL/L (ref 98–107)
CHLORIDE BLD-SCNC: 103 MEQ/L (ref 95–107)
CHLORIDE BLD-SCNC: 103 MEQ/L (ref 98–107)
CHOLESTEROL/HDL RATIO: 4.9
CHOLESTEROL: 182 MG/DL (ref 0–199)
CHP ED QC CHECK: NORMAL
CO2: 24 MEQ/L (ref 20–31)
CO2: 25 MEQ/L (ref 22–29)
CREAT SERPL-MCNC: 0.73 MG/DL (ref 0.5–0.9)
CREAT SERPL-MCNC: 0.81 MG/DL (ref 0.5–0.9)
CREAT SERPL-MCNC: 0.83 MG/DL (ref 0.5–1)
CREATININE URINE: 264.9 MG/DL
EOSINOPHILS ABSOLUTE: 0 K/UL (ref 0–0.7)
EOSINOPHILS RELATIVE PERCENT: 0 %
ERYTHROCYTE [DISTWIDTH] IN BLOOD BY AUTOMATED COUNT: 13.2 % (ref 11.5–14)
GFR AFRICAN AMERICAN: >60
GFR AFRICAN AMERICAN: >60
GFR AFRICAN AMERICAN: >60 ML/MIN/1.73M2
GFR NON-AFRICAN AMERICAN: >60
GFR NON-AFRICAN AMERICAN: >60
GFR NON-AFRICAN AMERICAN: >60 ML/MIN/1.73M2
GLUCOSE BLD-MCNC: 120 MG/DL
GLUCOSE BLD-MCNC: 219 MG/DL (ref 70–99)
GLUCOSE BLD-MCNC: 232 MG/DL (ref 60–115)
GLUCOSE BLD-MCNC: 276 MG/DL (ref 60–115)
GLUCOSE BLD-MCNC: 300 MG/DL (ref 60–115)
GLUCOSE BLD-MCNC: 336 MG/DL (ref 74–109)
GLUCOSE BLD-MCNC: 98 MG/DL (ref 60–115)
GLUCOSE: 186 MG/DL (ref 74–99)
HBA1C MFR BLD: 9.9 % (ref 4.8–5.9)
HCT VFR BLD CALC: 37.4 % (ref 37–47)
HCT VFR BLD CALC: 42.5 % (ref 36–46)
HDLC SERPL-MCNC: 37 MG/DL
HEMOGLOBIN: 12.9 G/DL (ref 12–16)
HEMOGLOBIN: 13.2 G/DL (ref 12–16)
LDL CHOLESTEROL: 97 MG/DL (ref 0–99)
LYMPHOCYTES ABSOLUTE: 0.4 K/UL (ref 1–4.8)
LYMPHOCYTES RELATIVE PERCENT: 4 %
MCH RBC QN AUTO: 30.9 PG (ref 27–31.3)
MCHC RBC AUTO-ENTMCNC: 31.1 G/DL (ref 32–36)
MCHC RBC AUTO-ENTMCNC: 34.4 % (ref 33–37)
MCV RBC AUTO: 89.7 FL (ref 82–100)
MCV RBC AUTO: 93 FL (ref 80–100)
METAMYELOCYTES RELATIVE PERCENT: 4 %
MICROALBUMIN UR-MCNC: 5.2 MG/DL
MICROALBUMIN/CREAT UR-RTO: 19.6 MG/G (ref 0–30)
MONOCYTES ABSOLUTE: 0.4 K/UL (ref 0.2–0.8)
MONOCYTES RELATIVE PERCENT: 3.9 %
NEUTROPHILS ABSOLUTE: 10.2 K/UL (ref 1.4–6.5)
NEUTROPHILS RELATIVE PERCENT: 82 %
NON-HDL CHOLESTEROL: 145 MG/DL
NUCLEATED RED BLOOD CELLS: 1 /100 WBC
PDW BLD-RTO: 13.4 % (ref 11.5–14.5)
PERFORMED ON: ABNORMAL
PERFORMED ON: NORMAL
PLATELET # BLD: 253 K/UL (ref 130–400)
PLATELET # BLD: 266 X10E9/L (ref 150–450)
PLATELET SLIDE REVIEW: NORMAL
POLYCHROMASIA: ABNORMAL
POTASSIUM REFLEX MAGNESIUM: 4.5 MEQ/L (ref 3.5–5.1)
POTASSIUM SERPL-SCNC: 3.8 MMOL/L (ref 3.5–5.3)
POTASSIUM SERPL-SCNC: 3.9 MEQ/L (ref 3.4–4.9)
PRO-BNP: 143 PG/ML
PROMYELOCYTES PERCENT: 1 %
RBC # BLD: 4.17 M/UL (ref 4.2–5.4)
RBC # BLD: 4.57 X10E12/L (ref 4–5.2)
SODIUM BLD-SCNC: 140 MEQ/L (ref 132–144)
SODIUM BLD-SCNC: 140 MMOL/L (ref 136–145)
SODIUM BLD-SCNC: 143 MEQ/L (ref 135–144)
TOTAL PROTEIN: 6.8 G/DL (ref 6.4–8.2)
TRIGL SERPL-MCNC: 240 MG/DL (ref 0–149)
UREA NITROGEN: 16 MG/DL (ref 6–23)
VITAMIN D 25-HYDROXY: 32.5 NG/ML (ref 30–100)
VLDLC SERPL CALC-MCNC: 48 MG/DL (ref 0–40)
WBC # BLD: 11 K/UL (ref 4.8–10.8)
WBC: 8 X10E9/L (ref 4.4–11.3)

## 2019-01-01 PROCEDURE — 64421 NJX AA&/STRD NTRCOST NRV EA: CPT | Performed by: PHYSICAL MEDICINE & REHABILITATION

## 2019-01-01 PROCEDURE — 6360000004 HC RX CONTRAST MEDICATION

## 2019-01-01 PROCEDURE — G0438 PPPS, INITIAL VISIT: HCPCS | Performed by: NURSE PRACTITIONER

## 2019-01-01 PROCEDURE — 83880 ASSAY OF NATRIURETIC PEPTIDE: CPT

## 2019-01-01 PROCEDURE — G0424 PULMONARY REHAB W EXER: HCPCS

## 2019-01-01 PROCEDURE — 6370000000 HC RX 637 (ALT 250 FOR IP): Performed by: PHYSICIAN ASSISTANT

## 2019-01-01 PROCEDURE — 71260 CT THORAX DX C+: CPT

## 2019-01-01 PROCEDURE — 6360000002 HC RX W HCPCS: Performed by: INTERNAL MEDICINE

## 2019-01-01 PROCEDURE — 7100000011 HC PHASE II RECOVERY - ADDTL 15 MIN: Performed by: SPECIALIST

## 2019-01-01 PROCEDURE — 99232 SBSQ HOSP IP/OBS MODERATE 35: CPT | Performed by: INTERNAL MEDICINE

## 2019-01-01 PROCEDURE — 76942 ECHO GUIDE FOR BIOPSY: CPT | Performed by: PHYSICAL MEDICINE & REHABILITATION

## 2019-01-01 PROCEDURE — 99215 OFFICE O/P EST HI 40 MIN: CPT | Performed by: INTERNAL MEDICINE

## 2019-01-01 PROCEDURE — 97140 MANUAL THERAPY 1/> REGIONS: CPT

## 2019-01-01 PROCEDURE — 82962 GLUCOSE BLOOD TEST: CPT | Performed by: INTERNAL MEDICINE

## 2019-01-01 PROCEDURE — 6370000000 HC RX 637 (ALT 250 FOR IP): Performed by: INTERNAL MEDICINE

## 2019-01-01 PROCEDURE — 2700000000 HC OXYGEN THERAPY PER DAY

## 2019-01-01 PROCEDURE — 3700000000 HC ANESTHESIA ATTENDED CARE: Performed by: SPECIALIST

## 2019-01-01 PROCEDURE — 3609017100 HC EGD: Performed by: SPECIALIST

## 2019-01-01 PROCEDURE — 6360000002 HC RX W HCPCS: Performed by: FAMILY MEDICINE

## 2019-01-01 PROCEDURE — 1210000000 HC MED SURG R&B

## 2019-01-01 PROCEDURE — 20553 NJX 1/MLT TRIGGER POINTS 3/>: CPT | Performed by: PHYSICAL MEDICINE & REHABILITATION

## 2019-01-01 PROCEDURE — 94669 MECHANICAL CHEST WALL OSCILL: CPT

## 2019-01-01 PROCEDURE — 88313 SPECIAL STAINS GROUP 2: CPT

## 2019-01-01 PROCEDURE — 97530 THERAPEUTIC ACTIVITIES: CPT

## 2019-01-01 PROCEDURE — 64445 NJX AA&/STRD SCIATIC NRV IMG: CPT | Performed by: PHYSICAL MEDICINE & REHABILITATION

## 2019-01-01 PROCEDURE — 78306 BONE IMAGING WHOLE BODY: CPT

## 2019-01-01 PROCEDURE — 2500000003 HC RX 250 WO HCPCS: Performed by: NURSE ANESTHETIST, CERTIFIED REGISTERED

## 2019-01-01 PROCEDURE — 2580000003 HC RX 258: Performed by: INTERNAL MEDICINE

## 2019-01-01 PROCEDURE — 96365 THER/PROPH/DIAG IV INF INIT: CPT

## 2019-01-01 PROCEDURE — 7100000010 HC PHASE II RECOVERY - FIRST 15 MIN: Performed by: SPECIALIST

## 2019-01-01 PROCEDURE — 99213 OFFICE O/P EST LOW 20 MIN: CPT | Performed by: FAMILY MEDICINE

## 2019-01-01 PROCEDURE — 94640 AIRWAY INHALATION TREATMENT: CPT

## 2019-01-01 PROCEDURE — 99214 OFFICE O/P EST MOD 30 MIN: CPT | Performed by: PHYSICAL MEDICINE & REHABILITATION

## 2019-01-01 PROCEDURE — 2580000003 HC RX 258: Performed by: SPECIALIST

## 2019-01-01 PROCEDURE — 88305 TISSUE EXAM BY PATHOLOGIST: CPT

## 2019-01-01 PROCEDURE — 85025 COMPLETE CBC W/AUTO DIFF WBC: CPT

## 2019-01-01 PROCEDURE — 6360000002 HC RX W HCPCS: Performed by: NURSE ANESTHETIST, CERTIFIED REGISTERED

## 2019-01-01 PROCEDURE — 73610 X-RAY EXAM OF ANKLE: CPT

## 2019-01-01 PROCEDURE — A9503 TC99M MEDRONATE: HCPCS | Performed by: PHYSICAL MEDICINE & REHABILITATION

## 2019-01-01 PROCEDURE — 99203 OFFICE O/P NEW LOW 30 MIN: CPT | Performed by: SPECIALIST

## 2019-01-01 PROCEDURE — 99212 OFFICE O/P EST SF 10 MIN: CPT | Performed by: SPECIALIST

## 2019-01-01 PROCEDURE — 99213 OFFICE O/P EST LOW 20 MIN: CPT | Performed by: INTERNAL MEDICINE

## 2019-01-01 PROCEDURE — 6370000000 HC RX 637 (ALT 250 FOR IP): Performed by: NURSE ANESTHETIST, CERTIFIED REGISTERED

## 2019-01-01 PROCEDURE — 80048 BASIC METABOLIC PNL TOTAL CA: CPT

## 2019-01-01 PROCEDURE — 36415 COLL VENOUS BLD VENIPUNCTURE: CPT

## 2019-01-01 PROCEDURE — 99215 OFFICE O/P EST HI 40 MIN: CPT | Performed by: PHYSICAL MEDICINE & REHABILITATION

## 2019-01-01 PROCEDURE — 2500000003 HC RX 250 WO HCPCS: Performed by: INTERNAL MEDICINE

## 2019-01-01 PROCEDURE — 43239 EGD BIOPSY SINGLE/MULTIPLE: CPT | Performed by: SPECIALIST

## 2019-01-01 PROCEDURE — 3430000000 HC RX DIAGNOSTIC RADIOPHARMACEUTICAL: Performed by: PHYSICAL MEDICINE & REHABILITATION

## 2019-01-01 RX ORDER — OXYCODONE HYDROCHLORIDE 10 MG/1
5-10 TABLET ORAL EVERY 6 HOURS PRN
Qty: 60 TABLET | Refills: 0 | Status: SHIPPED | OUTPATIENT
Start: 2019-01-01 | End: 2019-01-01

## 2019-01-01 RX ORDER — LIDOCAINE HYDROCHLORIDE 10 MG/ML
1 INJECTION, SOLUTION EPIDURAL; INFILTRATION; INTRACAUDAL; PERINEURAL
Status: DISCONTINUED | OUTPATIENT
Start: 2019-01-01 | End: 2019-01-01 | Stop reason: HOSPADM

## 2019-01-01 RX ORDER — ESOMEPRAZOLE MAGNESIUM 40 MG/1
CAPSULE, DELAYED RELEASE ORAL
Qty: 90 CAPSULE | Refills: 1 | Status: SHIPPED | OUTPATIENT
Start: 2019-01-01 | End: 2020-01-01

## 2019-01-01 RX ORDER — OXYCODONE HYDROCHLORIDE 10 MG/1
10 TABLET ORAL EVERY 8 HOURS PRN
Qty: 80 TABLET | Refills: 0 | Status: SHIPPED | OUTPATIENT
Start: 2019-01-01 | End: 2020-01-01 | Stop reason: SDUPTHER

## 2019-01-01 RX ORDER — SODIUM CHLORIDE 0.9 % (FLUSH) 0.9 %
10 SYRINGE (ML) INJECTION
Status: DISPENSED | OUTPATIENT
Start: 2019-01-01 | End: 2019-01-01

## 2019-01-01 RX ORDER — PROPOFOL 10 MG/ML
INJECTION, EMULSION INTRAVENOUS PRN
Status: DISCONTINUED | OUTPATIENT
Start: 2019-01-01 | End: 2019-01-01 | Stop reason: SDUPTHER

## 2019-01-01 RX ORDER — TRAMADOL HYDROCHLORIDE 50 MG/1
50 TABLET ORAL EVERY 6 HOURS PRN
COMMUNITY
End: 2019-01-01

## 2019-01-01 RX ORDER — ZOLEDRONIC ACID 5 MG/100ML
5 INJECTION, SOLUTION INTRAVENOUS ONCE
Status: COMPLETED | OUTPATIENT
Start: 2019-01-01 | End: 2019-01-01

## 2019-01-01 RX ORDER — PREDNISONE 10 MG/1
TABLET ORAL
Qty: 67 TABLET | Refills: 0 | Status: SHIPPED | OUTPATIENT
Start: 2019-01-01 | End: 2020-01-01 | Stop reason: CLARIF

## 2019-01-01 RX ORDER — THEOPHYLLINE ANHYDROUS 300 MG/1
CAPSULE, EXTENDED RELEASE ORAL DAILY
COMMUNITY
Start: 2019-01-01

## 2019-01-01 RX ORDER — SODIUM CHLORIDE 0.9 % (FLUSH) 0.9 %
10 SYRINGE (ML) INJECTION EVERY 12 HOURS SCHEDULED
Status: DISCONTINUED | OUTPATIENT
Start: 2019-01-01 | End: 2019-01-01 | Stop reason: HOSPADM

## 2019-01-01 RX ORDER — SODIUM CHLORIDE 9 MG/ML
INJECTION, SOLUTION INTRAVENOUS CONTINUOUS
Status: DISCONTINUED | OUTPATIENT
Start: 2019-01-01 | End: 2019-01-01 | Stop reason: HOSPADM

## 2019-01-01 RX ORDER — LIDOCAINE HYDROCHLORIDE 20 MG/ML
5 INJECTION, SOLUTION INFILTRATION; PERINEURAL ONCE
Status: COMPLETED | OUTPATIENT
Start: 2019-01-01 | End: 2019-01-01

## 2019-01-01 RX ORDER — GABAPENTIN 300 MG/1
CAPSULE ORAL 3 TIMES DAILY
Refills: 2 | COMMUNITY
Start: 2019-01-01

## 2019-01-01 RX ORDER — GLYCOPYRROLATE 1 MG/5 ML
SYRINGE (ML) INTRAVENOUS PRN
Status: DISCONTINUED | OUTPATIENT
Start: 2019-01-01 | End: 2019-01-01 | Stop reason: SDUPTHER

## 2019-01-01 RX ORDER — LORAZEPAM 0.5 MG/1
0.5 TABLET ORAL EVERY 12 HOURS PRN
Qty: 24 TABLET | Refills: 0 | Status: ON HOLD | OUTPATIENT
Start: 2019-01-01 | End: 2020-01-01 | Stop reason: ALTCHOICE

## 2019-01-01 RX ORDER — LIDOCAINE HYDROCHLORIDE 20 MG/ML
SOLUTION OROPHARYNGEAL PRN
Status: DISCONTINUED | OUTPATIENT
Start: 2019-01-01 | End: 2019-01-01 | Stop reason: SDUPTHER

## 2019-01-01 RX ORDER — SODIUM CHLORIDE 0.9 % (FLUSH) 0.9 %
10 SYRINGE (ML) INJECTION PRN
Status: DISCONTINUED | OUTPATIENT
Start: 2019-01-01 | End: 2019-01-01 | Stop reason: HOSPADM

## 2019-01-01 RX ORDER — LIDOCAINE HYDROCHLORIDE 10 MG/ML
16 INJECTION, SOLUTION INFILTRATION; PERINEURAL ONCE
Status: COMPLETED | OUTPATIENT
Start: 2019-01-01 | End: 2019-01-01

## 2019-01-01 RX ORDER — ZOLEDRONIC ACID 5 MG/100ML
5 INJECTION, SOLUTION INTRAVENOUS ONCE
Qty: 100 ML | Refills: 0 | Status: SHIPPED | OUTPATIENT
Start: 2019-01-01 | End: 2019-01-01

## 2019-01-01 RX ORDER — TC 99M MEDRONATE 20 MG/10ML
25 INJECTION, POWDER, LYOPHILIZED, FOR SOLUTION INTRAVENOUS
Status: COMPLETED | OUTPATIENT
Start: 2019-01-01 | End: 2019-01-01

## 2019-01-01 RX ORDER — LIDOCAINE HYDROCHLORIDE 10 MG/ML
8 INJECTION, SOLUTION INFILTRATION; PERINEURAL ONCE
Status: COMPLETED | OUTPATIENT
Start: 2019-01-01 | End: 2019-01-01

## 2019-01-01 RX ORDER — PREDNISONE 10 MG/1
TABLET ORAL
Qty: 67 TABLET | Refills: 1 | Status: SHIPPED | OUTPATIENT
Start: 2019-01-01 | End: 2020-01-01

## 2019-01-01 RX ORDER — PREDNISONE 10 MG/1
TABLET ORAL
Qty: 67 TABLET | Refills: 0 | Status: SHIPPED | OUTPATIENT
Start: 2019-01-01 | End: 2019-01-01 | Stop reason: SDUPTHER

## 2019-01-01 RX ORDER — LIDOCAINE HYDROCHLORIDE 10 MG/ML
24 INJECTION, SOLUTION INFILTRATION; PERINEURAL ONCE
Status: COMPLETED | OUTPATIENT
Start: 2019-01-01 | End: 2019-01-01

## 2019-01-01 RX ORDER — LIDOCAINE HYDROCHLORIDE 20 MG/ML
15 SOLUTION OROPHARYNGEAL
Status: DISCONTINUED | OUTPATIENT
Start: 2019-01-01 | End: 2019-01-01 | Stop reason: HOSPADM

## 2019-01-01 RX ORDER — LIDOCAINE HYDROCHLORIDE 20 MG/ML
INJECTION, SOLUTION INFILTRATION; PERINEURAL PRN
Status: DISCONTINUED | OUTPATIENT
Start: 2019-01-01 | End: 2019-01-01 | Stop reason: SDUPTHER

## 2019-01-01 RX ADMIN — FORMOTEROL FUMARATE DIHYDRATE 20 MCG: 20 SOLUTION RESPIRATORY (INHALATION) at 18:52

## 2019-01-01 RX ADMIN — FORMOTEROL FUMARATE DIHYDRATE 20 MCG: 20 SOLUTION RESPIRATORY (INHALATION) at 05:57

## 2019-01-01 RX ADMIN — IPRATROPIUM BROMIDE AND ALBUTEROL SULFATE 1 AMPULE: .5; 3 SOLUTION RESPIRATORY (INHALATION) at 18:52

## 2019-01-01 RX ADMIN — LEVOTHYROXINE SODIUM 125 MCG: 125 TABLET ORAL at 05:33

## 2019-01-01 RX ADMIN — LIDOCAINE HYDROCHLORIDE 15 ML: 20 SOLUTION ORAL; TOPICAL at 08:39

## 2019-01-01 RX ADMIN — LORAZEPAM 0.5 MG: 0.5 TABLET ORAL at 22:52

## 2019-01-01 RX ADMIN — LIDOCAINE HYDROCHLORIDE 5 ML: 20 INJECTION, SOLUTION INFILTRATION; PERINEURAL at 12:33

## 2019-01-01 RX ADMIN — IOPAMIDOL 75 ML: 612 INJECTION, SOLUTION INTRAVENOUS at 15:44

## 2019-01-01 RX ADMIN — IPRATROPIUM BROMIDE AND ALBUTEROL SULFATE 1 AMPULE: .5; 3 SOLUTION RESPIRATORY (INHALATION) at 05:56

## 2019-01-01 RX ADMIN — GUAIFENESIN 600 MG: 600 TABLET, EXTENDED RELEASE ORAL at 21:10

## 2019-01-01 RX ADMIN — METHYLPREDNISOLONE SODIUM SUCCINATE 40 MG: 40 INJECTION, POWDER, FOR SOLUTION INTRAMUSCULAR; INTRAVENOUS at 15:29

## 2019-01-01 RX ADMIN — LIDOCAINE HYDROCHLORIDE 16 ML: 10 INJECTION, SOLUTION INFILTRATION; PERINEURAL at 15:40

## 2019-01-01 RX ADMIN — THEOPHYLLINE 600 MG: 400 TABLET, EXTENDED RELEASE ORAL at 17:15

## 2019-01-01 RX ADMIN — CLOPIDOGREL BISULFATE 75 MG: 75 TABLET ORAL at 08:15

## 2019-01-01 RX ADMIN — PROPOFOL 200 MG: 10 INJECTION, EMULSION INTRAVENOUS at 08:39

## 2019-01-01 RX ADMIN — DIGOXIN 125 MCG: 125 TABLET ORAL at 11:53

## 2019-01-01 RX ADMIN — IPRATROPIUM BROMIDE AND ALBUTEROL SULFATE 1 AMPULE: .5; 3 SOLUTION RESPIRATORY (INHALATION) at 09:58

## 2019-01-01 RX ADMIN — TC 99M MEDRONATE 21.8 MILLICURIE: 20 INJECTION, POWDER, LYOPHILIZED, FOR SOLUTION INTRAVENOUS at 10:15

## 2019-01-01 RX ADMIN — LIDOCAINE HYDROCHLORIDE 50 MG: 20 INJECTION, SOLUTION INFILTRATION; PERINEURAL at 08:40

## 2019-01-01 RX ADMIN — BUDESONIDE 500 MCG: 0.5 SUSPENSION RESPIRATORY (INHALATION) at 18:52

## 2019-01-01 RX ADMIN — GUAIFENESIN 600 MG: 600 TABLET, EXTENDED RELEASE ORAL at 08:14

## 2019-01-01 RX ADMIN — VERAPAMIL HYDROCHLORIDE 240 MG: 240 TABLET, FILM COATED, EXTENDED RELEASE ORAL at 08:14

## 2019-01-01 RX ADMIN — ATORVASTATIN CALCIUM 80 MG: 80 TABLET, FILM COATED ORAL at 21:11

## 2019-01-01 RX ADMIN — Medication 0.2 MG: at 08:39

## 2019-01-01 RX ADMIN — IPRATROPIUM BROMIDE AND ALBUTEROL SULFATE 1 AMPULE: .5; 3 SOLUTION RESPIRATORY (INHALATION) at 16:15

## 2019-01-01 RX ADMIN — METHYLPREDNISOLONE SODIUM SUCCINATE 40 MG: 40 INJECTION, POWDER, FOR SOLUTION INTRAMUSCULAR; INTRAVENOUS at 22:51

## 2019-01-01 RX ADMIN — ENOXAPARIN SODIUM 40 MG: 40 INJECTION SUBCUTANEOUS at 08:13

## 2019-01-01 RX ADMIN — METHYLPREDNISOLONE SODIUM SUCCINATE 40 MG: 40 INJECTION, POWDER, FOR SOLUTION INTRAMUSCULAR; INTRAVENOUS at 05:33

## 2019-01-01 RX ADMIN — Medication 10 ML: at 21:11

## 2019-01-01 RX ADMIN — INSULIN GLARGINE 110 UNITS: 100 INJECTION, SOLUTION SUBCUTANEOUS at 21:10

## 2019-01-01 RX ADMIN — PANTOPRAZOLE SODIUM 40 MG: 40 TABLET, DELAYED RELEASE ORAL at 05:33

## 2019-01-01 RX ADMIN — LIDOCAINE HYDROCHLORIDE 8 ML: 10 INJECTION, SOLUTION INFILTRATION; PERINEURAL at 12:32

## 2019-01-01 RX ADMIN — LIDOCAINE HYDROCHLORIDE 24 ML: 10 INJECTION, SOLUTION INFILTRATION; PERINEURAL at 16:15

## 2019-01-01 RX ADMIN — ZOLEDRONIC ACID 5 MG: 5 INJECTION, SOLUTION INTRAVENOUS at 15:12

## 2019-01-01 RX ADMIN — ISOSORBIDE MONONITRATE 60 MG: 30 TABLET, EXTENDED RELEASE ORAL at 08:14

## 2019-01-01 RX ADMIN — VERAPAMIL HYDROCHLORIDE 240 MG: 240 TABLET, FILM COATED, EXTENDED RELEASE ORAL at 21:11

## 2019-01-01 RX ADMIN — LIDOCAINE HYDROCHLORIDE 5 ML: 20 INJECTION, SOLUTION INFILTRATION; PERINEURAL at 15:40

## 2019-01-01 RX ADMIN — LIDOCAINE HYDROCHLORIDE 24 ML: 10 INJECTION, SOLUTION INFILTRATION; PERINEURAL at 16:11

## 2019-01-01 RX ADMIN — DOXYCYCLINE 100 MG: 100 INJECTION, POWDER, LYOPHILIZED, FOR SOLUTION INTRAVENOUS at 05:33

## 2019-01-01 RX ADMIN — BUDESONIDE 500 MCG: 0.5 SUSPENSION RESPIRATORY (INHALATION) at 05:56

## 2019-01-01 RX ADMIN — ALBUTEROL SULFATE 2.5 MG: 2.5 SOLUTION RESPIRATORY (INHALATION) at 22:47

## 2019-01-01 RX ADMIN — Medication 10 ML: at 08:14

## 2019-01-01 RX ADMIN — ROFLUMILAST 250 MCG: 500 TABLET ORAL at 08:14

## 2019-01-01 RX ADMIN — SODIUM CHLORIDE: 9 INJECTION, SOLUTION INTRAVENOUS at 08:30

## 2019-01-01 RX ADMIN — DOXYCYCLINE 100 MG: 100 INJECTION, POWDER, LYOPHILIZED, FOR SOLUTION INTRAVENOUS at 16:50

## 2019-01-01 ASSESSMENT — PAIN DESCRIPTION - PAIN TYPE
TYPE: CHRONIC PAIN

## 2019-01-01 ASSESSMENT — ENCOUNTER SYMPTOMS
BACK PAIN: 1
ABDOMINAL PAIN: 0
RHINORRHEA: 0
EYE DISCHARGE: 0
ABDOMINAL PAIN: 0
SORE THROAT: 0
RECTAL PAIN: 0
ANAL BLEEDING: 0
TROUBLE SWALLOWING: 0
EYES NEGATIVE: 1
ABDOMINAL PAIN: 0
SHORTNESS OF BREATH: 1
VOMITING: 0
SHORTNESS OF BREATH: 1
NAUSEA: 0
CONSTIPATION: 0
STRIDOR: 0
ABDOMINAL PAIN: 0
SHORTNESS OF BREATH: 1
GASTROINTESTINAL NEGATIVE: 1
COUGH: 1
BLOOD IN STOOL: 0
SHORTNESS OF BREATH: 1
NAUSEA: 0
SINUS PRESSURE: 0
ALLERGIC/IMMUNOLOGIC NEGATIVE: 1
APNEA: 1
SINUS PRESSURE: 1
DIARRHEA: 0
NAUSEA: 0
SORE THROAT: 0
DIARRHEA: 0
BOWEL INCONTINENCE: 0
VOMITING: 0
GASTROINTESTINAL NEGATIVE: 1
STRIDOR: 0
DIARRHEA: 0
EYE ITCHING: 0
ABDOMINAL DISTENTION: 0
VOMITING: 0
RESPIRATORY NEGATIVE: 1
CHEST TIGHTNESS: 0
RECTAL PAIN: 0
NAUSEA: 0
WHEEZING: 1
ABDOMINAL DISTENTION: 0
DIARRHEA: 0
DIARRHEA: 0
BACK PAIN: 1
VOMITING: 1
WHEEZING: 1
WHEEZING: 1
SINUS PRESSURE: 0
CHOKING: 0
CONSTIPATION: 0
PHOTOPHOBIA: 0
CHOKING: 0
NAUSEA: 0
ALLERGIC/IMMUNOLOGIC NEGATIVE: 1
SHORTNESS OF BREATH: 1
CONSTIPATION: 1
EYES NEGATIVE: 1
ABDOMINAL PAIN: 0
SHORTNESS OF BREATH: 1
BOWEL INCONTINENCE: 0
WHEEZING: 1
SHORTNESS OF BREATH: 1
SINUS PRESSURE: 1
EYES NEGATIVE: 1
RHINORRHEA: 0
EYES NEGATIVE: 1
CHEST TIGHTNESS: 1
TROUBLE SWALLOWING: 0
EYE DISCHARGE: 0
COUGH: 1
EYE ITCHING: 0
DIARRHEA: 0
VOMITING: 1
NAUSEA: 0
VOMITING: 0
APNEA: 1
BLOOD IN STOOL: 0
CHEST TIGHTNESS: 0
VOICE CHANGE: 0
VOICE CHANGE: 0
CHEST TIGHTNESS: 1
PHOTOPHOBIA: 0
ABDOMINAL PAIN: 0
ANAL BLEEDING: 0
CONSTIPATION: 1

## 2019-01-01 ASSESSMENT — PAIN SCALES - GENERAL
PAINLEVEL_OUTOF10: 7
PAINLEVEL_OUTOF10: 5
PAINLEVEL_OUTOF10: 5
PAINLEVEL_OUTOF10: 0

## 2019-01-01 ASSESSMENT — PAIN DESCRIPTION - LOCATION: LOCATION: ABDOMEN

## 2019-01-01 ASSESSMENT — LIFESTYLE VARIABLES
HOW OFTEN DO YOU HAVE A DRINK CONTAINING ALCOHOL: 0
HOW OFTEN DO YOU HAVE A DRINK CONTAINING ALCOHOL: 0

## 2019-01-01 ASSESSMENT — COPD QUESTIONNAIRES: CAT_SEVERITY: MILD

## 2019-01-01 ASSESSMENT — PATIENT HEALTH QUESTIONNAIRE - PHQ9
SUM OF ALL RESPONSES TO PHQ QUESTIONS 1-9: 0
SUM OF ALL RESPONSES TO PHQ QUESTIONS 1-9: 0

## 2019-01-01 ASSESSMENT — PAIN DESCRIPTION - ORIENTATION: ORIENTATION: MID

## 2019-01-02 LAB
ANION GAP SERPL CALCULATED.3IONS-SCNC: 15 MEQ/L (ref 7–13)
BASOPHILS ABSOLUTE: 0 K/UL (ref 0–0.2)
BASOPHILS RELATIVE PERCENT: 0.2 %
BLOOD CULTURE, ROUTINE: NORMAL
BUN BLDV-MCNC: 28 MG/DL (ref 8–23)
CALCIUM SERPL-MCNC: 8.7 MG/DL (ref 8.6–10.2)
CHLORIDE BLD-SCNC: 99 MEQ/L (ref 98–107)
CO2: 25 MEQ/L (ref 22–29)
CREAT SERPL-MCNC: 0.84 MG/DL (ref 0.5–0.9)
CULTURE, BLOOD 2: NORMAL
EOSINOPHILS ABSOLUTE: 0 K/UL (ref 0–0.7)
EOSINOPHILS RELATIVE PERCENT: 0 %
GFR AFRICAN AMERICAN: >60
GFR NON-AFRICAN AMERICAN: >60
GLUCOSE BLD-MCNC: 145 MG/DL (ref 60–115)
GLUCOSE BLD-MCNC: 252 MG/DL (ref 60–115)
GLUCOSE BLD-MCNC: 280 MG/DL (ref 60–115)
GLUCOSE BLD-MCNC: 282 MG/DL (ref 60–115)
GLUCOSE BLD-MCNC: 302 MG/DL (ref 74–109)
HBA1C MFR BLD: 9.9 % (ref 4.8–5.9)
HCT VFR BLD CALC: 40.1 % (ref 37–47)
HEMOGLOBIN: 13.5 G/DL (ref 12–16)
LYMPHOCYTES ABSOLUTE: 0.6 K/UL (ref 1–4.8)
LYMPHOCYTES RELATIVE PERCENT: 4.7 %
MCH RBC QN AUTO: 30.3 PG (ref 27–31.3)
MCHC RBC AUTO-ENTMCNC: 33.6 % (ref 33–37)
MCV RBC AUTO: 90.2 FL (ref 82–100)
MONOCYTES ABSOLUTE: 0.5 K/UL (ref 0.2–0.8)
MONOCYTES RELATIVE PERCENT: 3.6 %
NEUTROPHILS ABSOLUTE: 11.8 K/UL (ref 1.4–6.5)
NEUTROPHILS RELATIVE PERCENT: 91.5 %
PDW BLD-RTO: 13.8 % (ref 11.5–14.5)
PERFORMED ON: ABNORMAL
PLATELET # BLD: 307 K/UL (ref 130–400)
POTASSIUM REFLEX MAGNESIUM: 4.2 MEQ/L (ref 3.5–5.1)
RBC # BLD: 4.44 M/UL (ref 4.2–5.4)
SODIUM BLD-SCNC: 139 MEQ/L (ref 132–144)
T4 FREE: 1.24 NG/DL (ref 0.93–1.7)
TSH SERPL DL<=0.05 MIU/L-ACNC: 0.72 UIU/ML (ref 0.27–4.2)
WBC # BLD: 12.9 K/UL (ref 4.8–10.8)

## 2019-01-02 PROCEDURE — 1210000000 HC MED SURG R&B

## 2019-01-02 PROCEDURE — 80048 BASIC METABOLIC PNL TOTAL CA: CPT

## 2019-01-02 PROCEDURE — 94760 N-INVAS EAR/PLS OXIMETRY 1: CPT

## 2019-01-02 PROCEDURE — 99232 SBSQ HOSP IP/OBS MODERATE 35: CPT | Performed by: INTERNAL MEDICINE

## 2019-01-02 PROCEDURE — 94640 AIRWAY INHALATION TREATMENT: CPT

## 2019-01-02 PROCEDURE — 2500000003 HC RX 250 WO HCPCS: Performed by: INTERNAL MEDICINE

## 2019-01-02 PROCEDURE — 6360000002 HC RX W HCPCS: Performed by: INTERNAL MEDICINE

## 2019-01-02 PROCEDURE — 85025 COMPLETE CBC W/AUTO DIFF WBC: CPT

## 2019-01-02 PROCEDURE — 6370000000 HC RX 637 (ALT 250 FOR IP): Performed by: INTERNAL MEDICINE

## 2019-01-02 PROCEDURE — 2580000003 HC RX 258: Performed by: INTERNAL MEDICINE

## 2019-01-02 PROCEDURE — 6370000000 HC RX 637 (ALT 250 FOR IP): Performed by: PHYSICIAN ASSISTANT

## 2019-01-02 PROCEDURE — APPSS15 APP SPLIT SHARED TIME 0-15 MINUTES: Performed by: PHYSICIAN ASSISTANT

## 2019-01-02 PROCEDURE — 94669 MECHANICAL CHEST WALL OSCILL: CPT

## 2019-01-02 PROCEDURE — 36415 COLL VENOUS BLD VENIPUNCTURE: CPT

## 2019-01-02 PROCEDURE — 2700000000 HC OXYGEN THERAPY PER DAY

## 2019-01-02 PROCEDURE — 83036 HEMOGLOBIN GLYCOSYLATED A1C: CPT

## 2019-01-02 PROCEDURE — 84439 ASSAY OF FREE THYROXINE: CPT

## 2019-01-02 PROCEDURE — 84443 ASSAY THYROID STIM HORMONE: CPT

## 2019-01-02 RX ORDER — LACTULOSE 10 G/15ML
30 SOLUTION ORAL ONCE
Status: DISCONTINUED | OUTPATIENT
Start: 2019-01-02 | End: 2019-01-02

## 2019-01-02 RX ADMIN — PANTOPRAZOLE SODIUM 40 MG: 40 TABLET, DELAYED RELEASE ORAL at 05:00

## 2019-01-02 RX ADMIN — VERAPAMIL HYDROCHLORIDE 240 MG: 240 TABLET, FILM COATED, EXTENDED RELEASE ORAL at 20:43

## 2019-01-02 RX ADMIN — ATORVASTATIN CALCIUM 80 MG: 80 TABLET, FILM COATED ORAL at 20:42

## 2019-01-02 RX ADMIN — BUDESONIDE 500 MCG: 0.5 SUSPENSION RESPIRATORY (INHALATION) at 19:10

## 2019-01-02 RX ADMIN — ENOXAPARIN SODIUM 40 MG: 40 INJECTION SUBCUTANEOUS at 08:45

## 2019-01-02 RX ADMIN — BUDESONIDE 500 MCG: 0.5 SUSPENSION RESPIRATORY (INHALATION) at 07:26

## 2019-01-02 RX ADMIN — FORMOTEROL FUMARATE DIHYDRATE 20 MCG: 20 SOLUTION RESPIRATORY (INHALATION) at 07:26

## 2019-01-02 RX ADMIN — GUAIFENESIN 600 MG: 600 TABLET, EXTENDED RELEASE ORAL at 20:42

## 2019-01-02 RX ADMIN — GUAIFENESIN 600 MG: 600 TABLET, EXTENDED RELEASE ORAL at 08:45

## 2019-01-02 RX ADMIN — DIGOXIN 125 MCG: 125 TABLET ORAL at 12:42

## 2019-01-02 RX ADMIN — IPRATROPIUM BROMIDE AND ALBUTEROL SULFATE 1 AMPULE: .5; 3 SOLUTION RESPIRATORY (INHALATION) at 07:26

## 2019-01-02 RX ADMIN — DOXYCYCLINE 100 MG: 100 INJECTION, POWDER, LYOPHILIZED, FOR SOLUTION INTRAVENOUS at 17:14

## 2019-01-02 RX ADMIN — METHYLPREDNISOLONE SODIUM SUCCINATE 40 MG: 40 INJECTION, POWDER, FOR SOLUTION INTRAMUSCULAR; INTRAVENOUS at 20:43

## 2019-01-02 RX ADMIN — ISOSORBIDE MONONITRATE 60 MG: 30 TABLET, EXTENDED RELEASE ORAL at 08:45

## 2019-01-02 RX ADMIN — FORMOTEROL FUMARATE DIHYDRATE 20 MCG: 20 SOLUTION RESPIRATORY (INHALATION) at 19:10

## 2019-01-02 RX ADMIN — Medication 10 ML: at 22:57

## 2019-01-02 RX ADMIN — ALBUTEROL SULFATE 2.5 MG: 2.5 SOLUTION RESPIRATORY (INHALATION) at 03:06

## 2019-01-02 RX ADMIN — LORAZEPAM 0.5 MG: 0.5 TABLET ORAL at 23:12

## 2019-01-02 RX ADMIN — ALBUTEROL SULFATE 2.5 MG: 2.5 SOLUTION RESPIRATORY (INHALATION) at 23:14

## 2019-01-02 RX ADMIN — IPRATROPIUM BROMIDE AND ALBUTEROL SULFATE 1 AMPULE: .5; 3 SOLUTION RESPIRATORY (INHALATION) at 15:29

## 2019-01-02 RX ADMIN — IPRATROPIUM BROMIDE AND ALBUTEROL SULFATE 1 AMPULE: .5; 3 SOLUTION RESPIRATORY (INHALATION) at 11:54

## 2019-01-02 RX ADMIN — INSULIN GLARGINE 110 UNITS: 100 INJECTION, SOLUTION SUBCUTANEOUS at 20:47

## 2019-01-02 RX ADMIN — METHYLPREDNISOLONE SODIUM SUCCINATE 40 MG: 40 INJECTION, POWDER, FOR SOLUTION INTRAMUSCULAR; INTRAVENOUS at 04:59

## 2019-01-02 RX ADMIN — LEVOTHYROXINE SODIUM 125 MCG: 125 TABLET ORAL at 05:00

## 2019-01-02 RX ADMIN — ROFLUMILAST 250 MCG: 500 TABLET ORAL at 08:46

## 2019-01-02 RX ADMIN — VERAPAMIL HYDROCHLORIDE 240 MG: 240 TABLET, FILM COATED, EXTENDED RELEASE ORAL at 08:45

## 2019-01-02 RX ADMIN — IPRATROPIUM BROMIDE AND ALBUTEROL SULFATE 1 AMPULE: .5; 3 SOLUTION RESPIRATORY (INHALATION) at 19:10

## 2019-01-02 RX ADMIN — Medication 10 ML: at 09:00

## 2019-01-02 RX ADMIN — DOXYCYCLINE 100 MG: 100 INJECTION, POWDER, LYOPHILIZED, FOR SOLUTION INTRAVENOUS at 04:59

## 2019-01-02 RX ADMIN — THEOPHYLLINE 600 MG: 400 TABLET, EXTENDED RELEASE ORAL at 17:14

## 2019-01-02 RX ADMIN — CLOPIDOGREL BISULFATE 75 MG: 75 TABLET ORAL at 08:45

## 2019-01-02 RX ADMIN — METHYLPREDNISOLONE SODIUM SUCCINATE 40 MG: 40 INJECTION, POWDER, FOR SOLUTION INTRAMUSCULAR; INTRAVENOUS at 14:53

## 2019-01-03 LAB
ANION GAP SERPL CALCULATED.3IONS-SCNC: 13 MEQ/L (ref 7–13)
BASOPHILS ABSOLUTE: 0 K/UL (ref 0–0.2)
BASOPHILS RELATIVE PERCENT: 0.1 %
BUN BLDV-MCNC: 28 MG/DL (ref 8–23)
CALCIUM SERPL-MCNC: 8.5 MG/DL (ref 8.6–10.2)
CHLORIDE BLD-SCNC: 99 MEQ/L (ref 98–107)
CO2: 27 MEQ/L (ref 22–29)
CREAT SERPL-MCNC: 0.69 MG/DL (ref 0.5–0.9)
EOSINOPHILS ABSOLUTE: 0 K/UL (ref 0–0.7)
EOSINOPHILS RELATIVE PERCENT: 0 %
GFR AFRICAN AMERICAN: >60
GFR NON-AFRICAN AMERICAN: >60
GLUCOSE BLD-MCNC: 112 MG/DL (ref 60–115)
GLUCOSE BLD-MCNC: 133 MG/DL (ref 60–115)
GLUCOSE BLD-MCNC: 228 MG/DL (ref 60–115)
GLUCOSE BLD-MCNC: 267 MG/DL (ref 60–115)
GLUCOSE BLD-MCNC: 291 MG/DL (ref 74–109)
HCT VFR BLD CALC: 41 % (ref 37–47)
HEMOGLOBIN: 13.8 G/DL (ref 12–16)
LYMPHOCYTES ABSOLUTE: 0.5 K/UL (ref 1–4.8)
LYMPHOCYTES RELATIVE PERCENT: 4.2 %
MCH RBC QN AUTO: 30.5 PG (ref 27–31.3)
MCHC RBC AUTO-ENTMCNC: 33.7 % (ref 33–37)
MCV RBC AUTO: 90.4 FL (ref 82–100)
MONOCYTES ABSOLUTE: 0.5 K/UL (ref 0.2–0.8)
MONOCYTES RELATIVE PERCENT: 3.8 %
NEUTROPHILS ABSOLUTE: 11.5 K/UL (ref 1.4–6.5)
NEUTROPHILS RELATIVE PERCENT: 91.9 %
PDW BLD-RTO: 13.7 % (ref 11.5–14.5)
PERFORMED ON: ABNORMAL
PERFORMED ON: NORMAL
PLATELET # BLD: 285 K/UL (ref 130–400)
POTASSIUM REFLEX MAGNESIUM: 4.2 MEQ/L (ref 3.5–5.1)
RBC # BLD: 4.54 M/UL (ref 4.2–5.4)
SODIUM BLD-SCNC: 139 MEQ/L (ref 132–144)
WBC # BLD: 12.5 K/UL (ref 4.8–10.8)

## 2019-01-03 PROCEDURE — 6370000000 HC RX 637 (ALT 250 FOR IP): Performed by: INTERNAL MEDICINE

## 2019-01-03 PROCEDURE — 1210000000 HC MED SURG R&B

## 2019-01-03 PROCEDURE — 36415 COLL VENOUS BLD VENIPUNCTURE: CPT

## 2019-01-03 PROCEDURE — 80048 BASIC METABOLIC PNL TOTAL CA: CPT

## 2019-01-03 PROCEDURE — 94669 MECHANICAL CHEST WALL OSCILL: CPT

## 2019-01-03 PROCEDURE — 99232 SBSQ HOSP IP/OBS MODERATE 35: CPT | Performed by: INTERNAL MEDICINE

## 2019-01-03 PROCEDURE — 94640 AIRWAY INHALATION TREATMENT: CPT

## 2019-01-03 PROCEDURE — 2580000003 HC RX 258: Performed by: INTERNAL MEDICINE

## 2019-01-03 PROCEDURE — 2700000000 HC OXYGEN THERAPY PER DAY

## 2019-01-03 PROCEDURE — 6360000002 HC RX W HCPCS: Performed by: INTERNAL MEDICINE

## 2019-01-03 PROCEDURE — 85025 COMPLETE CBC W/AUTO DIFF WBC: CPT

## 2019-01-03 PROCEDURE — 2500000003 HC RX 250 WO HCPCS: Performed by: INTERNAL MEDICINE

## 2019-01-03 PROCEDURE — 94664 DEMO&/EVAL PT USE INHALER: CPT

## 2019-01-03 RX ORDER — IPRATROPIUM BROMIDE AND ALBUTEROL SULFATE 2.5; .5 MG/3ML; MG/3ML
1 SOLUTION RESPIRATORY (INHALATION) 3 TIMES DAILY
Status: DISCONTINUED | OUTPATIENT
Start: 2019-01-03 | End: 2019-01-07 | Stop reason: HOSPADM

## 2019-01-03 RX ORDER — METHYLPREDNISOLONE SODIUM SUCCINATE 40 MG/ML
40 INJECTION, POWDER, LYOPHILIZED, FOR SOLUTION INTRAMUSCULAR; INTRAVENOUS EVERY 12 HOURS
Status: DISCONTINUED | OUTPATIENT
Start: 2019-01-04 | End: 2019-01-07 | Stop reason: HOSPADM

## 2019-01-03 RX ORDER — INSULIN GLARGINE 100 [IU]/ML
90 INJECTION, SOLUTION SUBCUTANEOUS NIGHTLY
Status: DISCONTINUED | OUTPATIENT
Start: 2019-01-03 | End: 2019-01-04

## 2019-01-03 RX ADMIN — VERAPAMIL HYDROCHLORIDE 240 MG: 240 TABLET, FILM COATED, EXTENDED RELEASE ORAL at 19:34

## 2019-01-03 RX ADMIN — Medication 10 ML: at 21:35

## 2019-01-03 RX ADMIN — PANTOPRAZOLE SODIUM 40 MG: 40 TABLET, DELAYED RELEASE ORAL at 05:23

## 2019-01-03 RX ADMIN — INSULIN GLARGINE 90 UNITS: 100 INJECTION, SOLUTION SUBCUTANEOUS at 22:16

## 2019-01-03 RX ADMIN — DIGOXIN 125 MCG: 125 TABLET ORAL at 12:51

## 2019-01-03 RX ADMIN — VERAPAMIL HYDROCHLORIDE 240 MG: 240 TABLET, FILM COATED, EXTENDED RELEASE ORAL at 08:55

## 2019-01-03 RX ADMIN — LORAZEPAM 0.5 MG: 0.5 TABLET ORAL at 23:12

## 2019-01-03 RX ADMIN — LEVOTHYROXINE SODIUM 125 MCG: 125 TABLET ORAL at 05:23

## 2019-01-03 RX ADMIN — DOXYCYCLINE 100 MG: 100 INJECTION, POWDER, LYOPHILIZED, FOR SOLUTION INTRAVENOUS at 17:19

## 2019-01-03 RX ADMIN — GUAIFENESIN 600 MG: 600 TABLET, EXTENDED RELEASE ORAL at 19:39

## 2019-01-03 RX ADMIN — DOXYCYCLINE 100 MG: 100 INJECTION, POWDER, LYOPHILIZED, FOR SOLUTION INTRAVENOUS at 05:23

## 2019-01-03 RX ADMIN — CLOPIDOGREL BISULFATE 75 MG: 75 TABLET ORAL at 08:55

## 2019-01-03 RX ADMIN — BUDESONIDE 500 MCG: 0.5 SUSPENSION RESPIRATORY (INHALATION) at 19:24

## 2019-01-03 RX ADMIN — ROFLUMILAST 250 MCG: 500 TABLET ORAL at 08:55

## 2019-01-03 RX ADMIN — ATORVASTATIN CALCIUM 80 MG: 80 TABLET, FILM COATED ORAL at 19:34

## 2019-01-03 RX ADMIN — ALBUTEROL SULFATE 2.5 MG: 2.5 SOLUTION RESPIRATORY (INHALATION) at 23:25

## 2019-01-03 RX ADMIN — GUAIFENESIN 600 MG: 600 TABLET, EXTENDED RELEASE ORAL at 08:55

## 2019-01-03 RX ADMIN — BUDESONIDE 500 MCG: 0.5 SUSPENSION RESPIRATORY (INHALATION) at 07:11

## 2019-01-03 RX ADMIN — Medication 10 ML: at 08:59

## 2019-01-03 RX ADMIN — FORMOTEROL FUMARATE DIHYDRATE 20 MCG: 20 SOLUTION RESPIRATORY (INHALATION) at 07:11

## 2019-01-03 RX ADMIN — METHYLPREDNISOLONE SODIUM SUCCINATE 40 MG: 40 INJECTION, POWDER, FOR SOLUTION INTRAMUSCULAR; INTRAVENOUS at 14:36

## 2019-01-03 RX ADMIN — IPRATROPIUM BROMIDE AND ALBUTEROL SULFATE 1 AMPULE: .5; 3 SOLUTION RESPIRATORY (INHALATION) at 07:11

## 2019-01-03 RX ADMIN — THEOPHYLLINE 600 MG: 400 TABLET, EXTENDED RELEASE ORAL at 17:19

## 2019-01-03 RX ADMIN — IPRATROPIUM BROMIDE AND ALBUTEROL SULFATE 1 AMPULE: .5; 3 SOLUTION RESPIRATORY (INHALATION) at 19:24

## 2019-01-03 RX ADMIN — ALBUTEROL SULFATE 2.5 MG: 2.5 SOLUTION RESPIRATORY (INHALATION) at 11:25

## 2019-01-03 RX ADMIN — ENOXAPARIN SODIUM 40 MG: 40 INJECTION SUBCUTANEOUS at 08:55

## 2019-01-03 RX ADMIN — METHYLPREDNISOLONE SODIUM SUCCINATE 40 MG: 40 INJECTION, POWDER, FOR SOLUTION INTRAMUSCULAR; INTRAVENOUS at 05:23

## 2019-01-03 RX ADMIN — FORMOTEROL FUMARATE DIHYDRATE 20 MCG: 20 SOLUTION RESPIRATORY (INHALATION) at 19:24

## 2019-01-03 RX ADMIN — IPRATROPIUM BROMIDE AND ALBUTEROL SULFATE 1 AMPULE: .5; 3 SOLUTION RESPIRATORY (INHALATION) at 13:34

## 2019-01-03 RX ADMIN — ISOSORBIDE MONONITRATE 60 MG: 30 TABLET, EXTENDED RELEASE ORAL at 08:55

## 2019-01-03 ASSESSMENT — PAIN SCALES - GENERAL: PAINLEVEL_OUTOF10: 0

## 2019-01-04 LAB
ANION GAP SERPL CALCULATED.3IONS-SCNC: 12 MEQ/L (ref 7–13)
BASOPHILS ABSOLUTE: 0 K/UL (ref 0–0.2)
BASOPHILS RELATIVE PERCENT: 0.2 %
BUN BLDV-MCNC: 25 MG/DL (ref 8–23)
CALCIUM SERPL-MCNC: 8.3 MG/DL (ref 8.6–10.2)
CHLORIDE BLD-SCNC: 102 MEQ/L (ref 98–107)
CO2: 28 MEQ/L (ref 22–29)
CREAT SERPL-MCNC: 0.77 MG/DL (ref 0.5–0.9)
EOSINOPHILS ABSOLUTE: 0 K/UL (ref 0–0.7)
EOSINOPHILS RELATIVE PERCENT: 0 %
GFR AFRICAN AMERICAN: >60
GFR NON-AFRICAN AMERICAN: >60
GLUCOSE BLD-MCNC: 197 MG/DL (ref 60–115)
GLUCOSE BLD-MCNC: 227 MG/DL (ref 74–109)
GLUCOSE BLD-MCNC: 237 MG/DL (ref 60–115)
GLUCOSE BLD-MCNC: 291 MG/DL (ref 60–115)
GLUCOSE BLD-MCNC: 77 MG/DL (ref 60–115)
HCT VFR BLD CALC: 40.7 % (ref 37–47)
HEMOGLOBIN: 13.8 G/DL (ref 12–16)
LYMPHOCYTES ABSOLUTE: 0.5 K/UL (ref 1–4.8)
LYMPHOCYTES RELATIVE PERCENT: 4.5 %
MCH RBC QN AUTO: 30.5 PG (ref 27–31.3)
MCHC RBC AUTO-ENTMCNC: 33.9 % (ref 33–37)
MCV RBC AUTO: 90 FL (ref 82–100)
MONOCYTES ABSOLUTE: 0.6 K/UL (ref 0.2–0.8)
MONOCYTES RELATIVE PERCENT: 5 %
NEUTROPHILS ABSOLUTE: 10.4 K/UL (ref 1.4–6.5)
NEUTROPHILS RELATIVE PERCENT: 90.3 %
PDW BLD-RTO: 13.7 % (ref 11.5–14.5)
PERFORMED ON: ABNORMAL
PERFORMED ON: NORMAL
PLATELET # BLD: 239 K/UL (ref 130–400)
POTASSIUM REFLEX MAGNESIUM: 4.2 MEQ/L (ref 3.5–5.1)
RBC # BLD: 4.52 M/UL (ref 4.2–5.4)
SODIUM BLD-SCNC: 142 MEQ/L (ref 132–144)
WBC # BLD: 11.5 K/UL (ref 4.8–10.8)

## 2019-01-04 PROCEDURE — 94640 AIRWAY INHALATION TREATMENT: CPT

## 2019-01-04 PROCEDURE — 6360000002 HC RX W HCPCS: Performed by: INTERNAL MEDICINE

## 2019-01-04 PROCEDURE — 2580000003 HC RX 258: Performed by: INTERNAL MEDICINE

## 2019-01-04 PROCEDURE — 80048 BASIC METABOLIC PNL TOTAL CA: CPT

## 2019-01-04 PROCEDURE — 99232 SBSQ HOSP IP/OBS MODERATE 35: CPT | Performed by: INTERNAL MEDICINE

## 2019-01-04 PROCEDURE — 2700000000 HC OXYGEN THERAPY PER DAY

## 2019-01-04 PROCEDURE — 6370000000 HC RX 637 (ALT 250 FOR IP): Performed by: INTERNAL MEDICINE

## 2019-01-04 PROCEDURE — 85025 COMPLETE CBC W/AUTO DIFF WBC: CPT

## 2019-01-04 PROCEDURE — 36415 COLL VENOUS BLD VENIPUNCTURE: CPT

## 2019-01-04 PROCEDURE — 94669 MECHANICAL CHEST WALL OSCILL: CPT

## 2019-01-04 PROCEDURE — 2500000003 HC RX 250 WO HCPCS: Performed by: INTERNAL MEDICINE

## 2019-01-04 PROCEDURE — 94761 N-INVAS EAR/PLS OXIMETRY MLT: CPT

## 2019-01-04 PROCEDURE — 1210000000 HC MED SURG R&B

## 2019-01-04 RX ORDER — INSULIN GLARGINE 100 [IU]/ML
70 INJECTION, SOLUTION SUBCUTANEOUS NIGHTLY
Status: DISCONTINUED | OUTPATIENT
Start: 2019-01-04 | End: 2019-01-06

## 2019-01-04 RX ADMIN — IPRATROPIUM BROMIDE AND ALBUTEROL SULFATE 1 AMPULE: .5; 3 SOLUTION RESPIRATORY (INHALATION) at 07:34

## 2019-01-04 RX ADMIN — ENOXAPARIN SODIUM 40 MG: 40 INJECTION SUBCUTANEOUS at 08:59

## 2019-01-04 RX ADMIN — ISOSORBIDE MONONITRATE 60 MG: 30 TABLET, EXTENDED RELEASE ORAL at 08:58

## 2019-01-04 RX ADMIN — Medication 10 ML: at 08:59

## 2019-01-04 RX ADMIN — ATORVASTATIN CALCIUM 80 MG: 80 TABLET, FILM COATED ORAL at 20:40

## 2019-01-04 RX ADMIN — VERAPAMIL HYDROCHLORIDE 240 MG: 240 TABLET, FILM COATED, EXTENDED RELEASE ORAL at 08:58

## 2019-01-04 RX ADMIN — CLOPIDOGREL BISULFATE 75 MG: 75 TABLET ORAL at 08:58

## 2019-01-04 RX ADMIN — DOXYCYCLINE 100 MG: 100 INJECTION, POWDER, LYOPHILIZED, FOR SOLUTION INTRAVENOUS at 05:15

## 2019-01-04 RX ADMIN — LEVOTHYROXINE SODIUM 125 MCG: 125 TABLET ORAL at 05:15

## 2019-01-04 RX ADMIN — FORMOTEROL FUMARATE DIHYDRATE 20 MCG: 20 SOLUTION RESPIRATORY (INHALATION) at 19:12

## 2019-01-04 RX ADMIN — BUDESONIDE 500 MCG: 0.5 SUSPENSION RESPIRATORY (INHALATION) at 19:12

## 2019-01-04 RX ADMIN — PANTOPRAZOLE SODIUM 40 MG: 40 TABLET, DELAYED RELEASE ORAL at 05:15

## 2019-01-04 RX ADMIN — GUAIFENESIN 600 MG: 600 TABLET, EXTENDED RELEASE ORAL at 20:40

## 2019-01-04 RX ADMIN — THEOPHYLLINE 600 MG: 400 TABLET, EXTENDED RELEASE ORAL at 17:38

## 2019-01-04 RX ADMIN — LORAZEPAM 0.5 MG: 0.5 TABLET ORAL at 23:08

## 2019-01-04 RX ADMIN — FORMOTEROL FUMARATE DIHYDRATE 20 MCG: 20 SOLUTION RESPIRATORY (INHALATION) at 07:34

## 2019-01-04 RX ADMIN — ALBUTEROL SULFATE 2.5 MG: 2.5 SOLUTION RESPIRATORY (INHALATION) at 04:26

## 2019-01-04 RX ADMIN — INSULIN GLARGINE 70 UNITS: 100 INJECTION, SOLUTION SUBCUTANEOUS at 21:37

## 2019-01-04 RX ADMIN — DIGOXIN 125 MCG: 125 TABLET ORAL at 12:34

## 2019-01-04 RX ADMIN — Medication 10 ML: at 22:23

## 2019-01-04 RX ADMIN — IPRATROPIUM BROMIDE AND ALBUTEROL SULFATE 1 AMPULE: .5; 3 SOLUTION RESPIRATORY (INHALATION) at 19:12

## 2019-01-04 RX ADMIN — BUDESONIDE 500 MCG: 0.5 SUSPENSION RESPIRATORY (INHALATION) at 07:34

## 2019-01-04 RX ADMIN — METHYLPREDNISOLONE SODIUM SUCCINATE 40 MG: 40 INJECTION, POWDER, FOR SOLUTION INTRAMUSCULAR; INTRAVENOUS at 02:59

## 2019-01-04 RX ADMIN — DOXYCYCLINE 100 MG: 100 INJECTION, POWDER, LYOPHILIZED, FOR SOLUTION INTRAVENOUS at 16:39

## 2019-01-04 RX ADMIN — METHYLPREDNISOLONE SODIUM SUCCINATE 40 MG: 40 INJECTION, POWDER, FOR SOLUTION INTRAMUSCULAR; INTRAVENOUS at 14:58

## 2019-01-04 RX ADMIN — ROFLUMILAST 250 MCG: 500 TABLET ORAL at 08:58

## 2019-01-04 RX ADMIN — VERAPAMIL HYDROCHLORIDE 240 MG: 240 TABLET, FILM COATED, EXTENDED RELEASE ORAL at 20:40

## 2019-01-04 RX ADMIN — GUAIFENESIN 600 MG: 600 TABLET, EXTENDED RELEASE ORAL at 08:58

## 2019-01-04 RX ADMIN — IPRATROPIUM BROMIDE AND ALBUTEROL SULFATE 1 AMPULE: .5; 3 SOLUTION RESPIRATORY (INHALATION) at 12:54

## 2019-01-04 RX ADMIN — ALBUTEROL SULFATE 2.5 MG: 2.5 SOLUTION RESPIRATORY (INHALATION) at 23:29

## 2019-01-04 ASSESSMENT — PAIN SCALES - GENERAL
PAINLEVEL_OUTOF10: 0

## 2019-01-04 ASSESSMENT — ENCOUNTER SYMPTOMS: SHORTNESS OF BREATH: 1

## 2019-01-05 LAB
ANION GAP SERPL CALCULATED.3IONS-SCNC: 14 MEQ/L (ref 7–13)
BANDED NEUTROPHILS RELATIVE PERCENT: 3 % (ref 5–11)
BASOPHILS ABSOLUTE: 0 K/UL (ref 0–0.2)
BASOPHILS RELATIVE PERCENT: 0.1 %
BUN BLDV-MCNC: 24 MG/DL (ref 8–23)
CALCIUM SERPL-MCNC: 8.2 MG/DL (ref 8.6–10.2)
CHLORIDE BLD-SCNC: 103 MEQ/L (ref 98–107)
CO2: 26 MEQ/L (ref 22–29)
CREAT SERPL-MCNC: 0.59 MG/DL (ref 0.5–0.9)
EOSINOPHILS ABSOLUTE: 0 K/UL (ref 0–0.7)
EOSINOPHILS RELATIVE PERCENT: 0 %
GFR AFRICAN AMERICAN: >60
GFR NON-AFRICAN AMERICAN: >60
GLUCOSE BLD-MCNC: 246 MG/DL (ref 60–115)
GLUCOSE BLD-MCNC: 279 MG/DL (ref 60–115)
GLUCOSE BLD-MCNC: 289 MG/DL (ref 60–115)
GLUCOSE BLD-MCNC: 304 MG/DL (ref 74–109)
GLUCOSE BLD-MCNC: 94 MG/DL (ref 60–115)
HCT VFR BLD CALC: 41.3 % (ref 37–47)
HEMOGLOBIN: 14 G/DL (ref 12–16)
HYPOCHROMIA: 0
LYMPHOCYTES ABSOLUTE: 0.2 K/UL (ref 1–4.8)
LYMPHOCYTES RELATIVE PERCENT: 2 %
MACROCYTES: 0
MCH RBC QN AUTO: 30.7 PG (ref 27–31.3)
MCHC RBC AUTO-ENTMCNC: 33.9 % (ref 33–37)
MCV RBC AUTO: 90.5 FL (ref 82–100)
METAMYELOCYTES RELATIVE PERCENT: 2 %
MONOCYTES ABSOLUTE: 0.2 K/UL (ref 0.2–0.8)
MONOCYTES RELATIVE PERCENT: 1.9 %
MYELOCYTE PERCENT: 1 %
NEUTROPHILS ABSOLUTE: 11.1 K/UL (ref 1.4–6.5)
NEUTROPHILS RELATIVE PERCENT: 90 %
PDW BLD-RTO: 13.9 % (ref 11.5–14.5)
PERFORMED ON: ABNORMAL
PERFORMED ON: NORMAL
PLATELET # BLD: 257 K/UL (ref 130–400)
PLATELET SLIDE REVIEW: ADEQUATE
POIKILOCYTES: 0
POLYCHROMASIA: ABNORMAL
POTASSIUM REFLEX MAGNESIUM: 4.6 MEQ/L (ref 3.5–5.1)
RBC # BLD: 4.56 M/UL (ref 4.2–5.4)
SMUDGE CELLS: 1
SODIUM BLD-SCNC: 143 MEQ/L (ref 132–144)
WBC # BLD: 11.6 K/UL (ref 4.8–10.8)

## 2019-01-05 PROCEDURE — 6370000000 HC RX 637 (ALT 250 FOR IP): Performed by: INTERNAL MEDICINE

## 2019-01-05 PROCEDURE — 94760 N-INVAS EAR/PLS OXIMETRY 1: CPT

## 2019-01-05 PROCEDURE — 36415 COLL VENOUS BLD VENIPUNCTURE: CPT

## 2019-01-05 PROCEDURE — 2500000003 HC RX 250 WO HCPCS: Performed by: INTERNAL MEDICINE

## 2019-01-05 PROCEDURE — 85025 COMPLETE CBC W/AUTO DIFF WBC: CPT

## 2019-01-05 PROCEDURE — 2580000003 HC RX 258: Performed by: INTERNAL MEDICINE

## 2019-01-05 PROCEDURE — 99232 SBSQ HOSP IP/OBS MODERATE 35: CPT | Performed by: INTERNAL MEDICINE

## 2019-01-05 PROCEDURE — 94668 MNPJ CHEST WALL SBSQ: CPT

## 2019-01-05 PROCEDURE — 80048 BASIC METABOLIC PNL TOTAL CA: CPT

## 2019-01-05 PROCEDURE — 6360000002 HC RX W HCPCS: Performed by: INTERNAL MEDICINE

## 2019-01-05 PROCEDURE — 2700000000 HC OXYGEN THERAPY PER DAY

## 2019-01-05 PROCEDURE — 1210000000 HC MED SURG R&B

## 2019-01-05 PROCEDURE — 94640 AIRWAY INHALATION TREATMENT: CPT

## 2019-01-05 RX ORDER — FUROSEMIDE 10 MG/ML
20 INJECTION INTRAMUSCULAR; INTRAVENOUS ONCE
Status: COMPLETED | OUTPATIENT
Start: 2019-01-05 | End: 2019-01-05

## 2019-01-05 RX ADMIN — LEVOTHYROXINE SODIUM 125 MCG: 125 TABLET ORAL at 05:14

## 2019-01-05 RX ADMIN — CLOPIDOGREL BISULFATE 75 MG: 75 TABLET ORAL at 10:15

## 2019-01-05 RX ADMIN — FORMOTEROL FUMARATE DIHYDRATE 20 MCG: 20 SOLUTION RESPIRATORY (INHALATION) at 19:00

## 2019-01-05 RX ADMIN — VERAPAMIL HYDROCHLORIDE 240 MG: 240 TABLET, FILM COATED, EXTENDED RELEASE ORAL at 10:14

## 2019-01-05 RX ADMIN — ALBUTEROL SULFATE 2.5 MG: 2.5 SOLUTION RESPIRATORY (INHALATION) at 05:09

## 2019-01-05 RX ADMIN — METHYLPREDNISOLONE SODIUM SUCCINATE 40 MG: 40 INJECTION, POWDER, FOR SOLUTION INTRAMUSCULAR; INTRAVENOUS at 16:15

## 2019-01-05 RX ADMIN — DOXYCYCLINE 100 MG: 100 INJECTION, POWDER, LYOPHILIZED, FOR SOLUTION INTRAVENOUS at 05:14

## 2019-01-05 RX ADMIN — BUDESONIDE 500 MCG: 0.5 SUSPENSION RESPIRATORY (INHALATION) at 07:56

## 2019-01-05 RX ADMIN — IPRATROPIUM BROMIDE AND ALBUTEROL SULFATE 1 AMPULE: .5; 3 SOLUTION RESPIRATORY (INHALATION) at 07:56

## 2019-01-05 RX ADMIN — ISOSORBIDE MONONITRATE 60 MG: 30 TABLET, EXTENDED RELEASE ORAL at 10:14

## 2019-01-05 RX ADMIN — IPRATROPIUM BROMIDE AND ALBUTEROL SULFATE 1 AMPULE: .5; 3 SOLUTION RESPIRATORY (INHALATION) at 19:00

## 2019-01-05 RX ADMIN — FUROSEMIDE 20 MG: 10 INJECTION, SOLUTION INTRAVENOUS at 17:53

## 2019-01-05 RX ADMIN — MAGNESIUM HYDROXIDE 30 ML: 400 SUSPENSION ORAL at 10:24

## 2019-01-05 RX ADMIN — GUAIFENESIN 600 MG: 600 TABLET, EXTENDED RELEASE ORAL at 21:29

## 2019-01-05 RX ADMIN — BUDESONIDE 500 MCG: 0.5 SUSPENSION RESPIRATORY (INHALATION) at 19:00

## 2019-01-05 RX ADMIN — THEOPHYLLINE 600 MG: 400 TABLET, EXTENDED RELEASE ORAL at 17:53

## 2019-01-05 RX ADMIN — PANTOPRAZOLE SODIUM 40 MG: 40 TABLET, DELAYED RELEASE ORAL at 05:14

## 2019-01-05 RX ADMIN — ATORVASTATIN CALCIUM 80 MG: 80 TABLET, FILM COATED ORAL at 21:29

## 2019-01-05 RX ADMIN — DIGOXIN 125 MCG: 125 TABLET ORAL at 11:53

## 2019-01-05 RX ADMIN — FORMOTEROL FUMARATE DIHYDRATE 20 MCG: 20 SOLUTION RESPIRATORY (INHALATION) at 08:01

## 2019-01-05 RX ADMIN — Medication 10 ML: at 10:14

## 2019-01-05 RX ADMIN — ENOXAPARIN SODIUM 40 MG: 40 INJECTION SUBCUTANEOUS at 10:24

## 2019-01-05 RX ADMIN — INSULIN GLARGINE 70 UNITS: 100 INJECTION, SOLUTION SUBCUTANEOUS at 21:28

## 2019-01-05 RX ADMIN — Medication 10 ML: at 21:30

## 2019-01-05 RX ADMIN — GUAIFENESIN 600 MG: 600 TABLET, EXTENDED RELEASE ORAL at 10:14

## 2019-01-05 RX ADMIN — DOXYCYCLINE 100 MG: 100 INJECTION, POWDER, LYOPHILIZED, FOR SOLUTION INTRAVENOUS at 17:53

## 2019-01-05 RX ADMIN — VERAPAMIL HYDROCHLORIDE 240 MG: 240 TABLET, FILM COATED, EXTENDED RELEASE ORAL at 21:29

## 2019-01-05 RX ADMIN — ALBUTEROL SULFATE 2.5 MG: 2.5 SOLUTION RESPIRATORY (INHALATION) at 23:49

## 2019-01-05 RX ADMIN — IPRATROPIUM BROMIDE AND ALBUTEROL SULFATE 1 AMPULE: .5; 3 SOLUTION RESPIRATORY (INHALATION) at 12:55

## 2019-01-05 RX ADMIN — LORAZEPAM 0.5 MG: 0.5 TABLET ORAL at 23:38

## 2019-01-05 RX ADMIN — THEOPHYLLINE 600 MG: 400 TABLET, EXTENDED RELEASE ORAL at 10:15

## 2019-01-05 RX ADMIN — METHYLPREDNISOLONE SODIUM SUCCINATE 40 MG: 40 INJECTION, POWDER, FOR SOLUTION INTRAMUSCULAR; INTRAVENOUS at 02:42

## 2019-01-05 ASSESSMENT — ENCOUNTER SYMPTOMS: SHORTNESS OF BREATH: 1

## 2019-01-05 ASSESSMENT — PAIN SCALES - GENERAL: PAINLEVEL_OUTOF10: 0

## 2019-01-06 LAB
ANION GAP SERPL CALCULATED.3IONS-SCNC: 13 MEQ/L (ref 7–13)
BASOPHILS ABSOLUTE: 0 K/UL (ref 0–0.2)
BASOPHILS RELATIVE PERCENT: 0.2 %
BUN BLDV-MCNC: 23 MG/DL (ref 8–23)
CALCIUM SERPL-MCNC: 8.1 MG/DL (ref 8.6–10.2)
CHLORIDE BLD-SCNC: 102 MEQ/L (ref 98–107)
CO2: 27 MEQ/L (ref 22–29)
CREAT SERPL-MCNC: 0.64 MG/DL (ref 0.5–0.9)
EOSINOPHILS ABSOLUTE: 0 K/UL (ref 0–0.7)
EOSINOPHILS RELATIVE PERCENT: 0 %
GFR AFRICAN AMERICAN: >60
GFR NON-AFRICAN AMERICAN: >60
GLUCOSE BLD-MCNC: 109 MG/DL (ref 60–115)
GLUCOSE BLD-MCNC: 134 MG/DL (ref 60–115)
GLUCOSE BLD-MCNC: 192 MG/DL (ref 60–115)
GLUCOSE BLD-MCNC: 217 MG/DL (ref 74–109)
GLUCOSE BLD-MCNC: 288 MG/DL (ref 60–115)
HCT VFR BLD CALC: 42.5 % (ref 37–47)
HEMOGLOBIN: 14.4 G/DL (ref 12–16)
LYMPHOCYTES ABSOLUTE: 0.6 K/UL (ref 1–4.8)
LYMPHOCYTES RELATIVE PERCENT: 3.9 %
MCH RBC QN AUTO: 30.5 PG (ref 27–31.3)
MCHC RBC AUTO-ENTMCNC: 33.9 % (ref 33–37)
MCV RBC AUTO: 89.9 FL (ref 82–100)
MONOCYTES ABSOLUTE: 1 K/UL (ref 0.2–0.8)
MONOCYTES RELATIVE PERCENT: 7.2 %
NEUTROPHILS ABSOLUTE: 12.7 K/UL (ref 1.4–6.5)
NEUTROPHILS RELATIVE PERCENT: 88.7 %
PDW BLD-RTO: 13.9 % (ref 11.5–14.5)
PERFORMED ON: ABNORMAL
PERFORMED ON: NORMAL
PLATELET # BLD: 262 K/UL (ref 130–400)
POTASSIUM REFLEX MAGNESIUM: 4.2 MEQ/L (ref 3.5–5.1)
RBC # BLD: 4.73 M/UL (ref 4.2–5.4)
SODIUM BLD-SCNC: 142 MEQ/L (ref 132–144)
WBC # BLD: 14.3 K/UL (ref 4.8–10.8)

## 2019-01-06 PROCEDURE — 2580000003 HC RX 258: Performed by: INTERNAL MEDICINE

## 2019-01-06 PROCEDURE — 94669 MECHANICAL CHEST WALL OSCILL: CPT

## 2019-01-06 PROCEDURE — 2700000000 HC OXYGEN THERAPY PER DAY

## 2019-01-06 PROCEDURE — 6360000002 HC RX W HCPCS: Performed by: INTERNAL MEDICINE

## 2019-01-06 PROCEDURE — 80048 BASIC METABOLIC PNL TOTAL CA: CPT

## 2019-01-06 PROCEDURE — 85025 COMPLETE CBC W/AUTO DIFF WBC: CPT

## 2019-01-06 PROCEDURE — 6370000000 HC RX 637 (ALT 250 FOR IP): Performed by: INTERNAL MEDICINE

## 2019-01-06 PROCEDURE — 99232 SBSQ HOSP IP/OBS MODERATE 35: CPT | Performed by: INTERNAL MEDICINE

## 2019-01-06 PROCEDURE — 36415 COLL VENOUS BLD VENIPUNCTURE: CPT

## 2019-01-06 PROCEDURE — 2500000003 HC RX 250 WO HCPCS: Performed by: INTERNAL MEDICINE

## 2019-01-06 PROCEDURE — 94640 AIRWAY INHALATION TREATMENT: CPT

## 2019-01-06 PROCEDURE — 94664 DEMO&/EVAL PT USE INHALER: CPT

## 2019-01-06 PROCEDURE — 1210000000 HC MED SURG R&B

## 2019-01-06 PROCEDURE — 94668 MNPJ CHEST WALL SBSQ: CPT

## 2019-01-06 RX ORDER — INSULIN GLARGINE 100 [IU]/ML
60 INJECTION, SOLUTION SUBCUTANEOUS NIGHTLY
Status: DISCONTINUED | OUTPATIENT
Start: 2019-01-06 | End: 2019-01-07 | Stop reason: HOSPADM

## 2019-01-06 RX ADMIN — IPRATROPIUM BROMIDE AND ALBUTEROL SULFATE 1 AMPULE: .5; 3 SOLUTION RESPIRATORY (INHALATION) at 19:04

## 2019-01-06 RX ADMIN — ALBUTEROL SULFATE 2.5 MG: 2.5 SOLUTION RESPIRATORY (INHALATION) at 22:55

## 2019-01-06 RX ADMIN — VERAPAMIL HYDROCHLORIDE 240 MG: 240 TABLET, FILM COATED, EXTENDED RELEASE ORAL at 21:08

## 2019-01-06 RX ADMIN — FORMOTEROL FUMARATE DIHYDRATE 20 MCG: 20 SOLUTION RESPIRATORY (INHALATION) at 19:04

## 2019-01-06 RX ADMIN — LEVOTHYROXINE SODIUM 125 MCG: 125 TABLET ORAL at 05:40

## 2019-01-06 RX ADMIN — THEOPHYLLINE 600 MG: 400 TABLET, EXTENDED RELEASE ORAL at 17:55

## 2019-01-06 RX ADMIN — VERAPAMIL HYDROCHLORIDE 240 MG: 240 TABLET, FILM COATED, EXTENDED RELEASE ORAL at 08:38

## 2019-01-06 RX ADMIN — ENOXAPARIN SODIUM 40 MG: 40 INJECTION SUBCUTANEOUS at 08:37

## 2019-01-06 RX ADMIN — BUDESONIDE 500 MCG: 0.5 SUSPENSION RESPIRATORY (INHALATION) at 08:20

## 2019-01-06 RX ADMIN — DOXYCYCLINE 100 MG: 100 INJECTION, POWDER, LYOPHILIZED, FOR SOLUTION INTRAVENOUS at 05:41

## 2019-01-06 RX ADMIN — Medication 10 ML: at 21:08

## 2019-01-06 RX ADMIN — ALBUTEROL SULFATE 2.5 MG: 2.5 SOLUTION RESPIRATORY (INHALATION) at 05:20

## 2019-01-06 RX ADMIN — INSULIN GLARGINE 60 UNITS: 100 INJECTION, SOLUTION SUBCUTANEOUS at 21:13

## 2019-01-06 RX ADMIN — METHYLPREDNISOLONE SODIUM SUCCINATE 40 MG: 40 INJECTION, POWDER, FOR SOLUTION INTRAMUSCULAR; INTRAVENOUS at 15:03

## 2019-01-06 RX ADMIN — PANTOPRAZOLE SODIUM 40 MG: 40 TABLET, DELAYED RELEASE ORAL at 05:40

## 2019-01-06 RX ADMIN — ACETAMINOPHEN 650 MG: 325 TABLET ORAL at 05:43

## 2019-01-06 RX ADMIN — IPRATROPIUM BROMIDE AND ALBUTEROL SULFATE 1 AMPULE: .5; 3 SOLUTION RESPIRATORY (INHALATION) at 08:20

## 2019-01-06 RX ADMIN — FORMOTEROL FUMARATE DIHYDRATE 20 MCG: 20 SOLUTION RESPIRATORY (INHALATION) at 08:24

## 2019-01-06 RX ADMIN — ATORVASTATIN CALCIUM 80 MG: 80 TABLET, FILM COATED ORAL at 21:21

## 2019-01-06 RX ADMIN — DIGOXIN 125 MCG: 125 TABLET ORAL at 12:17

## 2019-01-06 RX ADMIN — IPRATROPIUM BROMIDE AND ALBUTEROL SULFATE 1 AMPULE: .5; 3 SOLUTION RESPIRATORY (INHALATION) at 14:21

## 2019-01-06 RX ADMIN — DOXYCYCLINE 100 MG: 100 INJECTION, POWDER, LYOPHILIZED, FOR SOLUTION INTRAVENOUS at 17:40

## 2019-01-06 RX ADMIN — BUDESONIDE 500 MCG: 0.5 SUSPENSION RESPIRATORY (INHALATION) at 19:04

## 2019-01-06 RX ADMIN — GUAIFENESIN 600 MG: 600 TABLET, EXTENDED RELEASE ORAL at 08:38

## 2019-01-06 RX ADMIN — LORAZEPAM 0.5 MG: 0.5 TABLET ORAL at 22:24

## 2019-01-06 RX ADMIN — ISOSORBIDE MONONITRATE 60 MG: 30 TABLET, EXTENDED RELEASE ORAL at 08:37

## 2019-01-06 RX ADMIN — METHYLPREDNISOLONE SODIUM SUCCINATE 40 MG: 40 INJECTION, POWDER, FOR SOLUTION INTRAMUSCULAR; INTRAVENOUS at 05:41

## 2019-01-06 RX ADMIN — ROFLUMILAST 250 MCG: 500 TABLET ORAL at 12:21

## 2019-01-06 RX ADMIN — MAGESIUM CITRATE 296 ML: 1.75 LIQUID ORAL at 08:37

## 2019-01-06 RX ADMIN — Medication 10 ML: at 08:37

## 2019-01-06 RX ADMIN — CLOPIDOGREL BISULFATE 75 MG: 75 TABLET ORAL at 08:38

## 2019-01-06 RX ADMIN — GUAIFENESIN 600 MG: 600 TABLET, EXTENDED RELEASE ORAL at 21:08

## 2019-01-06 ASSESSMENT — PAIN SCALES - GENERAL: PAINLEVEL_OUTOF10: 5

## 2019-01-07 VITALS
RESPIRATION RATE: 18 BRPM | TEMPERATURE: 98.4 F | DIASTOLIC BLOOD PRESSURE: 64 MMHG | SYSTOLIC BLOOD PRESSURE: 156 MMHG | HEIGHT: 61 IN | HEART RATE: 110 BPM | OXYGEN SATURATION: 99 % | WEIGHT: 242.95 LBS | BODY MASS INDEX: 45.87 KG/M2

## 2019-01-07 LAB
ANION GAP SERPL CALCULATED.3IONS-SCNC: 12 MEQ/L (ref 7–13)
BASOPHILS ABSOLUTE: 0 K/UL (ref 0–0.2)
BASOPHILS RELATIVE PERCENT: 0.1 %
BUN BLDV-MCNC: 26 MG/DL (ref 8–23)
CALCIUM SERPL-MCNC: 8.2 MG/DL (ref 8.6–10.2)
CHLORIDE BLD-SCNC: 100 MEQ/L (ref 98–107)
CO2: 28 MEQ/L (ref 22–29)
CREAT SERPL-MCNC: 0.57 MG/DL (ref 0.5–0.9)
EOSINOPHILS ABSOLUTE: 0 K/UL (ref 0–0.7)
EOSINOPHILS RELATIVE PERCENT: 0 %
GFR AFRICAN AMERICAN: >60
GFR NON-AFRICAN AMERICAN: >60
GLUCOSE BLD-MCNC: 235 MG/DL (ref 74–109)
GLUCOSE BLD-MCNC: 279 MG/DL (ref 60–115)
GLUCOSE BLD-MCNC: 281 MG/DL (ref 60–115)
GLUCOSE BLD-MCNC: 300 MG/DL (ref 60–115)
HCT VFR BLD CALC: 40.6 % (ref 37–47)
HEMOGLOBIN: 13.9 G/DL (ref 12–16)
LYMPHOCYTES ABSOLUTE: 0.4 K/UL (ref 1–4.8)
LYMPHOCYTES RELATIVE PERCENT: 2.5 %
MCH RBC QN AUTO: 30.8 PG (ref 27–31.3)
MCHC RBC AUTO-ENTMCNC: 34.2 % (ref 33–37)
MCV RBC AUTO: 90.1 FL (ref 82–100)
MONOCYTES ABSOLUTE: 0.9 K/UL (ref 0.2–0.8)
MONOCYTES RELATIVE PERCENT: 5.5 %
NEUTROPHILS ABSOLUTE: 14.5 K/UL (ref 1.4–6.5)
NEUTROPHILS RELATIVE PERCENT: 91.9 %
PDW BLD-RTO: 13.7 % (ref 11.5–14.5)
PERFORMED ON: ABNORMAL
PLATELET # BLD: 240 K/UL (ref 130–400)
POTASSIUM REFLEX MAGNESIUM: 4.6 MEQ/L (ref 3.5–5.1)
RBC # BLD: 4.51 M/UL (ref 4.2–5.4)
SODIUM BLD-SCNC: 140 MEQ/L (ref 132–144)
WBC # BLD: 15.7 K/UL (ref 4.8–10.8)

## 2019-01-07 PROCEDURE — 99232 SBSQ HOSP IP/OBS MODERATE 35: CPT | Performed by: PHYSICIAN ASSISTANT

## 2019-01-07 PROCEDURE — 6370000000 HC RX 637 (ALT 250 FOR IP): Performed by: INTERNAL MEDICINE

## 2019-01-07 PROCEDURE — 2580000003 HC RX 258: Performed by: INTERNAL MEDICINE

## 2019-01-07 PROCEDURE — 94640 AIRWAY INHALATION TREATMENT: CPT

## 2019-01-07 PROCEDURE — 6360000002 HC RX W HCPCS: Performed by: INTERNAL MEDICINE

## 2019-01-07 PROCEDURE — 2700000000 HC OXYGEN THERAPY PER DAY

## 2019-01-07 PROCEDURE — 85025 COMPLETE CBC W/AUTO DIFF WBC: CPT

## 2019-01-07 PROCEDURE — 80048 BASIC METABOLIC PNL TOTAL CA: CPT

## 2019-01-07 PROCEDURE — 36415 COLL VENOUS BLD VENIPUNCTURE: CPT

## 2019-01-07 PROCEDURE — 2500000003 HC RX 250 WO HCPCS: Performed by: INTERNAL MEDICINE

## 2019-01-07 PROCEDURE — 99232 SBSQ HOSP IP/OBS MODERATE 35: CPT | Performed by: INTERNAL MEDICINE

## 2019-01-07 PROCEDURE — 94668 MNPJ CHEST WALL SBSQ: CPT

## 2019-01-07 PROCEDURE — 94760 N-INVAS EAR/PLS OXIMETRY 1: CPT

## 2019-01-07 RX ORDER — DOXYCYCLINE HYCLATE 100 MG
100 TABLET ORAL 2 TIMES DAILY
Qty: 20 TABLET | Refills: 0 | Status: SHIPPED | OUTPATIENT
Start: 2019-01-07 | End: 2019-01-07

## 2019-01-07 RX ORDER — DOXYCYCLINE HYCLATE 100 MG
100 TABLET ORAL 2 TIMES DAILY
Qty: 20 TABLET | Refills: 0 | Status: SHIPPED | OUTPATIENT
Start: 2019-01-07 | End: 2019-01-17

## 2019-01-07 RX ORDER — GUAIFENESIN 600 MG/1
600 TABLET, EXTENDED RELEASE ORAL 2 TIMES DAILY
Qty: 60 TABLET | Refills: 1 | Status: SHIPPED | OUTPATIENT
Start: 2019-01-07

## 2019-01-07 RX ORDER — GUAIFENESIN 600 MG/1
600 TABLET, EXTENDED RELEASE ORAL 2 TIMES DAILY
Qty: 60 TABLET | Refills: 1 | Status: SHIPPED | OUTPATIENT
Start: 2019-01-07 | End: 2019-01-07

## 2019-01-07 RX ADMIN — ENOXAPARIN SODIUM 40 MG: 40 INJECTION SUBCUTANEOUS at 10:25

## 2019-01-07 RX ADMIN — FORMOTEROL FUMARATE DIHYDRATE 20 MCG: 20 SOLUTION RESPIRATORY (INHALATION) at 10:04

## 2019-01-07 RX ADMIN — PANTOPRAZOLE SODIUM 40 MG: 40 TABLET, DELAYED RELEASE ORAL at 06:08

## 2019-01-07 RX ADMIN — ROFLUMILAST 250 MCG: 500 TABLET ORAL at 10:24

## 2019-01-07 RX ADMIN — DOXYCYCLINE 100 MG: 100 INJECTION, POWDER, LYOPHILIZED, FOR SOLUTION INTRAVENOUS at 05:31

## 2019-01-07 RX ADMIN — Medication 10 ML: at 10:25

## 2019-01-07 RX ADMIN — BUDESONIDE 500 MCG: 0.5 SUSPENSION RESPIRATORY (INHALATION) at 10:03

## 2019-01-07 RX ADMIN — LEVOTHYROXINE SODIUM 125 MCG: 125 TABLET ORAL at 06:08

## 2019-01-07 RX ADMIN — IPRATROPIUM BROMIDE AND ALBUTEROL SULFATE 1 AMPULE: .5; 3 SOLUTION RESPIRATORY (INHALATION) at 15:04

## 2019-01-07 RX ADMIN — VERAPAMIL HYDROCHLORIDE 240 MG: 240 TABLET, FILM COATED, EXTENDED RELEASE ORAL at 10:24

## 2019-01-07 RX ADMIN — DIGOXIN 125 MCG: 125 TABLET ORAL at 11:37

## 2019-01-07 RX ADMIN — ALBUTEROL SULFATE 2.5 MG: 2.5 SOLUTION RESPIRATORY (INHALATION) at 05:15

## 2019-01-07 RX ADMIN — GUAIFENESIN 600 MG: 600 TABLET, EXTENDED RELEASE ORAL at 10:24

## 2019-01-07 RX ADMIN — METHYLPREDNISOLONE SODIUM SUCCINATE 40 MG: 40 INJECTION, POWDER, FOR SOLUTION INTRAMUSCULAR; INTRAVENOUS at 02:58

## 2019-01-07 RX ADMIN — ISOSORBIDE MONONITRATE 60 MG: 30 TABLET, EXTENDED RELEASE ORAL at 10:24

## 2019-01-07 RX ADMIN — CLOPIDOGREL BISULFATE 75 MG: 75 TABLET ORAL at 10:24

## 2019-01-07 RX ADMIN — IPRATROPIUM BROMIDE AND ALBUTEROL SULFATE 1 AMPULE: .5; 3 SOLUTION RESPIRATORY (INHALATION) at 10:03

## 2019-01-07 ASSESSMENT — PAIN SCALES - GENERAL
PAINLEVEL_OUTOF10: 0
PAINLEVEL_OUTOF10: 0

## 2019-01-08 ENCOUNTER — TELEPHONE (OUTPATIENT)
Dept: PULMONOLOGY | Age: 69
End: 2019-01-08

## 2019-01-08 ENCOUNTER — CARE COORDINATION (OUTPATIENT)
Dept: CASE MANAGEMENT | Age: 69
End: 2019-01-08

## 2019-01-08 DIAGNOSIS — J44.1 COPD EXACERBATION (HCC): Primary | ICD-10-CM

## 2019-01-08 PROCEDURE — 1111F DSCHRG MED/CURRENT MED MERGE: CPT | Performed by: FAMILY MEDICINE

## 2019-01-08 RX ORDER — PREDNISONE 10 MG/1
TABLET ORAL
Qty: 40 TABLET | Refills: 0 | Status: SHIPPED | OUTPATIENT
Start: 2019-01-08 | End: 2019-04-18 | Stop reason: ALTCHOICE

## 2019-01-15 ENCOUNTER — OFFICE VISIT (OUTPATIENT)
Dept: FAMILY MEDICINE CLINIC | Age: 69
End: 2019-01-15
Payer: MEDICARE

## 2019-01-15 VITALS
HEART RATE: 101 BPM | WEIGHT: 211.1 LBS | TEMPERATURE: 99.1 F | BODY MASS INDEX: 39.85 KG/M2 | RESPIRATION RATE: 14 BRPM | OXYGEN SATURATION: 97 % | DIASTOLIC BLOOD PRESSURE: 86 MMHG | SYSTOLIC BLOOD PRESSURE: 138 MMHG | HEIGHT: 61 IN

## 2019-01-15 DIAGNOSIS — Z09 HOSPITAL DISCHARGE FOLLOW-UP: Primary | ICD-10-CM

## 2019-01-15 DIAGNOSIS — F41.9 ANXIETY: ICD-10-CM

## 2019-01-15 DIAGNOSIS — J44.9 CHRONIC OBSTRUCTIVE PULMONARY DISEASE, UNSPECIFIED COPD TYPE (HCC): ICD-10-CM

## 2019-01-15 DIAGNOSIS — C34.31 MALIGNANT NEOPLASM OF LOWER LOBE OF RIGHT LUNG (HCC): ICD-10-CM

## 2019-01-15 DIAGNOSIS — E55.9 VITAMIN D DEFICIENCY: ICD-10-CM

## 2019-01-15 PROCEDURE — 99495 TRANSJ CARE MGMT MOD F2F 14D: CPT | Performed by: FAMILY MEDICINE

## 2019-01-15 PROCEDURE — 1111F DSCHRG MED/CURRENT MED MERGE: CPT | Performed by: FAMILY MEDICINE

## 2019-01-15 RX ORDER — LORAZEPAM 0.5 MG/1
0.5 TABLET ORAL EVERY 12 HOURS PRN
Qty: 24 TABLET | Refills: 0 | Status: SHIPPED | OUTPATIENT
Start: 2019-01-15 | End: 2019-05-01 | Stop reason: SDUPTHER

## 2019-02-15 RX ORDER — INSULIN GLARGINE 100 [IU]/ML
INJECTION, SOLUTION SUBCUTANEOUS
Qty: 75 ML | Refills: 3 | Status: SHIPPED | OUTPATIENT
Start: 2019-02-15 | End: 2019-03-05 | Stop reason: CLARIF

## 2019-03-01 DIAGNOSIS — E11.65 UNCONTROLLED TYPE 2 DIABETES MELLITUS WITH HYPERGLYCEMIA (HCC): ICD-10-CM

## 2019-03-01 DIAGNOSIS — E03.9 HYPOTHYROIDISM, UNSPECIFIED TYPE: ICD-10-CM

## 2019-03-01 LAB
ANION GAP SERPL CALCULATED.3IONS-SCNC: 15 MEQ/L (ref 9–15)
BUN BLDV-MCNC: 15 MG/DL (ref 8–23)
CALCIUM SERPL-MCNC: 8.9 MG/DL (ref 8.5–9.9)
CHLORIDE BLD-SCNC: 103 MEQ/L (ref 95–107)
CO2: 27 MEQ/L (ref 20–31)
CREAT SERPL-MCNC: 0.58 MG/DL (ref 0.5–0.9)
CREATININE URINE: 201.2 MG/DL
GFR AFRICAN AMERICAN: >60
GFR NON-AFRICAN AMERICAN: >60
GLUCOSE BLD-MCNC: 90 MG/DL (ref 70–99)
HBA1C MFR BLD: 9 % (ref 4.8–5.9)
MICROALBUMIN UR-MCNC: 3.9 MG/DL
MICROALBUMIN/CREAT UR-RTO: 19.4 MG/G (ref 0–30)
POTASSIUM SERPL-SCNC: 3.5 MEQ/L (ref 3.4–4.9)
SODIUM BLD-SCNC: 145 MEQ/L (ref 135–144)
T4 FREE: 1.45 NG/DL (ref 0.84–1.68)
TSH SERPL DL<=0.05 MIU/L-ACNC: 6.88 UIU/ML (ref 0.44–3.86)

## 2019-03-05 ENCOUNTER — OFFICE VISIT (OUTPATIENT)
Dept: ENDOCRINOLOGY | Age: 69
End: 2019-03-05
Payer: MEDICARE

## 2019-03-05 VITALS
BODY MASS INDEX: 39.08 KG/M2 | HEIGHT: 61 IN | SYSTOLIC BLOOD PRESSURE: 176 MMHG | HEART RATE: 98 BPM | WEIGHT: 207 LBS | DIASTOLIC BLOOD PRESSURE: 74 MMHG

## 2019-03-05 DIAGNOSIS — E11.65 UNCONTROLLED TYPE 2 DIABETES MELLITUS WITH HYPERGLYCEMIA (HCC): Primary | ICD-10-CM

## 2019-03-05 DIAGNOSIS — E03.9 HYPOTHYROIDISM, UNSPECIFIED TYPE: ICD-10-CM

## 2019-03-05 LAB — GLUCOSE BLD-MCNC: 230 MG/DL

## 2019-03-05 PROCEDURE — 99213 OFFICE O/P EST LOW 20 MIN: CPT | Performed by: INTERNAL MEDICINE

## 2019-03-05 PROCEDURE — 82962 GLUCOSE BLOOD TEST: CPT | Performed by: INTERNAL MEDICINE

## 2019-03-05 RX ORDER — FLASH GLUCOSE SENSOR
KIT MISCELLANEOUS
Qty: 2 EACH | Refills: 6 | Status: SHIPPED | OUTPATIENT
Start: 2019-03-05 | End: 2019-01-01

## 2019-03-05 RX ORDER — FLASH GLUCOSE SCANNING READER
EACH MISCELLANEOUS
Qty: 1 DEVICE | Refills: 0 | Status: SHIPPED | OUTPATIENT
Start: 2019-03-05 | End: 2019-01-01

## 2019-03-06 ENCOUNTER — TELEPHONE (OUTPATIENT)
Dept: ENDOCRINOLOGY | Age: 69
End: 2019-03-06

## 2019-03-06 NOTE — TELEPHONE ENCOUNTER
Pt said she was informed by her Pharmacy that she needs a prior auth on her freestyle russ reader/ sensors  Please submit

## 2019-03-08 ENCOUNTER — OFFICE VISIT (OUTPATIENT)
Dept: PHYSICAL MEDICINE AND REHAB | Age: 69
End: 2019-03-08
Payer: MEDICARE

## 2019-03-08 VITALS
DIASTOLIC BLOOD PRESSURE: 72 MMHG | SYSTOLIC BLOOD PRESSURE: 142 MMHG | HEIGHT: 61 IN | WEIGHT: 207 LBS | BODY MASS INDEX: 39.08 KG/M2

## 2019-03-08 DIAGNOSIS — C34.31 MALIGNANT NEOPLASM OF LOWER LOBE OF RIGHT LUNG (HCC): ICD-10-CM

## 2019-03-08 DIAGNOSIS — M79.10 MYALGIA: ICD-10-CM

## 2019-03-08 DIAGNOSIS — M51.36 DDD (DEGENERATIVE DISC DISEASE), LUMBAR: ICD-10-CM

## 2019-03-08 DIAGNOSIS — G56.80 SUPRASCAPULAR ENTRAPMENT NEUROPATHY, UNSPECIFIED LATERALITY: ICD-10-CM

## 2019-03-08 DIAGNOSIS — R07.81 RIB PAIN ON RIGHT SIDE: Primary | ICD-10-CM

## 2019-03-08 DIAGNOSIS — G89.29 CHRONIC BILATERAL THORACIC BACK PAIN: ICD-10-CM

## 2019-03-08 DIAGNOSIS — E55.9 VITAMIN D DEFICIENCY: ICD-10-CM

## 2019-03-08 DIAGNOSIS — Z79.899 HIGH RISK MEDICATION USE: ICD-10-CM

## 2019-03-08 DIAGNOSIS — M54.42 CHRONIC MIDLINE LOW BACK PAIN WITH LEFT-SIDED SCIATICA: ICD-10-CM

## 2019-03-08 DIAGNOSIS — Z99.89 OSA ON CPAP: ICD-10-CM

## 2019-03-08 DIAGNOSIS — E66.01 CLASS 3 SEVERE OBESITY DUE TO EXCESS CALORIES WITH SERIOUS COMORBIDITY AND BODY MASS INDEX (BMI) OF 40.0 TO 44.9 IN ADULT (HCC): ICD-10-CM

## 2019-03-08 DIAGNOSIS — M54.6 CHRONIC BILATERAL THORACIC BACK PAIN: ICD-10-CM

## 2019-03-08 DIAGNOSIS — G89.29 CHRONIC MIDLINE LOW BACK PAIN WITH LEFT-SIDED SCIATICA: ICD-10-CM

## 2019-03-08 DIAGNOSIS — G56.82 SUPRASCAPULAR ENTRAPMENT NEUROPATHY OF LEFT SIDE: ICD-10-CM

## 2019-03-08 DIAGNOSIS — M54.2 NECK PAIN: ICD-10-CM

## 2019-03-08 DIAGNOSIS — G47.33 OSA ON CPAP: ICD-10-CM

## 2019-03-08 DIAGNOSIS — M54.40 ACUTE BILATERAL LOW BACK PAIN WITH SCIATICA, SCIATICA LATERALITY UNSPECIFIED: ICD-10-CM

## 2019-03-08 PROCEDURE — 99214 OFFICE O/P EST MOD 30 MIN: CPT | Performed by: PHYSICAL MEDICINE & REHABILITATION

## 2019-03-08 RX ORDER — TRAMADOL HYDROCHLORIDE 50 MG/1
50 TABLET ORAL EVERY 6 HOURS PRN
Qty: 50 TABLET | Refills: 0 | Status: SHIPPED | OUTPATIENT
Start: 2019-03-08 | End: 2019-04-10 | Stop reason: SDUPTHER

## 2019-03-08 ASSESSMENT — ENCOUNTER SYMPTOMS
CHOKING: 0
VOMITING: 0
NAUSEA: 0
CONSTIPATION: 0
WHEEZING: 1
APNEA: 1
SINUS PRESSURE: 1
BACK PAIN: 1
SHORTNESS OF BREATH: 1
ALLERGIC/IMMUNOLOGIC NEGATIVE: 1
STRIDOR: 0
ABDOMINAL PAIN: 0
EYES NEGATIVE: 1
DIARRHEA: 0
CHEST TIGHTNESS: 1
PHOTOPHOBIA: 0

## 2019-03-11 LAB — VITAMIN D 1,25-DIHYDROXY: 89.7 PG/ML (ref 19.9–79.3)

## 2019-03-12 ENCOUNTER — PROCEDURE VISIT (OUTPATIENT)
Dept: PHYSICAL MEDICINE AND REHAB | Age: 69
End: 2019-03-12
Payer: MEDICARE

## 2019-03-12 DIAGNOSIS — M79.10 MYALGIA: ICD-10-CM

## 2019-03-12 DIAGNOSIS — M79.7 FIBROMYALGIA: Primary | ICD-10-CM

## 2019-03-12 PROCEDURE — 20553 NJX 1/MLT TRIGGER POINTS 3/>: CPT | Performed by: PHYSICAL MEDICINE & REHABILITATION

## 2019-03-27 ENCOUNTER — PROCEDURE VISIT (OUTPATIENT)
Dept: PHYSICAL MEDICINE AND REHAB | Age: 69
End: 2019-03-27
Payer: MEDICARE

## 2019-03-27 DIAGNOSIS — G89.29 CHRONIC SCAPULAR PAIN: Primary | ICD-10-CM

## 2019-03-27 DIAGNOSIS — M89.8X1 CHRONIC SCAPULAR PAIN: Primary | ICD-10-CM

## 2019-03-27 DIAGNOSIS — M79.10 MYALGIA: ICD-10-CM

## 2019-03-27 PROCEDURE — 76942 ECHO GUIDE FOR BIOPSY: CPT | Performed by: PHYSICAL MEDICINE & REHABILITATION

## 2019-03-27 ASSESSMENT — ENCOUNTER SYMPTOMS
NAUSEA: 0
EYES NEGATIVE: 1
SINUS PRESSURE: 1
ABDOMINAL PAIN: 0
BACK PAIN: 1
WHEEZING: 1
STRIDOR: 0
VOMITING: 0
CHOKING: 0
DIARRHEA: 0
CHEST TIGHTNESS: 1
PHOTOPHOBIA: 0
CONSTIPATION: 0
SHORTNESS OF BREATH: 1
APNEA: 1
ALLERGIC/IMMUNOLOGIC NEGATIVE: 1

## 2019-03-27 NOTE — PROGRESS NOTES
Subjective  Marsha Bedolla, 70 y.o. female presents today with:    ***  HPI    Past Medical History:   Diagnosis Date    Anxiety     Asthma     Back pain     Bronchitis     CAD (coronary artery disease)     Carpal tunnel syndrome     COPD (chronic obstructive pulmonary disease) (HCC)     uses CPAP at night    Emphysema of lung (HCC)     Fibromyalgia     GERD (gastroesophageal reflux disease)     Hyperlipidemia     Hypertension     Hypothyroidism     Obesity     ROGELIO (obstructive sleep apnea)     Osteoarthritis     Osteoporosis     PONV (postoperative nausea and vomiting)     Restless legs syndrome     SOB (shortness of breath)     Type II or unspecified type diabetes mellitus without mention of complication, not stated as uncontrolled     Unspecified sleep apnea     UTI (urinary tract infection)      Past Surgical History:   Procedure Laterality Date    ANKLE SURGERY Left     hardware in & removed    ANKLE SURGERY Right     Plate in right ankle    CARPAL TUNNEL RELEASE Left 2015    CHOLECYSTECTOMY      COLONOSCOPY  09/11/2012    CORONARY ANGIOPLASTY WITH STENT PLACEMENT      ENDOSCOPY, COLON, DIAGNOSTIC      LUNG BIOPSY  09/07/2017    pleural biopsy    MS REVISE MEDIAN N/CARPAL TUNNEL SURG Right 5/24/2018    RIGHT WRIST CARPAL TUNNEL RELEASE, ( TO BE IN ROOM 12 WITH ROOM 2 TEAM) performed by Tyrel Martinez MD at [address] [place] [address] Left 6/19/2018    LEFT JOSELIN THYROIDECTOMY performed by Trent Christiansen MD at [place] [address]      partial    UPPER GASTROINTESTINAL ENDOSCOPY  09/11/2012    UPPER GASTROINTESTINAL ENDOSCOPY  12/4/14     DR. MAE     Social History     Socioeconomic History    Marital status:      Spouse name: Not on file    Number of children: Not on file    Years of education: Not on file    Highest education level: Not on file   Occupational History    Occupation: Retired   Social Needs    Financial resource strain: Not on file    Food insecurity:     Worry: Not on file     Inability: Not on file    Transportation needs:     Medical: Not on file     Non-medical: Not on file   Tobacco Use    Smoking status: Former Smoker     Packs/day: 1.50     Years: 32.00     Pack years: 48.00     Types: Cigarettes     Last attempt to quit: 2001     Years since quittin.1    Smokeless tobacco: Never Used   Substance and Sexual Activity    Alcohol use: No     Alcohol/week: 0.0 oz    Drug use: No    Sexual activity: Yes     Partners: Male   Lifestyle    Physical activity:     Days per week: Not on file     Minutes per session: Not on file    Stress: Not on file   Relationships    Social connections:     Talks on phone: Not on file     Gets together: Not on file     Attends [employer] service: Not on file     Active member of club or organization: Not on file     Attends meetings of clubs or organizations: Not on file     Relationship status: Not on file    Intimate partner violence:     Fear of current or ex partner: Not on file     Emotionally abused: Not on file     Physically abused: Not on file     Forced sexual activity: Not on file   Other Topics Concern    Not on file   Social History Narrative    Not on file     Family History   Problem Relation Age of Onset    High Blood Pressure Mother     Heart Disease Mother     Cancer Father         LUNG    High Blood Pressure Brother     COPD Brother     Heart Disease Brother     Heart Attack Brother     COPD Sister     Heart Disease Sister     Arthritis Maternal Aunt     COPD Sister     No Known Problems Daughter     No Known Problems Son        Allergies   Allergen Reactions    Biaxin [Clarithromycin] Nausea Only     Nausea with diarrhea    Invokana [Canagliflozin]      Yeast infection        Review of Systems   Constitutional: Positive for fatigue. Negative for activity change. HENT: Positive for postnasal drip and sinus pressure. Negative for congestion. Eyes: Negative. Negative for photophobia. Respiratory: Positive for apnea, chest tightness, shortness of breath and wheezing. Negative for choking and stridor. Cardiovascular: Positive for chest pain and leg swelling (Bilateral). Negative for palpitations. Gastrointestinal: Negative for abdominal pain, constipation, diarrhea, nausea and vomiting. Endocrine: Positive for heat intolerance. Negative for cold intolerance. Genitourinary: Negative. Negative for pelvic pain. Musculoskeletal: Positive for arthralgias, back pain, joint swelling, neck pain and neck stiffness. Negative for gait problem. Skin: Negative. Allergic/Immunologic: Negative. Neurological: Positive for weakness, light-headedness and numbness. Negative for dizziness and headaches. Hematological: Bruises/bleeds easily. Psychiatric/Behavioral: Positive for sleep disturbance. Negative for dysphoric mood. The patient is nervous/anxious. Objective    There were no vitals filed for this visit.   No data recorded     Physical Exam  Ortho Exam  Neurologic Exam

## 2019-03-28 RX ORDER — LIDOCAINE HYDROCHLORIDE 5 MG/ML
18 INJECTION, SOLUTION INFILTRATION; INTRAVENOUS ONCE
Status: COMPLETED | OUTPATIENT
Start: 2019-03-12 | End: 2019-03-28

## 2019-03-28 RX ADMIN — LIDOCAINE HYDROCHLORIDE 18 ML: 5 INJECTION, SOLUTION INFILTRATION; INTRAVENOUS at 09:52

## 2019-04-05 ENCOUNTER — OFFICE VISIT (OUTPATIENT)
Dept: ENDOCRINOLOGY | Age: 69
End: 2019-04-05
Payer: MEDICARE

## 2019-04-05 VITALS
DIASTOLIC BLOOD PRESSURE: 81 MMHG | HEART RATE: 93 BPM | SYSTOLIC BLOOD PRESSURE: 175 MMHG | BODY MASS INDEX: 36.63 KG/M2 | HEIGHT: 61 IN | WEIGHT: 194 LBS

## 2019-04-05 DIAGNOSIS — E03.9 HYPOTHYROIDISM, UNSPECIFIED TYPE: ICD-10-CM

## 2019-04-05 DIAGNOSIS — E11.65 UNCONTROLLED TYPE 2 DIABETES MELLITUS WITH HYPERGLYCEMIA (HCC): Primary | ICD-10-CM

## 2019-04-05 LAB
CHP ED QC CHECK: NORMAL
GLUCOSE BLD-MCNC: 134 MG/DL

## 2019-04-05 PROCEDURE — 82962 GLUCOSE BLOOD TEST: CPT | Performed by: INTERNAL MEDICINE

## 2019-04-05 PROCEDURE — 64418 NJX AA&/STRD SPRSCAP NRV: CPT | Performed by: PHYSICAL MEDICINE & REHABILITATION

## 2019-04-05 PROCEDURE — 99213 OFFICE O/P EST LOW 20 MIN: CPT | Performed by: INTERNAL MEDICINE

## 2019-04-05 RX ORDER — LIDOCAINE HYDROCHLORIDE 10 MG/ML
8 INJECTION, SOLUTION INFILTRATION; PERINEURAL ONCE
Status: COMPLETED | OUTPATIENT
Start: 2019-04-05 | End: 2019-04-05

## 2019-04-05 RX ORDER — FLASH GLUCOSE SENSOR
KIT MISCELLANEOUS
Qty: 2 EACH | Refills: 6 | Status: SHIPPED | OUTPATIENT
Start: 2019-04-05 | End: 2019-05-02 | Stop reason: SDUPTHER

## 2019-04-05 RX ORDER — LIDOCAINE HYDROCHLORIDE 20 MG/ML
5 INJECTION, SOLUTION INFILTRATION; PERINEURAL ONCE
Status: COMPLETED | OUTPATIENT
Start: 2019-04-05 | End: 2019-04-05

## 2019-04-05 RX ORDER — FLASH GLUCOSE SCANNING READER
EACH MISCELLANEOUS
Qty: 1 DEVICE | Refills: 0 | Status: SHIPPED | OUTPATIENT
Start: 2019-04-05 | End: 2019-05-02 | Stop reason: SDUPTHER

## 2019-04-05 RX ADMIN — LIDOCAINE HYDROCHLORIDE 8 ML: 10 INJECTION, SOLUTION INFILTRATION; PERINEURAL at 13:32

## 2019-04-05 RX ADMIN — LIDOCAINE HYDROCHLORIDE 5 ML: 20 INJECTION, SOLUTION INFILTRATION; PERINEURAL at 13:34

## 2019-04-06 RX ORDER — ESOMEPRAZOLE MAGNESIUM 40 MG/1
CAPSULE, DELAYED RELEASE ORAL
Qty: 90 CAPSULE | Refills: 1 | Status: SHIPPED | OUTPATIENT
Start: 2019-04-06 | End: 2019-01-01 | Stop reason: SDUPTHER

## 2019-04-10 ENCOUNTER — PROCEDURE VISIT (OUTPATIENT)
Dept: PHYSICAL MEDICINE AND REHAB | Age: 69
End: 2019-04-10
Payer: MEDICARE

## 2019-04-10 DIAGNOSIS — G89.29 CHRONIC MIDLINE LOW BACK PAIN WITH LEFT-SIDED SCIATICA: ICD-10-CM

## 2019-04-10 DIAGNOSIS — M54.42 CHRONIC MIDLINE LOW BACK PAIN WITH LEFT-SIDED SCIATICA: ICD-10-CM

## 2019-04-10 DIAGNOSIS — G89.29 CHRONIC BILATERAL THORACIC BACK PAIN: ICD-10-CM

## 2019-04-10 DIAGNOSIS — M54.6 CHRONIC BILATERAL THORACIC BACK PAIN: ICD-10-CM

## 2019-04-10 DIAGNOSIS — Z79.899 HIGH RISK MEDICATION USE: ICD-10-CM

## 2019-04-10 DIAGNOSIS — M79.10 MYALGIA: ICD-10-CM

## 2019-04-10 DIAGNOSIS — G56.82 SUPRASCAPULAR ENTRAPMENT NEUROPATHY OF LEFT SIDE: ICD-10-CM

## 2019-04-10 DIAGNOSIS — M51.36 DDD (DEGENERATIVE DISC DISEASE), LUMBAR: ICD-10-CM

## 2019-04-10 DIAGNOSIS — M79.7 FIBROMYALGIA: Primary | ICD-10-CM

## 2019-04-10 DIAGNOSIS — G56.80 SUPRASCAPULAR ENTRAPMENT NEUROPATHY, UNSPECIFIED LATERALITY: ICD-10-CM

## 2019-04-10 PROCEDURE — 20553 NJX 1/MLT TRIGGER POINTS 3/>: CPT | Performed by: PHYSICAL MEDICINE & REHABILITATION

## 2019-04-10 RX ORDER — TRAMADOL HYDROCHLORIDE 50 MG/1
50 TABLET ORAL EVERY 6 HOURS PRN
Qty: 50 TABLET | Refills: 0 | Status: SHIPPED | OUTPATIENT
Start: 2019-04-10 | End: 2019-06-11 | Stop reason: SDUPTHER

## 2019-04-14 NOTE — PROGRESS NOTES
adult [facility])    Morbid obesity with BMI of 40.0-44.9, adult ([facility] [address]    Malignant neoplasm of lower lobe of right lung (HCC)    Postoperative hypothyroidism    Uncontrolled type 2 diabetes mellitus with hyperglycemia (HCC)         Allergies   Allergen Reactions    Biaxin [Clarithromycin] Nausea Only     Nausea with diarrhea    Invokana [Canagliflozin]      Yeast infection        Current Outpatient Medications:     NOVOFINE 32G X 6 MM MISC, qid, Disp: 400 each, Rfl: 3    insulin aspart (NOVOLOG FLEXPEN) 100 UNIT/ML injection pen, Inject 10-15 Units into the skin 3 times daily (before meals), Disp: 30 pen, Rfl: 3    insulin glargine (BASAGLAR KWIKPEN) 100 UNIT/ML injection pen, Inject 90 units at night, Disp: 90 pen, Rfl: 3    Continuous Blood Gluc  (FREESTYLE DONOVAN 14 DAY READER) MARCELLUS, As directed, Disp: 1 Device, Rfl: 0    Continuous Blood Gluc Sensor (FREESTYLE DONOVAN 14 DAY SENSOR) MISC, Every 2 weeks, Disp: 2 each, Rfl: 06    Liraglutide (VICTOZA) 18 MG/3ML SOPN SC injection, Inject 1.8 mg into the skin daily, Disp: 9 pen, Rfl: 3    Continuous Blood Gluc  (FREESTYLE DONOVAN 14 DAY READER) MARCELLUS, As directed, Disp: 1 Device, Rfl: 00    Continuous Blood Gluc Sensor (FREESTYLE DONOVAN 14 DAY SENSOR) MISC, Every 2 weeks, Disp: 2 each, Rfl: 06    predniSONE (DELTASONE) 10 MG tablet, 4 tablets daily for 4 days, 3 tablets daily for 4 days, 2 tablets daily for 4 days, then 1 tablet daily for 4 days, Disp: 40 tablet, Rfl: 0    CPAP Machine MISC, by Does not apply route Change CPAP pressure to 10 cm, Disp: 1 each, Rfl: 0    guaiFENesin (MUCINEX) 600 MG extended release tablet, Take 1 tablet by mouth 2 times daily, Disp: 60 tablet, Rfl: 1    ipratropium-albuterol (DUONEB) 0.5-2.5 (3) MG/3ML SOLN nebulizer solution, Inhale 3 mLs into the lungs every 6 hours, Disp: 360 mL, Rfl: 3    topiramate (TOPAMAX) 25 MG tablet, Take 1 tablet by mouth nightly, Disp: 60 tablet, Rfl: 3    blood glucose test strips (ONE TOUCH ULTRA TEST) strip, USE TO TEST TWICE A DAY AND AS NEEDED, IDDM - Dx: E11.9, Disp: 100 each, Rfl: 1    Roflumilast (DALIRESP) 250 MCG TABS, Take by mouth daily, Disp: , Rfl:     vitamin D (ERGOCALCIFEROL) 80916 units CAPS capsule, Take 1 capsule by mouth Twice a Week, Disp: 16 capsule, Rfl: 0    OXYGEN, Inhale into the lungs 2 liters at night and during the day 3 liters, Disp: , Rfl:     fluocinonide (LIDEX) 0.05 % ointment, APPLY OINTMENT TOPICALLY TWICE DAILY FOR 2 WEEKS, Disp: 30 g, Rfl: 1    formoterol (PERFOROMIST) 20 MCG/2ML nebulizer solution, Inhale 2 mLs into the lungs daily, Disp: , Rfl:     diclofenac sodium (VOLTAREN) 1 % GEL, Apply 4 g topically 4 times daily Generic equivalent acceptable, unless otherwise noted. , Disp: 5 Tube, Rfl: 5    fluticasone (FLONASE) 50 MCG/ACT nasal spray, 2 sprays by Nasal route daily, Disp: 1 Bottle, Rfl: 0    levothyroxine (SYNTHROID) 125 MCG tablet, Take 1 tablet by mouth Daily, Disp: 90 tablet, Rfl: 3    traMADol (ULTRAM) 50 MG tablet, Take 1 tablet by mouth every 6 hours as needed. ., Disp: , Rfl:     SPIRIVA RESPIMAT 2.5 MCG/ACT AERS inhaler, 2 puffs daily , Disp: , Rfl:     Insulin Pen Needle (B-D UF III MINI PEN NEEDLES) 31G X 5 MM MISC, USE 1 DAILY TO INJECT LANTUS, Disp: 100 each, Rfl: 1    nystatin (MYCOSTATIN) 258967 UNIT/ML suspension, Take 5 mLs by mouth 4 times daily, Disp: 200 mL, Rfl: 1    Nebulizers (COMPRESSOR/NEBULIZER) MISC, Dispense Nebulizer supplies, Disp: 1 each, Rfl: 3    atorvastatin (LIPITOR) 80 MG tablet, Take 1 tablet by mouth nightly, Disp: , Rfl:     ipratropium (ATROVENT) 0.06 % nasal spray, 2 sprays by Nasal route 2 times daily as needed, Disp: , Rfl:     nitroGLYCERIN (NITROLINGUAL) 0.4 MG/SPRAY 0.4 mg spray, , Disp: , Rfl:     theophylline (UNIPHYL) 600 MG extended release tablet, TAKE 1 TABLET ONCE DAILY (Patient taking differently: Take 600 mg by mouth every evening TAKE 1 TABLET ONCE DAILY), Disp: 30 tablet, Rfl: 5    albuterol sulfate (PROAIR RESPICLICK) 988 (90 Base) MCG/ACT aerosol powder inhalation, Inhale 2 puffs into the lungs every 6 hours as needed for Wheezing or Shortness of Breath, Disp: 1 Inhaler, Rfl: 5    mepolizumab (NUCALA) 100 MG SOLR injection, Inject 100 mg into the skin Received from: [facility] Received Sig: Inject 100 mg subcutaneously one time only. monthly, Disp: , Rfl:     ONE TOUCH LANCETS MISC, USE TO TEST TWICE A DAY AND AS NEEDED, IDDM - Dx: E11.9, Disp: 200 each, Rfl: 3    Blood Glucose Monitoring Suppl MARCELLUS, One Touch Ultra - To Test BID - IDDM - Dx: E11.9, Disp: 1 Device, Rfl: 0    isosorbide mononitrate (IMDUR) 60 MG CR tablet, Take 60 mg by mouth daily , Disp: , Rfl:     budesonide (PULMICORT) 0.5 MG/2ML nebulizer suspension, Take 1 ampule by nebulization 2 times daily, Disp: , Rfl:     digoxin (DIGOX) 125 MCG tablet, Take 125 mcg by mouth Daily with lunch , Disp: , Rfl:     clopidogrel (PLAVIX) 75 MG tablet, Take 75 mg by mouth daily. , Disp: , Rfl:     fenofibrate (TRICOR) 145 MG tablet, Take 145 mg by mouth every evening , Disp: , Rfl:     verapamil (VERELAN PM) 240 MG CR capsule, Take 240 mg by mouth 2 times daily , Disp: , Rfl:     traMADol (ULTRAM) 50 MG tablet, Take 1 tablet by mouth every 6 hours as needed for Pain for up to 50 days. MAX #50 MONTHLY. DO NOT GET NARCOTIC MEDICATIONS FROM ANY OTHER PROVIDER., Disp: 50 tablet, Rfl: 0    esomeprazole (NEXIUM) 40 MG delayed release capsule, TAKE 1 CAPSULE DAILY, Disp: 90 capsule, Rfl: 1    LORazepam (ATIVAN) 0.5 MG tablet, Take 1 tablet by mouth every 12 hours as needed for Anxiety for up to 30 days. ., Disp: 24 tablet, Rfl: 0      Review of Systems     Vitals:    04/05/19 1644   BP: (!) 175/81   Pulse: 93   Weight: 194 lb (88 kg)   Height: 5' 1\" (1.549 m)       Objective:   Physical Exam   Constitutional: She appears well-developed and well-nourished. HENT:   Head: Normocephalic and atraumatic.    Eyes: Conjunctivae are normal.   Neck: Neck supple. Cardiovascular: Normal rate. Pulmonary/Chest: Effort normal.   Abdominal:   obese   Musculoskeletal: Normal range of motion. Neurological: She is alert. Psychiatric: She has a normal mood and affect. Assessment:       Diagnosis Orders   1. Uncontrolled type 2 diabetes mellitus with hyperglycemia (HCC)  insulin glargine (BASAGLAR KWIKPEN) 100 UNIT/ML injection pen   2. Hypothyroidism, unspecified type  TSH with Reflex    T4, Free   3.  Uncontrolled type 2 diabetes mellitus with complication, with long-term current use of insulin (HCC)  Basic Metabolic Panel    Hemoglobin A1C    POCT Glucose    Microalbumin / Creatinine Urine Ratio    NOVOFINE 32G X 6 MM MISC           Plan:      Orders Placed This Encounter   Procedures    Basic Metabolic Panel     Standing Status:   Future     Standing Expiration Date:   4/5/2020    Hemoglobin A1C     Standing Status:   Future     Standing Expiration Date:   4/5/2020    Microalbumin / Creatinine Urine Ratio     Standing Status:   Future     Standing Expiration Date:   4/5/2020    TSH with Reflex     Standing Status:   Future     Standing Expiration Date:   4/5/2020    T4, Free     Standing Status:   Future     Standing Expiration Date:   4/5/2020    POCT Glucose     Increase basaglar dose   Orders Placed This Encounter   Medications    NOVOFINE 32G X 6 MM MISC     Sig: qid     Dispense:  400 each     Refill:  3    insulin aspart (NOVOLOG FLEXPEN) 100 UNIT/ML injection pen     Sig: Inject 10-15 Units into the skin 3 times daily (before meals)     Dispense:  30 pen     Refill:  3    insulin glargine (BASAGLAR KWIKPEN) 100 UNIT/ML injection pen     Sig: Inject 90 units at night     Dispense:  90 pen     Refill:  3    Continuous Blood Gluc  (FREESTYLE DONOVAN 14 DAY READER) MARCELLUS     Sig: As directed     Dispense:  1 Device     Refill:  0    Continuous Blood Gluc Sensor (FREESTYLE DONOVAN 14 DAY SENSOR) MISC Sig: Every 2 weeks     Dispense:  2 each     Refill:  06       Continue synthroid 125 mcg daily     hbaic goal of 7 or lower         Joyce Robertson MD

## 2019-04-15 RX ORDER — LIDOCAINE HYDROCHLORIDE 10 MG/ML
16 INJECTION, SOLUTION INFILTRATION; PERINEURAL ONCE
Status: COMPLETED | OUTPATIENT
Start: 2019-04-15 | End: 2019-04-15

## 2019-04-15 RX ADMIN — LIDOCAINE HYDROCHLORIDE 16 ML: 10 INJECTION, SOLUTION INFILTRATION; PERINEURAL at 16:00

## 2019-04-15 NOTE — PROGRESS NOTES
Patient was here today for TP injections of Upper Thoracic. Patient placed in upright position areas marked and prepped with alcohol. Sites injected with 1% 16cc Lidocaine administered  By provider.

## 2019-04-18 ENCOUNTER — OFFICE VISIT (OUTPATIENT)
Dept: PULMONOLOGY | Age: 69
End: 2019-04-18
Payer: MEDICARE

## 2019-04-18 VITALS
BODY MASS INDEX: 36.63 KG/M2 | RESPIRATION RATE: 16 BRPM | HEIGHT: 61 IN | DIASTOLIC BLOOD PRESSURE: 70 MMHG | TEMPERATURE: 98.4 F | WEIGHT: 194 LBS | HEART RATE: 84 BPM | OXYGEN SATURATION: 92 % | SYSTOLIC BLOOD PRESSURE: 142 MMHG

## 2019-04-18 DIAGNOSIS — G47.33 OSA ON CPAP: ICD-10-CM

## 2019-04-18 DIAGNOSIS — Z99.89 OSA ON CPAP: ICD-10-CM

## 2019-04-18 DIAGNOSIS — J45.40 MODERATE PERSISTENT ASTHMA WITHOUT COMPLICATION: Primary | ICD-10-CM

## 2019-04-18 DIAGNOSIS — R09.02 HYPOXIA: ICD-10-CM

## 2019-04-18 DIAGNOSIS — E66.9 OBESITY (BMI 30-39.9): ICD-10-CM

## 2019-04-18 DIAGNOSIS — C34.31 MALIGNANT NEOPLASM OF LOWER LOBE OF RIGHT LUNG (HCC): ICD-10-CM

## 2019-04-18 DIAGNOSIS — J44.9 CHRONIC OBSTRUCTIVE PULMONARY DISEASE, UNSPECIFIED COPD TYPE (HCC): ICD-10-CM

## 2019-04-18 PROCEDURE — 99215 OFFICE O/P EST HI 40 MIN: CPT | Performed by: INTERNAL MEDICINE

## 2019-04-18 RX ORDER — PREDNISONE 1 MG/1
TABLET ORAL
COMMUNITY
Start: 2019-03-07 | End: 2019-01-01 | Stop reason: DRUGHIGH

## 2019-04-18 ASSESSMENT — ENCOUNTER SYMPTOMS
EYE DISCHARGE: 0
TROUBLE SWALLOWING: 0
ABDOMINAL PAIN: 0
COUGH: 1
NAUSEA: 0
WHEEZING: 1
SINUS PRESSURE: 0
SORE THROAT: 0
VOICE CHANGE: 0
RHINORRHEA: 0
SHORTNESS OF BREATH: 1
EYE ITCHING: 0
VOMITING: 0
CHEST TIGHTNESS: 0
DIARRHEA: 0

## 2019-04-18 NOTE — PROGRESS NOTES
Hyperlipidemia     Hypertension     Hypothyroidism     Obesity     ROGELIO (obstructive sleep apnea)     Osteoarthritis     Osteoporosis     PONV (postoperative nausea and vomiting)     Restless legs syndrome     SOB (shortness of breath)     Type II or unspecified type diabetes mellitus without mention of complication, not stated as uncontrolled     Unspecified sleep apnea     UTI (urinary tract infection)      Past Surgical History:   Procedure Laterality Date    ANKLE SURGERY Left     hardware in & removed    ANKLE SURGERY Right     Plate in right ankle    CARPAL TUNNEL RELEASE Left 2015    CHOLECYSTECTOMY      COLONOSCOPY  09/11/2012    CORONARY ANGIOPLASTY WITH STENT PLACEMENT      ENDOSCOPY, COLON, DIAGNOSTIC      LUNG BIOPSY  09/07/2017    pleural biopsy    DC REVISE MEDIAN N/CARPAL TUNNEL SURG Right 5/24/2018    RIGHT WRIST CARPAL TUNNEL RELEASE, ( TO BE IN ROOM 12 WITH ROOM 2 TEAM) performed by Dino Dunn MD at [address] [place] [address] Left 6/19/2018    LEFT JOSELIN THYROIDECTOMY performed by Minda Harrison MD at [place] [address]      partial    UPPER GASTROINTESTINAL ENDOSCOPY  09/11/2012    UPPER GASTROINTESTINAL ENDOSCOPY  12/4/14     DR. MAE     Family History   Problem Relation Age of Onset    High Blood Pressure Mother     Heart Disease Mother     Cancer Father         LUNG    High Blood Pressure Brother     COPD Brother     Heart Disease Brother     Heart Attack Brother     COPD Sister     Heart Disease Sister     Arthritis Maternal [de-identified]     COPD Sister     No Known Problems Daughter     No Known Problems Son      Social History     Socioeconomic History    Marital status:      Spouse name: Not on file    Number of children: Not on file    Years of education: Not on file    Highest education level: Not on file   Occupational History    Occupation: Retired   Social Needs    Financial resource strain: Not on Deana rash.   Allergic/Immunologic: Negative for environmental allergies and food allergies. Neurological: Negative for dizziness, tremors, weakness and headaches. Psychiatric/Behavioral: Positive for sleep disturbance. Negative for behavioral problems. Objective:     Vitals:    04/18/19 1255 04/18/19 1303   BP: (!) 142/70 (!) 142/70   Pulse: 84    Resp: 16    Temp: 98.4 °F (36.9 °C)    TempSrc: Tympanic    SpO2: 92%    Weight: 194 lb (88 kg)    Height: 5' 1\" (1.549 m)          Physical Exam   Constitutional: She is oriented to person, place, and time. She appears well-developed and well-nourished. No distress. obese   HENT:   Head: Normocephalic and atraumatic. Nose: Nose normal.   Mouth/Throat: Oropharynx is clear and moist.   Mallampati III   Eyes: Pupils are equal, round, and reactive to light. EOM are normal.   Neck: No JVD present. No tracheal deviation present. No thyromegaly present. Cardiovascular: Normal rate and regular rhythm. Exam reveals no gallop and no friction rub. No murmur heard. Pulmonary/Chest: No respiratory distress. She has no wheezes. She has no rales. She exhibits no tenderness. diminished Breath sound bilaterally. Abdominal: She exhibits no distension. There is no tenderness. There is no rebound. Musculoskeletal: Normal range of motion. She exhibits no edema. Lymphadenopathy:     She has no cervical adenopathy. Neurological: She is alert and oriented to person, place, and time. Coordination normal.   Skin: Skin is warm and dry. She is not diaphoretic. Psychiatric: She has a normal mood and affect.        Current Outpatient Medications   Medication Sig Dispense Refill    predniSONE (DELTASONE) 1 MG tablet Take 20 mg for seven days then 10 mg daily      Roflumilast (DALIRESP) 500 MCG tablet Take 1 tablet by mouth daily 90 tablet 3    albuterol sulfate (PROAIR RESPICLICK) 256 (90 Base) MCG/ACT aerosol powder inhalation Inhale 1 puff into the lungs every 6 hours as needed for Wheezing or Shortness of Breath 3 Inhaler 3    traMADol (ULTRAM) 50 MG tablet Take 1 tablet by mouth every 6 hours as needed for Pain for up to 50 days. MAX #50 MONTHLY. DO NOT GET NARCOTIC MEDICATIONS FROM ANY OTHER PROVIDER.  50 tablet 0    esomeprazole (NEXIUM) 40 MG delayed release capsule TAKE 1 CAPSULE DAILY 90 capsule 1    NOVOFINE 32G X 6 MM MISC qid 400 each 3    insulin aspart (NOVOLOG FLEXPEN) 100 UNIT/ML injection pen Inject 10-15 Units into the skin 3 times daily (before meals) 30 pen 3    insulin glargine (BASAGLAR KWIKPEN) 100 UNIT/ML injection pen Inject 90 units at night 90 pen 3    Continuous Blood Gluc  (FREESTYLE DONOVAN 14 DAY READER) MARCELLUS As directed 1 Device 0    Continuous Blood Gluc Sensor (FREESTYLE DONOVAN 14 DAY SENSOR) MISC Every 2 weeks 2 each 06    Liraglutide (VICTOZA) 18 MG/3ML SOPN SC injection Inject 1.8 mg into the skin daily 9 pen 3    Continuous Blood Gluc  (FREESTYLE DONOVAN 14 DAY READER) MARCELLUS As directed 1 Device 00    Continuous Blood Gluc Sensor (FREESTYLE DONOVAN 14 DAY SENSOR) MISC Every 2 weeks 2 each 06    CPAP Machine MISC by Does not apply route Change CPAP pressure to 10 cm 1 each 0    guaiFENesin (MUCINEX) 600 MG extended release tablet Take 1 tablet by mouth 2 times daily 60 tablet 1    ipratropium-albuterol (DUONEB) 0.5-2.5 (3) MG/3ML SOLN nebulizer solution Inhale 3 mLs into the lungs every 6 hours 360 mL 3    topiramate (TOPAMAX) 25 MG tablet Take 1 tablet by mouth nightly 60 tablet 3    blood glucose test strips (ONE TOUCH ULTRA TEST) strip USE TO TEST TWICE A DAY AND AS NEEDED, IDDM - Dx: E11.9 100 each 1    vitamin D (ERGOCALCIFEROL) 19318 units CAPS capsule Take 1 capsule by mouth Twice a Week 16 capsule 0    OXYGEN Inhale into the lungs 2 liters at night and during the day 3 liters      fluocinonide (LIDEX) 0.05 % ointment APPLY OINTMENT TOPICALLY TWICE DAILY FOR 2 WEEKS 30 g 1    formoterol (PERFOROMIST) 20 MCG/2ML nebulizer solution Inhale 2 mLs into the lungs daily      diclofenac sodium (VOLTAREN) 1 % GEL Apply 4 g topically 4 times daily Generic equivalent acceptable, unless otherwise noted. 5 Tube 5    fluticasone (FLONASE) 50 MCG/ACT nasal spray 2 sprays by Nasal route daily 1 Bottle 0    levothyroxine (SYNTHROID) 125 MCG tablet Take 1 tablet by mouth Daily 90 tablet 3    traMADol (ULTRAM) 50 MG tablet Take 1 tablet by mouth every 6 hours as needed. Sonia Humphrey SPIRIVA RESPIMAT 2.5 MCG/ACT AERS inhaler 2 puffs daily       Insulin Pen Needle (B-D UF III MINI PEN NEEDLES) 31G X 5 MM MISC USE 1 DAILY TO INJECT LANTUS 100 each 1    nystatin (MYCOSTATIN) 697321 UNIT/ML suspension Take 5 mLs by mouth 4 times daily 200 mL 1    Nebulizers (COMPRESSOR/NEBULIZER) MISC Dispense Nebulizer supplies 1 each 3    atorvastatin (LIPITOR) 80 MG tablet Take 1 tablet by mouth nightly      nitroGLYCERIN (NITROLINGUAL) 0.4 MG/SPRAY 0.4 mg spray       theophylline (UNIPHYL) 600 MG extended release tablet TAKE 1 TABLET ONCE DAILY (Patient taking differently: Take 600 mg by mouth every evening TAKE 1 TABLET ONCE DAILY) 30 tablet 5    albuterol sulfate (PROAIR RESPICLICK) 211 (90 Base) MCG/ACT aerosol powder inhalation Inhale 2 puffs into the lungs every 6 hours as needed for Wheezing or Shortness of Breath 1 Inhaler 5    ONE TOUCH LANCETS MISC USE TO TEST TWICE A DAY AND AS NEEDED, IDDM - Dx: E11.9 200 each 3    Blood Glucose Monitoring Suppl MARCELLUS One Touch Ultra - To Test BID - IDDM - Dx: E11.9 1 Device 0    isosorbide mononitrate (IMDUR) 60 MG CR tablet Take 60 mg by mouth daily       budesonide (PULMICORT) 0.5 MG/2ML nebulizer suspension Take 1 ampule by nebulization 2 times daily      digoxin (DIGOX) 125 MCG tablet Take 125 mcg by mouth Daily with lunch       clopidogrel (PLAVIX) 75 MG tablet Take 75 mg by mouth daily.         fenofibrate (TRICOR) 145 MG tablet Take 145 mg by mouth every evening       verapamil (VERELAN PM) 240 MG CR capsule Take 240 mg by mouth 2 times daily       LORazepam (ATIVAN) 0.5 MG tablet Take 1 tablet by mouth every 12 hours as needed for Anxiety for up to 30 days. . 24 tablet 0    mepolizumab (NUCALA) 100 MG SOLR injection Inject 100 mg into the skin Received from: [id number]: Inject 100 mg subcutaneously one time only. monthly       No current facility-administered medications for this visit. Results for orders placed during the hospital encounter of 12/28/18   XR CHEST STANDARD (2 VW)    Narrative EXAMINATION: CHEST TWO VIEWS     CLINICAL HISTORY: Short of breath    COMPARISONS: August 9, 2018     FINDINGS:    Two views of the chest are submitted. The cardiac silhouette is of normal size configuration. The mediastinum is unremarkable. Pulmonary vascular is attenuated, lungs are hyperinflated and there is some widening of the AP diameter the chest and coarsening of the interstitium. Right sided trachea. There is areas of atelectasis, superimposed groundglass infiltrates in both bases. .  No effusions. Pneumothoraces. Clinical note: The nodule(s) seen on the previous chest x-ray, CT scan of August 20, 2018 is not well appreciated. Impression BIBASILAR AREAS OF ATELECTASIS, PATCHY GROUNDGLASS INFILTRATES IN BOTH BASES SUPERIMPOSED UPON COPD. THE NODULES APPRECIATED ON THE CT SCAN OF CHEST AUGUST 20, 2018 ARE NOT WELL APPRECIATED BY PLAIN FILM. GIVEN THE INTERVAL TIME RECOMMEND CT SCAN THE CHEST WITH IV CONTRAST TO FURTHER EVALUATE. .   ]  Results for orders placed during the hospital encounter of 10/13/16   XR Chest Portable    Narrative EXAMINATION: CHEST PORTABLE VIEW  CLINICAL HISTORY: Short of breath  COMPARISONS: September 6, 2016  FINDINGS:  Single  views of the chest is submitted. The cardiac silhouette is enlarged, unchanged configuration. The mediastinum is unremarkable.   Pulmonary the diagnosis of exclusion. Hepatic steatosis. Cholecystectomy. All CT scans at this facility use dose modulation, iterative reconstruction, and/or weight based dosing when appropriate to reduce radiation dose to as low as reasonably achievable.   ]    Assessment/Plan:     1. Moderate persistent asthma without complication  Nucala was d/c but she said she is feeling asthma is worse. Pulmonologist at [facility] wanted to start her on Reslizumab but never started. She want to go back on Nucala inj. she said she was feeling better with Nucala. Patient advised to discuss with allergist regarding starting Nucal Back. Continue to use  bronchodilator as before    2. Chronic obstructive pulmonary disease, unspecified COPD type ([address]  Patient is on bronchodilator with DuoNeb 4 times a day when necessary, Perforomist twice a day, Pulmicort twice a day, Spiriva 2 puffs daily.  Theophylline 600 mg daily. She is also on daliresp 250 mcg daily change it to 500 µg daily. . She is on prednisone 10 mg daily. 3. Hypoxia  She is on per liter O2 via nasal cannula all the time continue O2 to keep saturation 90% or above    4. ROGELIO on CPAP  Patient is using CPAP with 8  centimeters of H2O with heated humidity. she is using CPAP for about 8-9 hours every night. she is using CPAP with  Nasal pillow. she said  sleep is restful with the CPAP use. she is compliant with CPAP therapy. Counseling: CPAP/BiPAP uses, patient advised to use CPAP at least 5-6 hours every night. Driving: patient is advised for extreme caution when driving or operating machinery if there is a feeling of drowsiness, especially while driving it is preferable to stop driving and take a brief nap. Sleep hygiene:Avoid supine sleep, sleep on  sides. Avoid  sleep deprivation. Explained sleep hygiene. Advice to avoid Alcohol and sedative    5. Malignant neoplasm of lower lobe of right lung [facility])  He had radiation therapy, she said she finished it.   She is going to follow with oncologist at [facility]    6. Obesity (BMI 30-39. 9)  Patient patient is advised try to lose weight. obesity related risk explained to the patient ,  Current weight:  194 lb (88 kg) Lbs. BMI:  Body mass index is 36.66 kg/m². Time spend over 40 min     Return in about 3 months (around 7/18/2019) for asthma, COPD, [place], hypoxia on O2.       Alfred Perez MD

## 2019-04-24 DIAGNOSIS — E55.9 VITAMIN D DEFICIENCY: ICD-10-CM

## 2019-04-24 LAB — VITAMIN D 25-HYDROXY: 17.4 NG/ML (ref 30–100)

## 2019-05-01 ENCOUNTER — OFFICE VISIT (OUTPATIENT)
Dept: FAMILY MEDICINE CLINIC | Age: 69
End: 2019-05-01
Payer: MEDICARE

## 2019-05-01 VITALS
HEART RATE: 105 BPM | RESPIRATION RATE: 12 BRPM | HEIGHT: 61 IN | DIASTOLIC BLOOD PRESSURE: 62 MMHG | BODY MASS INDEX: 36.59 KG/M2 | WEIGHT: 193.8 LBS | TEMPERATURE: 98.2 F | OXYGEN SATURATION: 94 % | SYSTOLIC BLOOD PRESSURE: 116 MMHG

## 2019-05-01 DIAGNOSIS — E55.9 VITAMIN D DEFICIENCY: Primary | ICD-10-CM

## 2019-05-01 DIAGNOSIS — F41.9 ANXIETY: ICD-10-CM

## 2019-05-01 PROCEDURE — 99213 OFFICE O/P EST LOW 20 MIN: CPT | Performed by: FAMILY MEDICINE

## 2019-05-01 PROCEDURE — G8510 SCR DEP NEG, NO PLAN REQD: HCPCS | Performed by: FAMILY MEDICINE

## 2019-05-01 RX ORDER — LORAZEPAM 0.5 MG/1
0.5 TABLET ORAL EVERY 12 HOURS PRN
Qty: 24 TABLET | Refills: 0 | Status: SHIPPED | OUTPATIENT
Start: 2019-05-01 | End: 2019-01-01 | Stop reason: SDUPTHER

## 2019-05-01 RX ORDER — CHOLECALCIFEROL (VITAMIN D3) 1250 MCG
CAPSULE ORAL
Qty: 4 CAPSULE | Refills: 1 | Status: SHIPPED | OUTPATIENT
Start: 2019-05-01

## 2019-05-01 ASSESSMENT — PATIENT HEALTH QUESTIONNAIRE - PHQ9
2. FEELING DOWN, DEPRESSED OR HOPELESS: 0
SUM OF ALL RESPONSES TO PHQ QUESTIONS 1-9: 0
SUM OF ALL RESPONSES TO PHQ9 QUESTIONS 1 & 2: 0
1. LITTLE INTEREST OR PLEASURE IN DOING THINGS: 0
SUM OF ALL RESPONSES TO PHQ QUESTIONS 1-9: 0

## 2019-05-01 ASSESSMENT — ENCOUNTER SYMPTOMS: ABDOMINAL PAIN: 0

## 2019-05-01 NOTE — PROGRESS NOTES
Subjective:      Patient ID: Nhung Winkler is a 70 y.o. female    HPI  Here in follow up for vitamin d deficiency and anxiety. Has been using ativan more often recently as she is going thru stresses of recent lung cancer  Currently taking otc vitamin d supplement. Review of Systems   Cardiovascular: Negative for chest pain. Gastrointestinal: Negative for abdominal pain. Skin: Negative for rash. Neurological: Negative for dizziness and weakness.      Reviewed allergy, medical, social, surgical, family and med list changes and updated   Files     Social History     Socioeconomic History    Marital status:      Spouse name: None    Number of children: None    Years of education: None    Highest education level: None   Occupational History    Occupation: Retired   Social Needs    Financial resource strain: None    Food insecurity:     Worry: None     Inability: None    Transportation needs:     Medical: None     Non-medical: None   Tobacco Use    Smoking status: Former Smoker     Packs/day: 1.50     Years: 32.00     Pack years: 48.00     Types: Cigarettes     Last attempt to quit: 2001     Years since quittin.2    Smokeless tobacco: Never Used   Substance and Sexual Activity    Alcohol use: No     Alcohol/week: 0.0 oz    Drug use: No    Sexual activity: Yes     Partners: Male   Lifestyle    Physical activity:     Days per week: None     Minutes per session: None    Stress: None   Relationships    Social connections:     Talks on phone: None     Gets together: None     Attends Hoahaoism service: None     Active member of club or organization: None     Attends meetings of clubs or organizations: None     Relationship status: None    Intimate partner violence:     Fear of current or ex partner: None     Emotionally abused: None     Physically abused: None     Forced sexual activity: None   Other Topics Concern    None   Social History Narrative    None     Current Outpatient Medications   Medication Sig Dispense Refill    predniSONE (DELTASONE) 1 MG tablet Take 20 mg for seven days then 10 mg daily      Roflumilast (DALIRESP) 500 MCG tablet Take 1 tablet by mouth daily 90 tablet 3    albuterol sulfate (PROAIR RESPICLICK) 184 (90 Base) MCG/ACT aerosol powder inhalation Inhale 1 puff into the lungs every 6 hours as needed for Wheezing or Shortness of Breath 3 Inhaler 3    traMADol (ULTRAM) 50 MG tablet Take 1 tablet by mouth every 6 hours as needed for Pain for up to 50 days. MAX #50 MONTHLY. DO NOT GET NARCOTIC MEDICATIONS FROM ANY OTHER PROVIDER.  50 tablet 0    esomeprazole (NEXIUM) 40 MG delayed release capsule TAKE 1 CAPSULE DAILY 90 capsule 1    NOVOFINE 32G X 6 MM MISC qid 400 each 3    insulin aspart (NOVOLOG FLEXPEN) 100 UNIT/ML injection pen Inject 10-15 Units into the skin 3 times daily (before meals) 30 pen 3    insulin glargine (BASAGLAR KWIKPEN) 100 UNIT/ML injection pen Inject 90 units at night 90 pen 3    Continuous Blood Gluc  (FREESTYLE DONOVAN 14 DAY READER) MARCELLUS As directed 1 Device 0    Continuous Blood Gluc Sensor (FREESTYLE DONOVAN 14 DAY SENSOR) MISC Every 2 weeks 2 each 06    Liraglutide (VICTOZA) 18 MG/3ML SOPN SC injection Inject 1.8 mg into the skin daily 9 pen 3    Continuous Blood Gluc  (FREESTYLE DONOVAN 14 DAY READER) MARCELLUS As directed 1 Device 00    Continuous Blood Gluc Sensor (FREESTYLE DONOVAN 14 DAY SENSOR) MISC Every 2 weeks 2 each 06    CPAP Machine MISC by Does not apply route Change CPAP pressure to 10 cm 1 each 0    guaiFENesin (MUCINEX) 600 MG extended release tablet Take 1 tablet by mouth 2 times daily 60 tablet 1    ipratropium-albuterol (DUONEB) 0.5-2.5 (3) MG/3ML SOLN nebulizer solution Inhale 3 mLs into the lungs every 6 hours 360 mL 3    topiramate (TOPAMAX) 25 MG tablet Take 1 tablet by mouth nightly 60 tablet 3    blood glucose test strips (ONE TOUCH ULTRA TEST) strip USE TO TEST TWICE A DAY AND AS NEEDED, IDDM - Dx: E11.9 100 each 1    vitamin D (ERGOCALCIFEROL) 64595 units CAPS capsule Take 1 capsule by mouth Twice a Week 16 capsule 0    OXYGEN Inhale into the lungs 2 liters at night and during the day 3 liters      fluocinonide (LIDEX) 0.05 % ointment APPLY OINTMENT TOPICALLY TWICE DAILY FOR 2 WEEKS 30 g 1    formoterol (PERFOROMIST) 20 MCG/2ML nebulizer solution Inhale 2 mLs into the lungs daily      diclofenac sodium (VOLTAREN) 1 % GEL Apply 4 g topically 4 times daily Generic equivalent acceptable, unless otherwise noted. 5 Tube 5    fluticasone (FLONASE) 50 MCG/ACT nasal spray 2 sprays by Nasal route daily 1 Bottle 0    levothyroxine (SYNTHROID) 125 MCG tablet Take 1 tablet by mouth Daily 90 tablet 3    traMADol (ULTRAM) 50 MG tablet Take 1 tablet by mouth every 6 hours as needed. Chales Haste SPIRIVA RESPIMAT 2.5 MCG/ACT AERS inhaler 2 puffs daily       Insulin Pen Needle (B-D UF III MINI PEN NEEDLES) 31G X 5 MM MISC USE 1 DAILY TO INJECT LANTUS 100 each 1    nystatin (MYCOSTATIN) 917540 UNIT/ML suspension Take 5 mLs by mouth 4 times daily 200 mL 1    Nebulizers (COMPRESSOR/NEBULIZER) MISC Dispense Nebulizer supplies 1 each 3    atorvastatin (LIPITOR) 80 MG tablet Take 1 tablet by mouth nightly      nitroGLYCERIN (NITROLINGUAL) 0.4 MG/SPRAY 0.4 mg spray       theophylline (UNIPHYL) 600 MG extended release tablet TAKE 1 TABLET ONCE DAILY (Patient taking differently: Take 600 mg by mouth every evening TAKE 1 TABLET ONCE DAILY) 30 tablet 5    albuterol sulfate (PROAIR RESPICLICK) 542 (90 Base) MCG/ACT aerosol powder inhalation Inhale 2 puffs into the lungs every 6 hours as needed for Wheezing or Shortness of Breath 1 Inhaler 5    mepolizumab (NUCALA) 100 MG SOLR injection Inject 100 mg into the skin Received from: [facility] Received Sig: Inject 100 mg subcutaneously one time only.  monthly      ONE TOUCH LANCETS MISC USE TO TEST TWICE A DAY AND AS NEEDED, IDDM - Dx: E11.9 200 each 3    substances monitoring: no signs of potential drug abuse or diversion identified and OARRS report reviewed today- activity consistent with treatment plan. Objective:   /62   Pulse 105   Temp 98.2 °F (36.8 °C) (Tympanic)   Resp 12   Ht 5' 1\" (1.549 m)   Wt 193 lb 12.8 oz (87.9 kg)   LMP 2001   SpO2 94%   BMI 36.62 kg/m²     Physical Exam  Patient with appropriate affect. Aler   Thought content appropriate  Good eye contact    Gen:   NAD  Lungs: clear and equal breath sounds  Heart:   Rate reg. No murmur  Assessment:       Diagnosis Orders   1. Vitamin D deficiency     2. Anxiety  LORazepam (ATIVAN) 0.5 MG tablet         Plan:      Orders Placed This Encounter   Medications    Cholecalciferol (VITAMIN D3) 70781 units CAPS     Si po q  weekly     Dispense:  4 capsule     Refill:  1    LORazepam (ATIVAN) 0.5 MG tablet     Sig: Take 1 tablet by mouth every 12 hours as needed for Anxiety for up to 30 days.      Dispense:  24 tablet     Refill:  0   non fasting blood work in 3 months and f/u after done

## 2019-05-02 RX ORDER — FLASH GLUCOSE SENSOR
KIT MISCELLANEOUS
Qty: 2 EACH | Refills: 6 | Status: SHIPPED | OUTPATIENT
Start: 2019-05-02 | End: 2019-06-12 | Stop reason: SDUPTHER

## 2019-05-02 RX ORDER — FLASH GLUCOSE SCANNING READER
EACH MISCELLANEOUS
Qty: 1 DEVICE | Refills: 0 | Status: SHIPPED | OUTPATIENT
Start: 2019-05-02 | End: 2019-06-12 | Stop reason: SDUPTHER

## 2019-05-15 ENCOUNTER — TELEPHONE (OUTPATIENT)
Dept: PULMONOLOGY | Age: 69
End: 2019-05-15

## 2019-05-15 LAB
ALBUMIN: 3.8 G/DL (ref 3.4–5)
ALP BLD-CCNC: 70 U/L (ref 33–136)
ALT SERPL-CCNC: 20 U/L (ref 7–45)
ANION GAP SERPL CALCULATED.3IONS-SCNC: 17 MMOL/L (ref 10–20)
AST SERPL-CCNC: 16 U/L (ref 9–39)
BICARBONATE: 28 MMOL/L (ref 21–32)
BILIRUB SERPL-MCNC: 0.4 MG/DL (ref 0–1.2)
BILIRUBIN DIRECT: 0.1 MG/DL (ref 0–0.3)
CALCIUM SERPL-MCNC: 9.1 MG/DL (ref 8.6–10.3)
CHLORIDE BLD-SCNC: 102 MMOL/L (ref 98–107)
CHOLESTEROL/HDL RATIO: 5
CHOLESTEROL: 165 MG/DL (ref 0–199)
CREAT SERPL-MCNC: 0.77 MG/DL (ref 0.5–1)
DIGOXIN LEVEL: 0.59 NG/ML (ref 0.8–2)
ERYTHROCYTE [DISTWIDTH] IN BLOOD BY AUTOMATED COUNT: 12.8 % (ref 11.5–14)
GFR AFRICAN AMERICAN: >60 ML/MIN/1.73M2
GFR NON-AFRICAN AMERICAN: >60 ML/MIN/1.73M2
GLUCOSE: 193 MG/DL (ref 74–99)
HCT VFR BLD CALC: 41.7 % (ref 36–46)
HDLC SERPL-MCNC: 33 MG/DL
HEMOGLOBIN: 13.4 G/DL (ref 12–16)
LDL CHOLESTEROL: 57 MG/DL (ref 0–99)
MAGNESIUM: 1.7 MG/DL (ref 1.6–2.4)
MCHC RBC AUTO-ENTMCNC: 32.1 G/DL (ref 32–36)
MCV RBC AUTO: 89 FL (ref 80–100)
NON-HDL CHOLESTEROL: 132 MG/DL
NUCLEATED RBCS: 0.2 /100 WBC (ref 0–0)
PLATELET # BLD: 293 X10E9/L (ref 150–450)
POTASSIUM SERPL-SCNC: 3.8 MMOL/L (ref 3.5–5.3)
RBC # BLD: 4.68 X10E12/L (ref 4–5.2)
SODIUM BLD-SCNC: 143 MMOL/L (ref 136–145)
TOTAL PROTEIN: 7 G/DL (ref 6.4–8.2)
TRIGL SERPL-MCNC: 374 MG/DL (ref 0–149)
UREA NITROGEN: 8 MG/DL (ref 6–23)
VLDLC SERPL CALC-MCNC: 75 MG/DL (ref 0–40)
WBC: 8 X10E9/L (ref 4.4–11.3)

## 2019-05-15 NOTE — TELEPHONE ENCOUNTER
Pt came to the office today stating that her insurance will not covered spriva, instead will covered SYMBICORT, [address] [facility], [facility], [address], [address] [facility], [address] [place] [address], [address] [place] [address], [place] [address], [address] [facility], [address] [place] [address]. PT WILL LIKE YOU TO SEND A PRESCRIPTION FOR ONE OF THIS TO [facility]. PLEASE ADVICE.

## 2019-06-07 DIAGNOSIS — E03.9 HYPOTHYROIDISM, UNSPECIFIED TYPE: ICD-10-CM

## 2019-06-07 LAB
ANION GAP SERPL CALCULATED.3IONS-SCNC: 18 MEQ/L (ref 9–15)
BUN BLDV-MCNC: 12 MG/DL (ref 8–23)
CALCIUM SERPL-MCNC: 9.1 MG/DL (ref 8.5–9.9)
CHLORIDE BLD-SCNC: 103 MEQ/L (ref 95–107)
CO2: 24 MEQ/L (ref 20–31)
CREAT SERPL-MCNC: 0.85 MG/DL (ref 0.5–0.9)
CREATININE URINE: 142.9 MG/DL
GFR AFRICAN AMERICAN: >60
GFR NON-AFRICAN AMERICAN: >60
GLUCOSE BLD-MCNC: 203 MG/DL (ref 70–99)
HBA1C MFR BLD: 8.4 % (ref 4.8–5.9)
MICROALBUMIN UR-MCNC: 2.2 MG/DL
MICROALBUMIN/CREAT UR-RTO: 15.4 MG/G (ref 0–30)
POTASSIUM SERPL-SCNC: 3.4 MEQ/L (ref 3.4–4.9)
SODIUM BLD-SCNC: 145 MEQ/L (ref 135–144)
T4 FREE: 1.28 NG/DL (ref 0.84–1.68)
TSH REFLEX: 7.44 UIU/ML (ref 0.44–3.86)

## 2019-06-10 RX ORDER — LIDOCAINE HYDROCHLORIDE 20 MG/ML
5 INJECTION, SOLUTION INFILTRATION; PERINEURAL ONCE
Status: COMPLETED | OUTPATIENT
Start: 2019-06-10 | End: 2019-06-10

## 2019-06-10 RX ORDER — LIDOCAINE HYDROCHLORIDE 10 MG/ML
8 INJECTION, SOLUTION INFILTRATION; PERINEURAL ONCE
Status: COMPLETED | OUTPATIENT
Start: 2019-06-10 | End: 2019-06-10

## 2019-06-10 RX ADMIN — LIDOCAINE HYDROCHLORIDE 8 ML: 10 INJECTION, SOLUTION INFILTRATION; PERINEURAL at 15:03

## 2019-06-10 RX ADMIN — LIDOCAINE HYDROCHLORIDE 5 ML: 20 INJECTION, SOLUTION INFILTRATION; PERINEURAL at 15:03

## 2019-06-10 NOTE — PROGRESS NOTES
Patient was here today for Nerve Block guided by U/S and TP injections of SS. Patient placed in upright position areas marked and prepped with alcohol. Sites injected with 2% 5 cc Lidocaine 1% 8cc Lidocaine administered  By provider.

## 2019-06-11 ENCOUNTER — OFFICE VISIT (OUTPATIENT)
Dept: PHYSICAL MEDICINE AND REHAB | Age: 69
End: 2019-06-11
Payer: MEDICARE

## 2019-06-11 VITALS
SYSTOLIC BLOOD PRESSURE: 142 MMHG | HEIGHT: 61 IN | DIASTOLIC BLOOD PRESSURE: 60 MMHG | BODY MASS INDEX: 35.12 KG/M2 | WEIGHT: 186 LBS

## 2019-06-11 DIAGNOSIS — M54.2 NECK PAIN: ICD-10-CM

## 2019-06-11 DIAGNOSIS — M79.642 PAIN IN BOTH HANDS: ICD-10-CM

## 2019-06-11 DIAGNOSIS — G89.29 CHRONIC MIDLINE LOW BACK PAIN WITH LEFT-SIDED SCIATICA: ICD-10-CM

## 2019-06-11 DIAGNOSIS — G56.80 SUPRASCAPULAR ENTRAPMENT NEUROPATHY, UNSPECIFIED LATERALITY: ICD-10-CM

## 2019-06-11 DIAGNOSIS — Z79.899 HIGH RISK MEDICATION USE: ICD-10-CM

## 2019-06-11 DIAGNOSIS — M79.641 PAIN IN BOTH HANDS: ICD-10-CM

## 2019-06-11 DIAGNOSIS — G56.03 BILATERAL CARPAL TUNNEL SYNDROME: ICD-10-CM

## 2019-06-11 DIAGNOSIS — G89.29 CHRONIC BILATERAL THORACIC BACK PAIN: ICD-10-CM

## 2019-06-11 DIAGNOSIS — M79.10 MYALGIA: Primary | ICD-10-CM

## 2019-06-11 DIAGNOSIS — M54.81 BILATERAL OCCIPITAL NEURALGIA: ICD-10-CM

## 2019-06-11 DIAGNOSIS — M54.42 CHRONIC MIDLINE LOW BACK PAIN WITH LEFT-SIDED SCIATICA: ICD-10-CM

## 2019-06-11 DIAGNOSIS — G56.82 SUPRASCAPULAR ENTRAPMENT NEUROPATHY OF LEFT SIDE: ICD-10-CM

## 2019-06-11 DIAGNOSIS — S23.41XS SPRAIN OF COSTAL CARTILAGE, SEQUELA: ICD-10-CM

## 2019-06-11 DIAGNOSIS — M51.36 DDD (DEGENERATIVE DISC DISEASE), LUMBAR: ICD-10-CM

## 2019-06-11 DIAGNOSIS — M54.6 CHRONIC BILATERAL THORACIC BACK PAIN: ICD-10-CM

## 2019-06-11 DIAGNOSIS — M54.12 C7 RADICULOPATHY: ICD-10-CM

## 2019-06-11 PROCEDURE — 99215 OFFICE O/P EST HI 40 MIN: CPT | Performed by: PHYSICAL MEDICINE & REHABILITATION

## 2019-06-11 RX ORDER — TRAMADOL HYDROCHLORIDE 50 MG/1
50 TABLET ORAL EVERY 6 HOURS PRN
Qty: 90 TABLET | Refills: 1 | Status: SHIPPED | OUTPATIENT
Start: 2019-06-11 | End: 2019-01-01

## 2019-06-11 RX ORDER — TRAMADOL HYDROCHLORIDE 50 MG/1
50 TABLET ORAL EVERY 6 HOURS PRN
Qty: 50 TABLET | Refills: 0 | Status: SHIPPED | OUTPATIENT
Start: 2019-06-11 | End: 2019-06-11 | Stop reason: DRUGHIGH

## 2019-06-11 RX ORDER — TRAMADOL HYDROCHLORIDE 50 MG/1
50 TABLET ORAL EVERY 6 HOURS PRN
Qty: 90 TABLET | Refills: 1 | Status: SHIPPED | OUTPATIENT
Start: 2019-06-11 | End: 2019-06-11 | Stop reason: SDUPTHER

## 2019-06-11 ASSESSMENT — ENCOUNTER SYMPTOMS
BACK PAIN: 1
SINUS PRESSURE: 1
DIARRHEA: 0
CONSTIPATION: 0
ABDOMINAL PAIN: 0
EYES NEGATIVE: 1
VOMITING: 0
WHEEZING: 1
CHOKING: 0
APNEA: 1
SHORTNESS OF BREATH: 1
ALLERGIC/IMMUNOLOGIC NEGATIVE: 1
PHOTOPHOBIA: 0
CHEST TIGHTNESS: 1
STRIDOR: 0
NAUSEA: 0

## 2019-06-11 NOTE — TELEPHONE ENCOUNTER
R/F Voltaren 1% Gel    Rx written at [address] sent to incorrect pharmacy. I called and Canceled Rx at [facility].      BRENDEN PIMENTEL

## 2019-06-11 NOTE — PROGRESS NOTES
Subjective  Lisa Hailee, 70 y.o. female presents today with:       Back Pain lower back TP 4/10/19 30% pain reduction less a week     Neck Pain     Leg Pain cramping losing balance    Extremity Weakness burning and pain    Foot Swelling cramping     Medication Refill Pill count for Tramadol pt did not bring meds    Mental Health Problem COPING       She continues doing well with meds /Ultram and injections- she is happy to report that she thinks that she is done with her radiation therapy for her lung cancer. However her pain has gotten worse in her upper back neck and she has numbness in her hands. We discussed the possibilities of cervical radiculopathy at C7 bilaterally and will check an MRI and EMG. Her mid back in the thoracic spine it has flared up as well and I will check an x-ray of the mid back. I have also discussed with her alternating Tylenol and tramadol and possibly increasing tramadol to 2 a day on a regular basis and only taking the third if she is in dire straits. She will always use lowest dose of the medication and I have never seen any behaviors of concern with her in these medications. He will follow-up for an another CAT scan next month the [facility] to determine whether her lung cancer is completely gone. She will also see Dr. Dilcia Ward soon because of chronic GI dyspepsia. She is lost a lot of weight because of the dyspepsia and possibly secondary to the Topamax. Still no signs of drug abuse or misuse. She rarely takes her medication when she does she is more able to participate in family activities and has positive interactions with family and friends. Recent tox screen was unremarkable. Back Pain   This is a chronic problem. The current episode started more than 1 year ago. The problem occurs constantly. The problem is unchanged.  The pain is present in the lumbar spine, thoracic spine and gluteal. The quality of the pain is described as aching. Radiates to: Bilateral legs-pain will alternate from leg to leg. The pain is at a severity of 8/10. The pain is moderate. The pain is the same all the time. The symptoms are aggravated by lying down and sitting. Associated symptoms include chest pain, leg pain, numbness and weakness (legs ). Pertinent negatives include no abdominal pain, headaches, paresis, pelvic pain or tingling. Risk factors include history of osteoporosis, obesity, lack of exercise, sedentary lifestyle and menopause. She has tried home exercises and analgesics (Tramadol, Tylenol, PT 8 sessions, Injections ) for the symptoms. The treatment provided moderate relief. Neck Pain    This is a chronic problem. The current episode started more than 1 year ago. The problem occurs intermittently. The problem has been unchanged. The pain is present in the midline, left side and right side. The quality of the pain is described as aching. The pain is at a severity of 9/10. The pain is severe. The symptoms are aggravated by bending, position and twisting. Stiffness is present all day. Associated symptoms include chest pain, leg pain, numbness and weakness (legs ). Pertinent negatives include no headaches, pain with swallowing, paresis, photophobia or tingling. She has tried home exercises and heat (Tramadol, Tylenol, PT 15 sessions, Injections ) for the symptoms. The treatment provided significant relief. Knee Pain    The incident occurred more than 1 week ago. The incident occurred in the yard. The injury mechanism was a fall. The pain is present in the right leg, right foot, right ankle, right knee and right hip. The pain is at a severity of 7/10. The pain is severe. Associated symptoms include numbness. Pertinent negatives include no inability to bear weight, loss of motion, muscle weakness or tingling. She reports no foreign bodies present. The symptoms are aggravated by palpation, movement and weight bearing.  She has tried ice for the symptoms. The treatment provided mild relief. Past Medical History:   Diagnosis Date    Anxiety     Asthma     Back pain     Bronchitis     CAD (coronary artery disease)     Carpal tunnel syndrome     COPD (chronic obstructive pulmonary disease) (HCC)     uses CPAP at night    Emphysema of lung (HCC)     Fibromyalgia     GERD (gastroesophageal reflux disease)     Hyperlipidemia     Hypertension     Hypothyroidism     Obesity     ROGELIO (obstructive sleep apnea)     Osteoarthritis     Osteoporosis     PONV (postoperative nausea and vomiting)     Restless legs syndrome     SOB (shortness of breath)     Type II or unspecified type diabetes mellitus without mention of complication, not stated as uncontrolled     Unspecified sleep apnea     UTI (urinary tract infection)      Past Surgical History:   Procedure Laterality Date    ANKLE SURGERY Left     hardware in & removed    ANKLE SURGERY Right     Plate in right ankle    CARPAL TUNNEL RELEASE Left 2015    CHOLECYSTECTOMY      COLONOSCOPY  09/11/2012    CORONARY ANGIOPLASTY WITH STENT PLACEMENT      ENDOSCOPY, COLON, DIAGNOSTIC      LUNG BIOPSY  09/07/2017    pleural biopsy    WA REVISE MEDIAN N/CARPAL TUNNEL SURG Right 5/24/2018    RIGHT WRIST CARPAL TUNNEL RELEASE, ( TO BE IN ROOM 12 WITH ROOM 2 TEAM) performed by Elda Phalen, MD at [facility] Left 6/19/2018    LEFT JOSELIN THYROIDECTOMY performed by Charley Pan MD at [address] [place] [address] [facility]      THYROIDECTOMY      partial    UPPER GASTROINTESTINAL ENDOSCOPY  09/11/2012    UPPER GASTROINTESTINAL ENDOSCOPY  12/4/14     DR. MAE     Social History     Socioeconomic History    Marital status:      Spouse name: None    Number of children: None    Years of education: None    Highest education level: None   Occupational History    Occupation: Retired   Social Needs    Financial resource strain: None    Food insecurity:     Worry: None     Inability: None  Transportation needs:     Medical: None     Non-medical: None   Tobacco Use    Smoking status: Former Smoker     Packs/day: 1.50     Years: 32.00     Pack years: 48.00     Types: Cigarettes     Last attempt to quit: 2001     Years since quittin.3    Smokeless tobacco: Never Used   Substance and Sexual Activity    Alcohol use: No     Alcohol/week: 0.0 oz    Drug use: No    Sexual activity: Yes     Partners: Male   Lifestyle    Physical activity:     Days per week: None     Minutes per session: None    Stress: None   Relationships    Social connections:     Talks on phone: None     Gets together: None     Attends Holiness service: None     Active member of club or organization: None     Attends meetings of clubs or organizations: None     Relationship status: None    Intimate partner violence:     Fear of current or ex partner: None     Emotionally abused: None     Physically abused: None     Forced sexual activity: None   Other Topics Concern    None   Social History Narrative    None     Family History   Problem Relation Age of Onset    High Blood Pressure Mother     Heart Disease Mother     Cancer Father         LUNG    High Blood Pressure Brother     COPD Brother     Heart Disease Brother     Heart Attack Brother     COPD Sister     Heart Disease Sister     Arthritis Maternal Aunt     COPD Sister     No Known Problems Daughter     No Known Problems Son        Allergies   Allergen Reactions    Biaxin [Clarithromycin] Nausea Only     Nausea with diarrhea    Invokana [Canagliflozin]      Yeast infection        Review of Systems   Constitutional: Positive for fatigue. Negative for activity change. HENT: Positive for postnasal drip and sinus pressure. Negative for congestion. Eyes: Negative. Negative for photophobia. Respiratory: Positive for apnea, chest tightness, shortness of breath and wheezing. Negative for choking and stridor.     Cardiovascular: Positive for chest pain and leg swelling (Bilateral). Negative for palpitations. Gastrointestinal: Negative for abdominal pain, constipation, diarrhea, nausea and vomiting. Endocrine: Positive for heat intolerance. Negative for cold intolerance. Genitourinary: Negative. Negative for pelvic pain. Musculoskeletal: Positive for arthralgias, back pain, joint swelling, neck pain and neck stiffness. Negative for gait problem. Skin: Negative. Allergic/Immunologic: Negative. Neurological: Positive for weakness (legs ), light-headedness and numbness. Negative for dizziness, tingling and headaches. Hematological: Bruises/bleeds easily. Psychiatric/Behavioral: Positive for sleep disturbance. Negative for dysphoric mood. The patient is nervous/anxious. Objective    Vitals:    06/11/19 1318 06/11/19 1359   BP: (!) 180/70 (!) 142/60   Weight: 186 lb (84.4 kg)    Height: 5' 1\" (1.549 m)      Pain Score: EIGHT       Physical Exam   Constitutional: She is oriented to person, place, and time. Vital signs are normal. She appears well-developed and well-nourished. Non-toxic appearance. She does not have a sickly appearance. She does not appear ill. No distress. Nasal cannula in place. HENT:   Head: Normocephalic and atraumatic. Right Ear: Hearing normal.   Left Ear: Hearing normal.   Nose: Nose normal.   Mouth/Throat: Oropharynx is clear and moist and mucous membranes are normal. No oral lesions. Normal dentition. No oropharyngeal exudate. No signs of toxic erosions. Eyes: Pupils are equal, round, and reactive to light. Conjunctivae and EOM are normal. Right eye exhibits no chemosis, no discharge and no exudate. Left eye exhibits no chemosis, no discharge and no exudate. No scleral icterus. Neck: Normal range of motion. Neck supple. No JVD present. No neck rigidity. No tracheal deviation and no edema present. No thyromegaly present.        Cardiovascular: Normal rate, regular rhythm, normal heart sounds is alert and oriented to person, place, and time. She displays abnormal reflex. She displays no atrophy and no tremor. No cranial nerve deficit or sensory deficit. She exhibits normal muscle tone. Coordination normal. She displays no Babinski's sign on the right side. She displays no Babinski's sign on the left side. Skin: Skin is warm, dry and intact. No abrasion, no bruising, no ecchymosis, no laceration, no petechiae and no rash noted. Rash is not macular, not pustular and not urticarial. She is not diaphoretic. No cyanosis or erythema. No pallor. Nails show no clubbing. Psychiatric: She has a normal mood and affect. Her behavior is normal. Judgment and thought content normal. Her mood appears not anxious. Her affect is not angry, not blunt, not labile and not inappropriate. Her speech is not rapid and/or pressured, not delayed, not tangential and not slurred. She is not agitated, not aggressive, not hyperactive, not slowed, not withdrawn, not actively hallucinating and not combative. Thought content is not paranoid and not delusional. Cognition and memory are normal. Cognition and memory are not impaired. She does not express impulsivity or inappropriate judgment. She does not exhibit a depressed mood. She expresses no homicidal and no suicidal ideation. She expresses no suicidal plans and no homicidal plans. She is communicative. She exhibits normal recent memory and normal remote memory. She is attentive. Vitals reviewed. Ortho Exam  Neurologic Exam     Mental Status   Oriented to person, place, and time. Speech: not slurred   Level of consciousness: alert  Knowledge: good. Able to name object. Able to read. Able to repeat. Able to write. Normal comprehension. Cranial Nerves     CN III, IV, VI   Pupils are equal, round, and reactive to light.   Extraocular motions are normal.         After a thorough review and discussion of the previous medical records, patient comprehensive medical, surgical, and family and social history, Review of Systems, their OARRS, their Screener and Opioid Assessment for Patients with Pain (SOAPP®-R), recent diagnostics, and symptomatic results to previous treatment, it is my impression that the patients is suffering with progressive and severe:     Diagnosis Orders   1. Myalgia  traMADol (ULTRAM) 50 MG tablet    Ambulatory referral to Occupational Therapy    DISCONTINUED: traMADol (ULTRAM) 50 MG tablet    DISCONTINUED: traMADol (ULTRAM) 50 MG tablet   2. Sprain of costal cartilage, sequela  Ambulatory referral to Occupational Therapy   3. Suprascapular entrapment neuropathy of left side  [place] INJECT NERV BLCK,SUPRASCAP N.    traMADol (ULTRAM) 50 MG tablet    Ambulatory referral to Occupational Therapy    DISCONTINUED: traMADol (ULTRAM) 50 MG tablet    DISCONTINUED: traMADol (ULTRAM) 50 MG tablet   4. High risk medication use - 11/07/17 OARRS PM&R, 02/07/18 OARRS PM&R, 05/30/17 Urine Drug Screen: negative PM&R, MED CONTRACT 1/26/17     5. Neck pain  Ambulatory referral to Occupational Therapy    MRI CERVICAL SPINE W WO CONTRAST    XR THORACIC SPINE (2 VIEWS)   6. Bilateral carpal tunnel syndrome  Ambulatory referral to Occupational Therapy    XR THORACIC SPINE (2 VIEWS)   7. Chronic midline low back pain with left-sided sciatica  traMADol (ULTRAM) 50 MG tablet    Ambulatory referral to Occupational Therapy    DISCONTINUED: traMADol (ULTRAM) 50 MG tablet    DISCONTINUED: traMADol (ULTRAM) 50 MG tablet   8. DDD (degenerative disc disease), lumbar  traMADol (ULTRAM) 50 MG tablet    Ambulatory referral to Occupational Therapy    MRI CERVICAL SPINE W WO CONTRAST    XR THORACIC SPINE (2 VIEWS)    DISCONTINUED: traMADol (ULTRAM) 50 MG tablet    DISCONTINUED: traMADol (ULTRAM) 50 MG tablet   9. Suprascapular entrapment neuropathy, unspecified laterality  traMADol (ULTRAM) 50 MG tablet    DISCONTINUED: traMADol (ULTRAM) 50 MG tablet    DISCONTINUED: traMADol (ULTRAM) 50 MG tablet   10. Chronic bilateral thoracic back pain  traMADol (ULTRAM) 50 MG tablet    Ambulatory referral to Occupational Therapy    MRI CERVICAL SPINE W WO CONTRAST    XR THORACIC SPINE (2 VIEWS)    DISCONTINUED: traMADol (ULTRAM) 50 MG tablet    DISCONTINUED: traMADol (ULTRAM) 50 MG tablet   11. High risk medication use - 08/5/17 OARRS PM&R, 11/07/17 OARRS PM&R, MED CONTRACT 1/26/17  traMADol (ULTRAM) 50 MG tablet    DISCONTINUED: traMADol (ULTRAM) 50 MG tablet    DISCONTINUED: traMADol (ULTRAM) 50 MG tablet   12. Pain in both hands  diclofenac sodium (VOLTAREN) 1 % GEL   13. Bilateral occipital neuralgia  [place] INJECT NERV BLCK,GREAT OCCIPTL   14. C7 radiculopathy  EMG    MRI CERVICAL SPINE W WO CONTRAST       I am also concerned by lifestyle and mood issues including:    Past Medical History:   Diagnosis Date    Anxiety     Asthma     Back pain     Bronchitis     CAD (coronary artery disease)     Carpal tunnel syndrome     COPD (chronic obstructive pulmonary disease) (HCC)     uses CPAP at night    Emphysema of lung ([facility])     Fibromyalgia     GERD (gastroesophageal reflux disease)     Hyperlipidemia     Hypertension     Hypothyroidism     Obesity     ROGELIO (obstructive sleep apnea)     Osteoarthritis     Osteoporosis     PONV (postoperative nausea and vomiting)     Restless legs syndrome     SOB (shortness of breath)     Type II or unspecified type diabetes mellitus without mention of complication, not stated as uncontrolled     Unspecified sleep apnea     UTI (urinary tract infection)            Given their medication, chronic pain and lifestyle and medications they are at risk for :    Falls, constipation, addiction  Loss of livelyhood due to severe pain, debility, weight gain and  vitamin D deficiency    The patient was educated regarding proper diet, fitness routine, and regulatory restrictions concerning pain medications.         Previous notes, comprehensive past medical, surgical, family history, and diagnostics were reviewed. Patient education and councelling were provided regarding off label use,treatment options and medication and injection risks. Current and old OARRS ([place] Automated Prescription Reporting System) records reviewed, all refills reviewed since last visit,  Behavioral agreement/BARB regulations   and Toxicology screen was reviewed with patient and is up to date. There are no current red flags. They are making good progress regarding pain relief, they are performing at a functional level regarding activities of daily living, family interactions and psychological functioning, they're not having any adverse effects or side effects from the current medications, and I see no findings of aberrant drug taking or addiction related behaviors. The patient is aware that they have a chronic pain condition and they may require opiates dosing for life. All efforts will be made to wean to the lowest effective dose. Other therapies for pain have not been effective including nonopiate medications. Injections and exercises are only partially effective. A Rx for Narcan was offered to help prevent accidental overdose. RX Monitoring 6/11/2019   Attestation -   Acute Pain Prescriptions -   Periodic Controlled Substance Monitoring Possible medication side effects, risk of tolerance/dependence & alternative treatments discussed. ;No signs of potential drug abuse or diversion identified.;Obtaining appropriate analgesic effect of treatment. ;Assessed functional status. Chronic Pain > 50 MEDD Obtained or confirmed \"Consent for Opioid Use\" on file.;Considered consultation with a specialist.;Re-evaluated the status of the patient's underlying condition causing pain. Chronic Pain > 80 MEDD -       Periodic Controlled Substance Monitoring: Possible medication side effects, risk of tolerance/dependence & alternative treatments discussed., No signs of potential drug abuse or diversion identified. , Obtaining appropriate analgesic effect of treatment., Assessed functional status. (Fleinessa Pomeroy, DO)       Patient is currently taking:       I am having Porsha Pal. [place]ki maintain her verapamil, clopidogrel, fenofibrate, digoxin, budesonide, isosorbide mononitrate, ONE TOUCH LANCETS, blood glucose monitor kit and supplies, mepolizumab, theophylline, albuterol sulfate, atorvastatin, nitroGLYCERIN, Compressor/Nebulizer, nystatin, Insulin Pen Needle, SPIRIVA RESPIMAT, levothyroxine, fluticasone, formoterol, OXYGEN, fluocinonide, vitamin D, blood glucose test strips, topiramate, ipratropium-albuterol, CPAP Machine, guaiFENesin, Liraglutide, FREESTYLE DONOVAN 14 DAY READER, FREESTYLE DONOVAN 14 DAY SENSOR, NOVOFINE, insulin aspart, insulin glargine, esomeprazole, predniSONE, Roflumilast, albuterol sulfate, Vitamin D3, LORazepam, FREESTYLE DONOVAN 14 DAY READER, FREESTYLE DONOVAN 14 DAY SENSOR, Umeclidinium Bromide, diclofenac sodium, and traMADol. I also recommend the following Medications:    Orders Placed This Encounter   Medications    DISCONTD: traMADol (ULTRAM) 50 MG tablet     Sig: Take 1 tablet by mouth every 6 hours as needed for Pain for up to 50 days. MAX #50 MONTHLY. DO NOT GET NARCOTIC MEDICATIONS FROM ANY OTHER PROVIDER. Dispense:  50 tablet     Refill:  0     Reduce doses taken as pain becomes manageable    diclofenac sodium (VOLTAREN) 1 % GEL     Sig: Apply 4 g topically 4 times daily Generic equivalent acceptable, unless otherwise noted. Dispense:  5 Tube     Refill:  5    DISCONTD: traMADol (ULTRAM) 50 MG tablet     Sig: Take 1 tablet by mouth every 6 hours as needed for Pain for up to 50 days. MAX #90 MONTHLY. DO NOT GET NARCOTIC MEDICATIONS FROM ANY OTHER PROVIDER. Dispense:  90 tablet     Refill:  1     Reduce doses taken as pain becomes manageable    traMADol (ULTRAM) 50 MG tablet     Sig: Take 1 tablet by mouth every 6 hours as needed for Pain for up to 50 days.  MAX #90 MONTHLY. DO NOT GET NARCOTIC MEDICATIONS FROM ANY OTHER PROVIDER. Dispense:  90 tablet     Refill:  1     Reduce doses taken as pain becomes manageable        -which helps with pain and function. Otherwise, continue the current pain medications that I have prescibed. Radiologic:   Old films reviewed  ,  IMPRESSION:    Stable dominant right lower lobe 1.6 cm nodule with surrounding radiation   changes.  New elongated nodular opacity in the left upper lobe is more   likely infectious rather than neoplastic.  6-8 week imaging follow up   suggested to assure resolution. I discussed results with patients. see Follow up plans below  For any new studies. Care Everywhere Updates:  requested and reviewed. 70year old female with radiographic malignant neoplasm {serial CT growth, mild increasing PET avidity] RLL lung, ([id number]) stage IA2 s/p SBRT 50 Gy/5 fractions completed 2/15/19       Stable dominant right lower lobe 1.6 cm nodule with surrounding radiation   changes.  New elongated nodular opacity in the left upper lobe is more   likely infectious rather than neoplastic.  6-8 week imaging follow up   suggested to assure resolution. No new issues noted. [facility]'s ASSESSMENT/PLAN: Clinically doing well. CT demonstrates partial response in the treated tumor with evolving radiation changes. CT does demonstrate area of suspected atelectasis in segment-like shape in the MOISES, likely inflammatory origin and benign appearing. We will obtain short interval scan to further assess, we will see back in 8 weeks with CT Chest prior. Electrodiagnostic:  Previous studies requested,     I discussed results with patient. See follow-up plans for new studies.         Labs:  Previous labs reviewed     Lab Results   Component Value Date     06/07/2019    K 3.4 06/07/2019    K 4.6 01/07/2019     06/07/2019    CO2 24 06/07/2019    BUN 12 06/07/2019    CREATININE 0.85 06/07/2019    CALCIUM possible             Follow up with Primary Care Physician regarding their general medical needs. They are to follow up in 2 months to review medication, efficacy of injections, pill counts, OARRS check, SOAPPR assessment, review diagnostics, to review previous and future treatment plans and assess appropriateness for continued therapy.         New Diagnostics  Orders Placed This Encounter   Procedures    MRI CERVICAL SPINE W WO CONTRAST    XR THORACIC SPINE (2 VIEWS)    Ambulatory referral to Occupational Therapy    EMG    [place] INJECT NERV BLCK,SUPRASCAP N.    [place] INJECT NERV BLCK,GREAT OCCIPTL       Lauren Adams, DO

## 2019-06-12 ENCOUNTER — OFFICE VISIT (OUTPATIENT)
Dept: ENDOCRINOLOGY | Age: 69
End: 2019-06-12
Payer: MEDICARE

## 2019-06-12 VITALS
SYSTOLIC BLOOD PRESSURE: 137 MMHG | WEIGHT: 186 LBS | HEART RATE: 95 BPM | BODY MASS INDEX: 35.12 KG/M2 | HEIGHT: 61 IN | DIASTOLIC BLOOD PRESSURE: 84 MMHG

## 2019-06-12 DIAGNOSIS — E89.0 POSTOPERATIVE HYPOTHYROIDISM: ICD-10-CM

## 2019-06-12 LAB
CHP ED QC CHECK: NORMAL
GLUCOSE BLD-MCNC: 115 MG/DL

## 2019-06-12 PROCEDURE — 82962 GLUCOSE BLOOD TEST: CPT | Performed by: INTERNAL MEDICINE

## 2019-06-12 PROCEDURE — 99213 OFFICE O/P EST LOW 20 MIN: CPT | Performed by: INTERNAL MEDICINE

## 2019-06-12 RX ORDER — FLASH GLUCOSE SENSOR
KIT MISCELLANEOUS
Qty: 2 EACH | Refills: 6 | Status: SHIPPED | OUTPATIENT
Start: 2019-06-12 | End: 2019-01-01

## 2019-06-12 RX ORDER — FLASH GLUCOSE SCANNING READER
EACH MISCELLANEOUS
Qty: 1 DEVICE | Refills: 0 | Status: SHIPPED | OUTPATIENT
Start: 2019-06-12 | End: 2019-01-01

## 2019-06-12 RX ORDER — LEVOTHYROXINE SODIUM 0.15 MG/1
150 TABLET ORAL DAILY
Qty: 90 TABLET | Refills: 3 | Status: SHIPPED | OUTPATIENT
Start: 2019-06-12 | End: 2020-01-01 | Stop reason: SDUPTHER

## 2019-06-12 ASSESSMENT — ENCOUNTER SYMPTOMS
SHORTNESS OF BREATH: 1
WHEEZING: 1

## 2019-06-12 NOTE — PROGRESS NOTES
Subjective:      Patient ID: Ho Hernandez is a 70 y.o. female. 2  month f/u on diabetes hypothyroidism   Diabetes   She presents for her follow-up diabetic visit. She has type 2 diabetes mellitus. Diabetic complications include heart disease. Current diabetic treatment includes insulin injections (basaglar novolog  plus victoza ). She is currently taking insulin pre-breakfast, pre-lunch, pre-dinner and at bedtime. Her overall blood glucose range is 180-200 mg/dl. (Lab Results       Component                Value               Date                       LABA1C                   8.4 (H)             06/07/2019              Pt on tapering steroid dose   )     Recent diagnosis of lung cancer s/p surgery  S/p radiation rx      Hypothyroidism s/p thyroidectomy  on  synthroid 125 mcg daily      reviewed labs    Results for Adore Collins (MRN [id number]) as of 6/12/2019 15:13   Ref.  Range 6/7/2019 13:52 6/7/2019 13:53 6/12/2019 14:54   Sodium Latest Ref Range: 135 - 144 mEq/L 145 (H)     Potassium Latest Ref Range: 3.4 - 4.9 mEq/L 3.4     Chloride Latest Ref Range: 95 - 107 mEq/L 103     CO2 Latest Ref Range: 20 - 31 mEq/L 24     BUN Latest Ref Range: 8 - 23 mg/dL 12     Creatinine Latest Ref Range: 0.50 - 0.90 mg/dL 0.85     Anion Gap Latest Ref Range: 9 - 15 mEq/L 18 (H)     GFR Non- Latest Ref Range: >60  >60.0     GFR African American Latest Ref Range: >60  >60.0     Glucose Latest Units: mg/dL 203 (H)  115   Calcium Latest Ref Range: 8.5 - 9.9 mg/dL 9.1     Hemoglobin A1C Latest Ref Range: 4.8 - 5.9 % 8.4 (H)     TSH Latest Ref Range: 0.440 - 3.860 uIU/mL 7.440 (H)     T4 Free Latest Ref Range: 0.84 - 1.68 ng/dL 1.28     Creatinine, Ur Latest Ref Range: Not Established mg/dL  142.9    Microalbumin Creatinine Ratio Latest Ref Range: 0.0 - 30.0 mg/G  15.4    Microalbumin, Random Urine Latest Ref Range: Not Established mg/dL  2.20 (H)             Patient Active Problem List   Diagnosis    Disp: 5 Tube, Rfl: 5    traMADol (ULTRAM) 50 MG tablet, Take 1 tablet by mouth every 6 hours as needed for Pain for up to 50 days. MAX #90 MONTHLY.  DO NOT GET NARCOTIC MEDICATIONS FROM ANY OTHER PROVIDER., Disp: 90 tablet, Rfl: 1    Umeclidinium Bromide 62.5 MCG/INH AEPB, Inhale 1 puff into the lungs daily, Disp: 90 each, Rfl: 3    Continuous Blood Gluc  (FREESTYLE DONOVAN 14 DAY READER) MARCELLUS, As directed, Disp: 1 Device, Rfl: 0    Continuous Blood Gluc Sensor (FREESTYLE DONOVAN 14 DAY SENSOR) MISC, Every 2 weeks, Disp: 2 each, Rfl: 06    Cholecalciferol (VITAMIN D3) 47400 units CAPS, 1 po q  weekly, Disp: 4 capsule, Rfl: 1    predniSONE (DELTASONE) 1 MG tablet, Take 20 mg for seven days then 10 mg daily, Disp: , Rfl:     Roflumilast (DALIRESP) 500 MCG tablet, Take 1 tablet by mouth daily, Disp: 90 tablet, Rfl: 3    esomeprazole (NEXIUM) 40 MG delayed release capsule, TAKE 1 CAPSULE DAILY, Disp: 90 capsule, Rfl: 1    NOVOFINE 32G X 6 MM MISC, qid, Disp: 400 each, Rfl: 3    insulin aspart (NOVOLOG FLEXPEN) 100 UNIT/ML injection pen, Inject 10-15 Units into the skin 3 times daily (before meals), Disp: 30 pen, Rfl: 3    insulin glargine (BASAGLAR KWIKPEN) 100 UNIT/ML injection pen, Inject 90 units at night, Disp: 90 pen, Rfl: 3    Liraglutide (VICTOZA) 18 MG/3ML SOPN SC injection, Inject 1.8 mg into the skin daily, Disp: 9 pen, Rfl: 3    Continuous Blood Gluc  (FREESTYLE DONOVAN 14 DAY READER) MARCELLUS, As directed, Disp: 1 Device, Rfl: 00    Continuous Blood Gluc Sensor (FREESTYLE DONOVAN 14 DAY SENSOR) MISC, Every 2 weeks, Disp: 2 each, Rfl: 06    CPAP Machine MISC, by Does not apply route Change CPAP pressure to 10 cm, Disp: 1 each, Rfl: 0    guaiFENesin (MUCINEX) 600 MG extended release tablet, Take 1 tablet by mouth 2 times daily, Disp: 60 tablet, Rfl: 1    ipratropium-albuterol (DUONEB) 0.5-2.5 (3) MG/3ML SOLN nebulizer solution, Inhale 3 mLs into the lungs every 6 hours, Disp: 360 mL, Rfl: 3    topiramate (TOPAMAX) 25 MG tablet, Take 1 tablet by mouth nightly, Disp: 60 tablet, Rfl: 3    blood glucose test strips (ONE TOUCH ULTRA TEST) strip, USE TO TEST TWICE A DAY AND AS NEEDED, IDDM - Dx: E11.9, Disp: 100 each, Rfl: 1    vitamin D (ERGOCALCIFEROL) 82077 units CAPS capsule, Take 1 capsule by mouth Twice a Week, Disp: 16 capsule, Rfl: 0    OXYGEN, Inhale into the lungs 2 liters at night and during the day 3 liters, Disp: , Rfl:     fluocinonide (LIDEX) 0.05 % ointment, APPLY OINTMENT TOPICALLY TWICE DAILY FOR 2 WEEKS, Disp: 30 g, Rfl: 1    formoterol (PERFOROMIST) 20 MCG/2ML nebulizer solution, Inhale 2 mLs into the lungs daily, Disp: , Rfl:     fluticasone (FLONASE) 50 MCG/ACT nasal spray, 2 sprays by Nasal route daily, Disp: 1 Bottle, Rfl: 0    levothyroxine (SYNTHROID) 125 MCG tablet, Take 1 tablet by mouth Daily, Disp: 90 tablet, Rfl: 3    SPIRIVA RESPIMAT 2.5 MCG/ACT AERS inhaler, 2 puffs daily , Disp: , Rfl:     Insulin Pen Needle (B-D UF III MINI PEN NEEDLES) 31G X 5 MM MISC, USE 1 DAILY TO INJECT LANTUS, Disp: 100 each, Rfl: 1    nystatin (MYCOSTATIN) 526352 UNIT/ML suspension, Take 5 mLs by mouth 4 times daily, Disp: 200 mL, Rfl: 1    Nebulizers (COMPRESSOR/NEBULIZER) MISC, Dispense Nebulizer supplies, Disp: 1 each, Rfl: 3    atorvastatin (LIPITOR) 80 MG tablet, Take 1 tablet by mouth nightly, Disp: , Rfl:     nitroGLYCERIN (NITROLINGUAL) 0.4 MG/SPRAY 0.4 mg spray, , Disp: , Rfl:     theophylline (UNIPHYL) 600 MG extended release tablet, TAKE 1 TABLET ONCE DAILY (Patient taking differently: Take 600 mg by mouth every evening TAKE 1 TABLET ONCE DAILY), Disp: 30 tablet, Rfl: 5    albuterol sulfate (PROAIR RESPICLICK) 513 (90 Base) MCG/ACT aerosol powder inhalation, Inhale 2 puffs into the lungs every 6 hours as needed for Wheezing or Shortness of Breath, Disp: 1 Inhaler, Rfl: 5    mepolizumab (NUCALA) 100 MG SOLR injection, Inject 100 mg into the skin Received from: [facility] Received Sig: Inject 100 mg subcutaneously one time only. monthly, Disp: , Rfl:     ONE TOUCH LANCETS MISC, USE TO TEST TWICE A DAY AND AS NEEDED, IDDM - Dx: E11.9, Disp: 200 each, Rfl: 3    Blood Glucose Monitoring Suppl MARCELLUS, One Touch Ultra - To Test BID - IDDM - Dx: E11.9, Disp: 1 Device, Rfl: 0    isosorbide mononitrate (IMDUR) 60 MG CR tablet, Take 60 mg by mouth daily , Disp: , Rfl:     budesonide (PULMICORT) 0.5 MG/2ML nebulizer suspension, Take 1 ampule by nebulization 2 times daily, Disp: , Rfl:     digoxin (DIGOX) 125 MCG tablet, Take 125 mcg by mouth Daily with lunch , Disp: , Rfl:     clopidogrel (PLAVIX) 75 MG tablet, Take 75 mg by mouth daily. , Disp: , Rfl:     fenofibrate (TRICOR) 145 MG tablet, Take 145 mg by mouth every evening , Disp: , Rfl:     verapamil (VERELAN PM) 240 MG CR capsule, Take 240 mg by mouth 2 times daily , Disp: , Rfl:     LORazepam (ATIVAN) 0.5 MG tablet, Take 1 tablet by mouth every 12 hours as needed for Anxiety for up to 30 days. , Disp: 24 tablet, Rfl: 0    albuterol sulfate (PROAIR RESPICLICK) 117 (90 Base) MCG/ACT aerosol powder inhalation, Inhale 1 puff into the lungs every 6 hours as needed for Wheezing or Shortness of Breath, Disp: 3 Inhaler, Rfl: 3      Review of Systems   Respiratory: Positive for shortness of breath and wheezing. Vitals:    06/12/19 1446   BP: 137/84   Pulse: 95   Weight: 186 lb (84.4 kg)   Height: 5' 1\" (1.549 m)       Objective:   Physical Exam   Constitutional: She appears well-developed and well-nourished. HENT:   Head: Normocephalic and atraumatic. Neck: Neck supple. Cardiovascular: Normal rate. Pulmonary/Chest: She has wheezes. Abdominal:   obese   Musculoskeletal: Normal range of motion. Neurological: She is alert. Psychiatric: She has a normal mood and affect. Assessment:       Diagnosis Orders   1.  Uncontrolled type 2 diabetes mellitus with complication, with long-term current use of insulin ([address] POCT Glucose    Basic Metabolic Panel    Hemoglobin A1C    Microalbumin / Creatinine Urine Ratio   2. Postoperative hypothyroidism             Plan:       Orders Placed This Encounter   Procedures    Basic Metabolic Panel     Standing Status:   Future     Standing Expiration Date:   6/12/2020    Hemoglobin A1C     Standing Status:   Future     Standing Expiration Date:   6/12/2020    Microalbumin / Creatinine Urine Ratio     Standing Status:   Future     Standing Expiration Date:   6/12/2020    POCT Glucose     Orders Placed This Encounter   Medications    Continuous Blood Gluc Sensor (FREESTYLE DONOVAN 14 DAY SENSOR) MISC     Sig: Every 2 weeks     Dispense:  2 each     Refill:  06    Continuous Blood Gluc  (FREESTYLE DONOVAN 14 DAY READER) MARCELLUS     Sig: As directed     Dispense:  1 Device     Refill:  0    levothyroxine (SYNTHROID) 150 MCG tablet     Sig: Take 1 tablet by mouth Daily     Dispense:  90 tablet     Refill:  3     Increase synthroid dose   Continue basagalr 90 hs novolog 10-15 tid plus victoza 1.8 mg daily   Diabetes education provided today:    Continuous Glucose monitor. How it works and checks blood sugars every 5 min. for 4 days during our tests. Steroids and effects on blood sugars. Managing high and low sugar readings.       hbaic goal of 7 or lower         Marina Santiago MD

## 2019-06-15 ENCOUNTER — OFFICE VISIT (OUTPATIENT)
Dept: PHYSICAL MEDICINE AND REHAB | Age: 69
End: 2019-06-15
Payer: MEDICARE

## 2019-06-15 DIAGNOSIS — G56.23 ULNAR NEUROPATHY OF BOTH UPPER EXTREMITIES: ICD-10-CM

## 2019-06-15 DIAGNOSIS — M54.12 CERVICAL RADICULOPATHY: ICD-10-CM

## 2019-06-15 DIAGNOSIS — G56.03 BILATERAL CARPAL TUNNEL SYNDROME: Primary | ICD-10-CM

## 2019-06-15 PROCEDURE — 95886 MUSC TEST DONE W/N TEST COMP: CPT | Performed by: PHYSICAL MEDICINE & REHABILITATION

## 2019-06-15 PROCEDURE — 95912 NRV CNDJ TEST 11-12 STUDIES: CPT | Performed by: PHYSICAL MEDICINE & REHABILITATION

## 2019-06-17 ENCOUNTER — HOSPITAL ENCOUNTER (OUTPATIENT)
Dept: PULMONOLOGY | Age: 69
Setting detail: THERAPIES SERIES
Discharge: HOME | End: 2019-06-17
Payer: MEDICARE

## 2019-06-17 ENCOUNTER — HOSPITAL ENCOUNTER (OUTPATIENT)
Dept: OCCUPATIONAL THERAPY | Age: 69
Setting detail: THERAPIES SERIES
Discharge: HOME | End: 2019-06-17
Payer: MEDICARE

## 2019-06-17 PROCEDURE — G0424 PULMONARY REHAB W EXER: HCPCS

## 2019-06-17 PROCEDURE — 97166 OT EVAL MOD COMPLEX 45 MIN: CPT

## 2019-06-17 NOTE — PROGRESS NOTES
[facility] CHRONIC PAIN EVALUATION                                                                                                                   DATE: 2019                                                                           PHYSICIAN: General  Chart Reviewed: Yes  Patient assessed for rehabilitation services?: Yes  Additional Pertinent Hx: Pt reports she was DX with lung cancer in 2019. Pt states she underwent 5 days of radiation   Pt reports further treatment may occur. Pt states she had an EMG on Saturday, 6/15 and was told she has \"nerve damage\" of her left UE. Pt reports she has been given a referral for an orthopedic specialist      RESTRICTIONS: NO MFR TO CHEST AND BACK    Referring Practitioner: Dr Kris Beasley   Diagnosis: Myalgia, sprain of costal cartilage, sequela, suprascapular entrapment neuropathy of left side, neck pain, bilateral CTS, chronic midline low back pain- with left sided scaitica, DDD, lumbar and chronic bilateral thoracic back pain                                                                                                PATIENT NAME: Veronica James  DIAGNOSIS: Diagnosis: Myalgia, sprain of costal cartilage, sequela, suprascapular entrapment neuropathy of left side, neck pain, bilateral CTs, chronic midline low back pain- with left sided scaitica, DDD, lumbar and chronic bilateral thoracic back pain     MRN: [id number]    ACCOUNT#: [de-identified]                                                     :1950  (74 y.o.)                              MEDICATIONS:  Current Outpatient Medications:     diclofenac sodium (VOLTAREN) 1 % GEL, Apply 4 g topically 4 times daily Generic equivalent acceptable, unless otherwise noted. , Disp: 200 g, Rfl: 0    diclofenac sodium 1 % GEL, Apply 4 g topically 4 times daily, Disp: 5 Tube, Rfl: 5   Continuous Blood Gluc Sensor (FREESTYLE DONOVAN 14 DAY SENSOR) MISC, Every 2 weeks, Disp: 2 each, Rfl: 06    Continuous Blood Gluc  (FREESTYLE DONOVAN 14 DAY READER) MARCELLUS, As directed, Disp: 1 Device, Rfl: 0    levothyroxine (SYNTHROID) 150 MCG tablet, Take 1 tablet by mouth Daily, Disp: 90 tablet, Rfl: 3    traMADol (ULTRAM) 50 MG tablet, Take 1 tablet by mouth every 6 hours as needed for Pain for up to 50 days. MAX #90 MONTHLY. DO NOT GET NARCOTIC MEDICATIONS FROM ANY OTHER PROVIDER., Disp: 90 tablet, Rfl: 1    Umeclidinium Bromide 62.5 MCG/INH AEPB, Inhale 1 puff into the lungs daily, Disp: 90 each, Rfl: 3    Cholecalciferol (VITAMIN D3) 44094 units CAPS, 1 po q  weekly, Disp: 4 capsule, Rfl: 1    LORazepam (ATIVAN) 0.5 MG tablet, Take 1 tablet by mouth every 12 hours as needed for Anxiety for up to 30 days. , Disp: 24 tablet, Rfl: 0    predniSONE (DELTASONE) 1 MG tablet, Take 20 mg for seven days then 10 mg daily, Disp: , Rfl:     Roflumilast (DALIRESP) 500 MCG tablet, Take 1 tablet by mouth daily, Disp: 90 tablet, Rfl: 3    albuterol sulfate (PROAIR RESPICLICK) 240 (90 Base) MCG/ACT aerosol powder inhalation, Inhale 1 puff into the lungs every 6 hours as needed for Wheezing or Shortness of Breath, Disp: 3 Inhaler, Rfl: 3    esomeprazole (NEXIUM) 40 MG delayed release capsule, TAKE 1 CAPSULE DAILY, Disp: 90 capsule, Rfl: 1    NOVOFINE 32G X 6 MM MISC, qid, Disp: 400 each, Rfl: 3    insulin aspart (NOVOLOG FLEXPEN) 100 UNIT/ML injection pen, Inject 10-15 Units into the skin 3 times daily (before meals), Disp: 30 pen, Rfl: 3    insulin glargine (BASAGLAR KWIKPEN) 100 UNIT/ML injection pen, Inject 90 units at night, Disp: 90 pen, Rfl: 3    Liraglutide (VICTOZA) 18 MG/3ML SOPN SC injection, Inject 1.8 mg into the skin daily, Disp: 9 pen, Rfl: 3    Continuous Blood Gluc  (FREESTYLE DONOVAN 14 DAY READER) MARCELLUS, As directed, Disp: 1 Device, Rfl: 00    Continuous Blood Gluc Sensor (FREESTYLE DONOVAN 14 DAY SENSOR) MISC, Every 2 weeks, Disp: 2 each, Rfl: 06    CPAP Machine MISC, by Does not apply route Change CPAP pressure to 10 cm, Disp: 1 each, Rfl: 0    guaiFENesin (MUCINEX) 600 MG extended release tablet, Take 1 tablet by mouth 2 times daily, Disp: 60 tablet, Rfl: 1    ipratropium-albuterol (DUONEB) 0.5-2.5 (3) MG/3ML SOLN nebulizer solution, Inhale 3 mLs into the lungs every 6 hours, Disp: 360 mL, Rfl: 3    topiramate (TOPAMAX) 25 MG tablet, Take 1 tablet by mouth nightly, Disp: 60 tablet, Rfl: 3    blood glucose test strips (ONE TOUCH ULTRA TEST) strip, USE TO TEST TWICE A DAY AND AS NEEDED, IDDM - Dx: E11.9, Disp: 100 each, Rfl: 1    vitamin D (ERGOCALCIFEROL) 23368 units CAPS capsule, Take 1 capsule by mouth Twice a Week, Disp: 16 capsule, Rfl: 0    OXYGEN, Inhale into the lungs 2 liters at night and during the day 3 liters, Disp: , Rfl:     fluocinonide (LIDEX) 0.05 % ointment, APPLY OINTMENT TOPICALLY TWICE DAILY FOR 2 WEEKS, Disp: 30 g, Rfl: 1    formoterol (PERFOROMIST) 20 MCG/2ML nebulizer solution, Inhale 2 mLs into the lungs daily, Disp: , Rfl:     fluticasone (FLONASE) 50 MCG/ACT nasal spray, 2 sprays by Nasal route daily, Disp: 1 Bottle, Rfl: 0    SPIRIVA RESPIMAT 2.5 MCG/ACT AERS inhaler, 2 puffs daily , Disp: , Rfl:     Insulin Pen Needle (B-D UF III MINI PEN NEEDLES) 31G X 5 MM MISC, USE 1 DAILY TO INJECT LANTUS, Disp: 100 each, Rfl: 1    nystatin (MYCOSTATIN) 677343 UNIT/ML suspension, Take 5 mLs by mouth 4 times daily, Disp: 200 mL, Rfl: 1    Nebulizers (COMPRESSOR/NEBULIZER) MISC, Dispense Nebulizer supplies, Disp: 1 each, Rfl: 3    atorvastatin (LIPITOR) 80 MG tablet, Take 1 tablet by mouth nightly, Disp: , Rfl:     nitroGLYCERIN (NITROLINGUAL) 0.4 MG/SPRAY 0.4 mg spray, , Disp: , Rfl:     theophylline (UNIPHYL) 600 MG extended release tablet, TAKE 1 TABLET ONCE DAILY (Patient taking differently: Take 600 mg by mouth every evening TAKE 1 TABLET ONCE DAILY), Disp: 30 tablet, Rfl: 5    albuterol sulfate (PROAIR RESPICLICK) 955 (90 Base) MCG/ACT aerosol powder inhalation, Inhale 2 puffs into the lungs every 6 hours as needed for Wheezing or Shortness of Breath, Disp: 1 Inhaler, Rfl: 5    mepolizumab (NUCALA) 100 MG SOLR injection, Inject 100 mg into the skin Received from: [facility] Received Sig: Inject 100 mg subcutaneously one time only. monthly, Disp: , Rfl:     ONE TOUCH LANCETS MISC, USE TO TEST TWICE A DAY AND AS NEEDED, IDDM - Dx: E11.9, Disp: 200 each, Rfl: 3    Blood Glucose Monitoring Suppl MARCELLUS, One Touch Ultra - To Test BID - IDDM - Dx: E11.9, Disp: 1 Device, Rfl: 0    isosorbide mononitrate (IMDUR) 60 MG CR tablet, Take 60 mg by mouth daily , Disp: , Rfl:     budesonide (PULMICORT) 0.5 MG/2ML nebulizer suspension, Take 1 ampule by nebulization 2 times daily, Disp: , Rfl:     digoxin (DIGOX) 125 MCG tablet, Take 125 mcg by mouth Daily with lunch , Disp: , Rfl:     clopidogrel (PLAVIX) 75 MG tablet, Take 75 mg by mouth daily. , Disp: , Rfl:     fenofibrate (TRICOR) 145 MG tablet, Take 145 mg by mouth every evening , Disp: , Rfl:     verapamil (VERELAN PM) 240 MG CR capsule, Take 240 mg by mouth 2 times daily , Disp: , Rfl:     PRECAUTIONS: [] None  [x] Fall Risk  [] Pacemaker  [] WB status [x] Other:   COPD with 3 L O2 when ambulating                                                              HAND DOMINANCE: [x] Right   [] Left     PRIOR LEVEL OF FUNCTION:  [] Patient performing at independent level for ADL and IADL activities:    [x] Patient receiving assistance for ADL and IADL activities:    [] Other:    Comments:                                        DATE OF SX OR INJURY: Pt reports h/o chronic pain     PREVIOUS/CURRENT TREATMENT FOR SAME PROBLEM:    [x]   YES   [] NO    CHIEF COMPLAINT: Pt reports pain of her neck and thoracic level of her back are primary concerns.       PERTINENT MEDICAL HISTORY:  Past Medical History:   Diagnosis Date    dropped. Pt's bilateral  strength was obtained. Right  20# and left  strength 24.7#. NORMS for age: Right 48#   Left 43#    SOFT TISSUE ASSESSMENT/MOBILITY: Pt exhibits tightness of tissue throughout the cervical through lumbar spine. Pt with impaired dissociation of thoracic and lumbar levels. SCAR/LOCATION/MOBILITY NA      COMMENTS/CLINICAL IMPRESSIONS: Due to pt's intensity, frequency and multiple pain sites, OT recommends beginning treatment at a frequency of 3 x a week. Pt reports she is also ready to begin pulmonary therapy. TX PLAN: ___2-3__ X WEEK X ___8__WEEKS OR #___20-24_____VISITS. Complexity:       []  Low Complexity  ¨ History: Brief history including review of medical records relating to the problem  ¨ Exam: 1-3 performance Deficits  ¨ Assistance/Modification: No assistance or modifications required to perform tasks. No comorbities affecting occupational performance  [x]  Medium Complexity  ¨ History: Expanded review of medical records and additional review of physical, cognitive, or psychosocial history related to current functional performance  ¨ Exam: 3-5 performance deficits  ¨ Assistance/Modification: Min/mod assistance or modifications required to perform tasks. May have comorbidities that affect occupational performance. []  High Complexity  ¨ History: Extensive review of medical records and additional review of physical, cognitive, or psychosocial history related to current functional performance. ¨ Exam: 5 or more performance deficits  ¨ Assistance/Modification: Significant assistance or modifications required to perform tasks. Have comorbidities that affect occupational performance. LONG TERM GOALS:  1. Pt will participate in therapeutic interventions to decrease fascial restrictions and increase tissue mobility for decreased pain of indicated areas.     2.  Pt will report 2-3/10 (or less) pain intensity of indicated areas during all activities including rest/sleep. 3.  Pt will describe 3 forms of relaxation she can utilize to decrease stress. 4.  Pt will demonstrate good understanding of all recommended home programs after instruction complete. PROBLEMS:  [x]  PAIN [x]   IMPAIRED SKIN/TISSUE MOBILITY              []  EDEMA []    IMPAIRED SENSATION   []  IMPAIRED ROM [x]   DECREASED STRENGTH   []  IMPAIRED COORDINATION /         DEXTERITY []  DECREASED USE ___UE FOR         ADL/WORK ROLE PERFORMANCE   [x]  DECREASED KNOWLEDGE HEP      []  IMPAIRED FLEXIBILITY                 []  OTHER:           TREATMENT PLAN:  []  Re-evaluation                        [x]  Pain Mgmt. Tech. [x]  PROM/stretching/AAROM/AROM []  Coordination/Dexterity retraining   [x]  Instruction written HEP          []  Scar Mgmt.                                []  Desensitization [x]  Modalities   [x]  MFR techniques                    [x]  ADL Retraining body mechanics, AE/techniques   []  Strengthening/Graded therapeutic activity []  Edema mgmt.  Tech   [x]  Instruction/application of        Energy conservation, work        Simplification, joint protection,               Body mechanics      []  Work Simulation Activity              []  Other:                                                                                                                                                                                             OUTPATIENT FALLS RISK ASSESSMENT                          Age: 0-59 = 0          60-69= 1            > 70= 2 History of Falls:   0  Falls  last 6 mo = 0    1  fall  Last  6 mo = 1   1-3 falls last 6 mo = 2 Medical History:   Parkinsons, CVA,HTN, vertigo, >4 meds, use of assistive device (1pt.for each)  Mental Status:  A & O x 3 = 0  Disoriented to person, place, or time = 2     High Risk for Falls: >8  Intermediate Risk for Falls: 4-8   Low Risk for Falls: <4    [x]  INITIAL ASSESSMENT: Date:   6/17/19                                                       Age:   1                                                         Falls: 0                                                         PMH: 6-7                                                          Mental: 0                                                       Total:  7-8                                                        *Patient 4 or younger []   Vestibular []    Signature: ELISA Ceja Electronically signed by ELISA Ceja on 6/17/19 at 1:36 PM  , 6/17/2019, 9:27 AM                                                        * All pediatric patients (age 3 or younger) and vestibular patients will be considered high risk for falls- scoring does not need to be completed - treat as high risk. Interventions     Low Risk: Monitor for changes, reassess every three months. Intermediate Risk: Supervision for most activities, direct contact with new activities or equipment, reassess monthly. High Risk: Stand by assitance at all times, reassess monthly.        THERAPY TIME INDIVIDUAL                                       TIME IN 0800                                      TIME OUT 0900                                       MINUTES 60                                  Electronically signed by:  ELISA Ceja  Electronically signed by ELISA Ceja on 6/17/19 at 1:37 PM                    Date: 6/17/2019, 9:27 AM        I HEREBY AGREE TO THIS PLAN OF CARE AS MEDICALLY NECESSARY:     Physician signature                                                               Date

## 2019-06-19 ENCOUNTER — PROCEDURE VISIT (OUTPATIENT)
Dept: PHYSICAL MEDICINE AND REHAB | Age: 69
End: 2019-06-19
Payer: MEDICARE

## 2019-06-19 ENCOUNTER — HOSPITAL ENCOUNTER (OUTPATIENT)
Dept: GENERAL RADIOLOGY | Age: 69
Discharge: HOME | End: 2019-06-21
Payer: MEDICARE

## 2019-06-19 DIAGNOSIS — E66.01 MORBID OBESITY WITH BMI OF 40.0-44.9, ADULT (HCC): ICD-10-CM

## 2019-06-19 DIAGNOSIS — M51.36 DDD (DEGENERATIVE DISC DISEASE), LUMBAR: ICD-10-CM

## 2019-06-19 DIAGNOSIS — M89.8X1 PAIN OF LEFT SCAPULA: Primary | ICD-10-CM

## 2019-06-19 DIAGNOSIS — M79.10 MYALGIA: ICD-10-CM

## 2019-06-19 DIAGNOSIS — E66.01 CLASS 3 SEVERE OBESITY DUE TO EXCESS CALORIES WITH SERIOUS COMORBIDITY AND BODY MASS INDEX (BMI) OF 40.0 TO 44.9 IN ADULT (HCC): ICD-10-CM

## 2019-06-19 DIAGNOSIS — M54.2 NECK PAIN: ICD-10-CM

## 2019-06-19 DIAGNOSIS — G89.29 CHRONIC BILATERAL THORACIC BACK PAIN: ICD-10-CM

## 2019-06-19 DIAGNOSIS — G56.03 BILATERAL CARPAL TUNNEL SYNDROME: ICD-10-CM

## 2019-06-19 DIAGNOSIS — M54.6 CHRONIC BILATERAL THORACIC BACK PAIN: ICD-10-CM

## 2019-06-19 DIAGNOSIS — Z79.899 HIGH RISK MEDICATION USE: ICD-10-CM

## 2019-06-19 PROCEDURE — 76942 ECHO GUIDE FOR BIOPSY: CPT | Performed by: PHYSICAL MEDICINE & REHABILITATION

## 2019-06-19 PROCEDURE — 72070 X-RAY EXAM THORAC SPINE 2VWS: CPT

## 2019-06-19 PROCEDURE — 64418 NJX AA&/STRD SPRSCAP NRV: CPT | Performed by: PHYSICAL MEDICINE & REHABILITATION

## 2019-06-19 RX ORDER — TOPIRAMATE 25 MG/1
TABLET ORAL
Qty: 60 TABLET | Refills: 3 | Status: SHIPPED | OUTPATIENT
Start: 2019-06-19 | End: 2020-01-01

## 2019-06-19 RX ORDER — LEVOTHYROXINE SODIUM 0.12 MG/1
TABLET ORAL
Qty: 90 TABLET | Refills: 3 | Status: SHIPPED | OUTPATIENT
Start: 2019-06-19 | End: 2019-01-01 | Stop reason: DRUGHIGH

## 2019-06-20 ENCOUNTER — HOSPITAL ENCOUNTER (OUTPATIENT)
Dept: MRI IMAGING | Age: 69
Discharge: HOME | End: 2019-06-22
Payer: MEDICARE

## 2019-06-20 DIAGNOSIS — M51.36 DDD (DEGENERATIVE DISC DISEASE), LUMBAR: ICD-10-CM

## 2019-06-20 DIAGNOSIS — G89.29 CHRONIC BILATERAL THORACIC BACK PAIN: ICD-10-CM

## 2019-06-20 DIAGNOSIS — M54.6 CHRONIC BILATERAL THORACIC BACK PAIN: ICD-10-CM

## 2019-06-20 DIAGNOSIS — M54.12 C7 RADICULOPATHY: ICD-10-CM

## 2019-06-20 DIAGNOSIS — M54.2 NECK PAIN: ICD-10-CM

## 2019-06-20 DIAGNOSIS — M54.2 NECK PAIN: Primary | ICD-10-CM

## 2019-06-20 PROCEDURE — 72141 MRI NECK SPINE W/O DYE: CPT

## 2019-06-25 ENCOUNTER — HOSPITAL ENCOUNTER (OUTPATIENT)
Dept: PULMONOLOGY | Age: 69
Setting detail: THERAPIES SERIES
Discharge: HOME | End: 2019-06-25
Payer: MEDICARE

## 2019-06-25 ENCOUNTER — HOSPITAL ENCOUNTER (OUTPATIENT)
Dept: OCCUPATIONAL THERAPY | Age: 69
Setting detail: THERAPIES SERIES
Discharge: HOME | End: 2019-06-25
Payer: MEDICARE

## 2019-06-25 PROCEDURE — G0424 PULMONARY REHAB W EXER: HCPCS

## 2019-06-25 PROCEDURE — 97530 THERAPEUTIC ACTIVITIES: CPT

## 2019-06-25 NOTE — PROGRESS NOTES
Occupational Therapy  Daily Note     Name: Darryle Crest  : 1950  MRN: [id number]  Diagnosis: Myalgia, sprain of costal cartilage, sequela, suprascapular entrapment neuropathy of left side, neck pain, bilateral CTs, chronic midline low back pain- with left sided scaitica, DDD, lumbar and chronic bilateral thoracic back pain     Visit Information:   OT Insurance Information: Medicare product   Progress Note Counter: 1/10    Date: 2019    Referring Practitioner: Dr Nedra Groverer: \"I don't get any sleep,\"       Pain rating:     Pre-treatment pain: 5/10  Multiple pain sites   -Upper shoulders  -Neck  -Right Hip       Pain after treatment:  4/10  Multiple pain sites  -Upper shoulders   -Neck   Right Hip         Focus of treatment was on the following:   []Neuromuscular Re-education   []Orthotic/Splint/Brace: fabrication/education [x]UE strength  []Right []Left [x]Both   [] Strength   []Right []Left []Both     []Cognition  []Balance  []Posture/positioning   []Decreasing pain    []Coordination [x]ADL's []Functional Mobility [x]Home management    []ROM [x]Endurance  []Transfers []Fine Motor   []Visual []Caregiver training []Perceptual                    []Sensory                    []Edema management  []Other:          BUE Theraband Exercises: Pt used a yellow Theraband to complete exercises as follows:   Bicep Curls: 2x10 reps    Tricep Extension: 2x10 reps   Shoulder Abduction (palm up): 2x10 reps   Shoulder Abduction (palm down): 2x10 reps   Scapular Retraction : 2x10 reps   Educated pt in exercise technique and provided verbal and visual cues to maintain. To increase BUE strength, endurance, and posture to decrease pain and improve ADL completion. Therapist did not \"issue\" exercises to pt, however, she requested to have a theraband and pictures of the exercises. Pt reports that exercises \"feel bad but good\".  Therapist educated pt to stop exercises if they become painful and to explore which ones \"feel good\" and which ones \"hurt\". Pt reports a decrease in soreness at end of session. Issued energy conservation handout and reviewed with patient. Pt verbalized understanding and reports that she does some of them at home already. Therapist and pt thoroughly discussed tips that are specific to pt's home and lifestyle. Assessment: Pt tolerated treatment well. Plan:  Plan  Times per week: 2-3x per week   Current Treatment Recommendations: Strengthening, ROM, Pain Management      Goals:  Long term goals  Long term goal 1:  Pt will participate in therapeutic interventions to decrease fascial restrictions and increase tissue mobility for decreased pain of indicated areas. Long term goal 2:  Pt will report 2-3/10 (or less) pain intensity of indicated areas during all activities including rest/sleep. Long term goal 3: 3. Pt will describe 3 forms of relaxation she can utilize to decrease stress. Long term goal 4: 4. Pt will demonstrate good understanding of all recommended home programs after instruction complete          Time   In: 11:00  Out: 12:00  60 Minutes    Electronically signed by TOPHER Dimas on 6/25/2019 at 12:39 PM .es  Occupational Therapist saw pt this date. Pt reports that she experienced increased pain after \"stretches\" (use of Theraband during the 6/25/19 treatment session). On review of this note, OT determined that the current POC was not followed. The POC does not include a strengthening goal.  OT contacted the treating CARDOSO to provide feedback regarding this treatment session and current POC.   Elva Licona OTR/L Electronically signed by ELISA Frank on 6/26/19 at 5:04 PM

## 2019-06-26 ENCOUNTER — HOSPITAL ENCOUNTER (OUTPATIENT)
Dept: OCCUPATIONAL THERAPY | Age: 69
Setting detail: THERAPIES SERIES
Discharge: HOME | End: 2019-06-26
Payer: MEDICARE

## 2019-06-26 PROCEDURE — 97140 MANUAL THERAPY 1/> REGIONS: CPT

## 2019-06-26 PROCEDURE — 97530 THERAPEUTIC ACTIVITIES: CPT

## 2019-06-26 ASSESSMENT — PAIN DESCRIPTION - ORIENTATION: ORIENTATION: POSTERIOR

## 2019-06-26 ASSESSMENT — PAIN DESCRIPTION - LOCATION: LOCATION: BACK;NECK;SHOULDER

## 2019-06-26 ASSESSMENT — PAIN SCALES - GENERAL: PAINLEVEL_OUTOF10: 7

## 2019-06-26 ASSESSMENT — PAIN DESCRIPTION - PAIN TYPE: TYPE: CHRONIC PAIN

## 2019-06-26 NOTE — PROGRESS NOTES
Occupational Therapy  Daily Treatment Note  Date: 2019  Patient Name: Ric Lewis  :  1950  MRN: [id number]       Subjective   OT Visit Information  OT Insurance Information: Medicare product   Progress Note Counter: 2/10  Subjective:  Pt reports experiencing increased pain after her treatment on 19. Pt reports that she \"was having a pretty decent day pain wise\", but that after use of theraband she experienced increased pain which continued through the day. Subjective: Pt reports she has received the results of her MRI (MY CHART), but she is unsure of their meaning. Pt states that she does not see Dr Jase Dejesus for a f/u until September. Pt reports she will be receiving injections soon, but she does not think she will be able to discuss the results with the physician at this time. Pt states she is going to contact the office to schedule an appointment to discuss the results. General Comment  Comments: OT and pt discussed plan for treatment. As the pt has a DX of CA, treatment with manual techniques has restrictions. CA is a contraindication for MFR. After consultation with senior therapist Lou Guaman, it was established that techniques that avoided the trunk and chest area would be acceptable. Techniques at the cervical level, arm and leg pulls are acceptable. Pain Assessment  Pain Assessment: 0-10  Pain Level: 7  Pain Type: Chronic pain  Pain Location: Back;Neck; Shoulder  Pain Orientation: Posterior  Pain Radiating Towards: anterior trunk  Vital Signs  Patient Currently in Pain: Yes   Treatment Activities:  OT provided verbal instruction with demonstration for 4 types of relaxation- Imagery, breathing, muscle contraction/relaxation and biofeedback. Pt completed each relaxation technique. Pt receptive to use as an adjunct to the medical management of pain.   OT provided verbal instruction with demonstration and written information with pictorial for 2 tasks, pelvic tilts and LE stretch in side lying. Pt demonstrated correct execution of each tasks and denied pain/discomfort. Upper Extremity    [x] Arm pull           [] Right   [] Left    [x] Both    [] Finger pull           [] Right   [] Left   [] Both     [] Soft tissue mobilization (identify area):    [] Other:                Assessment Pt reports understanding all information discussed this session. Pt demonstrates correct execution of relaxation techniques and therapeutic tasks discussed. Post Treatment Pain Screening  Pain at present: 7  Scale Used: Numeric Score  Intervention List: Patient declined any intervention         Plan Continue with established treatment plan. Goals addressed- ALL  Long term goals  Time Frame for Long term goals : 2-3 x a week for 6-8 weeks or 20-24 visits   Long term goal 1:  Pt will participate in therapeutic interventions to decrease fascial restrictions and increase tissue mobility for decreased pain of indicated areas. Long term goal 2:  Pt will report 2-3/10 (or less) pain intensity of indicated areas during all activities including rest/sleep. Long term goal 3: 3. Pt will describe 3 forms of relaxation she can utilize to decrease stress. Long term goal 4: 4.   Pt will demonstrate good understanding of all recommended home programs after instruction complete    Therapy Time   Individual Concurrent Group Co-treatment   Time In  1102         Time Out  1206         Minutes  59                 AMI Berg/L Electronically signed by AMI Frank/L on 6/26/19 at 5:27 PM

## 2019-06-27 ENCOUNTER — HOSPITAL ENCOUNTER (OUTPATIENT)
Dept: PULMONOLOGY | Age: 69
Setting detail: THERAPIES SERIES
Discharge: HOME | End: 2019-06-27
Payer: MEDICARE

## 2019-06-27 DIAGNOSIS — M54.12 C7 RADICULOPATHY: ICD-10-CM

## 2019-06-27 PROCEDURE — G0424 PULMONARY REHAB W EXER: HCPCS

## 2019-06-28 ENCOUNTER — APPOINTMENT (OUTPATIENT)
Dept: OCCUPATIONAL THERAPY | Age: 69
End: 2019-06-28
Payer: MEDICARE

## 2019-07-01 ENCOUNTER — HOSPITAL ENCOUNTER (OUTPATIENT)
Dept: PULMONOLOGY | Age: 69
Setting detail: THERAPIES SERIES
Discharge: HOME | End: 2019-07-01
Payer: MEDICARE

## 2019-07-01 ENCOUNTER — HOSPITAL ENCOUNTER (OUTPATIENT)
Dept: OCCUPATIONAL THERAPY | Age: 69
Setting detail: THERAPIES SERIES
Discharge: HOME | End: 2019-07-01
Payer: MEDICARE

## 2019-07-01 PROCEDURE — G0424 PULMONARY REHAB W EXER: HCPCS

## 2019-07-01 PROCEDURE — 97140 MANUAL THERAPY 1/> REGIONS: CPT

## 2019-07-01 ASSESSMENT — PAIN DESCRIPTION - ORIENTATION: ORIENTATION: POSTERIOR

## 2019-07-01 ASSESSMENT — PAIN DESCRIPTION - LOCATION: LOCATION: BACK;NECK;SHOULDER

## 2019-07-01 ASSESSMENT — PAIN DESCRIPTION - PAIN TYPE: TYPE: CHRONIC PAIN

## 2019-07-01 ASSESSMENT — PAIN SCALES - GENERAL: PAINLEVEL_OUTOF10: 6

## 2019-07-02 ENCOUNTER — PROCEDURE VISIT (OUTPATIENT)
Dept: PHYSICAL MEDICINE AND REHAB | Age: 69
End: 2019-07-02
Payer: MEDICARE

## 2019-07-02 ENCOUNTER — HOSPITAL ENCOUNTER (OUTPATIENT)
Dept: PULMONOLOGY | Age: 69
Setting detail: THERAPIES SERIES
Discharge: HOME | End: 2019-07-02
Payer: MEDICARE

## 2019-07-02 DIAGNOSIS — M79.7 FIBROMYALGIA: Primary | ICD-10-CM

## 2019-07-02 DIAGNOSIS — M79.10 MYALGIA: ICD-10-CM

## 2019-07-02 PROCEDURE — G0424 PULMONARY REHAB W EXER: HCPCS

## 2019-07-02 PROCEDURE — 20553 NJX 1/MLT TRIGGER POINTS 3/>: CPT | Performed by: PHYSICAL MEDICINE & REHABILITATION

## 2019-07-02 RX ORDER — LIDOCAINE HYDROCHLORIDE 10 MG/ML
16 INJECTION, SOLUTION INFILTRATION; PERINEURAL ONCE
Status: COMPLETED | OUTPATIENT
Start: 2019-07-02 | End: 2019-07-02

## 2019-07-02 RX ADMIN — LIDOCAINE HYDROCHLORIDE 16 ML: 10 INJECTION, SOLUTION INFILTRATION; PERINEURAL at 12:26

## 2019-07-02 NOTE — PROGRESS NOTES
Patient here for trigger point injections. Patient taken back to exam room, and placed on drape locking stool. Areas to be injected marked appropriately, and cleansed with alcohol. 16cc of 1% lidocaine to be injected by provider.

## 2019-07-03 ENCOUNTER — HOSPITAL ENCOUNTER (OUTPATIENT)
Dept: OCCUPATIONAL THERAPY | Age: 69
Setting detail: THERAPIES SERIES
Discharge: HOME | End: 2019-07-03
Payer: MEDICARE

## 2019-07-03 PROCEDURE — 97530 THERAPEUTIC ACTIVITIES: CPT

## 2019-07-03 PROCEDURE — 97140 MANUAL THERAPY 1/> REGIONS: CPT

## 2019-07-03 ASSESSMENT — PAIN DESCRIPTION - ORIENTATION: ORIENTATION: POSTERIOR

## 2019-07-03 ASSESSMENT — PAIN DESCRIPTION - LOCATION: LOCATION: BACK;NECK;SHOULDER

## 2019-07-03 ASSESSMENT — PAIN SCALES - GENERAL: PAINLEVEL_OUTOF10: 7

## 2019-07-03 ASSESSMENT — PAIN DESCRIPTION - PAIN TYPE: TYPE: CHRONIC PAIN

## 2019-07-05 ENCOUNTER — HOSPITAL ENCOUNTER (OUTPATIENT)
Dept: OCCUPATIONAL THERAPY | Age: 69
Setting detail: THERAPIES SERIES
Discharge: HOME | End: 2019-07-05
Payer: MEDICARE

## 2019-07-05 PROCEDURE — 97140 MANUAL THERAPY 1/> REGIONS: CPT

## 2019-07-08 ENCOUNTER — HOSPITAL ENCOUNTER (OUTPATIENT)
Dept: PULMONOLOGY | Age: 69
Setting detail: THERAPIES SERIES
Discharge: HOME | End: 2019-07-08
Payer: MEDICARE

## 2019-07-08 ENCOUNTER — HOSPITAL ENCOUNTER (OUTPATIENT)
Dept: OCCUPATIONAL THERAPY | Age: 69
Setting detail: THERAPIES SERIES
Discharge: HOME | End: 2019-07-08
Payer: MEDICARE

## 2019-07-08 PROCEDURE — G0424 PULMONARY REHAB W EXER: HCPCS

## 2019-07-08 PROCEDURE — 97140 MANUAL THERAPY 1/> REGIONS: CPT

## 2019-07-08 ASSESSMENT — PAIN DESCRIPTION - LOCATION: LOCATION: BACK;NECK;SHOULDER

## 2019-07-08 ASSESSMENT — PAIN DESCRIPTION - PAIN TYPE: TYPE: CHRONIC PAIN

## 2019-07-08 ASSESSMENT — PAIN DESCRIPTION - ORIENTATION: ORIENTATION: POSTERIOR;UPPER

## 2019-07-08 ASSESSMENT — PAIN SCALES - GENERAL: PAINLEVEL_OUTOF10: 8

## 2019-07-08 NOTE — PROGRESS NOTES
[facility]  Occupational Therapy Treatment Note  Date: 2019  Patient Name: Rocoi Haile    MRN: [id number]  Account: [de-identified]   : 1950  (74 y.o.) Gender: female        OT Visit Information  OT Insurance Information: Medicare product   Progress Note Counter: 6/10  Subjective  Subjective: Pt reports experiencing pain of her neck shoulders and upper back yesterday at an intensity of 8/10   Pain Assessment  Patient Currently in Pain: Yes  Pain Assessment: 0-10  Pain Level: 8  Pain Type: Chronic pain  Pain Location: Back, Neck, Shoulder  Pain Orientation: Posterior, Upper      OBJECTIVE: Direct treatment of manual techniques completed with the pt in sitting. Techniques included yoke release, deep releases at the lateral and posterior neck musculature, pectoral stretch and bilateral arm pulls. Pt reported a decrease in neck pain at the end of treatment, but upper back pain was unchanged. Pt reports she has an appointment with her oncologist this week. Pt requested a copy of her initial evaluation to provide her MD.  Pt's initial evaluation provided with the pt signing a release of medical information form. Post Treatment Pain Screening  Pain at present: 6  Scale Used: Numeric Score  Intervention List: Patient declined any intervention  Plan. Assessment :  Pt tolerated treatment well. OT remains limited in the techniques available for treatment due to restrictions.   (Cancer)   Plan: Continue with current plan    Electronically signed by ELISA Larsen on 19 at 4:53 PM  Electronically signed by ELISA Larsen on 19 at 5:00 PM

## 2019-07-09 ENCOUNTER — HOSPITAL ENCOUNTER (OUTPATIENT)
Dept: PULMONOLOGY | Age: 69
Setting detail: THERAPIES SERIES
Discharge: HOME | End: 2019-07-09
Payer: MEDICARE

## 2019-07-09 PROCEDURE — G0424 PULMONARY REHAB W EXER: HCPCS

## 2019-07-10 ENCOUNTER — APPOINTMENT (OUTPATIENT)
Dept: OCCUPATIONAL THERAPY | Age: 69
End: 2019-07-10
Payer: MEDICARE

## 2019-07-12 ENCOUNTER — HOSPITAL ENCOUNTER (OUTPATIENT)
Dept: OCCUPATIONAL THERAPY | Age: 69
Setting detail: THERAPIES SERIES
Discharge: HOME | End: 2019-07-12
Payer: MEDICARE

## 2019-07-12 PROCEDURE — 97140 MANUAL THERAPY 1/> REGIONS: CPT

## 2019-07-17 NOTE — PROGRESS NOTES
Occupational Therapy  Daily Note     Name: Andreina Montes De Oca  : 1950  MRN: [id number]  Diagnosis: Myalgia, sprain of costal cartilage, sequela, suprascapular entrapment neuropathy of left side, neck pain, bilateral CTs, chronic midline low back pain- with left sided scaitica, DDD, lumbar and chronic bilateral thoracic back pain     Visit Information:   OT Insurance Information: Medicare product   Progress Note Counter: 9/10    Date: 2019       OT Individual Minutes  Time In: 1100  Time Out: 6385  Minutes: 62    Referring Practitioner: Dr James Lang     Subjective:    \"I have those injections (with Dr. Yoanna Lombardo) later today. \"          Pain rating:     Pre-treatment pain:   5/10 neck and shoulder     Pain after treatment:      4/10 neck and shoulder     Focus of treatment was on the following:   []Neuromuscular Re-education   []Orthotic/Splint/Brace: fabrication/education []UE strength  []Right []Left []Both   [] Strength   []Right []Left []Both     []Cognition  []Balance  []Posture/positioning   [x]Decreasing pain    []Coordination []ADL's []Functional Mobility []Home management    []ROM []Endurance  []Transfers []Fine Motor   []Visual []Caregiver training []Perceptual                    []Sensory                    []Edema management  []Other:     Treatment activities:     Manual/MFR: Pt seated in chair     Cervical Area:    [] Occipital Condyle                              [] Cervical hold with anterior chest release                                                                                                                     [] Cervical release for extension [] Posterior cervical for rotation:                                                                                                                        [x] Cervical release for side bending left and right                [x] Cervical release for rotation               [] Right   [] Left    [x] Both        Transverse Plane:     []

## 2019-07-25 NOTE — PROGRESS NOTES
Subjective:     Amy Ward is a 70 y.o. female who complains today of:     Chief Complaint   Patient presents with    Follow-up     f/u for COPD, Asthma, ROGELIO, and Hypoxia on O2. HPI  She had finished radiation therapy for lung cancer. CT chest done at [facility]  She is on bronchodilator with DuoNeb 4 times a day when necessary, Perforomist twice a day, Pulmicort twice a day, Spiriva 2 puffs daily.  Theophylline 600 mg daily. She is also on daliresp 250 mcg daily. She is on daily prednisone 10 mg daily. she said her breathing is better. C/o shortness of breath with exertion. On and off Wheezing   C/o Cough with  Color  Sputum. No Hemoptysis  No Chest tightness   No Chest pain with radiation  or pleuritic pain  No Fever or chills. No Rhinorrhea and postnasal drip. Patient is using CPAP with  8  centimeters of H2O with heated humidity. Patient is using CPAP for about  9  hours every night. Patient is using CPAP with  Nasal pillow. Patient said  sleep is restful with the CPAP use. Patient is compliant with CPAP therapy and benefiting with CPAP use. No snoring with CPAP use. No complaint of daytime sleepiness or tiredness with CPAP use. Patient denies taking naps. No sleepiness with driving. Patient denies difficulty falling asleep or staying asleep.     Allergies:  Biaxin [clarithromycin] and Invokana [canagliflozin]  Past Medical History:   Diagnosis Date    Anxiety     Asthma     Back pain     Bronchitis     CAD (coronary artery disease)     Carpal tunnel syndrome     COPD (chronic obstructive pulmonary disease) (HCC)     uses CPAP at night    Emphysema of lung (HCC)     Fibromyalgia     GERD (gastroesophageal reflux disease)     Hyperlipidemia     Hypertension     Hypothyroidism     Obesity     ROGELIO (obstructive sleep apnea)     Osteoarthritis     Osteoporosis     PONV (postoperative nausea and vomiting)     Restless legs syndrome     SOB (shortness of breath)     Types: Cigarettes     Last attempt to quit: 2001     Years since quittin.4    Smokeless tobacco: Never Used   Substance and Sexual Activity    Alcohol use: No     Alcohol/week: 0.0 standard drinks    Drug use: No    Sexual activity: Yes     Partners: Male   Lifestyle    Physical activity:     Days per week: Not on file     Minutes per session: Not on file    Stress: Not on file   Relationships    Social connections:     Talks on phone: Not on file     Gets together: Not on file     Attends [employer] service: Not on file     Active member of club or organization: Not on file     Attends meetings of clubs or organizations: Not on file     Relationship status: Not on file    Intimate partner violence:     Fear of current or ex partner: Not on file     Emotionally abused: Not on file     Physically abused: Not on file     Forced sexual activity: Not on file   Other Topics Concern    Not on file   Social History Narrative    Not on file         Review of Systems   Constitutional: Negative for chills, diaphoresis, fatigue and fever. HENT: Negative for congestion, mouth sores, nosebleeds, postnasal drip, rhinorrhea, sinus pressure, sneezing, sore throat, trouble swallowing and voice change. Eyes: Negative for discharge, itching and visual disturbance. Respiratory: Positive for cough, shortness of breath and wheezing. Negative for chest tightness. Cardiovascular: Negative for chest pain, palpitations and leg swelling. Gastrointestinal: Negative for abdominal pain, diarrhea, nausea and vomiting. Genitourinary: Negative for difficulty urinating and hematuria. Musculoskeletal: Negative for arthralgias, joint swelling and myalgias. Skin: Negative for rash. Allergic/Immunologic: Negative for environmental allergies and food allergies. Neurological: Negative for dizziness, tremors, weakness and headaches. Psychiatric/Behavioral: Negative for behavioral problems and sleep disturbance.

## 2019-08-01 NOTE — ANESTHESIA PRE PROCEDURE
MCG tablet Take 1 tablet by mouth daily 4/18/19   Tasha Menjivar MD   albuterol sulfate (PROAIR RESPICLICK) 007 (90 Base) MCG/ACT aerosol powder inhalation Inhale 1 puff into the lungs every 6 hours as needed for Wheezing or Shortness of Breath 4/18/19 5/18/19  Tasha Menjivar MD   esomeprazole (NEXIUM) 40 MG delayed release capsule TAKE 1 CAPSULE DAILY 4/6/19   Feliciano Coleman MD   NOVOFINE 32G X 6 MM MISC qid 4/5/19   Flower Potter MD   insulin aspart (NOVOLOG FLEXPEN) 100 UNIT/ML injection pen Inject 10-15 Units into the skin 3 times daily (before meals) 4/5/19   Flower Potter MD   insulin glargine (BASAGLAR KWIKPEN) 100 UNIT/ML injection pen Inject 90 units at night 4/5/19   Flower Potter MD   Liraglutide (VICTOZA) 18 MG/3ML SOPN SC injection Inject 1.8 mg into the skin daily 3/5/19   Flower Potter MD   Continuous Blood Gluc  (FREESTYLE DONOVAN 14 DAY READER) MARCELLUS As directed 3/5/19   Flower Potter MD   Continuous Blood Gluc Sensor (FREESTYLE DONOVAN 14 DAY SENSOR) MISC Every 2 weeks 3/5/19   Flower Potter MD   CPAP Machine MISC by Does not apply route Change CPAP pressure to 10 cm 1/7/19   Tasha Menjivar MD   guaiFENesin (MUCINEX) 600 MG extended release tablet Take 1 tablet by mouth 2 times daily 1/7/19   Shilpa Urias MD   ipratropium-albuterol (DUONEB) 0.5-2.5 (3) MG/3ML SOLN nebulizer solution Inhale 3 mLs into the lungs every 6 hours 11/26/18   Tasha Menjivar MD   blood glucose test strips (ONE TOUCH ULTRA TEST) strip USE TO TEST TWICE A DAY AND AS NEEDED, IDDM - Dx: E11.9 10/24/18   Flower Potter MD   OXYGEN Inhale into the lungs 2 liters at night and during the day 3 liters    Historical Provider, MD   fluocinonide (LIDEX) 0.05 % ointment APPLY OINTMENT TOPICALLY TWICE DAILY FOR 2 WEEKS 9/7/18   Feliciano Coleman MD   formoterol (PERFOROMIST) 20 MCG/2ML nebulizer solution Inhale 2 mLs into the lungs daily 7/23/18   Historical Provider, MD   fluticasone (FLONASE) 50 MCG/ACT nasal spray 2 sprays by Nasal verapamil (VERELAN PM) 240 MG CR capsule Take 240 mg by mouth 2 times daily     Historical Provider, MD       Current medications:    No current facility-administered medications for this encounter. Current Outpatient Medications   Medication Sig Dispense Refill    predniSONE (DELTASONE) 10 MG tablet 2 tab for 7 days and then 1 tab daily . 67 tablet 1    predniSONE (DELTASONE) 10 MG tablet 2 tablets daily for 7 days, then 1 tab daily. 67 tablet 0    diclofenac sodium (VOLTAREN) 1 % GEL Apply 4 g topically 4 times daily Generic equivalent acceptable, unless otherwise noted. 1500 g 3    topiramate (TOPAMAX) 25 MG tablet TAKE 1 TABLET NIGHTLY 60 tablet 3    diclofenac sodium 1 % GEL Apply 4 g topically 4 times daily 5 Tube 5    Continuous Blood Gluc Sensor (e-INFO TechnologiesSTYLE DONOVAN 14 DAY SENSOR) MISC Every 2 weeks 2 each 06    Continuous Blood Gluc  (FREESTYLE DONOVAN 14 DAY READER) MARCELLUS As directed 1 Device 0    levothyroxine (SYNTHROID) 150 MCG tablet Take 1 tablet by mouth Daily 90 tablet 3    traMADol (ULTRAM) 50 MG tablet Take 1 tablet by mouth every 6 hours as needed for Pain for up to 50 days. MAX #90 MONTHLY. DO NOT GET NARCOTIC MEDICATIONS FROM ANY OTHER PROVIDER. 90 tablet 1    Umeclidinium Bromide 62.5 MCG/INH AEPB Inhale 1 puff into the lungs daily 90 each 3    Cholecalciferol (VITAMIN D3) 43476 units CAPS 1 po q  weekly (Patient not taking: Reported on 7/25/2019) 4 capsule 1    LORazepam (ATIVAN) 0.5 MG tablet Take 1 tablet by mouth every 12 hours as needed for Anxiety for up to 30 days.  24 tablet 0    Roflumilast (DALIRESP) 500 MCG tablet Take 1 tablet by mouth daily 90 tablet 3    albuterol sulfate (PROAIR RESPICLICK) 858 (90 Base) MCG/ACT aerosol powder inhalation Inhale 1 puff into the lungs every 6 hours as needed for Wheezing or Shortness of Breath 3 Inhaler 3    esomeprazole (NEXIUM) 40 MG delayed release capsule TAKE 1 CAPSULE DAILY 90 capsule 1    NOVOFINE 32G X 6 MM MISC qid Unspecified sleep apnea     UTI (urinary tract infection)        Past Surgical History:        Procedure Laterality Date    ANKLE SURGERY Left     hardware in & removed    ANKLE SURGERY Right     Plate in right ankle    CARPAL TUNNEL RELEASE Left 2015    CHOLECYSTECTOMY      COLONOSCOPY  2012    CORONARY ANGIOPLASTY WITH STENT PLACEMENT      ENDOSCOPY, COLON, DIAGNOSTIC      LUNG BIOPSY  2017    pleural biopsy    MA REVISE MEDIAN N/CARPAL TUNNEL SURG Right 2018    RIGHT WRIST CARPAL TUNNEL RELEASE, ( TO BE IN ROOM 12 WITH ROOM 2 TEAM) performed by Robbie Mccarty MD at [facility] Left 2018    LEFT JOSELIN THYROIDECTOMY performed by Gaston Panda MD at [address] [place] [address] PTCA      THYROIDECTOMY      partial    UPPER GASTROINTESTINAL ENDOSCOPY  2012    UPPER GASTROINTESTINAL ENDOSCOPY  14     DR. MAE       Social History:    Social History     Tobacco Use    Smoking status: Former Smoker     Packs/day: 1.50     Years: 32.00     Pack years: 48.00     Types: Cigarettes     Last attempt to quit: 2001     Years since quittin.4    Smokeless tobacco: Never Used   Substance Use Topics    Alcohol use: No     Alcohol/week: 0.0 standard drinks                                Counseling given: Not Answered      Vital Signs (Current): There were no vitals filed for this visit.                                            BP Readings from Last 3 Encounters:   19 110/60   19 137/84   19 (!) 142/60       NPO Status:                                                                                 BMI:   Wt Readings from Last 3 Encounters:   19 184 lb (83.5 kg)   19 184 lb 3.2 oz (83.6 kg)   19 186 lb (84.4 kg)     There is no height or weight on file to calculate BMI.    CBC:   Lab Results   Component Value Date    WBC 8.0 05/15/2019    WBC 15.7 2019    RBC 4.68 05/15/2019    RBC 4.98 2018    HGB 13.4 05/15/2019    HCT ASA 4       Induction: intravenous. Anesthetic plan and risks discussed with patient. Plan discussed with attending.                   JUAN Cervantes - ROBI   7/31/2019

## 2019-08-01 NOTE — OP NOTE
postbulbar duodenum was examined that is normal.  Estimated blood loss is minimal.    The patient tolerated the procedure well and was taken to the post anesthesia care unit in good condition. Impression: Possible short segment Dumont's esophagus versus irregular Z line. Gastroparesis. Recommendations: Follow the biopsy results and follow-up in the office.     Roman Collazo MD  [facility]

## 2019-08-07 NOTE — PATIENT INSTRUCTIONS
Personalized Preventive Plan for Edmond Morel - 8/7/2019  Medicare offers a range of preventive health benefits. Some of the tests and screenings are paid in full while other may be subject to a deductible, co-insurance, and/or copay. Some of these benefits include a comprehensive review of your medical history including lifestyle, illnesses that may run in your family, and various assessments and screenings as appropriate. After reviewing your medical record and screening and assessments performed today your provider may have ordered immunizations, labs, imaging, and/or referrals for you. A list of these orders (if applicable) as well as your Preventive Care list are included within your After Visit Summary for your review. Other Preventive Recommendations:    · A preventive eye exam performed by an eye specialist is recommended every 1-2 years to screen for glaucoma; cataracts, macular degeneration, and other eye disorders. · A preventive dental visit is recommended every 6 months. · Try to get at least 150 minutes of exercise per week or 10,000 steps per day on a pedometer . · Order or download the FREE \"Exercise & Physical Activity: Your Everyday Guide\" from [employer]. Call 9-843.875.4824 or search [employer] online. · You need 2432-6972 mg of calcium and 7345-5289 IU of vitamin D per day. It is possible to meet your calcium requirement with diet alone, but a vitamin D supplement is usually necessary to meet this goal.  · When exposed to the sun, use a sunscreen that protects against both UVA and UVB radiation with an SPF of 30 or greater. Reapply every 2 to 3 hours or after sweating, drying off with a towel, or swimming. · Always wear a seat belt when traveling in a car. Always wear a helmet when riding a bicycle or motorcycle.

## 2019-08-07 NOTE — PROGRESS NOTES
Subjective:      Patient ID: Sonya Fernando is a 70 y.o. female    HPI  Here in follow up for vitamin d deficiency and anxiety. Has used about 10 tablets since last visit in early may. Finished vitamin d scrip as well and does feel better when vitamin d level is normal.  Has had 6 months of skin lesion on left arm. No associated pain with skin lesion. No bleeding. May need left elbow surgery soon secondary to nerve damage. Review of Systems   Constitutional: Negative for activity change and unexpected weight change. Musculoskeletal: Positive for arthralgias. Neurological: Negative for dizziness and weakness. Reviewed allergy, medical, social, surgical, family and med list changes and updated   Files--reviewed blood work with normal vitamin d   Controlled substances monitoring: no signs of potential drug abuse or diversion identified and [place] report reviewed today- activity consistent with treatment plan.      Social History     Socioeconomic History    Marital status:      Spouse name: None    Number of children: None    Years of education: None    Highest education level: None   Occupational History    Occupation: Retired   Social Needs    Financial resource strain: None    Food insecurity:     Worry: None     Inability: None    Transportation needs:     Medical: None     Non-medical: None   Tobacco Use    Smoking status: Former Smoker     Packs/day: 1.50     Years: 32.00     Pack years: 48.00     Types: Cigarettes     Last attempt to quit: 2001     Years since quittin.4    Smokeless tobacco: Never Used   Substance and Sexual Activity    Alcohol use: No     Alcohol/week: 0.0 standard drinks    Drug use: No    Sexual activity: Yes     Partners: Male   Lifestyle    Physical activity:     Days per week: None     Minutes per session: None    Stress: None   Relationships    Social connections:     Talks on phone: None     Gets together: None     Attends Yarsanism service: None     Active member of club or organization: None     Attends meetings of clubs or organizations: None     Relationship status: None    Intimate partner violence:     Fear of current or ex partner: None     Emotionally abused: None     Physically abused: None     Forced sexual activity: None   Other Topics Concern    None   Social History Narrative    None     Current Outpatient Medications   Medication Sig Dispense Refill    PAMELA-24 300 MG extended release capsule       traMADol (ULTRAM) 50 MG tablet Take 50 mg by mouth every 6 hours as needed for Pain.  predniSONE (DELTASONE) 10 MG tablet 2 tab for 7 days and then 1 tab daily . 67 tablet 1    predniSONE (DELTASONE) 10 MG tablet 2 tablets daily for 7 days, then 1 tab daily. 67 tablet 0    diclofenac sodium (VOLTAREN) 1 % GEL Apply 4 g topically 4 times daily Generic equivalent acceptable, unless otherwise noted.  1500 g 3    topiramate (TOPAMAX) 25 MG tablet TAKE 1 TABLET NIGHTLY 60 tablet 3    diclofenac sodium 1 % GEL Apply 4 g topically 4 times daily 5 Tube 5    Continuous Blood Gluc Sensor (FREESTYLE DONOVAN 14 DAY SENSOR) MISC Every 2 weeks 2 each 06    Continuous Blood Gluc  (FREESTYLE DONOVAN 14 DAY READER) MARCELLUS As directed 1 Device 0    levothyroxine (SYNTHROID) 150 MCG tablet Take 1 tablet by mouth Daily 90 tablet 3    Umeclidinium Bromide 62.5 MCG/INH AEPB Inhale 1 puff into the lungs daily 90 each 3    Cholecalciferol (VITAMIN D3) 44204 units CAPS 1 po q  weekly 4 capsule 1    Roflumilast (DALIRESP) 500 MCG tablet Take 1 tablet by mouth daily 90 tablet 3    esomeprazole (NEXIUM) 40 MG delayed release capsule TAKE 1 CAPSULE DAILY 90 capsule 1    NOVOFINE 32G X 6 MM MISC qid 400 each 3    insulin aspart (NOVOLOG FLEXPEN) 100 UNIT/ML injection pen Inject 10-15 Units into the skin 3 times daily (before meals) 30 pen 3    insulin glargine (BASAGLAR KWIKPEN) 100 UNIT/ML injection pen Inject 90 units at night 90 pen 3   

## 2019-08-07 NOTE — PROGRESS NOTES
Medicare Annual Wellness Visit  Name: Shayy Valencia Date: 2019   MRN: [id number] Sex: Female   Age: 70 y.o. Ethnicity: Non-/Non    : 1950 Race: Gregg Somers is here for Medicare AWV (Pt presents today for medicare annual wellness exam.)    Screenings for behavioral, psychosocial and functional/safety risks, and cognitive dysfunction are all negative except as indicated below. These results, as well as other patient data from the [address] form, are documented in Flowsheets linked to this Encounter. Allergies   Allergen Reactions    Biaxin [Clarithromycin] Nausea Only     Nausea with diarrhea    Invokana [Canagliflozin]      Yeast infection      Prior to Visit Medications    Medication Sig Taking? Authorizing Provider   PAMELA-24 300 MG extended release capsule  Yes Historical Provider, MD   traMADol (ULTRAM) 50 MG tablet Take 50 mg by mouth every 6 hours as needed for Pain. Yes Historical Provider, MD   predniSONE (DELTASONE) 10 MG tablet 2 tab for 7 days and then 1 tab daily . Yes Cristino Mcduffie MD   predniSONE (DELTASONE) 10 MG tablet 2 tablets daily for 7 days, then 1 tab daily. Yes Cristino Mcduffie MD   diclofenac sodium (VOLTAREN) 1 % GEL Apply 4 g topically 4 times daily Generic equivalent acceptable, unless otherwise noted.  Yes Lauren Adams DO   topiramate (TOPAMAX) 25 MG tablet TAKE 1 TABLET NIGHTLY Yes Lauren Adams DO   diclofenac sodium 1 % GEL Apply 4 g topically 4 times daily Yes Lauren Adams DO   Continuous Blood Gluc Sensor (FREESTYLE DONOVAN 14 DAY SENSOR) MISC Every 2 weeks Yes Primo Rivera MD   Continuous Blood Gluc  (FREESTYLE DONOVAN 14 DAY READER) MARCELLUS As directed Yes Primo Rivera MD   levothyroxine (SYNTHROID) 150 MCG tablet Take 1 tablet by mouth Daily Yes Noel Tolliver MD   Umeclidinium Bromide 62.5 MCG/INH AEPB Inhale 1 puff into the lungs daily Yes Cristino Mcduffie MD   Cholecalciferol (VITAMIN D3) 08622 Diagnosis Date    Anxiety     Asthma     Back pain     Bronchitis     CAD (coronary artery disease)     Cancer (HCC)     RLL lung CA    Carpal tunnel syndrome     COPD (chronic obstructive pulmonary disease) (HCC)     uses CPAP at night    Emphysema of lung (HCC)     Fibromyalgia     GERD (gastroesophageal reflux disease)     Hyperlipidemia     Hypertension     Hypothyroidism     Obesity     ROGELIO (obstructive sleep apnea)     Osteoarthritis     Osteoporosis     PONV (postoperative nausea and vomiting)     Restless legs syndrome     SOB (shortness of breath)     Type II or unspecified type diabetes mellitus without mention of complication, not stated as uncontrolled     Unspecified sleep apnea     UTI (urinary tract infection)      Past Surgical History:   Procedure Laterality Date    ANKLE SURGERY Left     hardware in & removed    ANKLE SURGERY Right     Plate in right ankle    CARPAL TUNNEL RELEASE Left 2015    CHOLECYSTECTOMY      COLONOSCOPY  09/11/2012    CORONARY ANGIOPLASTY WITH STENT PLACEMENT      ENDOSCOPY, COLON, DIAGNOSTIC      LUNG BIOPSY  09/07/2017    pleural biopsy    TN REVISE MEDIAN N/CARPAL TUNNEL SURG Right 5/24/2018    RIGHT WRIST CARPAL TUNNEL RELEASE, ( TO BE IN ROOM 12 WITH ROOM 2 TEAM) performed by Keith Jane MD at [facility] Left 6/19/2018    LEFT JOSELIN THYROIDECTOMY performed by Woodrow Cho MD at [address] [place] [address] PTCA      THYROIDECTOMY      partial    UPPER GASTROINTESTINAL ENDOSCOPY  09/11/2012    UPPER GASTROINTESTINAL ENDOSCOPY  12/4/14     DR. MAE    UPPER GASTROINTESTINAL ENDOSCOPY N/A 8/1/2019    EGD ESOPHAGOGASTRODUODENOSCOPY performed by Cornelius Can MD at [facility]     Family History   Problem Relation Age of Onset    High Blood Pressure Mother     Heart Disease Mother     Cancer Father         LUNG    High Blood Pressure Brother     COPD Brother     Heart Disease Brother     Heart Attack Brother organomegaly  Extremities: no cyanosis, clubbing or edema  Musculoskeletal: normal range of motion, no joint swelling, deformity or tenderness  Neurologic: reflexes normal and symmetric, no cranial nerve deficit, gait, coordination and speech normal    Patient's complete Health Risk Assessment and screening values have been reviewed and are found in Flowsheets. The following problems were reviewed today and where indicated follow up appointments were made and/or referrals ordered. Positive Risk Factor Screenings with Interventions:     General Health:  General  In general, how would you say your health is?: Fair  In the past 7 days, have you experienced any of the following? New or Increased Pain, New or Increased Fatigue, Loneliness, Social Isolation, Stress or Anger?: (!) New or Increased Pain, New or Increased Fatigue  Do you get the social and emotional support that you need?: Yes  Do you have a Living Will?: Yes  General Health Risk Interventions:  · Pain issues: Pt sees Dr. Jennifer Palomares Habits/Nutrition:  Health Habits/Nutrition  Do you exercise for at least 20 minutes 2-3 times per week?: Yes  Have you lost any weight without trying in the past 3 months?: (!) Yes  Do you eat fewer than 2 meals per day?: No  Have you seen a dentist within the past year?: (!) No  Body mass index is 34.58 kg/m².   Health Habits/Nutrition Interventions:  · Dental exam overdue:  patient declines dental evaluation  · Pt was put on topamax for pain and it has helped with weight loss per pt    Hearing/Vision:  No exam data present  Hearing/Vision  Do you or your family notice any trouble with your hearing?: (!) Yes  Do you have difficulty driving, watching TV, or doing any of your daily activities because of your eyesight?: No  Have you had an eye exam within the past year?: Yes  Hearing/Vision Interventions:  · Hearing concerns:  patient declines any further evaluation/treatment for hearing issues    Safety:  Safety  Do you have working smoke detectors?: Yes  Have all throw rugs been removed or fastened?: Yes  Do you have non-slip mats or surfaces in all bathtubs/showers?: Yes  Do all of your stairways have a railing or banister?: (!) No(no stairs)  Are your doorways, halls and stairs free of clutter?: Yes  Do you always fasten your seatbelt when you are in a car?: Yes  Safety Interventions:  · Home safety tips provided    Personalized Preventive Plan   Current Health Maintenance Status  Immunization History   Administered Date(s) Administered    Influenza 10/22/2012, 10/21/2013    Influenza Vaccine, unspecified formulation 11/01/2016    Influenza Virus Vaccine 10/03/2014    Influenza, High Dose (Fluzone 65 yrs and older) 11/06/2015, 10/05/2017, 10/04/2018    Influenza, Intradermal, Preservative free 10/25/2011    Influenza, Donnis Hence, 3 Years and older, IM (Fluzone 3 yrs and older or Afluria 5 yrs and older) 10/20/2016    Pneumococcal Conjugate 13-valent (Martínez Janie) 10/20/2016, 11/16/2016    Pneumococcal Polysaccharide ([id number]) 05/21/2012    Zoster Live (Zostavax) 11/30/2016        Health Maintenance   Topic Date Due    Hepatitis C screen  1950    DTaP/Tdap/Td vaccine (1 - Tdap) 10/26/1968    Annual Wellness Visit (AWV)  10/26/2012    Shingles Vaccine (2 of 3) 01/25/2017    Pneumococcal 65+ years Vaccine (2 of 2 - PPSV23) 11/16/2017    Diabetic foot exam  10/30/2018    Diabetic retinal exam  04/16/2019    Flu vaccine (1) 09/01/2019    Breast cancer screen  01/02/2020    Lipid screen  05/15/2020    A1C test (Diabetic or Prediabetic)  06/07/2020    Diabetic microalbuminuria test  06/07/2020    TSH testing  06/07/2020    Colon cancer screen colonoscopy  09/11/2022    DEXA (modify frequency per FRAX score)  Completed     Recommendations for Preventive Services Due: see orders and patient instructions/AVS.  .   Recommended screening schedule for the next 5-10 years is provided to the patient in written form: see Patient Instructions/AVS.    There are no diagnoses linked to this encounter.

## 2019-09-11 PROBLEM — R91.8 RIGHT LOWER LOBE LUNG MASS: Status: ACTIVE | Noted: 2019-01-01

## 2019-09-11 NOTE — PROGRESS NOTES
Subjective  Janet Mitchell, 70 y.o. female presents today with:       Back Pain Trigger point injections 7/2/19 & 7/17/19 with 0% reduction in pain      Neck Pain Left scapular injection 6/19/19 with 0% reduction in pain lasting     Leg Pain Bilateral     Foot Pain Bilateral    Medication Refill Tramadol, Voltaren Gel refill & pill count- Patient did not bring medication       She continues doing well with meds /Ultram and injections- she is happy to report that she thinks that she is done with her radiation therapy for her lung cancer. Her mid back in the thoracic spine it has flared up as well and I will check an x-ray of the mid back. I have also discussed with her alternating Tylenol and tramadol and possibly increasing tramadol to 2 a day on a regular basis and only taking the third if she is in dire straits. She will always use lowest dose of the medication and I have never seen any behaviors of concern with her in these medications. She saw Dr. Rakel Ruiz recently to follow-up with her Dumont's esophagus. SHe is to avoid NSAIDS and try to wean down off the prednisone. .  Right rib pain is severe unrelieved with Ultram--can't sleep at night. Still no signs of drug abuse or misuse. She rarely takes her medication when she does she is more able to participate in family activities and has positive interactions with family and friends. Recent tox screen was unremarkable. Back Pain   This is a chronic problem. The current episode started more than 1 year ago. The problem occurs constantly. The problem is unchanged. The pain is present in the lumbar spine, thoracic spine and gluteal. The quality of the pain is described as aching. Radiates to: Bilateral legs-pain will alternate from leg to leg. The pain is at a severity of 8/10. The pain is moderate. The pain is the same all the time. The symptoms are aggravated by lying down and sitting. Stiffness is present in the morning.  Associated symptoms include chest pain, leg pain, numbness and weakness (legs ). Pertinent negatives include no abdominal pain, bladder incontinence, bowel incontinence, headaches, paresis, pelvic pain or tingling. Risk factors include history of osteoporosis, obesity, lack of exercise, sedentary lifestyle and menopause. She has tried home exercises, analgesics, walking, muscle relaxant and heat (Tramadol, Tylenol, PT 8 sessions, Injections ) for the symptoms. The treatment provided mild relief. Neck Pain    This is a chronic problem. The current episode started more than 1 year ago. The problem occurs intermittently. The problem has been unchanged. The pain is present in the midline, left side and right side. The quality of the pain is described as aching. The pain is at a severity of 9/10. The pain is severe. The symptoms are aggravated by bending, position and twisting. Stiffness is present all day. Associated symptoms include chest pain, leg pain, numbness and weakness (legs ). Pertinent negatives include no headaches, pain with swallowing, paresis, photophobia or tingling. She has tried home exercises and heat (Tramadol, Tylenol, PT 15 sessions, Injections ) for the symptoms. The treatment provided significant relief. Knee Pain    The incident occurred more than 1 week ago. The incident occurred in the yard. The injury mechanism was a fall. The pain is present in the right leg, right foot, right ankle, right knee and right hip. The pain is at a severity of 7/10. The pain is severe. Associated symptoms include numbness. Pertinent negatives include no inability to bear weight, loss of motion, muscle weakness or tingling. She reports no foreign bodies present. The symptoms are aggravated by palpation, movement and weight bearing. She has tried ice for the symptoms. The treatment provided mild relief.        Past Medical History:   Diagnosis Date    Anxiety     Asthma     Back pain     Bronchitis     CAD (coronary Positive for chest pain and leg swelling (Bilateral). Negative for palpitations. Gastrointestinal: Positive for constipation and vomiting. Negative for abdominal pain, bowel incontinence, diarrhea and nausea. Endocrine: Positive for heat intolerance. Negative for cold intolerance. Genitourinary: Negative. Negative for bladder incontinence and pelvic pain. Musculoskeletal: Positive for arthralgias, back pain, joint swelling, neck pain and neck stiffness. Negative for gait problem. Skin: Negative. Allergic/Immunologic: Negative. Neurological: Positive for weakness (legs ), light-headedness and numbness. Negative for dizziness, tingling and headaches. Hematological: Bruises/bleeds easily. Psychiatric/Behavioral: Positive for dysphoric mood and sleep disturbance. The patient is nervous/anxious. Objective    Vitals:    09/11/19 1337   BP: (!) 140/60   Site: Left Upper Arm   Position: Sitting   Cuff Size: Small Adult   Weight: 176 lb 8 oz (80.1 kg)   Height: 5' 1\" (1.549 m)     Pain Score: SEVEN (Without medication)       Physical Exam   Constitutional: She is oriented to person, place, and time. Vital signs are normal. She appears well-developed and well-nourished. Non-toxic appearance. She does not have a sickly appearance. She does not appear ill. No distress. Nasal cannula in place. HENT:   Head: Normocephalic and atraumatic. Right Ear: Hearing normal.   Left Ear: Hearing normal.   Nose: Nose normal.   Mouth/Throat: Oropharynx is clear and moist and mucous membranes are normal. No oral lesions. Normal dentition. No oropharyngeal exudate. No signs of toxic erosions. Eyes: Pupils are equal, round, and reactive to light. Conjunctivae and EOM are normal. Right eye exhibits no chemosis, no discharge and no exudate. Left eye exhibits no chemosis, no discharge and no exudate. No scleral icterus. Neck: Normal range of motion. Neck supple. No JVD present. No neck rigidity.  No tracheal deviation and no edema present. No thyromegaly present. Cardiovascular: Normal rate, regular rhythm, normal heart sounds and intact distal pulses. Exam reveals no gallop, no friction rub and no decreased pulses. No murmur heard. Pulmonary/Chest: Accessory muscle usage present. No apnea, no tachypnea and no bradypnea. No respiratory distress. She has decreased breath sounds. She has no wheezes. She has no rales. She exhibits no tenderness. On NC O2   Abdominal: Soft. Bowel sounds are normal. She exhibits no distension and no mass. There is tenderness in the right upper quadrant. There is no rebound and no guarding. Musculoskeletal: She exhibits tenderness. She exhibits no edema. Right shoulder: Normal.        Left shoulder: Normal.        Right elbow: Normal.       Left elbow: Normal.        Right wrist: Normal.        Left wrist: Normal.        Right hip: Normal.        Left hip: Normal.        Right knee: Normal.        Left knee: Normal.        Right ankle: Normal. Achilles tendon normal.        Left ankle: Normal. Achilles tendon normal.        Cervical back: She exhibits tenderness, pain and spasm. She exhibits no bony tenderness and no swelling. Thoracic back: She exhibits tenderness and spasm. Lumbar back: She exhibits decreased range of motion, tenderness, bony tenderness and pain. She exhibits no swelling, no edema, no deformity, no laceration and normal pulse. Back:         Right upper arm: Normal.        Left upper arm: Normal.        Right forearm: Normal.        Left forearm: Normal.        Arms:       Right hand: Decreased sensation noted. Decreased sensation is present in the medial distribution. Left hand: Decreased sensation noted. Decreased sensation is present in the medial distribution.         Right upper leg: Normal.        Left upper leg: Normal.        Right lower leg: Normal.        Left lower leg: Normal.        Legs:       Right foot: Normal.        Left foot: Normal.   Tender areas are indicated by numbered spot         Lymphadenopathy:     She has no cervical adenopathy. Neurological: She is alert and oriented to person, place, and time. She displays abnormal reflex. She displays no atrophy and no tremor. No cranial nerve deficit or sensory deficit. She exhibits normal muscle tone. Coordination normal. She displays no Babinski's sign on the right side. She displays no Babinski's sign on the left side. Skin: Skin is warm, dry and intact. No abrasion, no bruising, no ecchymosis, no laceration, no petechiae and no rash noted. Rash is not macular, not pustular and not urticarial. She is not diaphoretic. No cyanosis or erythema. No pallor. Nails show no clubbing. Psychiatric: She has a normal mood and affect. Her behavior is normal. Judgment and thought content normal. Her mood appears not anxious. Her affect is not angry, not blunt, not labile and not inappropriate. Her speech is not rapid and/or pressured, not delayed, not tangential and not slurred. She is not agitated, not aggressive, not hyperactive, not slowed, not withdrawn, not actively hallucinating and not combative. Thought content is not paranoid and not delusional. Cognition and memory are normal. Cognition and memory are not impaired. She does not express impulsivity or inappropriate judgment. She does not exhibit a depressed mood. She expresses no homicidal and no suicidal ideation. She expresses no suicidal plans and no homicidal plans. She is communicative. She exhibits normal recent memory and normal remote memory. She is attentive. Vitals reviewed. Ortho Exam  Neurologic Exam     Mental Status   Oriented to person, place, and time. Speech: not slurred     Cranial Nerves     CN III, IV, VI   Pupils are equal, round, and reactive to light.   Extraocular motions are normal.         After a thorough review and discussion of the previous medical records, patient complication, not stated as uncontrolled     Unspecified sleep apnea     UTI (urinary tract infection)            Given their medication, chronic pain and lifestyle and medications they are at risk for :    Falls, constipation, addiction  Loss of livelyhood due to severe pain, debility, weight gain and  vitamin D deficiency    The patient was educated regarding proper diet, fitness routine, and regulatory restrictions concerning pain medications. Previous notes, comprehensive past medical, surgical, family history, and diagnostics were reviewed. Patient education and councelling were provided regarding off label use,treatment options and medication and injection risks. Current and old OARRS ([place] Automated Pres[place] Reporting System) records reviewed, all refills reviewed since last visit,  Behavioral agreement/BARB regulations   and Toxicology screen was reviewed with patient and is up to date. There are no current red flags. They are making good progress regarding pain relief, they are performing at a functional level regarding activities of daily living, family interactions and psychological functioning, they're not having any adverse effects or side effects from the current medications, and I see no findings of aberrant drug taking or addiction related behaviors. The patient is aware that they have a chronic pain condition and they may require opiates dosing for life. All efforts will be made to wean to the lowest effective dose. Other therapies for pain have not been effective including nonopiate medications. Injections and exercises are only partially effective. A Rx for Narcan was offered to help prevent accidental overdose. RX Monitoring 9/11/2019   Attestation -   Acute Pain Prescriptions -   Periodic Controlled Substance Monitoring Possible medication side effects, risk of tolerance/dependence & alternative treatments discussed. ;No signs of potential drug abuse or diversion rainy months,   Education was given on:   Dietary and Fitness--daily stretches and low carb diet-in chair Yoga when possible             Follow up with Primary Care Physician regarding their general medical needs. Follow-up with Dr. Lizzie Elizalde in 2 years for EGD regarding her Dumont's esophagus. They are to follow up in 2 months to review medication, efficacy of injections, pill counts, OARRS check, SOAPPR assessment, review diagnostics, to review previous and future treatment plans and assess appropriateness for continued therapy.         New Diagnostics  Orders Placed This Encounter   Procedures    NM Bone Scan 3 Phase       Verónica Ortiz DO

## 2019-09-15 NOTE — PROGRESS NOTES
Subjective:      Patient ID: Sonya Fernando is a 70 y.o. female. 3 month f/u on diabetes hypothyroidism   Diabetes   She presents for her follow-up diabetic visit. She has type 2 diabetes mellitus. Her disease course has been fluctuating. Current diabetic treatment includes insulin injections. She is currently taking insulin pre-breakfast, pre-lunch and pre-dinner. Her overall blood glucose range is >200 mg/dl. (Lab Results       Component                Value               Date                       LABA1C                   9.9 (H)             09/09/2019            Higher fasting glucose   )     Hypothyroidism on synthroid 150 mcg daily       Results for Mackenzie Davila (MRN [id number]) as of 9/15/2019 19:17   Ref. Range 1/2/2019 05:38 3/1/2019 14:11 6/7/2019 13:52 9/9/2019 11:31   Hemoglobin A1C Latest Ref Range: 4.8 - 5.9 % 9.9 (H) 9.0 (H) 8.4 (H) 9.9 (H)       Results for Mackenzie Davila (MRN [id number]) as of 9/15/2019 19:17   Ref.  Range 9/9/2019 11:31 9/9/2019 11:32   Sodium Latest Ref Range: 135 - 144 mEq/L 143    Potassium Latest Ref Range: 3.4 - 4.9 mEq/L 3.9    Chloride Latest Ref Range: 95 - 107 mEq/L 103    CO2 Latest Ref Range: 20 - 31 mEq/L 24    BUN Latest Ref Range: 8 - 23 mg/dL 16    Creatinine Latest Ref Range: 0.50 - 0.90 mg/dL 0.81    Anion Gap Latest Ref Range: 9 - 15 mEq/L 16 (H)    GFR Non- Latest Ref Range: >60  >60.0    GFR African American Latest Ref Range: >60  >60.0    Glucose Latest Ref Range: 70 - 99 mg/dL 219 (H)    Calcium Latest Ref Range: 8.5 - 9.9 mg/dL 9.4    Hemoglobin A1C Latest Ref Range: 4.8 - 5.9 % 9.9 (H)    Creatinine, Ur Latest Ref Range: Not Established mg/dL  264.9   Microalbumin Creatinine Ratio Latest Ref Range: 0.0 - 30.0 mg/G  19.6   Microalbumin, Random Urine Latest Ref Range: Not Established mg/dL  5.20 (H)     Patient Active Problem List   Diagnosis    Chronic obstructive pulmonary disease (HCC)    Asthma    Diabetes mellitus type 2 in obese ([facility] [address]    ROGELIO on CPAP    CAD (coronary artery disease)    HTN (hypertension)    Hyperlipidemia with target LDL less than 70    Renal artery stenosis (HCC)    Obesity (BMI 30-39. 9)    Osteoporosis    Anxiety    Suprascapular entrapment neuropathy of left side    Midline low back pain with left-sided sciatica    Bilateral low back pain with sciatica    Rib pain on right side    Myalgia    High risk medication use - 11/07/17 OARRS PM&R, 02/07/18 OARRS PM&R, 05/30/17 Urine Drug Screen: negative PM&R, MED CONTRACT 1/26/17    Rib sprain    Acute pain of right knee    Acute right ankle pain    Chronic bilateral low back pain with sciatica    History of MRSA infection of lungs 9/2016    Osteoporosis    DDD (degenerative disc disease), lumbar    Chronic midline low back pain with left-sided sciatica    Bilateral carpal tunnel syndrome    Neck pain    Chronic thoracic spine pain    Moderate persistent asthma without complication    Right carpal tunnel syndrome    Former smoker, stopped smoking in distant past    Encounter for postoperative wound care    Hypoxia    Class 3 severe obesity due to excess calories with serious comorbidity and body mass index (BMI) of 40.0 to 44.9 in adult ([facility] [address]    Morbid obesity with BMI of 40.0-44.9, adult ([facility]    Malignant neoplasm of lower lobe of right lung (HCC)    Postoperative hypothyroidism    Uncontrolled type 2 diabetes mellitus with hyperglycemia (HCC)    Ulnar neuropathy of both upper extremities    Dyspepsia    Esophagitis    Right lower lobe lung mass     Allergies   Allergen Reactions    Biaxin [Clarithromycin] Nausea Only     Nausea with diarrhea    Invokana [Canagliflozin]      Yeast infection     Victoza [Liraglutide] Nausea And Vomiting       Current Outpatient Medications:     insulin glargine (BASAGLAR KWIKPEN) 100 UNIT/ML injection pen, Inject 100 units at night, Disp: 90 pen, Rfl: 3    oxyCODONE HCl (OXY-IR) 10 MG RESPICLICK) 330 (90 Base) MCG/ACT aerosol powder inhalation, Inhale 2 puffs into the lungs every 6 hours as needed for Wheezing or Shortness of Breath, Disp: 1 Inhaler, Rfl: 5    mepolizumab (NUCALA) 100 MG SOLR injection, Inject 100 mg into the skin Received from: [facility] Received Sig: Inject 100 mg subcutaneously one time only. monthly, Disp: , Rfl:     ONE TOUCH LANCETS MISC, USE TO TEST TWICE A DAY AND AS NEEDED, IDDM - Dx: E11.9, Disp: 200 each, Rfl: 3    Blood Glucose Monitoring Suppl [facility], One Touch Ultra - To Test BID - IDDM - Dx: E11.9, Disp: 1 Device, Rfl: 0    isosorbide mononitrate (IMDUR) 60 MG CR tablet, Take 60 mg by mouth daily , Disp: , Rfl:     budesonide (PULMICORT) 0.5 MG/2ML nebulizer suspension, Take 1 ampule by nebulization 2 times daily, Disp: , Rfl:     digoxin (DIGOX) 125 MCG tablet, Take 125 mcg by mouth Daily with lunch , Disp: , Rfl:     clopidogrel (PLAVIX) 75 MG tablet, Take 75 mg by mouth daily. , Disp: , Rfl:     fenofibrate (TRICOR) 145 MG tablet, Take 145 mg by mouth every evening , Disp: , Rfl:     verapamil (VERELAN PM) 240 MG CR capsule, Take 240 mg by mouth 2 times daily , Disp: , Rfl:     Review of Systems   Respiratory: Positive for shortness of breath. Musculoskeletal: Positive for arthralgias. All other systems reviewed and are negative. Vitals:    09/12/19 1202   BP: (!) 175/70   Site: Right Upper Arm   Position: Sitting   Cuff Size: Large Adult   Pulse: 91   Weight: 176 lb (79.8 kg)   Height: 5' 1\" (1.549 m)       Objective:   Physical Exam   Constitutional: She appears well-developed and well-nourished. HENT:   Head: Normocephalic and atraumatic. Neck: Neck supple. Cardiovascular: Normal rate. Musculoskeletal: Normal range of motion. Neurological: She is alert. Psychiatric: Her mood appears anxious. Assessment:       Diagnosis Orders   1.  Uncontrolled type 2 diabetes mellitus with hyperglycemia (HCC)  POCT Glucose    Basic

## 2019-10-08 NOTE — PROGRESS NOTES
Pt arrived to unit walking with spouse   Complains of back pain and states it always starts when its time for the medicine  Settled into recliner and IV started

## 2020-01-01 ENCOUNTER — TELEPHONE (OUTPATIENT)
Dept: PHARMACY | Facility: CLINIC | Age: 70
End: 2020-01-01

## 2020-01-01 ENCOUNTER — TELEPHONE (OUTPATIENT)
Dept: FAMILY MEDICINE CLINIC | Age: 70
End: 2020-01-01

## 2020-01-01 ENCOUNTER — APPOINTMENT (OUTPATIENT)
Dept: GENERAL RADIOLOGY | Age: 70
DRG: 870 | End: 2020-01-01
Payer: MEDICARE

## 2020-01-01 ENCOUNTER — PROCEDURE VISIT (OUTPATIENT)
Dept: PHYSICAL MEDICINE AND REHAB | Age: 70
End: 2020-01-01
Payer: MEDICARE

## 2020-01-01 ENCOUNTER — OFFICE VISIT (OUTPATIENT)
Dept: PHYSICAL MEDICINE AND REHAB | Age: 70
End: 2020-01-01
Payer: MEDICARE

## 2020-01-01 ENCOUNTER — OFFICE VISIT (OUTPATIENT)
Dept: ENDOCRINOLOGY | Age: 70
End: 2020-01-01
Payer: MEDICARE

## 2020-01-01 ENCOUNTER — HOSPITAL ENCOUNTER (INPATIENT)
Age: 70
LOS: 5 days | DRG: 870 | End: 2020-07-15
Attending: EMERGENCY MEDICINE | Admitting: EMERGENCY MEDICINE
Payer: MEDICARE

## 2020-01-01 ENCOUNTER — APPOINTMENT (OUTPATIENT)
Dept: ULTRASOUND IMAGING | Age: 70
DRG: 870 | End: 2020-01-01
Payer: MEDICARE

## 2020-01-01 ENCOUNTER — TELEPHONE (OUTPATIENT)
Dept: PHYSICAL MEDICINE AND REHAB | Age: 70
End: 2020-01-01

## 2020-01-01 ENCOUNTER — APPOINTMENT (OUTPATIENT)
Dept: GENERAL RADIOLOGY | Age: 70
End: 2020-01-01
Payer: MEDICARE

## 2020-01-01 ENCOUNTER — HOSPITAL ENCOUNTER (OUTPATIENT)
Dept: WOMENS IMAGING | Age: 70
Discharge: HOME | End: 2020-02-14
Payer: MEDICARE

## 2020-01-01 ENCOUNTER — VIRTUAL VISIT (OUTPATIENT)
Dept: PULMONOLOGY | Age: 70
End: 2020-01-01
Payer: MEDICARE

## 2020-01-01 ENCOUNTER — PROCEDURE VISIT (OUTPATIENT)
Dept: FAMILY MEDICINE CLINIC | Age: 70
End: 2020-01-01
Payer: MEDICARE

## 2020-01-01 ENCOUNTER — OFFICE VISIT (OUTPATIENT)
Dept: FAMILY MEDICINE CLINIC | Age: 70
End: 2020-01-01
Payer: MEDICARE

## 2020-01-01 ENCOUNTER — OFFICE VISIT (OUTPATIENT)
Dept: PULMONOLOGY | Age: 70
End: 2020-01-01
Payer: MEDICARE

## 2020-01-01 ENCOUNTER — APPOINTMENT (OUTPATIENT)
Dept: CT IMAGING | Age: 70
DRG: 870 | End: 2020-01-01
Payer: MEDICARE

## 2020-01-01 ENCOUNTER — OFFICE VISIT (OUTPATIENT)
Dept: FAMILY MEDICINE CLINIC | Age: 70
End: 2020-01-01

## 2020-01-01 ENCOUNTER — HOSPITAL ENCOUNTER (OUTPATIENT)
Dept: MRI IMAGING | Age: 70
Discharge: HOME | End: 2020-01-15
Payer: MEDICARE

## 2020-01-01 ENCOUNTER — HOSPITAL ENCOUNTER (OUTPATIENT)
Dept: WOMENS IMAGING | Age: 70
Discharge: HOME | End: 2020-02-28
Payer: MEDICARE

## 2020-01-01 ENCOUNTER — TELEPHONE (OUTPATIENT)
Dept: INTERNAL MEDICINE CLINIC | Age: 70
End: 2020-01-01

## 2020-01-01 ENCOUNTER — HOSPITAL ENCOUNTER (OUTPATIENT)
Dept: CT IMAGING | Age: 70
Discharge: HOME | End: 2020-03-21
Payer: MEDICARE

## 2020-01-01 ENCOUNTER — HOSPITAL ENCOUNTER (EMERGENCY)
Age: 70
Discharge: HOME | End: 2020-02-10
Payer: MEDICARE

## 2020-01-01 VITALS
TEMPERATURE: 98.3 F | RESPIRATION RATE: 16 BRPM | WEIGHT: 167 LBS | SYSTOLIC BLOOD PRESSURE: 130 MMHG | OXYGEN SATURATION: 96 % | HEART RATE: 81 BPM | BODY MASS INDEX: 31.53 KG/M2 | DIASTOLIC BLOOD PRESSURE: 62 MMHG | HEIGHT: 61 IN

## 2020-01-01 VITALS
DIASTOLIC BLOOD PRESSURE: 83 MMHG | HEART RATE: 91 BPM | HEIGHT: 61 IN | BODY MASS INDEX: 33.99 KG/M2 | WEIGHT: 180 LBS | SYSTOLIC BLOOD PRESSURE: 167 MMHG

## 2020-01-01 VITALS
OXYGEN SATURATION: 97 % | WEIGHT: 171 LBS | RESPIRATION RATE: 20 BRPM | HEIGHT: 61 IN | TEMPERATURE: 98.2 F | SYSTOLIC BLOOD PRESSURE: 140 MMHG | DIASTOLIC BLOOD PRESSURE: 57 MMHG | HEART RATE: 86 BPM | BODY MASS INDEX: 33.04 KG/M2 | BODY MASS INDEX: 32.28 KG/M2 | HEIGHT: 61 IN | WEIGHT: 175 LBS

## 2020-01-01 VITALS
DIASTOLIC BLOOD PRESSURE: 66 MMHG | TEMPERATURE: 98.5 F | WEIGHT: 176 LBS | HEIGHT: 61 IN | SYSTOLIC BLOOD PRESSURE: 134 MMHG | BODY MASS INDEX: 33.23 KG/M2 | RESPIRATION RATE: 16 BRPM | OXYGEN SATURATION: 94 % | HEART RATE: 89 BPM

## 2020-01-01 VITALS
SYSTOLIC BLOOD PRESSURE: 148 MMHG | HEIGHT: 61 IN | DIASTOLIC BLOOD PRESSURE: 60 MMHG | WEIGHT: 176 LBS | BODY MASS INDEX: 33.23 KG/M2

## 2020-01-01 VITALS
OXYGEN SATURATION: 97 % | RESPIRATION RATE: 32 BRPM | TEMPERATURE: 105 F | WEIGHT: 186.73 LBS | HEIGHT: 64 IN | DIASTOLIC BLOOD PRESSURE: 20 MMHG | BODY MASS INDEX: 31.88 KG/M2 | SYSTOLIC BLOOD PRESSURE: 52 MMHG | HEART RATE: 106 BPM

## 2020-01-01 VITALS
HEART RATE: 78 BPM | WEIGHT: 167 LBS | SYSTOLIC BLOOD PRESSURE: 130 MMHG | HEIGHT: 61 IN | TEMPERATURE: 97.8 F | RESPIRATION RATE: 16 BRPM | DIASTOLIC BLOOD PRESSURE: 70 MMHG | OXYGEN SATURATION: 97 % | BODY MASS INDEX: 31.53 KG/M2

## 2020-01-01 VITALS
WEIGHT: 171 LBS | HEIGHT: 61 IN | HEART RATE: 70 BPM | SYSTOLIC BLOOD PRESSURE: 122 MMHG | DIASTOLIC BLOOD PRESSURE: 80 MMHG | RESPIRATION RATE: 14 BRPM | TEMPERATURE: 97.3 F | BODY MASS INDEX: 32.28 KG/M2 | OXYGEN SATURATION: 92 %

## 2020-01-01 VITALS
HEART RATE: 93 BPM | OXYGEN SATURATION: 93 % | HEIGHT: 61 IN | RESPIRATION RATE: 16 BRPM | BODY MASS INDEX: 33.42 KG/M2 | SYSTOLIC BLOOD PRESSURE: 138 MMHG | WEIGHT: 177 LBS | DIASTOLIC BLOOD PRESSURE: 73 MMHG

## 2020-01-01 VITALS
SYSTOLIC BLOOD PRESSURE: 132 MMHG | WEIGHT: 177 LBS | BODY MASS INDEX: 33.42 KG/M2 | HEIGHT: 61 IN | DIASTOLIC BLOOD PRESSURE: 62 MMHG

## 2020-01-01 VITALS
RESPIRATION RATE: 16 BRPM | TEMPERATURE: 98.5 F | SYSTOLIC BLOOD PRESSURE: 162 MMHG | BODY MASS INDEX: 34.36 KG/M2 | HEIGHT: 61 IN | WEIGHT: 182 LBS | DIASTOLIC BLOOD PRESSURE: 84 MMHG | HEART RATE: 86 BPM | OXYGEN SATURATION: 91 %

## 2020-01-01 VITALS
BODY MASS INDEX: 33.42 KG/M2 | HEART RATE: 86 BPM | SYSTOLIC BLOOD PRESSURE: 132 MMHG | HEIGHT: 61 IN | DIASTOLIC BLOOD PRESSURE: 74 MMHG | WEIGHT: 177 LBS | RESPIRATION RATE: 18 BRPM

## 2020-01-01 VITALS
OXYGEN SATURATION: 94 % | SYSTOLIC BLOOD PRESSURE: 140 MMHG | TEMPERATURE: 98.1 F | HEART RATE: 64 BPM | BODY MASS INDEX: 33.23 KG/M2 | HEIGHT: 61 IN | DIASTOLIC BLOOD PRESSURE: 78 MMHG | WEIGHT: 176 LBS | RESPIRATION RATE: 14 BRPM

## 2020-01-01 DIAGNOSIS — Z79.899 HIGH RISK MEDICATION USE: ICD-10-CM

## 2020-01-01 DIAGNOSIS — R20.8 OTHER DISTURBANCES OF SKIN SENSATION: ICD-10-CM

## 2020-01-01 DIAGNOSIS — E11.65 UNCONTROLLED TYPE 2 DIABETES MELLITUS WITH HYPERGLYCEMIA (HCC): ICD-10-CM

## 2020-01-01 DIAGNOSIS — Z01.419 PAP SMEAR, AS PART OF ROUTINE GYNECOLOGICAL EXAMINATION: ICD-10-CM

## 2020-01-01 DIAGNOSIS — E89.0 POSTOPERATIVE HYPOTHYROIDISM: ICD-10-CM

## 2020-01-01 LAB
ALBUMIN SERPL-MCNC: 2.6 G/DL (ref 3.5–4.6)
ALBUMIN SERPL-MCNC: 2.7 G/DL (ref 3.5–4.6)
ALBUMIN SERPL-MCNC: 2.8 G/DL (ref 3.5–4.6)
ALBUMIN SERPL-MCNC: 3 G/DL (ref 3.5–4.6)
ALBUMIN SERPL-MCNC: 3.5 G/DL (ref 3.5–4.6)
ALBUMIN SERPL-MCNC: 4.2 G/DL (ref 3.5–4.6)
ALBUMIN SERPL-MCNC: 4.6 G/DL (ref 3.5–4.6)
ALP BLD-CCNC: 58 U/L (ref 40–130)
ALP BLD-CCNC: 67 U/L (ref 40–130)
ALP BLD-CCNC: 67 U/L (ref 40–130)
ALP BLD-CCNC: 78 U/L (ref 40–130)
ALT SERPL-CCNC: 25 U/L (ref 0–33)
ALT SERPL-CCNC: 412 U/L (ref 0–33)
ALT SERPL-CCNC: 549 U/L (ref 0–33)
ALT SERPL-CCNC: 61 U/L (ref 0–33)
AMPHETAMINE SCREEN, URINE: ABNORMAL
AMPHETAMINE SCREEN, URINE: ABNORMAL
ANION GAP SERPL CALCULATED.3IONS-SCNC: 12 MEQ/L (ref 9–15)
ANION GAP SERPL CALCULATED.3IONS-SCNC: 12 MEQ/L (ref 9–15)
ANION GAP SERPL CALCULATED.3IONS-SCNC: 14 MEQ/L (ref 9–15)
ANION GAP SERPL CALCULATED.3IONS-SCNC: 14 MEQ/L (ref 9–15)
ANION GAP SERPL CALCULATED.3IONS-SCNC: 15 MEQ/L (ref 9–15)
ANION GAP SERPL CALCULATED.3IONS-SCNC: 16 MEQ/L (ref 9–15)
ANION GAP SERPL CALCULATED.3IONS-SCNC: 17 MEQ/L (ref 9–15)
ANION GAP SERPL CALCULATED.3IONS-SCNC: 19 MEQ/L (ref 9–15)
ANION GAP SERPL CALCULATED.3IONS-SCNC: 22 MEQ/L (ref 9–15)
ANION GAP SERPL CALCULATED.3IONS-SCNC: 23 MEQ/L (ref 9–15)
ANION GAP SERPL CALCULATED.3IONS-SCNC: 32 MEQ/L (ref 9–15)
ANISOCYTOSIS: ABNORMAL
APTT: 33.2 SEC (ref 24.4–36.8)
AST SERPL-CCNC: 16 U/L (ref 0–35)
AST SERPL-CCNC: 195 U/L (ref 0–35)
AST SERPL-CCNC: 75 U/L (ref 0–35)
AST SERPL-CCNC: 942 U/L (ref 0–35)
ATYPICAL LYMPHOCYTE RELATIVE PERCENT: 2 %
ATYPICAL LYMPHOCYTE RELATIVE PERCENT: 2 %
BACTERIA: ABNORMAL /HPF
BANDED NEUTROPHILS RELATIVE PERCENT: 3 % (ref 5–11)
BANDED NEUTROPHILS RELATIVE PERCENT: 35 % (ref 5–11)
BANDED NEUTROPHILS RELATIVE PERCENT: 6 % (ref 5–11)
BARBITURATE SCREEN URINE: ABNORMAL
BARBITURATE SCREEN URINE: ABNORMAL
BASE EXCESS ARTERIAL: -11 (ref -3–3)
BASE EXCESS ARTERIAL: -15 (ref -3–3)
BASE EXCESS ARTERIAL: -6 (ref -3–3)
BASE EXCESS ARTERIAL: -7 (ref -3–3)
BASE EXCESS ARTERIAL: -7 (ref -3–3)
BASE EXCESS ARTERIAL: -9 (ref -3–3)
BASOPHILS ABSOLUTE: 0 K/UL (ref 0–0.2)
BASOPHILS RELATIVE PERCENT: 0 %
BASOPHILS RELATIVE PERCENT: 0.1 %
BASOPHILS RELATIVE PERCENT: 0.3 %
BASOPHILS RELATIVE PERCENT: 0.5 %
BASOPHILS RELATIVE PERCENT: 0.8 %
BENZODIAZEPINE SCREEN, URINE: ABNORMAL
BENZODIAZEPINE SCREEN, URINE: ABNORMAL
BETA-HYDROXYBUTYRATE: 5.9 MG/DL (ref 0.2–2.8)
BILIRUB SERPL-MCNC: 0.3 MG/DL (ref 0.2–0.7)
BILIRUB SERPL-MCNC: 0.6 MG/DL (ref 0.2–0.7)
BILIRUB SERPL-MCNC: 0.7 MG/DL (ref 0.2–0.7)
BILIRUB SERPL-MCNC: <0.2 MG/DL (ref 0.2–0.7)
BILIRUBIN DIRECT: 0.5 MG/DL (ref 0–0.4)
BILIRUBIN DIRECT: <0.2 MG/DL (ref 0–0.4)
BILIRUBIN URINE: ABNORMAL
BILIRUBIN, INDIRECT: 0.2 MG/DL (ref 0–0.6)
BILIRUBIN, INDIRECT: NORMAL MG/DL (ref 0–0.6)
BLOOD CULTURE, ROUTINE: NORMAL
BLOOD, URINE: ABNORMAL
BUN BLDV-MCNC: 15 MG/DL (ref 8–23)
BUN BLDV-MCNC: 15 MG/DL (ref 8–23)
BUN BLDV-MCNC: 18 MG/DL (ref 8–23)
BUN BLDV-MCNC: 31 MG/DL (ref 8–23)
BUN BLDV-MCNC: 39 MG/DL (ref 8–23)
BUN BLDV-MCNC: 42 MG/DL (ref 8–23)
BUN BLDV-MCNC: 44 MG/DL (ref 8–23)
BUN BLDV-MCNC: 49 MG/DL (ref 8–23)
BUN BLDV-MCNC: 50 MG/DL (ref 8–23)
BUN BLDV-MCNC: 60 MG/DL (ref 8–23)
BUN BLDV-MCNC: 70 MG/DL (ref 8–23)
BUN BLDV-MCNC: 76 MG/DL (ref 8–23)
BUN BLDV-MCNC: 94 MG/DL (ref 8–23)
C-REACTIVE PROTEIN, HIGH SENSITIVITY: 92.8 MG/L (ref 0–5)
CALCIUM IONIZED: 0.86 MMOL/L (ref 1.12–1.32)
CALCIUM IONIZED: 0.93 MMOL/L (ref 1.12–1.32)
CALCIUM IONIZED: 0.98 MMOL/L (ref 1.12–1.32)
CALCIUM IONIZED: 1.13 MMOL/L (ref 1.12–1.32)
CALCIUM SERPL-MCNC: 10.9 MG/DL (ref 8.5–9.9)
CALCIUM SERPL-MCNC: 6.8 MG/DL (ref 8.5–9.9)
CALCIUM SERPL-MCNC: 6.8 MG/DL (ref 8.5–9.9)
CALCIUM SERPL-MCNC: 6.9 MG/DL (ref 8.5–9.9)
CALCIUM SERPL-MCNC: 7 MG/DL (ref 8.5–9.9)
CALCIUM SERPL-MCNC: 7.1 MG/DL (ref 8.5–9.9)
CALCIUM SERPL-MCNC: 7.3 MG/DL (ref 8.5–9.9)
CALCIUM SERPL-MCNC: 7.5 MG/DL (ref 8.5–9.9)
CALCIUM SERPL-MCNC: 8.3 MG/DL (ref 8.5–9.9)
CALCIUM SERPL-MCNC: 8.4 MG/DL (ref 8.5–9.9)
CALCIUM SERPL-MCNC: 9.3 MG/DL (ref 8.5–9.9)
CALCIUM SERPL-MCNC: 9.3 MG/DL (ref 8.5–9.9)
CALCIUM SERPL-MCNC: 9.6 MG/DL (ref 8.5–9.9)
CANNABINOID SCREEN URINE: ABNORMAL
CANNABINOID SCREEN URINE: ABNORMAL
CHLORIDE BLD-SCNC: 101 MEQ/L (ref 95–107)
CHLORIDE BLD-SCNC: 102 MEQ/L (ref 95–107)
CHLORIDE BLD-SCNC: 106 MEQ/L (ref 95–107)
CHLORIDE BLD-SCNC: 107 MEQ/L (ref 95–107)
CHLORIDE BLD-SCNC: 94 MEQ/L (ref 95–107)
CHLORIDE BLD-SCNC: 95 MEQ/L (ref 95–107)
CHLORIDE BLD-SCNC: 96 MEQ/L (ref 95–107)
CHLORIDE BLD-SCNC: 99 MEQ/L (ref 95–107)
CHOLESTEROL, TOTAL: 118 MG/DL (ref 0–199)
CHP ED QC CHECK: NORMAL
CLARITY: ABNORMAL
CO2: 10 MEQ/L (ref 20–31)
CO2: 14 MEQ/L (ref 20–31)
CO2: 19 MEQ/L (ref 20–31)
CO2: 19 MEQ/L (ref 20–31)
CO2: 20 MEQ/L (ref 20–31)
CO2: 21 MEQ/L (ref 20–31)
CO2: 21 MEQ/L (ref 20–31)
CO2: 22 MEQ/L (ref 20–31)
CO2: 27 MEQ/L (ref 20–31)
CO2: 28 MEQ/L (ref 20–31)
CO2: 28 MEQ/L (ref 20–31)
COCAINE METABOLITE SCREEN URINE: ABNORMAL
COCAINE METABOLITE SCREEN URINE: ABNORMAL
COLOR: ABNORMAL
CREAT SERPL-MCNC: 0.73 MG/DL (ref 0.5–0.9)
CREAT SERPL-MCNC: 0.74 MG/DL (ref 0.5–0.9)
CREAT SERPL-MCNC: 0.78 MG/DL (ref 0.5–0.9)
CREAT SERPL-MCNC: 4.19 MG/DL (ref 0.5–0.9)
CREAT SERPL-MCNC: 4.32 MG/DL (ref 0.5–0.9)
CREAT SERPL-MCNC: 4.35 MG/DL (ref 0.5–0.9)
CREAT SERPL-MCNC: 4.9 MG/DL (ref 0.5–0.9)
CREAT SERPL-MCNC: 5.07 MG/DL (ref 0.5–0.9)
CREAT SERPL-MCNC: 5.55 MG/DL (ref 0.5–0.9)
CREAT SERPL-MCNC: 5.6 MG/DL (ref 0.5–0.9)
CREAT SERPL-MCNC: 6.15 MG/DL (ref 0.5–0.9)
CREAT SERPL-MCNC: 6.89 MG/DL (ref 0.5–0.9)
CREAT SERPL-MCNC: 8.09 MG/DL (ref 0.5–0.9)
DIGOXIN LEVEL: 1 NG/ML (ref 0.8–2)
EKG ATRIAL RATE: 102 BPM
EKG ATRIAL RATE: 122 BPM
EKG ATRIAL RATE: 63 BPM
EKG P AXIS: 59 DEGREES
EKG P AXIS: 74 DEGREES
EKG P AXIS: 88 DEGREES
EKG P-R INTERVAL: 154 MS
EKG P-R INTERVAL: 174 MS
EKG P-R INTERVAL: 176 MS
EKG Q-T INTERVAL: 356 MS
EKG Q-T INTERVAL: 380 MS
EKG Q-T INTERVAL: 404 MS
EKG QRS DURATION: 100 MS
EKG QRS DURATION: 84 MS
EKG QRS DURATION: 94 MS
EKG QTC CALCULATION (BAZETT): 388 MS
EKG QTC CALCULATION (BAZETT): 463 MS
EKG QTC CALCULATION (BAZETT): 575 MS
EKG R AXIS: -10 DEGREES
EKG R AXIS: -10 DEGREES
EKG R AXIS: -45 DEGREES
EKG T AXIS: 215 DEGREES
EKG T AXIS: 217 DEGREES
EKG T AXIS: 220 DEGREES
EKG VENTRICULAR RATE: 102 BPM
EKG VENTRICULAR RATE: 122 BPM
EKG VENTRICULAR RATE: 63 BPM
EOSINOPHILS ABSOLUTE: 0 K/UL (ref 0–0.7)
EOSINOPHILS RELATIVE PERCENT: 0 %
EOSINOPHILS RELATIVE PERCENT: 0.1 %
EOSINOPHILS RELATIVE PERCENT: 0.1 %
EPITHELIAL CELLS, UA: ABNORMAL /HPF
ETHANOL PERCENT: NORMAL G/DL
ETHANOL: <10 MG/DL (ref 0–0.08)
GFR AFRICAN AMERICAN: 10
GFR AFRICAN AMERICAN: 10.2
GFR AFRICAN AMERICAN: 10.6
GFR AFRICAN AMERICAN: 12.1
GFR AFRICAN AMERICAN: 12.2
GFR AFRICAN AMERICAN: 12.7
GFR AFRICAN AMERICAN: 13
GFR AFRICAN AMERICAN: 5.9
GFR AFRICAN AMERICAN: 7.1
GFR AFRICAN AMERICAN: 8
GFR AFRICAN AMERICAN: 8.1
GFR AFRICAN AMERICAN: 9
GFR AFRICAN AMERICAN: 9.1
GFR AFRICAN AMERICAN: 9.2
GFR AFRICAN AMERICAN: >60
GFR NON-AFRICAN AMERICAN: 10
GFR NON-AFRICAN AMERICAN: 10
GFR NON-AFRICAN AMERICAN: 10.1
GFR NON-AFRICAN AMERICAN: 10.5
GFR NON-AFRICAN AMERICAN: 4.9
GFR NON-AFRICAN AMERICAN: 5.9
GFR NON-AFRICAN AMERICAN: 6
GFR NON-AFRICAN AMERICAN: 6.7
GFR NON-AFRICAN AMERICAN: 7
GFR NON-AFRICAN AMERICAN: 7.5
GFR NON-AFRICAN AMERICAN: 7.6
GFR NON-AFRICAN AMERICAN: 8
GFR NON-AFRICAN AMERICAN: 8.4
GFR NON-AFRICAN AMERICAN: 8.7
GFR NON-AFRICAN AMERICAN: >60
GLOBULIN: 2.7 G/DL (ref 2.3–3.5)
GLOBULIN: 3.7 G/DL (ref 2.3–3.5)
GLOBULIN: 3.9 G/DL (ref 2.3–3.5)
GLUCOSE BLD-MCNC: 122 MG/DL (ref 70–99)
GLUCOSE BLD-MCNC: 130 MG/DL (ref 60–115)
GLUCOSE BLD-MCNC: 137 MG/DL (ref 60–115)
GLUCOSE BLD-MCNC: 140 MG/DL (ref 60–115)
GLUCOSE BLD-MCNC: 140 MG/DL (ref 60–115)
GLUCOSE BLD-MCNC: 146 MG/DL (ref 60–115)
GLUCOSE BLD-MCNC: 148 MG/DL (ref 60–115)
GLUCOSE BLD-MCNC: 150 MG/DL (ref 60–115)
GLUCOSE BLD-MCNC: 150 MG/DL (ref 60–115)
GLUCOSE BLD-MCNC: 152 MG/DL (ref 60–115)
GLUCOSE BLD-MCNC: 152 MG/DL (ref 60–115)
GLUCOSE BLD-MCNC: 155 MG/DL (ref 60–115)
GLUCOSE BLD-MCNC: 158 MG/DL (ref 70–99)
GLUCOSE BLD-MCNC: 159 MG/DL (ref 60–115)
GLUCOSE BLD-MCNC: 159 MG/DL (ref 70–99)
GLUCOSE BLD-MCNC: 160 MG/DL (ref 70–99)
GLUCOSE BLD-MCNC: 162 MG/DL (ref 70–99)
GLUCOSE BLD-MCNC: 178 MG/DL (ref 60–115)
GLUCOSE BLD-MCNC: 181 MG/DL (ref 60–115)
GLUCOSE BLD-MCNC: 181 MG/DL (ref 60–115)
GLUCOSE BLD-MCNC: 182 MG/DL (ref 60–115)
GLUCOSE BLD-MCNC: 186 MG/DL (ref 60–115)
GLUCOSE BLD-MCNC: 190 MG/DL (ref 60–115)
GLUCOSE BLD-MCNC: 190 MG/DL (ref 70–99)
GLUCOSE BLD-MCNC: 202 MG/DL (ref 60–115)
GLUCOSE BLD-MCNC: 203 MG/DL (ref 60–115)
GLUCOSE BLD-MCNC: 208 MG/DL (ref 60–115)
GLUCOSE BLD-MCNC: 210 MG/DL (ref 70–99)
GLUCOSE BLD-MCNC: 211 MG/DL (ref 70–99)
GLUCOSE BLD-MCNC: 219 MG/DL (ref 60–115)
GLUCOSE BLD-MCNC: 221 MG/DL (ref 60–115)
GLUCOSE BLD-MCNC: 223 MG/DL (ref 60–115)
GLUCOSE BLD-MCNC: 229 MG/DL (ref 70–99)
GLUCOSE BLD-MCNC: 235 MG/DL (ref 60–115)
GLUCOSE BLD-MCNC: 235 MG/DL (ref 70–99)
GLUCOSE BLD-MCNC: 238 MG/DL (ref 60–115)
GLUCOSE BLD-MCNC: 256 MG/DL (ref 60–115)
GLUCOSE BLD-MCNC: 267 MG/DL (ref 70–99)
GLUCOSE BLD-MCNC: 280 MG/DL (ref 60–115)
GLUCOSE BLD-MCNC: 283 MG/DL (ref 60–115)
GLUCOSE BLD-MCNC: 297 MG/DL (ref 60–115)
GLUCOSE BLD-MCNC: 301 MG/DL (ref 60–115)
GLUCOSE BLD-MCNC: 303 MG/DL
GLUCOSE BLD-MCNC: 310 MG/DL (ref 60–115)
GLUCOSE BLD-MCNC: 316 MG/DL (ref 60–115)
GLUCOSE BLD-MCNC: 324 MG/DL (ref 70–99)
GLUCOSE BLD-MCNC: 356 MG/DL (ref 60–115)
GLUCOSE BLD-MCNC: 361 MG/DL (ref 60–115)
GLUCOSE BLD-MCNC: 370 MG/DL (ref 60–115)
GLUCOSE BLD-MCNC: 380 MG/DL (ref 60–115)
GLUCOSE BLD-MCNC: 500 MG/DL (ref 70–99)
GLUCOSE BLD-MCNC: 506 MG/DL (ref 60–115)
GLUCOSE BLD-MCNC: 549 MG/DL (ref 60–115)
GLUCOSE BLD-MCNC: 77 MG/DL
GLUCOSE BLD-MCNC: 98 MG/DL
GLUCOSE URINE: 100 MG/DL
HBA1C MFR BLD: 9.1 % (ref 4.8–5.9)
HBA1C MFR BLD: 9.5 % (ref 4.8–5.9)
HBV SURFACE AB TITR SER: NORMAL MIU/ML
HCO3 ARTERIAL: 14.7 MMOL/L (ref 21–29)
HCO3 ARTERIAL: 16.3 MMOL/L (ref 21–29)
HCO3 ARTERIAL: 16.5 MMOL/L (ref 21–29)
HCO3 ARTERIAL: 19 MMOL/L (ref 21–29)
HCO3 ARTERIAL: 21 MMOL/L (ref 21–29)
HCO3 ARTERIAL: 22.4 MMOL/L (ref 21–29)
HCT VFR BLD CALC: 37 % (ref 37–47)
HCT VFR BLD CALC: 37.7 % (ref 37–47)
HCT VFR BLD CALC: 40.7 % (ref 37–47)
HCT VFR BLD CALC: 43.6 % (ref 37–47)
HCT VFR BLD CALC: 45.5 % (ref 37–47)
HCT VFR BLD CALC: 59.2 % (ref 37–47)
HDLC SERPL-MCNC: 44 MG/DL (ref 40–59)
HEMATOLOGY PATH CONSULT: NORMAL
HEMATOLOGY PATH CONSULT: NORMAL
HEMATOLOGY PATH CONSULT: YES
HEMATOLOGY PATH CONSULT: YES
HEMOGLOBIN: 12.3 G/DL (ref 12–16)
HEMOGLOBIN: 12.6 G/DL (ref 12–16)
HEMOGLOBIN: 12.7 GM/DL (ref 12–16)
HEMOGLOBIN: 13.6 G/DL (ref 12–16)
HEMOGLOBIN: 13.8 GM/DL (ref 12–16)
HEMOGLOBIN: 14.1 GM/DL (ref 12–16)
HEMOGLOBIN: 14.4 G/DL (ref 12–16)
HEMOGLOBIN: 14.5 G/DL (ref 12–16)
HEMOGLOBIN: 17.1 GM/DL (ref 12–16)
HEMOGLOBIN: 17.6 GM/DL (ref 12–16)
HEMOGLOBIN: 18.5 GM/DL (ref 12–16)
HEMOGLOBIN: 19.1 G/DL (ref 12–16)
HEPATITIS B CORE IGM ANTIBODY: NORMAL
HEPATITIS B SURFACE ANTIGEN INTERPRETATION: NORMAL
HYPERSEGMENTED NEUTROPHILS: ABNORMAL
INR BLD: 1.2
KETONES, URINE: ABNORMAL MG/DL
L. PNEUMOPHILA SEROGP 1 UR AG: NEGATIVE
LACTATE: 0.81 MMOL/L (ref 0.4–2)
LACTATE: 0.98 MMOL/L (ref 0.4–2)
LACTATE: 1.03 MMOL/L (ref 0.4–2)
LACTATE: 1.37 MMOL/L (ref 0.4–2)
LACTATE: 2.27 MMOL/L (ref 0.4–2)
LACTATE: 3.31 MMOL/L (ref 0.4–2)
LACTIC ACID: 3.2 MMOL/L (ref 0.5–2.2)
LACTIC ACID: 7.6 MMOL/L (ref 0.5–2.2)
LDL CHOLESTEROL CALCULATED: 45 MG/DL (ref 0–129)
LEUKOCYTE ESTERASE, URINE: ABNORMAL
LIPASE: 9 U/L (ref 12–95)
LV EF: 55 %
LV EF: 60 %
LVEF MODALITY: NORMAL
LVEF MODALITY: NORMAL
LYMPHOCYTES ABSOLUTE: 0.4 K/UL (ref 1–4.8)
LYMPHOCYTES ABSOLUTE: 1.1 K/UL (ref 1–4.8)
LYMPHOCYTES ABSOLUTE: 1.6 K/UL (ref 1–4.8)
LYMPHOCYTES ABSOLUTE: 3.3 K/UL (ref 1–4.8)
LYMPHOCYTES ABSOLUTE: 3.8 K/UL (ref 1–4.8)
LYMPHOCYTES RELATIVE PERCENT: 10.5 %
LYMPHOCYTES RELATIVE PERCENT: 13 %
LYMPHOCYTES RELATIVE PERCENT: 16 %
LYMPHOCYTES RELATIVE PERCENT: 3.3 %
LYMPHOCYTES RELATIVE PERCENT: 5 %
Lab: ABNORMAL
Lab: ABNORMAL
MAGNESIUM: 1.7 MG/DL (ref 1.7–2.4)
MAGNESIUM: 1.8 MG/DL (ref 1.7–2.4)
MAGNESIUM: 1.8 MG/DL (ref 1.7–2.4)
MAGNESIUM: 1.9 MG/DL (ref 1.7–2.4)
MAGNESIUM: 2 MG/DL (ref 1.7–2.4)
MAGNESIUM: 2.3 MG/DL (ref 1.7–2.4)
MAGNESIUM: 2.3 MG/DL (ref 1.7–2.4)
MAGNESIUM: 2.4 MG/DL (ref 1.7–2.4)
MCH RBC QN AUTO: 28.7 PG (ref 27–31.3)
MCH RBC QN AUTO: 28.7 PG (ref 27–31.3)
MCH RBC QN AUTO: 28.8 PG (ref 27–31.3)
MCH RBC QN AUTO: 29.1 PG (ref 27–31.3)
MCH RBC QN AUTO: 29.5 PG (ref 27–31.3)
MCH RBC QN AUTO: 29.6 PG (ref 27–31.3)
MCHC RBC AUTO-ENTMCNC: 31.7 % (ref 33–37)
MCHC RBC AUTO-ENTMCNC: 32.2 % (ref 33–37)
MCHC RBC AUTO-ENTMCNC: 33.2 % (ref 33–37)
MCHC RBC AUTO-ENTMCNC: 33.3 % (ref 33–37)
MCHC RBC AUTO-ENTMCNC: 33.5 % (ref 33–37)
MCHC RBC AUTO-ENTMCNC: 33.5 % (ref 33–37)
MCV RBC AUTO: 86.2 FL (ref 82–100)
MCV RBC AUTO: 86.3 FL (ref 82–100)
MCV RBC AUTO: 88.2 FL (ref 82–100)
MCV RBC AUTO: 88.9 FL (ref 82–100)
MCV RBC AUTO: 90.3 FL (ref 82–100)
MCV RBC AUTO: 90.7 FL (ref 82–100)
METAMYELOCYTES RELATIVE PERCENT: 1 %
METAMYELOCYTES RELATIVE PERCENT: 4 %
METHADONE SCREEN, URINE: ABNORMAL
METHADONE SCREEN, URINE: ABNORMAL
MICROCYTES: ABNORMAL
MONOCYTES ABSOLUTE: 0.5 K/UL (ref 0.2–0.8)
MONOCYTES ABSOLUTE: 0.9 K/UL (ref 0.2–0.8)
MONOCYTES ABSOLUTE: 1.2 K/UL (ref 0.2–0.8)
MONOCYTES ABSOLUTE: 1.7 K/UL (ref 0.2–0.8)
MONOCYTES ABSOLUTE: 2.7 K/UL (ref 0.2–0.8)
MONOCYTES RELATIVE PERCENT: 10.7 %
MONOCYTES RELATIVE PERCENT: 13 %
MONOCYTES RELATIVE PERCENT: 2.7 %
MONOCYTES RELATIVE PERCENT: 4.8 %
MONOCYTES RELATIVE PERCENT: 7.2 %
MYELOCYTE PERCENT: 1 %
MYELOCYTE PERCENT: 3 %
NEUTROPHILS ABSOLUTE: 11.1 K/UL (ref 1.4–6.5)
NEUTROPHILS ABSOLUTE: 12.3 K/UL (ref 1.4–6.5)
NEUTROPHILS ABSOLUTE: 14.5 K/UL (ref 1.4–6.5)
NEUTROPHILS ABSOLUTE: 14.7 K/UL (ref 1.4–6.5)
NEUTROPHILS ABSOLUTE: 18.8 K/UL (ref 1.4–6.5)
NEUTROPHILS RELATIVE PERCENT: 34 %
NEUTROPHILS RELATIVE PERCENT: 70 %
NEUTROPHILS RELATIVE PERCENT: 78.6 %
NEUTROPHILS RELATIVE PERCENT: 84 %
NEUTROPHILS RELATIVE PERCENT: 89.2 %
NITRITE, URINE: NEGATIVE
NUCLEATED RED BLOOD CELLS: 1 /100 WBC
O2 SAT, ARTERIAL: 100 % (ref 93–100)
O2 SAT, ARTERIAL: 100 % (ref 93–100)
O2 SAT, ARTERIAL: 47 % (ref 93–100)
O2 SAT, ARTERIAL: 93 % (ref 93–100)
O2 SAT, ARTERIAL: 95 % (ref 93–100)
O2 SAT, ARTERIAL: 96 % (ref 93–100)
OPIATE SCREEN URINE: ABNORMAL
OPIATE SCREEN URINE: ABNORMAL
OXYCODONE URINE: POSITIVE
OXYCODONE URINE: POSITIVE
PCO2 ARTERIAL: 30 MM HG (ref 35–45)
PCO2 ARTERIAL: 37 MM HG (ref 35–45)
PCO2 ARTERIAL: 42 MM HG (ref 35–45)
PCO2 ARTERIAL: 47 MM HG (ref 35–45)
PCO2 ARTERIAL: 50 MM HG (ref 35–45)
PCO2 ARTERIAL: 69 MM HG (ref 35–45)
PDW BLD-RTO: 13.6 % (ref 11.5–14.5)
PDW BLD-RTO: 14.2 % (ref 11.5–14.5)
PDW BLD-RTO: 14.3 % (ref 11.5–14.5)
PDW BLD-RTO: 14.4 % (ref 11.5–14.5)
PDW BLD-RTO: 14.5 % (ref 11.5–14.5)
PDW BLD-RTO: 14.6 % (ref 11.5–14.5)
PERFORMED ON: ABNORMAL
PH ARTERIAL: 7.08 (ref 7.35–7.45)
PH ARTERIAL: 7.12 (ref 7.35–7.45)
PH ARTERIAL: 7.21 (ref 7.35–7.45)
PH ARTERIAL: 7.26 (ref 7.35–7.45)
PH ARTERIAL: 7.32 (ref 7.35–7.45)
PH ARTERIAL: 7.34 (ref 7.35–7.45)
PH UA: 5 (ref 5–9)
PHENCYCLIDINE SCREEN URINE: ABNORMAL
PHENCYCLIDINE SCREEN URINE: ABNORMAL
PHOSPHORUS: 4.1 MG/DL (ref 2.3–4.8)
PHOSPHORUS: 4.2 MG/DL (ref 2.3–4.8)
PHOSPHORUS: 4.3 MG/DL (ref 2.3–4.8)
PHOSPHORUS: 4.5 MG/DL (ref 2.3–4.8)
PHOSPHORUS: 5.1 MG/DL (ref 2.3–4.8)
PHOSPHORUS: 5.6 MG/DL (ref 2.3–4.8)
PHOSPHORUS: 5.6 MG/DL (ref 2.3–4.8)
PHOSPHORUS: 5.9 MG/DL (ref 2.3–4.8)
PHOSPHORUS: 7.4 MG/DL (ref 2.3–4.8)
PHOSPHORUS: 8.6 MG/DL (ref 2.3–4.8)
PLATELET # BLD: 133 K/UL (ref 130–400)
PLATELET # BLD: 161 K/UL (ref 130–400)
PLATELET # BLD: 204 K/UL (ref 130–400)
PLATELET # BLD: 208 K/UL (ref 130–400)
PLATELET # BLD: 273 K/UL (ref 130–400)
PLATELET # BLD: 385 K/UL (ref 130–400)
PLATELET SLIDE REVIEW: ADEQUATE
PO2 ARTERIAL: 330 MM HG (ref 75–108)
PO2 ARTERIAL: 35 MM HG (ref 75–108)
PO2 ARTERIAL: 397 MM HG (ref 75–108)
PO2 ARTERIAL: 79 MM HG (ref 75–108)
PO2 ARTERIAL: 81 MM HG (ref 75–108)
PO2 ARTERIAL: 84 MM HG (ref 75–108)
POC CHLORIDE: 101 MEQ/L (ref 99–110)
POC CHLORIDE: 101 MEQ/L (ref 99–110)
POC CHLORIDE: 104 MEQ/L (ref 99–110)
POC CHLORIDE: 109 MEQ/L (ref 99–110)
POC CHLORIDE: 112 MEQ/L (ref 99–110)
POC CHLORIDE: 114 MEQ/L (ref 99–110)
POC CREATININE: 4.2 MG/DL (ref 0.6–1.2)
POC CREATININE: 5.3 MG/DL (ref 0.6–1.2)
POC CREATININE: 5.9 MG/DL (ref 0.6–1.2)
POC CREATININE: 6 MG/DL (ref 0.6–1.2)
POC CREATININE: 6.3 MG/DL (ref 0.6–1.2)
POC CREATININE: 6.4 MG/DL (ref 0.6–1.2)
POC FIO2: 100
POC FIO2: 40
POC FIO2: 40
POC FIO2: 50
POC HEMATOCRIT: 37 % (ref 36–48)
POC HEMATOCRIT: 41 % (ref 36–48)
POC HEMATOCRIT: 41 % (ref 36–48)
POC HEMATOCRIT: 50 % (ref 36–48)
POC HEMATOCRIT: 52 % (ref 36–48)
POC HEMATOCRIT: 54 % (ref 36–48)
POC POTASSIUM: 3.1 MEQ/L (ref 3.5–5.1)
POC POTASSIUM: 3.1 MEQ/L (ref 3.5–5.1)
POC POTASSIUM: 3.8 MEQ/L (ref 3.5–5.1)
POC POTASSIUM: 4 MEQ/L (ref 3.5–5.1)
POC POTASSIUM: 4.1 MEQ/L (ref 3.5–5.1)
POC POTASSIUM: 4.5 MEQ/L (ref 3.5–5.1)
POC SAMPLE TYPE: ABNORMAL
POC SODIUM: 130 MEQ/L (ref 136–145)
POC SODIUM: 131 MEQ/L (ref 136–145)
POC SODIUM: 132 MEQ/L (ref 136–145)
POC SODIUM: 135 MEQ/L (ref 136–145)
POC SODIUM: 140 MEQ/L (ref 136–145)
POC SODIUM: 140 MEQ/L (ref 136–145)
POLYCHROMASIA: ABNORMAL
POTASSIUM REFLEX MAGNESIUM: 4 MEQ/L (ref 3.4–4.9)
POTASSIUM SERPL-SCNC: 3.2 MEQ/L (ref 3.4–4.9)
POTASSIUM SERPL-SCNC: 3.4 MEQ/L (ref 3.4–4.9)
POTASSIUM SERPL-SCNC: 3.7 MEQ/L (ref 3.4–4.9)
POTASSIUM SERPL-SCNC: 3.7 MEQ/L (ref 3.4–4.9)
POTASSIUM SERPL-SCNC: 3.8 MEQ/L (ref 3.4–4.9)
POTASSIUM SERPL-SCNC: 4 MEQ/L (ref 3.4–4.9)
POTASSIUM SERPL-SCNC: 4 MEQ/L (ref 3.4–4.9)
POTASSIUM SERPL-SCNC: 4.1 MEQ/L (ref 3.4–4.9)
POTASSIUM SERPL-SCNC: 4.2 MEQ/L (ref 3.4–4.9)
POTASSIUM SERPL-SCNC: 4.3 MEQ/L (ref 3.4–4.9)
POTASSIUM SERPL-SCNC: 4.3 MEQ/L (ref 3.4–4.9)
POTASSIUM SERPL-SCNC: 5.7 MEQ/L (ref 3.4–4.9)
PROCALCITONIN: 91.52 NG/ML (ref 0–0.15)
PROLYMPHOCYTES: 1 %
PROPOXYPHENE SCREEN: ABNORMAL
PROPOXYPHENE SCREEN: ABNORMAL
PROTEIN UA: >=300 MG/DL
PROTHROMBIN TIME: 15 SEC (ref 12.3–14.9)
RBC # BLD: 4.29 M/UL (ref 4.2–5.4)
RBC # BLD: 4.38 M/UL (ref 4.2–5.4)
RBC # BLD: 4.62 M/UL (ref 4.2–5.4)
RBC # BLD: 4.9 M/UL (ref 4.2–5.4)
RBC # BLD: 5.02 M/UL (ref 4.2–5.4)
RBC # BLD: 6.56 M/UL (ref 4.2–5.4)
RBC UA: ABNORMAL /HPF (ref 0–2)
SARS-COV-2, NAAT: NOT DETECTED
SARS-COV-2, NAAT: NOT DETECTED
SEDIMENTATION RATE, ERYTHROCYTE: 62 MM (ref 0–30)
SMUDGE CELLS: 1
SODIUM BLD-SCNC: 128 MEQ/L (ref 135–144)
SODIUM BLD-SCNC: 130 MEQ/L (ref 135–144)
SODIUM BLD-SCNC: 130 MEQ/L (ref 135–144)
SODIUM BLD-SCNC: 133 MEQ/L (ref 135–144)
SODIUM BLD-SCNC: 135 MEQ/L (ref 135–144)
SODIUM BLD-SCNC: 136 MEQ/L (ref 135–144)
SODIUM BLD-SCNC: 138 MEQ/L (ref 135–144)
SODIUM BLD-SCNC: 138 MEQ/L (ref 135–144)
SODIUM BLD-SCNC: 140 MEQ/L (ref 135–144)
SODIUM BLD-SCNC: 140 MEQ/L (ref 135–144)
SODIUM BLD-SCNC: 142 MEQ/L (ref 135–144)
SODIUM BLD-SCNC: 146 MEQ/L (ref 135–144)
SODIUM BLD-SCNC: 147 MEQ/L (ref 135–144)
SPECIFIC GRAVITY UA: 1.02 (ref 1–1.03)
T4 FREE: 1.4 NG/DL (ref 0.84–1.68)
T4 FREE: 1.49 NG/DL (ref 0.84–1.68)
TCO2 ARTERIAL: 16 (ref 22–29)
TCO2 ARTERIAL: 17 (ref 22–29)
TCO2 ARTERIAL: 18 (ref 22–29)
TCO2 ARTERIAL: 20 (ref 22–29)
TCO2 ARTERIAL: 23 (ref 22–29)
TCO2 ARTERIAL: 25 (ref 22–29)
TEAR DROP CELLS: ABNORMAL
TOTAL PROTEIN: 5.7 G/DL (ref 6.3–8)
TOTAL PROTEIN: 7.1 G/DL (ref 6.3–8)
TOTAL PROTEIN: 7.2 G/DL (ref 6.3–8)
TOTAL PROTEIN: 8.5 G/DL (ref 6.3–8)
TRIGL SERPL-MCNC: 144 MG/DL (ref 0–150)
TROPONIN: 0.18 NG/ML (ref 0–0.01)
TROPONIN: 0.25 NG/ML (ref 0–0.01)
TROPONIN: 0.29 NG/ML (ref 0–0.01)
TROPONIN: 0.3 NG/ML (ref 0–0.01)
TROPONIN: 1.17 NG/ML (ref 0–0.01)
TSH SERPL DL<=0.05 MIU/L-ACNC: 3.16 UIU/ML (ref 0.44–3.86)
TSH SERPL DL<=0.05 MIU/L-ACNC: 3.58 UIU/ML (ref 0.44–3.86)
TSH SERPL DL<=0.05 MIU/L-ACNC: 9.47 UIU/ML (ref 0.44–3.86)
UROBILINOGEN, URINE: 1 E.U./DL
VANCOMYCIN RANDOM: 45.2 UG/ML (ref 10–40)
VANCOMYCIN TROUGH: 7.8 UG/ML (ref 10–20)
WBC # BLD: 12.4 K/UL (ref 4.8–10.8)
WBC # BLD: 15.6 K/UL (ref 4.8–10.8)
WBC # BLD: 16.1 K/UL (ref 4.8–10.8)
WBC # BLD: 20.7 K/UL (ref 4.8–10.8)
WBC # BLD: 23.5 K/UL (ref 4.8–10.8)
WBC # BLD: 9.7 K/UL (ref 4.8–10.8)
WBC UA: ABNORMAL /HPF (ref 0–5)

## 2020-01-01 PROCEDURE — 2580000003 HC RX 258: Performed by: INTERNAL MEDICINE

## 2020-01-01 PROCEDURE — 96372 THER/PROPH/DIAG INJ SC/IM: CPT | Performed by: PHYSICAL MEDICINE & REHABILITATION

## 2020-01-01 PROCEDURE — 02HV33Z INSERTION OF INFUSION DEVICE INTO SUPERIOR VENA CAVA, PERCUTANEOUS APPROACH: ICD-10-PCS | Performed by: INTERNAL MEDICINE

## 2020-01-01 PROCEDURE — 73630 X-RAY EXAM OF FOOT: CPT

## 2020-01-01 PROCEDURE — 82330 ASSAY OF CALCIUM: CPT

## 2020-01-01 PROCEDURE — 36415 COLL VENOUS BLD VENIPUNCTURE: CPT

## 2020-01-01 PROCEDURE — 84132 ASSAY OF SERUM POTASSIUM: CPT

## 2020-01-01 PROCEDURE — 80202 ASSAY OF VANCOMYCIN: CPT

## 2020-01-01 PROCEDURE — 99291 CRITICAL CARE FIRST HOUR: CPT | Performed by: INTERNAL MEDICINE

## 2020-01-01 PROCEDURE — 2700000000 HC OXYGEN THERAPY PER DAY

## 2020-01-01 PROCEDURE — 87040 BLOOD CULTURE FOR BACTERIA: CPT

## 2020-01-01 PROCEDURE — 82435 ASSAY OF BLOOD CHLORIDE: CPT

## 2020-01-01 PROCEDURE — 36600 WITHDRAWAL OF ARTERIAL BLOOD: CPT

## 2020-01-01 PROCEDURE — 87186 SC STD MICRODIL/AGAR DIL: CPT

## 2020-01-01 PROCEDURE — 84443 ASSAY THYROID STIM HORMONE: CPT

## 2020-01-01 PROCEDURE — 95886 MUSC TEST DONE W/N TEST COMP: CPT | Performed by: PHYSICAL MEDICINE & REHABILITATION

## 2020-01-01 PROCEDURE — 64445 NJX AA&/STRD SCIATIC NRV IMG: CPT | Performed by: PHYSICAL MEDICINE & REHABILITATION

## 2020-01-01 PROCEDURE — 80053 COMPREHEN METABOLIC PANEL: CPT

## 2020-01-01 PROCEDURE — 85014 HEMATOCRIT: CPT

## 2020-01-01 PROCEDURE — 99214 OFFICE O/P EST MOD 30 MIN: CPT | Performed by: INTERNAL MEDICINE

## 2020-01-01 PROCEDURE — 2580000003 HC RX 258: Performed by: EMERGENCY MEDICINE

## 2020-01-01 PROCEDURE — 2500000003 HC RX 250 WO HCPCS: Performed by: INTERNAL MEDICINE

## 2020-01-01 PROCEDURE — 82565 ASSAY OF CREATININE: CPT

## 2020-01-01 PROCEDURE — 89220 SPUTUM SPECIMEN COLLECTION: CPT

## 2020-01-01 PROCEDURE — 6360000002 HC RX W HCPCS

## 2020-01-01 PROCEDURE — 84100 ASSAY OF PHOSPHORUS: CPT

## 2020-01-01 PROCEDURE — 99213 OFFICE O/P EST LOW 20 MIN: CPT | Performed by: FAMILY MEDICINE

## 2020-01-01 PROCEDURE — 2000000000 HC ICU R&B

## 2020-01-01 PROCEDURE — 82948 REAGENT STRIP/BLOOD GLUCOSE: CPT

## 2020-01-01 PROCEDURE — 82803 BLOOD GASES ANY COMBINATION: CPT

## 2020-01-01 PROCEDURE — 93005 ELECTROCARDIOGRAM TRACING: CPT | Performed by: INTERNAL MEDICINE

## 2020-01-01 PROCEDURE — 99397 PER PM REEVAL EST PAT 65+ YR: CPT | Performed by: NURSE PRACTITIONER

## 2020-01-01 PROCEDURE — 84484 ASSAY OF TROPONIN QUANT: CPT

## 2020-01-01 PROCEDURE — 90935 HEMODIALYSIS ONE EVALUATION: CPT

## 2020-01-01 PROCEDURE — 80069 RENAL FUNCTION PANEL: CPT

## 2020-01-01 PROCEDURE — 87070 CULTURE OTHR SPECIMN AEROBIC: CPT

## 2020-01-01 PROCEDURE — 76937 US GUIDE VASCULAR ACCESS: CPT | Performed by: INTERNAL MEDICINE

## 2020-01-01 PROCEDURE — 94003 VENT MGMT INPAT SUBQ DAY: CPT

## 2020-01-01 PROCEDURE — 6360000002 HC RX W HCPCS: Performed by: INTERNAL MEDICINE

## 2020-01-01 PROCEDURE — 96374 THER/PROPH/DIAG INJ IV PUSH: CPT

## 2020-01-01 PROCEDURE — 94640 AIRWAY INHALATION TREATMENT: CPT

## 2020-01-01 PROCEDURE — 6370000000 HC RX 637 (ALT 250 FOR IP): Performed by: INTERNAL MEDICINE

## 2020-01-01 PROCEDURE — 71045 X-RAY EXAM CHEST 1 VIEW: CPT

## 2020-01-01 PROCEDURE — 76942 ECHO GUIDE FOR BIOPSY: CPT | Performed by: PHYSICAL MEDICINE & REHABILITATION

## 2020-01-01 PROCEDURE — 36592 COLLECT BLOOD FROM PICC: CPT

## 2020-01-01 PROCEDURE — 74176 CT ABD & PELVIS W/O CONTRAST: CPT

## 2020-01-01 PROCEDURE — 83735 ASSAY OF MAGNESIUM: CPT

## 2020-01-01 PROCEDURE — 5A1D70Z PERFORMANCE OF URINARY FILTRATION, INTERMITTENT, LESS THAN 6 HOURS PER DAY: ICD-10-PCS | Performed by: INTERNAL MEDICINE

## 2020-01-01 PROCEDURE — 86705 HEP B CORE ANTIBODY IGM: CPT

## 2020-01-01 PROCEDURE — 83605 ASSAY OF LACTIC ACID: CPT

## 2020-01-01 PROCEDURE — 85610 PROTHROMBIN TIME: CPT

## 2020-01-01 PROCEDURE — 99215 OFFICE O/P EST HI 40 MIN: CPT | Performed by: INTERNAL MEDICINE

## 2020-01-01 PROCEDURE — 99213 OFFICE O/P EST LOW 20 MIN: CPT | Performed by: INTERNAL MEDICINE

## 2020-01-01 PROCEDURE — 77067 SCR MAMMO BI INCL CAD: CPT

## 2020-01-01 PROCEDURE — 82962 GLUCOSE BLOOD TEST: CPT | Performed by: FAMILY MEDICINE

## 2020-01-01 PROCEDURE — 93970 EXTREMITY STUDY: CPT

## 2020-01-01 PROCEDURE — 2500000003 HC RX 250 WO HCPCS: Performed by: EMERGENCY MEDICINE

## 2020-01-01 PROCEDURE — 6360000004 HC RX CONTRAST MEDICATION: Performed by: INTERNAL MEDICINE

## 2020-01-01 PROCEDURE — 99215 OFFICE O/P EST HI 40 MIN: CPT | Performed by: PHYSICAL MEDICINE & REHABILITATION

## 2020-01-01 PROCEDURE — 84295 ASSAY OF SERUM SODIUM: CPT

## 2020-01-01 PROCEDURE — 85025 COMPLETE CBC W/AUTO DIFF WBC: CPT

## 2020-01-01 PROCEDURE — 95912 NRV CNDJ TEST 11-12 STUDIES: CPT | Performed by: PHYSICAL MEDICINE & REHABILITATION

## 2020-01-01 PROCEDURE — 84145 PROCALCITONIN (PCT): CPT

## 2020-01-01 PROCEDURE — 93010 ELECTROCARDIOGRAM REPORT: CPT | Performed by: INTERNAL MEDICINE

## 2020-01-01 PROCEDURE — 36556 INSERT NON-TUNNEL CV CATH: CPT | Performed by: INTERNAL MEDICINE

## 2020-01-01 PROCEDURE — 36620 INSERTION CATHETER ARTERY: CPT | Performed by: INTERNAL MEDICINE

## 2020-01-01 PROCEDURE — 20553 NJX 1/MLT TRIGGER POINTS 3/>: CPT | Performed by: PHYSICAL MEDICINE & REHABILITATION

## 2020-01-01 PROCEDURE — 71260 CT THORAX DX C+: CPT

## 2020-01-01 PROCEDURE — 82010 KETONE BODYS QUAN: CPT

## 2020-01-01 PROCEDURE — 87205 SMEAR GRAM STAIN: CPT

## 2020-01-01 PROCEDURE — 87449 NOS EACH ORGANISM AG IA: CPT

## 2020-01-01 PROCEDURE — 73610 X-RAY EXAM OF ANKLE: CPT

## 2020-01-01 PROCEDURE — 6360000002 HC RX W HCPCS: Performed by: EMERGENCY MEDICINE

## 2020-01-01 PROCEDURE — 31500 INSERT EMERGENCY AIRWAY: CPT

## 2020-01-01 PROCEDURE — 87077 CULTURE AEROBIC IDENTIFY: CPT

## 2020-01-01 PROCEDURE — 99443 PR PHYS/QHP TELEPHONE EVALUATION 21-30 MIN: CPT | Performed by: INTERNAL MEDICINE

## 2020-01-01 PROCEDURE — U0002 COVID-19 LAB TEST NON-CDC: HCPCS

## 2020-01-01 PROCEDURE — 99291 CRITICAL CARE FIRST HOUR: CPT

## 2020-01-01 PROCEDURE — 93306 TTE W/DOPPLER COMPLETE: CPT

## 2020-01-01 PROCEDURE — 5A1955Z RESPIRATORY VENTILATION, GREATER THAN 96 CONSECUTIVE HOURS: ICD-10-PCS | Performed by: EMERGENCY MEDICINE

## 2020-01-01 PROCEDURE — 94002 VENT MGMT INPAT INIT DAY: CPT

## 2020-01-01 PROCEDURE — 82962 GLUCOSE BLOOD TEST: CPT | Performed by: INTERNAL MEDICINE

## 2020-01-01 PROCEDURE — 87340 HEPATITIS B SURFACE AG IA: CPT

## 2020-01-01 PROCEDURE — 0BH17EZ INSERTION OF ENDOTRACHEAL AIRWAY INTO TRACHEA, VIA NATURAL OR ARTIFICIAL OPENING: ICD-10-PCS | Performed by: EMERGENCY MEDICINE

## 2020-01-01 PROCEDURE — 93005 ELECTROCARDIOGRAM TRACING: CPT | Performed by: EMERGENCY MEDICINE

## 2020-01-01 PROCEDURE — C8923 2D TTE W OR W/O FOL W/CON,CO: HCPCS

## 2020-01-01 PROCEDURE — G0279 TOMOSYNTHESIS, MAMMO: HCPCS

## 2020-01-01 PROCEDURE — 86141 C-REACTIVE PROTEIN HS: CPT

## 2020-01-01 PROCEDURE — 99292 CRITICAL CARE ADDL 30 MIN: CPT

## 2020-01-01 PROCEDURE — 71250 CT THORAX DX C-: CPT

## 2020-01-01 PROCEDURE — 11401 EXC TR-EXT B9+MARG 0.6-1 CM: CPT | Performed by: FAMILY MEDICINE

## 2020-01-01 PROCEDURE — G0480 DRUG TEST DEF 1-7 CLASSES: HCPCS

## 2020-01-01 PROCEDURE — 80162 ASSAY OF DIGOXIN TOTAL: CPT

## 2020-01-01 PROCEDURE — 85652 RBC SED RATE AUTOMATED: CPT

## 2020-01-01 PROCEDURE — 99283 EMERGENCY DEPT VISIT LOW MDM: CPT

## 2020-01-01 PROCEDURE — 81003 URINALYSIS AUTO W/O SCOPE: CPT

## 2020-01-01 PROCEDURE — 86706 HEP B SURFACE ANTIBODY: CPT

## 2020-01-01 PROCEDURE — 72148 MRI LUMBAR SPINE W/O DYE: CPT

## 2020-01-01 PROCEDURE — 85730 THROMBOPLASTIN TIME PARTIAL: CPT

## 2020-01-01 PROCEDURE — 80307 DRUG TEST PRSMV CHEM ANLYZR: CPT

## 2020-01-01 PROCEDURE — 83690 ASSAY OF LIPASE: CPT

## 2020-01-01 RX ORDER — LIDOCAINE HYDROCHLORIDE 10 MG/ML
16 INJECTION, SOLUTION INFILTRATION; PERINEURAL ONCE
Status: COMPLETED | OUTPATIENT
Start: 2020-01-01 | End: 2020-01-01

## 2020-01-01 RX ORDER — HEPARIN SODIUM 1000 [USP'U]/ML
4000 INJECTION, SOLUTION INTRAVENOUS; SUBCUTANEOUS ONCE
Status: COMPLETED | OUTPATIENT
Start: 2020-01-01 | End: 2020-01-01

## 2020-01-01 RX ORDER — POTASSIUM CHLORIDE 7.45 MG/ML
10 INJECTION INTRAVENOUS
Status: COMPLETED | OUTPATIENT
Start: 2020-01-01 | End: 2020-01-01

## 2020-01-01 RX ORDER — HYDRALAZINE HYDROCHLORIDE 20 MG/ML
10 INJECTION INTRAMUSCULAR; INTRAVENOUS EVERY 6 HOURS PRN
Status: DISCONTINUED | OUTPATIENT
Start: 2020-01-01 | End: 2020-07-16 | Stop reason: HOSPADM

## 2020-01-01 RX ORDER — INSULIN GLARGINE 100 [IU]/ML
INJECTION, SOLUTION SUBCUTANEOUS
Qty: 75 ML | Refills: 1
Start: 2020-01-01

## 2020-01-01 RX ORDER — PANTOPRAZOLE SODIUM 40 MG/1
40 TABLET, DELAYED RELEASE ORAL
Status: DISCONTINUED | OUTPATIENT
Start: 2020-01-01 | End: 2020-01-01

## 2020-01-01 RX ORDER — EZETIMIBE 10 MG/1
10 TABLET ORAL DAILY
COMMUNITY

## 2020-01-01 RX ORDER — CYANOCOBALAMIN 1000 UG/ML
1000 INJECTION INTRAMUSCULAR; SUBCUTANEOUS ONCE
Status: COMPLETED | OUTPATIENT
Start: 2020-01-01 | End: 2020-01-01

## 2020-01-01 RX ORDER — LIDOCAINE HYDROCHLORIDE 10 MG/ML
24 INJECTION, SOLUTION INFILTRATION; PERINEURAL ONCE
Status: COMPLETED | OUTPATIENT
Start: 2020-01-01 | End: 2020-01-01

## 2020-01-01 RX ORDER — MORPHINE SULFATE 4 MG/ML
INJECTION, SOLUTION INTRAMUSCULAR; INTRAVENOUS
Status: COMPLETED
Start: 2020-01-01 | End: 2020-01-01

## 2020-01-01 RX ORDER — LEVOTHYROXINE SODIUM 0.15 MG/1
150 TABLET ORAL DAILY
Qty: 90 TABLET | Refills: 3 | Status: SHIPPED | OUTPATIENT
Start: 2020-01-01

## 2020-01-01 RX ORDER — LIDOCAINE HYDROCHLORIDE 20 MG/ML
5 INJECTION, SOLUTION INFILTRATION; PERINEURAL ONCE
Status: COMPLETED | OUTPATIENT
Start: 2020-01-01 | End: 2020-01-01

## 2020-01-01 RX ORDER — LIDOCAINE HYDROCHLORIDE 10 MG/ML
15 INJECTION, SOLUTION INFILTRATION; PERINEURAL ONCE
Status: COMPLETED | OUTPATIENT
Start: 2020-01-01 | End: 2020-01-01

## 2020-01-01 RX ORDER — PREDNISONE 10 MG/1
10 TABLET ORAL DAILY
Qty: 90 TABLET | Refills: 1 | Status: SHIPPED | OUTPATIENT
Start: 2020-01-01 | End: 2020-11-10

## 2020-01-01 RX ORDER — LORAZEPAM 2 MG/ML
1 INJECTION INTRAMUSCULAR EVERY 6 HOURS PRN
Status: DISCONTINUED | OUTPATIENT
Start: 2020-01-01 | End: 2020-07-16 | Stop reason: HOSPADM

## 2020-01-01 RX ORDER — LEVOFLOXACIN 500 MG/1
500 TABLET, FILM COATED ORAL DAILY
Qty: 7 TABLET | Refills: 0 | Status: SHIPPED | OUTPATIENT
Start: 2020-01-01 | End: 2020-01-01

## 2020-01-01 RX ORDER — 0.9 % SODIUM CHLORIDE 0.9 %
2000 INTRAVENOUS SOLUTION INTRAVENOUS ONCE
Status: COMPLETED | OUTPATIENT
Start: 2020-01-01 | End: 2020-01-01

## 2020-01-01 RX ORDER — PROMETHAZINE HYDROCHLORIDE 12.5 MG/1
12.5 TABLET ORAL EVERY 6 HOURS PRN
Status: DISCONTINUED | OUTPATIENT
Start: 2020-01-01 | End: 2020-07-16 | Stop reason: HOSPADM

## 2020-01-01 RX ORDER — ACETAMINOPHEN 650 MG/1
650 SUPPOSITORY RECTAL EVERY 6 HOURS PRN
Status: DISCONTINUED | OUTPATIENT
Start: 2020-01-01 | End: 2020-07-16 | Stop reason: HOSPADM

## 2020-01-01 RX ORDER — ROCURONIUM BROMIDE 10 MG/ML
INJECTION, SOLUTION INTRAVENOUS DAILY PRN
Status: DISCONTINUED | OUTPATIENT
Start: 2020-01-01 | End: 2020-07-16 | Stop reason: HOSPADM

## 2020-01-01 RX ORDER — PREDNISONE 10 MG/1
TABLET ORAL
Qty: 70 TABLET | Refills: 0 | Status: SHIPPED | OUTPATIENT
Start: 2020-01-01 | End: 2020-01-01 | Stop reason: SDUPTHER

## 2020-01-01 RX ORDER — PREDNISONE 10 MG/1
TABLET ORAL
Qty: 67 TABLET | Refills: 1 | Status: SHIPPED | OUTPATIENT
Start: 2020-01-01 | End: 2020-01-01 | Stop reason: CLARIF

## 2020-01-01 RX ORDER — HEPARIN SODIUM 1000 [USP'U]/ML
4000 INJECTION, SOLUTION INTRAVENOUS; SUBCUTANEOUS ONCE
Status: DISCONTINUED | OUTPATIENT
Start: 2020-01-01 | End: 2020-01-01

## 2020-01-01 RX ORDER — GLYCOPYRROLATE 0.2 MG/ML
0.2 INJECTION INTRAMUSCULAR; INTRAVENOUS EVERY 4 HOURS PRN
Status: DISCONTINUED | OUTPATIENT
Start: 2020-01-01 | End: 2020-07-16 | Stop reason: HOSPADM

## 2020-01-01 RX ORDER — CEPHALEXIN 500 MG/1
500 CAPSULE ORAL 3 TIMES DAILY
Qty: 30 CAPSULE | Refills: 0 | Status: SHIPPED | OUTPATIENT
Start: 2020-01-01

## 2020-01-01 RX ORDER — NICOTINE POLACRILEX 4 MG
15 LOZENGE BUCCAL PRN
Status: DISCONTINUED | OUTPATIENT
Start: 2020-01-01 | End: 2020-07-16 | Stop reason: HOSPADM

## 2020-01-01 RX ORDER — OXYCODONE HYDROCHLORIDE 10 MG/1
10 TABLET ORAL EVERY 8 HOURS PRN
COMMUNITY

## 2020-01-01 RX ORDER — DEXTROSE MONOHYDRATE 50 MG/ML
100 INJECTION, SOLUTION INTRAVENOUS PRN
Status: DISCONTINUED | OUTPATIENT
Start: 2020-01-01 | End: 2020-07-16 | Stop reason: HOSPADM

## 2020-01-01 RX ORDER — SODIUM CHLORIDE 9 MG/ML
INJECTION, SOLUTION INTRAVENOUS
Status: DISPENSED
Start: 2020-01-01 | End: 2020-01-01

## 2020-01-01 RX ORDER — HYDROXYCHLOROQUINE SULFATE 200 MG/1
200 TABLET, FILM COATED ORAL 2 TIMES DAILY
Qty: 60 TABLET | Refills: 2 | Status: SHIPPED | OUTPATIENT
Start: 2020-01-01

## 2020-01-01 RX ORDER — SODIUM CHLORIDE 0.9 % (FLUSH) 0.9 %
10 SYRINGE (ML) INJECTION PRN
Status: DISCONTINUED | OUTPATIENT
Start: 2020-01-01 | End: 2020-07-16 | Stop reason: HOSPADM

## 2020-01-01 RX ORDER — LABETALOL 20 MG/4 ML (5 MG/ML) INTRAVENOUS SYRINGE
10
Status: DISCONTINUED | OUTPATIENT
Start: 2020-01-01 | End: 2020-07-16 | Stop reason: HOSPADM

## 2020-01-01 RX ORDER — INSULIN GLARGINE 100 [IU]/ML
75 INJECTION, SOLUTION SUBCUTANEOUS DAILY
Status: DISCONTINUED | OUTPATIENT
Start: 2020-01-01 | End: 2020-01-01

## 2020-01-01 RX ORDER — OXYCODONE HCL 10 MG/1
10 TABLET, FILM COATED, EXTENDED RELEASE ORAL EVERY 8 HOURS PRN
Qty: 80 TABLET | Refills: 0 | Status: SHIPPED | OUTPATIENT
Start: 2020-01-01 | End: 2020-01-01

## 2020-01-01 RX ORDER — PREDNISONE 10 MG/1
TABLET ORAL
Qty: 30 TABLET | Refills: 0 | Status: ON HOLD | OUTPATIENT
Start: 2020-01-01 | End: 2020-01-01 | Stop reason: ALTCHOICE

## 2020-01-01 RX ORDER — SODIUM CHLORIDE, SODIUM LACTATE, POTASSIUM CHLORIDE, CALCIUM CHLORIDE 600; 310; 30; 20 MG/100ML; MG/100ML; MG/100ML; MG/100ML
INJECTION, SOLUTION INTRAVENOUS CONTINUOUS
Status: DISCONTINUED | OUTPATIENT
Start: 2020-01-01 | End: 2020-01-01

## 2020-01-01 RX ORDER — BLOOD SUGAR DIAGNOSTIC
STRIP MISCELLANEOUS
Qty: 600 EACH | Refills: 3 | Status: SHIPPED | OUTPATIENT
Start: 2020-01-01

## 2020-01-01 RX ORDER — LEVOTHYROXINE SODIUM 0.15 MG/1
150 TABLET ORAL DAILY
Status: DISCONTINUED | OUTPATIENT
Start: 2020-01-01 | End: 2020-01-01

## 2020-01-01 RX ORDER — DOXYCYCLINE HYCLATE 100 MG
100 TABLET ORAL 2 TIMES DAILY
Qty: 28 TABLET | Refills: 0 | Status: SHIPPED | OUTPATIENT
Start: 2020-01-01 | End: 2020-01-01

## 2020-01-01 RX ORDER — DIGOXIN 250 MCG
TABLET ORAL
COMMUNITY
Start: 2020-01-01

## 2020-01-01 RX ORDER — MORPHINE SULFATE 4 MG/ML
4 INJECTION, SOLUTION INTRAMUSCULAR; INTRAVENOUS
Status: DISCONTINUED | OUTPATIENT
Start: 2020-01-01 | End: 2020-07-16 | Stop reason: HOSPADM

## 2020-01-01 RX ORDER — METHYLPREDNISOLONE SODIUM SUCCINATE 40 MG/ML
40 INJECTION, POWDER, LYOPHILIZED, FOR SOLUTION INTRAMUSCULAR; INTRAVENOUS DAILY
Status: DISCONTINUED | OUTPATIENT
Start: 2020-01-01 | End: 2020-01-01

## 2020-01-01 RX ORDER — INSULIN GLARGINE 100 [IU]/ML
INJECTION, SOLUTION SUBCUTANEOUS
Qty: 75 ML | Refills: 1 | Status: SHIPPED | OUTPATIENT
Start: 2020-01-01 | End: 2020-01-01 | Stop reason: SDUPTHER

## 2020-01-01 RX ORDER — DEXTROSE AND SODIUM CHLORIDE 5; .45 G/100ML; G/100ML
INJECTION, SOLUTION INTRAVENOUS CONTINUOUS PRN
Status: DISCONTINUED | OUTPATIENT
Start: 2020-01-01 | End: 2020-01-01

## 2020-01-01 RX ORDER — OXYCODONE HYDROCHLORIDE 15 MG/1
15 TABLET ORAL EVERY 8 HOURS PRN
Qty: 80 TABLET | Refills: 0 | Status: SHIPPED | OUTPATIENT
Start: 2020-01-01 | End: 2020-01-01

## 2020-01-01 RX ORDER — OXYCODONE HYDROCHLORIDE 15 MG/1
15 TABLET ORAL EVERY 8 HOURS PRN
Qty: 80 TABLET | Refills: 0 | Status: SHIPPED | OUTPATIENT
Start: 2020-01-01 | End: 2020-01-01 | Stop reason: SDUPTHER

## 2020-01-01 RX ORDER — PREDNISONE 10 MG/1
TABLET ORAL
Qty: 30 TABLET | Refills: 0 | Status: SHIPPED | OUTPATIENT
Start: 2020-01-01 | End: 2020-01-01 | Stop reason: CLARIF

## 2020-01-01 RX ORDER — UMECLIDINIUM 62.5 UG/1
AEROSOL, POWDER ORAL
Qty: 1 EACH | Refills: 3 | Status: SHIPPED | OUTPATIENT
Start: 2020-01-01

## 2020-01-01 RX ORDER — PROPOFOL 10 MG/ML
10 INJECTION, EMULSION INTRAVENOUS ONCE
Status: COMPLETED | OUTPATIENT
Start: 2020-01-01 | End: 2020-01-01

## 2020-01-01 RX ORDER — IPRATROPIUM BROMIDE AND ALBUTEROL SULFATE 2.5; .5 MG/3ML; MG/3ML
1 SOLUTION RESPIRATORY (INHALATION)
Status: DISCONTINUED | OUTPATIENT
Start: 2020-01-01 | End: 2020-01-01

## 2020-01-01 RX ORDER — TOPIRAMATE 25 MG/1
TABLET ORAL
Qty: 90 TABLET | Refills: 1 | Status: SHIPPED | OUTPATIENT
Start: 2020-01-01

## 2020-01-01 RX ORDER — INSULIN ASPART 100 [IU]/ML
INJECTION, SOLUTION INTRAVENOUS; SUBCUTANEOUS
Qty: 30 ML | Refills: 3 | Status: SHIPPED | OUTPATIENT
Start: 2020-01-01 | End: 2020-01-01 | Stop reason: SDUPTHER

## 2020-01-01 RX ORDER — PROPOFOL 10 MG/ML
INJECTION, EMULSION INTRAVENOUS
Status: DISCONTINUED
Start: 2020-01-01 | End: 2020-01-01

## 2020-01-01 RX ORDER — CEFUROXIME AXETIL 250 MG/1
250 TABLET ORAL 2 TIMES DAILY
Qty: 20 TABLET | Refills: 0 | Status: SHIPPED | OUTPATIENT
Start: 2020-01-01 | End: 2020-01-01

## 2020-01-01 RX ORDER — INSULIN ASPART 100 [IU]/ML
INJECTION, SOLUTION INTRAVENOUS; SUBCUTANEOUS
Qty: 30 ML | Refills: 3
Start: 2020-01-01

## 2020-01-01 RX ORDER — LIDOCAINE 50 MG/G
OINTMENT TOPICAL
Qty: 1 TUBE | Refills: 11 | Status: SHIPPED | OUTPATIENT
Start: 2020-01-01

## 2020-01-01 RX ORDER — HEPARIN SODIUM 1000 [USP'U]/ML
2000 INJECTION, SOLUTION INTRAVENOUS; SUBCUTANEOUS ONCE
Status: COMPLETED | OUTPATIENT
Start: 2020-01-01 | End: 2020-01-01

## 2020-01-01 RX ORDER — ONDANSETRON 2 MG/ML
4 INJECTION INTRAMUSCULAR; INTRAVENOUS EVERY 6 HOURS PRN
Status: DISCONTINUED | OUTPATIENT
Start: 2020-01-01 | End: 2020-07-16 | Stop reason: HOSPADM

## 2020-01-01 RX ORDER — DEXTROSE MONOHYDRATE 25 G/50ML
12.5 INJECTION, SOLUTION INTRAVENOUS PRN
Status: DISCONTINUED | OUTPATIENT
Start: 2020-01-01 | End: 2020-07-16 | Stop reason: HOSPADM

## 2020-01-01 RX ORDER — ACETAMINOPHEN 325 MG/1
650 TABLET ORAL EVERY 6 HOURS PRN
Status: DISCONTINUED | OUTPATIENT
Start: 2020-01-01 | End: 2020-07-16 | Stop reason: HOSPADM

## 2020-01-01 RX ORDER — ESOMEPRAZOLE MAGNESIUM 40 MG/1
CAPSULE, DELAYED RELEASE ORAL
Qty: 90 CAPSULE | Refills: 1 | Status: ON HOLD | OUTPATIENT
Start: 2020-01-01 | End: 2020-01-01 | Stop reason: ALTCHOICE

## 2020-01-01 RX ORDER — SODIUM CHLORIDE 450 MG/100ML
INJECTION, SOLUTION INTRAVENOUS CONTINUOUS
Status: DISCONTINUED | OUTPATIENT
Start: 2020-01-01 | End: 2020-01-01

## 2020-01-01 RX ORDER — TRAMADOL HYDROCHLORIDE 50 MG/1
50 TABLET ORAL EVERY 6 HOURS PRN
Status: ON HOLD | COMMUNITY
End: 2020-01-01 | Stop reason: ALTCHOICE

## 2020-01-01 RX ORDER — PROPOFOL 10 MG/ML
10 INJECTION, EMULSION INTRAVENOUS
Status: DISCONTINUED | OUTPATIENT
Start: 2020-01-01 | End: 2020-01-01

## 2020-01-01 RX ORDER — LEVOFLOXACIN 5 MG/ML
750 INJECTION, SOLUTION INTRAVENOUS ONCE
Status: COMPLETED | OUTPATIENT
Start: 2020-01-01 | End: 2020-01-01

## 2020-01-01 RX ORDER — AMOXICILLIN AND CLAVULANATE POTASSIUM 875; 125 MG/1; MG/1
TABLET, FILM COATED ORAL
COMMUNITY
Start: 2020-01-01 | End: 2020-01-01 | Stop reason: ALTCHOICE

## 2020-01-01 RX ORDER — LIRAGLUTIDE 6 MG/ML
INJECTION SUBCUTANEOUS
Qty: 27 ML | Refills: 3 | Status: SHIPPED | OUTPATIENT
Start: 2020-01-01 | End: 2020-01-01

## 2020-01-01 RX ORDER — LACTULOSE 10 G/15ML
20 SOLUTION ORAL ONCE
Status: COMPLETED | OUTPATIENT
Start: 2020-01-01 | End: 2020-01-01

## 2020-01-01 RX ORDER — PREDNISONE 10 MG/1
TABLET ORAL
Qty: 40 TABLET | Refills: 0 | Status: ON HOLD | OUTPATIENT
Start: 2020-01-01 | End: 2020-01-01 | Stop reason: ALTCHOICE

## 2020-01-01 RX ORDER — MORPHINE SULFATE 2 MG/ML
2 INJECTION, SOLUTION INTRAMUSCULAR; INTRAVENOUS
Status: DISCONTINUED | OUTPATIENT
Start: 2020-01-01 | End: 2020-07-16 | Stop reason: HOSPADM

## 2020-01-01 RX ORDER — MORPHINE SULFATE 4 MG/ML
4 INJECTION, SOLUTION INTRAMUSCULAR; INTRAVENOUS ONCE
Status: DISCONTINUED | OUTPATIENT
Start: 2020-01-01 | End: 2020-07-16 | Stop reason: HOSPADM

## 2020-01-01 RX ORDER — LIDOCAINE HYDROCHLORIDE 10 MG/ML
23 INJECTION, SOLUTION INFILTRATION; PERINEURAL ONCE
Status: COMPLETED | OUTPATIENT
Start: 2020-01-01 | End: 2020-01-01

## 2020-01-01 RX ORDER — 0.9 % SODIUM CHLORIDE 0.9 %
1000 INTRAVENOUS SOLUTION INTRAVENOUS ONCE
Status: COMPLETED | OUTPATIENT
Start: 2020-01-01 | End: 2020-01-01

## 2020-01-01 RX ORDER — POLYETHYLENE GLYCOL 3350 17 G/17G
17 POWDER, FOR SOLUTION ORAL DAILY
Status: DISCONTINUED | OUTPATIENT
Start: 2020-01-01 | End: 2020-01-01

## 2020-01-01 RX ORDER — ETOMIDATE 2 MG/ML
INJECTION INTRAVENOUS DAILY PRN
Status: DISCONTINUED | OUTPATIENT
Start: 2020-01-01 | End: 2020-07-16 | Stop reason: HOSPADM

## 2020-01-01 RX ORDER — OXYCODONE HYDROCHLORIDE 10 MG/1
10 TABLET ORAL EVERY 8 HOURS PRN
Qty: 80 TABLET | Refills: 0 | Status: SHIPPED | OUTPATIENT
Start: 2020-01-01 | End: 2020-01-01

## 2020-01-01 RX ORDER — POLYETHYLENE GLYCOL 3350 17 G/17G
17 POWDER, FOR SOLUTION ORAL DAILY PRN
Status: DISCONTINUED | OUTPATIENT
Start: 2020-01-01 | End: 2020-07-16 | Stop reason: HOSPADM

## 2020-01-01 RX ORDER — SODIUM CHLORIDE 9 MG/ML
INJECTION, SOLUTION INTRAVENOUS CONTINUOUS
Status: DISCONTINUED | OUTPATIENT
Start: 2020-01-01 | End: 2020-01-01

## 2020-01-01 RX ORDER — SODIUM CHLORIDE 0.9 % (FLUSH) 0.9 %
10 SYRINGE (ML) INJECTION EVERY 12 HOURS SCHEDULED
Status: DISCONTINUED | OUTPATIENT
Start: 2020-01-01 | End: 2020-01-01

## 2020-01-01 RX ADMIN — CYANOCOBALAMIN 1000 MCG: 1000 INJECTION INTRAMUSCULAR; SUBCUTANEOUS at 09:51

## 2020-01-01 RX ADMIN — ALTEPLASE 2 MG: 2.2 INJECTION, POWDER, LYOPHILIZED, FOR SOLUTION INTRAVENOUS at 17:31

## 2020-01-01 RX ADMIN — SODIUM CHLORIDE 4.4 UNITS/HR: 9 INJECTION, SOLUTION INTRAVENOUS at 18:21

## 2020-01-01 RX ADMIN — SODIUM BICARBONATE 50 MEQ: 84 INJECTION INTRAVENOUS at 18:31

## 2020-01-01 RX ADMIN — VANCOMYCIN HYDROCHLORIDE 1000 MG: 1 INJECTION, POWDER, LYOPHILIZED, FOR SOLUTION INTRAVENOUS at 21:00

## 2020-01-01 RX ADMIN — METHYLPREDNISOLONE SODIUM SUCCINATE 40 MG: 40 INJECTION, POWDER, FOR SOLUTION INTRAMUSCULAR; INTRAVENOUS at 15:44

## 2020-01-01 RX ADMIN — DEXMEDETOMIDINE HYDROCHLORIDE 1 MCG/KG/HR: 100 INJECTION, SOLUTION, CONCENTRATE INTRAVENOUS at 11:40

## 2020-01-01 RX ADMIN — FENTANYL CITRATE 150 MCG/HR: 50 INJECTION, SOLUTION INTRAMUSCULAR; INTRAVENOUS at 00:11

## 2020-01-01 RX ADMIN — LIDOCAINE HYDROCHLORIDE 16 ML: 10 INJECTION, SOLUTION INFILTRATION; PERINEURAL at 09:52

## 2020-01-01 RX ADMIN — DEXMEDETOMIDINE HYDROCHLORIDE 1 MCG/KG/HR: 100 INJECTION, SOLUTION, CONCENTRATE INTRAVENOUS at 06:53

## 2020-01-01 RX ADMIN — POLYETHYLENE GLYCOL 3350 17 G: 17 POWDER, FOR SOLUTION ORAL at 11:40

## 2020-01-01 RX ADMIN — PROPOFOL INJECTABLE EMULSION 30 MCG/KG/MIN: 10 INJECTION, EMULSION INTRAVENOUS at 08:40

## 2020-01-01 RX ADMIN — PROPOFOL INJECTABLE EMULSION 50 MCG/KG/MIN: 10 INJECTION, EMULSION INTRAVENOUS at 13:26

## 2020-01-01 RX ADMIN — ENOXAPARIN SODIUM 30 MG: 30 INJECTION SUBCUTANEOUS at 21:23

## 2020-01-01 RX ADMIN — ENOXAPARIN SODIUM 30 MG: 30 INJECTION SUBCUTANEOUS at 07:55

## 2020-01-01 RX ADMIN — SODIUM CHLORIDE 1000 ML: 9 INJECTION, SOLUTION INTRAVENOUS at 18:31

## 2020-01-01 RX ADMIN — DEXTROSE AND SODIUM CHLORIDE: 5; 450 INJECTION, SOLUTION INTRAVENOUS at 16:09

## 2020-01-01 RX ADMIN — Medication 10 ML: at 21:00

## 2020-01-01 RX ADMIN — PROPOFOL INJECTABLE EMULSION 10 MCG/KG/MIN: 10 INJECTION, EMULSION INTRAVENOUS at 17:29

## 2020-01-01 RX ADMIN — INSULIN HUMAN 8 UNITS: 100 INJECTION, SOLUTION PARENTERAL at 08:49

## 2020-01-01 RX ADMIN — Medication 10 ML: at 21:15

## 2020-01-01 RX ADMIN — IOPAMIDOL 75 ML: 755 INJECTION, SOLUTION INTRAVENOUS at 14:31

## 2020-01-01 RX ADMIN — DEXMEDETOMIDINE HYDROCHLORIDE 1 MCG/KG/HR: 100 INJECTION, SOLUTION, CONCENTRATE INTRAVENOUS at 21:35

## 2020-01-01 RX ADMIN — POTASSIUM BICARBONATE 40 MEQ: 782 TABLET, EFFERVESCENT ORAL at 00:29

## 2020-01-01 RX ADMIN — PIPERACILLIN AND TAZOBACTAM 3.38 G: 3; .375 INJECTION, POWDER, LYOPHILIZED, FOR SOLUTION INTRAVENOUS at 21:20

## 2020-01-01 RX ADMIN — IPRATROPIUM BROMIDE AND ALBUTEROL SULFATE 1 AMPULE: .5; 3 SOLUTION RESPIRATORY (INHALATION) at 03:48

## 2020-01-01 RX ADMIN — SODIUM CHLORIDE: 9 INJECTION, SOLUTION INTRAVENOUS at 12:13

## 2020-01-01 RX ADMIN — PANTOPRAZOLE SODIUM 40 MG: 40 TABLET, DELAYED RELEASE ORAL at 09:13

## 2020-01-01 RX ADMIN — LEVOTHYROXINE SODIUM 150 MCG: 150 TABLET ORAL at 06:45

## 2020-01-01 RX ADMIN — LACTULOSE 20 G: 20 SOLUTION ORAL at 11:40

## 2020-01-01 RX ADMIN — PROPOFOL INJECTABLE EMULSION 50 MCG/KG/MIN: 10 INJECTION, EMULSION INTRAVENOUS at 17:54

## 2020-01-01 RX ADMIN — ENOXAPARIN SODIUM 30 MG: 30 INJECTION SUBCUTANEOUS at 09:25

## 2020-01-01 RX ADMIN — FENTANYL CITRATE 100 MCG/HR: 50 INJECTION, SOLUTION INTRAMUSCULAR; INTRAVENOUS at 12:35

## 2020-01-01 RX ADMIN — HEPARIN SODIUM 2000 UNITS: 1000 INJECTION INTRAVENOUS; SUBCUTANEOUS at 13:55

## 2020-01-01 RX ADMIN — PIPERACILLIN AND TAZOBACTAM 3.38 G: 3; .375 INJECTION, POWDER, LYOPHILIZED, FOR SOLUTION INTRAVENOUS at 09:25

## 2020-01-01 RX ADMIN — PIPERACILLIN AND TAZOBACTAM 3.38 G: 3; .375 INJECTION, POWDER, LYOPHILIZED, FOR SOLUTION INTRAVENOUS at 21:16

## 2020-01-01 RX ADMIN — LIDOCAINE HYDROCHLORIDE 23 ML: 10 INJECTION, SOLUTION INFILTRATION; PERINEURAL at 15:38

## 2020-01-01 RX ADMIN — ENOXAPARIN SODIUM 30 MG: 30 INJECTION SUBCUTANEOUS at 08:40

## 2020-01-01 RX ADMIN — PANTOPRAZOLE SODIUM 40 MG: 40 TABLET, DELAYED RELEASE ORAL at 06:45

## 2020-01-01 RX ADMIN — LIDOCAINE HYDROCHLORIDE 5 ML: 20 INJECTION, SOLUTION INFILTRATION; PERINEURAL at 13:45

## 2020-01-01 RX ADMIN — PROPOFOL INJECTABLE EMULSION 25 MCG/KG/MIN: 10 INJECTION, EMULSION INTRAVENOUS at 12:41

## 2020-01-01 RX ADMIN — ETOMIDATE 20 MG: 2 INJECTION, SOLUTION INTRAVENOUS at 16:54

## 2020-01-01 RX ADMIN — Medication 10 ML: at 08:52

## 2020-01-01 RX ADMIN — PIPERACILLIN AND TAZOBACTAM 3.38 G: 3; .375 INJECTION, POWDER, LYOPHILIZED, FOR SOLUTION INTRAVENOUS at 08:40

## 2020-01-01 RX ADMIN — INSULIN GLARGINE 75 UNITS: 100 INJECTION, SOLUTION SUBCUTANEOUS at 10:49

## 2020-01-01 RX ADMIN — ENOXAPARIN SODIUM 30 MG: 30 INJECTION SUBCUTANEOUS at 09:13

## 2020-01-01 RX ADMIN — DOCUSATE SODIUM 100 MG: 50 LIQUID ORAL at 09:24

## 2020-01-01 RX ADMIN — PROPOFOL INJECTABLE EMULSION 30 MCG/KG/MIN: 10 INJECTION, EMULSION INTRAVENOUS at 23:36

## 2020-01-01 RX ADMIN — CYANOCOBALAMIN 1000 MCG: 1000 INJECTION INTRAMUSCULAR; SUBCUTANEOUS at 15:45

## 2020-01-01 RX ADMIN — FENTANYL CITRATE 50 MCG/HR: 50 INJECTION, SOLUTION INTRAMUSCULAR; INTRAVENOUS at 12:13

## 2020-01-01 RX ADMIN — IPRATROPIUM BROMIDE AND ALBUTEROL SULFATE 1 AMPULE: .5; 3 SOLUTION RESPIRATORY (INHALATION) at 19:49

## 2020-01-01 RX ADMIN — DEXMEDETOMIDINE HYDROCHLORIDE 0.6 MCG/HR: 100 INJECTION, SOLUTION, CONCENTRATE INTRAVENOUS at 14:34

## 2020-01-01 RX ADMIN — LIDOCAINE HYDROCHLORIDE 5 ML: 20 INJECTION, SOLUTION INFILTRATION; PERINEURAL at 09:53

## 2020-01-01 RX ADMIN — INSULIN LISPRO 5 UNITS: 100 INJECTION, SOLUTION INTRAVENOUS; SUBCUTANEOUS at 01:24

## 2020-01-01 RX ADMIN — DOCUSATE SODIUM 100 MG: 50 LIQUID ORAL at 22:48

## 2020-01-01 RX ADMIN — POLYETHYLENE GLYCOL 3350 17 G: 17 POWDER, FOR SOLUTION ORAL at 09:24

## 2020-01-01 RX ADMIN — METHYLPREDNISOLONE SODIUM SUCCINATE 40 MG: 40 INJECTION, POWDER, FOR SOLUTION INTRAMUSCULAR; INTRAVENOUS at 07:55

## 2020-01-01 RX ADMIN — ACETAMINOPHEN 650 MG: 650 SUPPOSITORY RECTAL at 04:00

## 2020-01-01 RX ADMIN — LABETALOL 20 MG/4 ML (5 MG/ML) INTRAVENOUS SYRINGE 10 MG: at 09:13

## 2020-01-01 RX ADMIN — LEVOFLOXACIN 750 MG: 5 INJECTION, SOLUTION INTRAVENOUS at 18:08

## 2020-01-01 RX ADMIN — LIDOCAINE HYDROCHLORIDE 15 ML: 10 INJECTION, SOLUTION INFILTRATION; PERINEURAL at 15:45

## 2020-01-01 RX ADMIN — PROPOFOL INJECTABLE EMULSION 50 MCG/KG/MIN: 10 INJECTION, EMULSION INTRAVENOUS at 15:16

## 2020-01-01 RX ADMIN — HEPARIN SODIUM 4000 UNITS: 1000 INJECTION INTRAVENOUS; SUBCUTANEOUS at 12:35

## 2020-01-01 RX ADMIN — NOREPINEPHRINE BITARTRATE 2 MCG/MIN: 1 INJECTION, SOLUTION, CONCENTRATE INTRAVENOUS at 04:04

## 2020-01-01 RX ADMIN — FENTANYL CITRATE 75 MCG/HR: 50 INJECTION, SOLUTION INTRAMUSCULAR; INTRAVENOUS at 22:00

## 2020-01-01 RX ADMIN — FENTANYL CITRATE 200 MCG/HR: 50 INJECTION, SOLUTION INTRAMUSCULAR; INTRAVENOUS at 18:56

## 2020-01-01 RX ADMIN — PROPOFOL INJECTABLE EMULSION 51.41 MCG/KG/MIN: 10 INJECTION, EMULSION INTRAVENOUS at 22:40

## 2020-01-01 RX ADMIN — DEXMEDETOMIDINE HYDROCHLORIDE 1 MCG/KG/HR: 100 INJECTION, SOLUTION, CONCENTRATE INTRAVENOUS at 22:45

## 2020-01-01 RX ADMIN — INSULIN GLARGINE 75 UNITS: 100 INJECTION, SOLUTION SUBCUTANEOUS at 11:03

## 2020-01-01 RX ADMIN — LIDOCAINE HYDROCHLORIDE 24 ML: 10 INJECTION, SOLUTION INFILTRATION; PERINEURAL at 13:44

## 2020-01-01 RX ADMIN — LIDOCAINE HYDROCHLORIDE 16 ML: 10 INJECTION, SOLUTION INFILTRATION; PERINEURAL at 11:51

## 2020-01-01 RX ADMIN — DEXTROSE AND SODIUM CHLORIDE: 5; 450 INJECTION, SOLUTION INTRAVENOUS at 23:06

## 2020-01-01 RX ADMIN — Medication 10 ML: at 09:26

## 2020-01-01 RX ADMIN — SODIUM CHLORIDE: 9 INJECTION, SOLUTION INTRAVENOUS at 21:20

## 2020-01-01 RX ADMIN — DEXTROSE AND SODIUM CHLORIDE 150 ML/HR: 5; 450 INJECTION, SOLUTION INTRAVENOUS at 12:50

## 2020-01-01 RX ADMIN — PROPOFOL INJECTABLE EMULSION 51.41 MCG/KG/MIN: 10 INJECTION, EMULSION INTRAVENOUS at 01:40

## 2020-01-01 RX ADMIN — Medication 10 ML: at 08:00

## 2020-01-01 RX ADMIN — POTASSIUM BICARBONATE 20 MEQ: 782 TABLET, EFFERVESCENT ORAL at 09:13

## 2020-01-01 RX ADMIN — FENTANYL CITRATE 200 MCG/HR: 50 INJECTION, SOLUTION INTRAMUSCULAR; INTRAVENOUS at 00:35

## 2020-01-01 RX ADMIN — VANCOMYCIN HYDROCHLORIDE 1000 MG: 1 INJECTION, POWDER, LYOPHILIZED, FOR SOLUTION INTRAVENOUS at 21:20

## 2020-01-01 RX ADMIN — SODIUM BICARBONATE: 84 INJECTION, SOLUTION INTRAVENOUS at 19:30

## 2020-01-01 RX ADMIN — LEVOTHYROXINE SODIUM 150 MCG: 150 TABLET ORAL at 06:55

## 2020-01-01 RX ADMIN — INSULIN LISPRO 6 UNITS: 100 INJECTION, SOLUTION INTRAVENOUS; SUBCUTANEOUS at 17:46

## 2020-01-01 RX ADMIN — DEXMEDETOMIDINE HYDROCHLORIDE 0.2 MCG/KG/HR: 100 INJECTION, SOLUTION, CONCENTRATE INTRAVENOUS at 02:03

## 2020-01-01 RX ADMIN — DEXMEDETOMIDINE HYDROCHLORIDE 1 MCG/KG/HR: 100 INJECTION, SOLUTION, CONCENTRATE INTRAVENOUS at 17:39

## 2020-01-01 RX ADMIN — MORPHINE SULFATE 4 MG: 4 INJECTION, SOLUTION INTRAMUSCULAR; INTRAVENOUS at 17:22

## 2020-01-01 RX ADMIN — PROPOFOL INJECTABLE EMULSION 50 MCG/KG/MIN: 10 INJECTION, EMULSION INTRAVENOUS at 19:18

## 2020-01-01 RX ADMIN — SODIUM CHLORIDE 8.04 UNITS/HR: 9 INJECTION, SOLUTION INTRAVENOUS at 08:57

## 2020-01-01 RX ADMIN — DEXMEDETOMIDINE HYDROCHLORIDE 1 MCG/KG/HR: 100 INJECTION, SOLUTION, CONCENTRATE INTRAVENOUS at 04:57

## 2020-01-01 RX ADMIN — INSULIN LISPRO 4 UNITS: 100 INJECTION, SOLUTION INTRAVENOUS; SUBCUTANEOUS at 21:23

## 2020-01-01 RX ADMIN — PROPOFOL INJECTABLE EMULSION 30 MCG/KG/MIN: 10 INJECTION, EMULSION INTRAVENOUS at 02:50

## 2020-01-01 RX ADMIN — ALTEPLASE 2 MG: 2.2 INJECTION, POWDER, LYOPHILIZED, FOR SOLUTION INTRAVENOUS at 17:28

## 2020-01-01 RX ADMIN — INSULIN LISPRO 3 UNITS: 100 INJECTION, SOLUTION INTRAVENOUS; SUBCUTANEOUS at 15:44

## 2020-01-01 RX ADMIN — LEVOTHYROXINE SODIUM 150 MCG: 150 TABLET ORAL at 21:16

## 2020-01-01 RX ADMIN — Medication 10 MEQ: at 10:24

## 2020-01-01 RX ADMIN — PROPOFOL INJECTABLE EMULSION 10 MCG/KG/MIN: 10 INJECTION, EMULSION INTRAVENOUS at 00:26

## 2020-01-01 RX ADMIN — LIDOCAINE HYDROCHLORIDE 15 ML: 10 INJECTION, SOLUTION INFILTRATION; PERINEURAL at 16:34

## 2020-01-01 RX ADMIN — PIPERACILLIN AND TAZOBACTAM 3.38 G: 3; .375 INJECTION, POWDER, LYOPHILIZED, FOR SOLUTION INTRAVENOUS at 07:54

## 2020-01-01 RX ADMIN — NOREPINEPHRINE BITARTRATE 22 MCG/MIN: 1 INJECTION, SOLUTION, CONCENTRATE INTRAVENOUS at 15:34

## 2020-01-01 RX ADMIN — ACETAMINOPHEN 650 MG: 325 TABLET, FILM COATED ORAL at 12:59

## 2020-01-01 RX ADMIN — PIPERACILLIN AND TAZOBACTAM 3.38 G: 3; .375 INJECTION, POWDER, LYOPHILIZED, FOR SOLUTION INTRAVENOUS at 09:14

## 2020-01-01 RX ADMIN — LIDOCAINE HYDROCHLORIDE 5 ML: 20 INJECTION, SOLUTION INFILTRATION; PERINEURAL at 16:34

## 2020-01-01 RX ADMIN — LEVOTHYROXINE SODIUM 150 MCG: 150 TABLET ORAL at 06:59

## 2020-01-01 RX ADMIN — PIPERACILLIN AND TAZOBACTAM 3.38 G: 3; .375 INJECTION, POWDER, LYOPHILIZED, FOR SOLUTION INTRAVENOUS at 22:06

## 2020-01-01 RX ADMIN — Medication 10 ML: at 22:17

## 2020-01-01 RX ADMIN — Medication 10 MEQ: at 09:19

## 2020-01-01 RX ADMIN — PIPERACILLIN AND TAZOBACTAM 3.38 G: 3; .375 INJECTION, POWDER, LYOPHILIZED, FOR SOLUTION INTRAVENOUS at 21:00

## 2020-01-01 RX ADMIN — ROCURONIUM BROMIDE 100 MG: 10 INJECTION INTRAVENOUS at 16:54

## 2020-01-01 RX ADMIN — FENTANYL CITRATE 50 MCG/HR: 50 INJECTION, SOLUTION INTRAMUSCULAR; INTRAVENOUS at 22:19

## 2020-01-01 RX ADMIN — FENTANYL CITRATE 150 MCG/HR: 50 INJECTION, SOLUTION INTRAMUSCULAR; INTRAVENOUS at 06:52

## 2020-01-01 RX ADMIN — VANCOMYCIN HYDROCHLORIDE 1000 MG: 1 INJECTION, POWDER, LYOPHILIZED, FOR SOLUTION INTRAVENOUS at 21:16

## 2020-01-01 RX ADMIN — SODIUM CHLORIDE 2000 ML: 9 INJECTION, SOLUTION INTRAVENOUS at 17:14

## 2020-01-01 RX ADMIN — PANTOPRAZOLE SODIUM 40 MG: 40 TABLET, DELAYED RELEASE ORAL at 06:59

## 2020-01-01 RX ADMIN — PROPOFOL INJECTABLE EMULSION 30 MCG/KG/MIN: 10 INJECTION, EMULSION INTRAVENOUS at 15:21

## 2020-01-01 RX ADMIN — VANCOMYCIN HYDROCHLORIDE 1000 MG: 1 INJECTION, POWDER, LYOPHILIZED, FOR SOLUTION INTRAVENOUS at 17:05

## 2020-01-01 RX ADMIN — CYANOCOBALAMIN 1000 MCG: 1000 INJECTION INTRAMUSCULAR; SUBCUTANEOUS at 15:38

## 2020-01-01 RX ADMIN — DOCUSATE SODIUM 100 MG: 50 LIQUID ORAL at 11:40

## 2020-01-01 RX ADMIN — LIDOCAINE HYDROCHLORIDE 5 ML: 20 INJECTION, SOLUTION INFILTRATION; PERINEURAL at 15:46

## 2020-01-01 RX ADMIN — CYANOCOBALAMIN 1000 MCG: 1000 INJECTION INTRAMUSCULAR; SUBCUTANEOUS at 16:33

## 2020-01-01 RX ADMIN — PIPERACILLIN AND TAZOBACTAM 3.38 G: 3; .375 INJECTION, POWDER, LYOPHILIZED, FOR SOLUTION INTRAVENOUS at 22:48

## 2020-01-01 RX ADMIN — ACETAMINOPHEN 650 MG: 325 TABLET, FILM COATED ORAL at 00:25

## 2020-01-01 RX ADMIN — METHYLPREDNISOLONE SODIUM SUCCINATE 40 MG: 40 INJECTION, POWDER, FOR SOLUTION INTRAMUSCULAR; INTRAVENOUS at 09:26

## 2020-01-01 RX ADMIN — METHYLPREDNISOLONE SODIUM SUCCINATE 40 MG: 40 INJECTION, POWDER, FOR SOLUTION INTRAMUSCULAR; INTRAVENOUS at 09:14

## 2020-01-01 SDOH — ECONOMIC STABILITY: FOOD INSECURITY: WITHIN THE PAST 12 MONTHS, YOU WORRIED THAT YOUR FOOD WOULD RUN OUT BEFORE YOU GOT MONEY TO BUY MORE.: NEVER TRUE

## 2020-01-01 SDOH — ECONOMIC STABILITY: INCOME INSECURITY: HOW HARD IS IT FOR YOU TO PAY FOR THE VERY BASICS LIKE FOOD, HOUSING, MEDICAL CARE, AND HEATING?: NOT VERY HARD

## 2020-01-01 SDOH — ECONOMIC STABILITY: TRANSPORTATION INSECURITY
IN THE PAST 12 MONTHS, HAS THE LACK OF TRANSPORTATION KEPT YOU FROM MEDICAL APPOINTMENTS OR FROM GETTING MEDICATIONS?: NO

## 2020-01-01 SDOH — ECONOMIC STABILITY: TRANSPORTATION INSECURITY
IN THE PAST 12 MONTHS, HAS LACK OF TRANSPORTATION KEPT YOU FROM MEETINGS, WORK, OR FROM GETTING THINGS NEEDED FOR DAILY LIVING?: NO

## 2020-01-01 SDOH — ECONOMIC STABILITY: FOOD INSECURITY: WITHIN THE PAST 12 MONTHS, THE FOOD YOU BOUGHT JUST DIDN'T LAST AND YOU DIDN'T HAVE MONEY TO GET MORE.: NEVER TRUE

## 2020-01-01 ASSESSMENT — PULMONARY FUNCTION TESTS
PIF_VALUE: 28
PIF_VALUE: 34
PIF_VALUE: 60
PIF_VALUE: 35
PIF_VALUE: 45
PIF_VALUE: 35
PIF_VALUE: 59
PIF_VALUE: 37
PIF_VALUE: 18
PIF_VALUE: 44
PIF_VALUE: 31
PIF_VALUE: 69
PIF_VALUE: 19
PIF_VALUE: 35
PIF_VALUE: 29
PIF_VALUE: 46
PIF_VALUE: 35
PIF_VALUE: 35
PIF_VALUE: 31
PIF_VALUE: 62
PIF_VALUE: 35
PIF_VALUE: 35
PIF_VALUE: 45
PIF_VALUE: 17
PIF_VALUE: 50
PIF_VALUE: 44
PIF_VALUE: 27
PIF_VALUE: 42
PIF_VALUE: 64
PIF_VALUE: 43
PIF_VALUE: 18
PIF_VALUE: 27
PIF_VALUE: 19
PIF_VALUE: 29
PIF_VALUE: 18
PIF_VALUE: 44
PIF_VALUE: 57
PIF_VALUE: 30
PIF_VALUE: 38
PIF_VALUE: 35
PIF_VALUE: 18
PIF_VALUE: 11
PIF_VALUE: 34
PIF_VALUE: 35
PIF_VALUE: 62
PIF_VALUE: 40
PIF_VALUE: 35
PIF_VALUE: 44
PIF_VALUE: 35
PIF_VALUE: 36
PIF_VALUE: 35
PIF_VALUE: 38
PIF_VALUE: 35
PIF_VALUE: 19
PIF_VALUE: 17
PIF_VALUE: 41
PIF_VALUE: 41
PIF_VALUE: 36
PIF_VALUE: 30
PIF_VALUE: 44
PIF_VALUE: 35
PIF_VALUE: 35
PIF_VALUE: 17
PIF_VALUE: 26
PIF_VALUE: 18
PIF_VALUE: 19
PIF_VALUE: 17
PIF_VALUE: 35
PIF_VALUE: 35
PIF_VALUE: 12
PIF_VALUE: 61
PIF_VALUE: 35
PIF_VALUE: 17
PIF_VALUE: 36
PIF_VALUE: 32
PIF_VALUE: 64
PIF_VALUE: 32
PIF_VALUE: 17
PIF_VALUE: 11
PIF_VALUE: 35
PIF_VALUE: 30
PIF_VALUE: 41
PIF_VALUE: 35
PIF_VALUE: 44
PIF_VALUE: 52
PIF_VALUE: 35
PIF_VALUE: 24
PIF_VALUE: 35
PIF_VALUE: 35
PIF_VALUE: 45
PIF_VALUE: 45
PIF_VALUE: 43
PIF_VALUE: 35
PIF_VALUE: 23
PIF_VALUE: 66
PIF_VALUE: 65
PIF_VALUE: 46
PIF_VALUE: 35
PIF_VALUE: 26
PIF_VALUE: 19
PIF_VALUE: 46
PIF_VALUE: 35
PIF_VALUE: 26
PIF_VALUE: 46
PIF_VALUE: 35
PIF_VALUE: 64
PIF_VALUE: 35
PIF_VALUE: 44
PIF_VALUE: 35
PIF_VALUE: 12
PIF_VALUE: 43
PIF_VALUE: 25
PIF_VALUE: 35
PIF_VALUE: 19
PIF_VALUE: 35
PIF_VALUE: 19
PIF_VALUE: 35
PIF_VALUE: 42
PIF_VALUE: 17
PIF_VALUE: 56
PIF_VALUE: 35
PIF_VALUE: 17
PIF_VALUE: 42
PIF_VALUE: 44
PIF_VALUE: 35
PIF_VALUE: 35
PIF_VALUE: 18
PIF_VALUE: 33
PIF_VALUE: 35
PIF_VALUE: 65
PIF_VALUE: 35
PIF_VALUE: 60
PIF_VALUE: 35
PIF_VALUE: 18
PIF_VALUE: 59
PIF_VALUE: 35
PIF_VALUE: 53
PIF_VALUE: 35
PIF_VALUE: 26
PIF_VALUE: 35
PIF_VALUE: 19
PIF_VALUE: 35
PIF_VALUE: 65
PIF_VALUE: 11
PIF_VALUE: 42
PIF_VALUE: 19
PIF_VALUE: 35
PIF_VALUE: 25
PIF_VALUE: 12
PIF_VALUE: 36
PIF_VALUE: 18
PIF_VALUE: 35
PIF_VALUE: 42
PIF_VALUE: 36
PIF_VALUE: 35
PIF_VALUE: 31
PIF_VALUE: 36
PIF_VALUE: 26
PIF_VALUE: 35
PIF_VALUE: 35
PIF_VALUE: 43
PIF_VALUE: 11
PIF_VALUE: 52
PIF_VALUE: 65
PIF_VALUE: 60
PIF_VALUE: 35
PIF_VALUE: 43
PIF_VALUE: 18
PIF_VALUE: 38
PIF_VALUE: 27
PIF_VALUE: 35
PIF_VALUE: 46
PIF_VALUE: 18
PIF_VALUE: 18
PIF_VALUE: 44
PIF_VALUE: 36
PIF_VALUE: 20
PIF_VALUE: 43
PIF_VALUE: 35
PIF_VALUE: 44
PIF_VALUE: 63
PIF_VALUE: 44
PIF_VALUE: 61
PIF_VALUE: 19
PIF_VALUE: 35
PIF_VALUE: 35
PIF_VALUE: 36
PIF_VALUE: 59
PIF_VALUE: 12
PIF_VALUE: 35
PIF_VALUE: 61

## 2020-01-01 ASSESSMENT — ENCOUNTER SYMPTOMS
CHEST TIGHTNESS: 0
VOICE CHANGE: 0
ABDOMINAL PAIN: 0
BACK PAIN: 1
CHOKING: 0
VOMITING: 0
SINUS PRESSURE: 0
BACK PAIN: 1
TROUBLE SWALLOWING: 0
SORE THROAT: 0
CONSTIPATION: 1
EYE DISCHARGE: 0
VOICE CHANGE: 0
NAUSEA: 0
NAUSEA: 0
BACK PAIN: 0
DIARRHEA: 0
EYE DISCHARGE: 0
DIARRHEA: 0
EYE DISCHARGE: 0
SHORTNESS OF BREATH: 0
DIARRHEA: 0
NAUSEA: 0
COLOR CHANGE: 0
SINUS PRESSURE: 0
EYE ITCHING: 0
BOWEL INCONTINENCE: 0
STRIDOR: 0
RHINORRHEA: 0
SHORTNESS OF BREATH: 0
APNEA: 1
ABDOMINAL PAIN: 0
EYE ITCHING: 0
COUGH: 1
CONSTIPATION: 0
SHORTNESS OF BREATH: 0
VOICE CHANGE: 0
SORE THROAT: 0
COUGH: 0
SINUS PRESSURE: 1
CHEST TIGHTNESS: 0
EYES NEGATIVE: 1
SORE THROAT: 0
TROUBLE SWALLOWING: 0
EYE ITCHING: 0
NAUSEA: 0
RECTAL PAIN: 0
NAUSEA: 0
WHEEZING: 1
TROUBLE SWALLOWING: 0
VOMITING: 0
SHORTNESS OF BREATH: 1
WHEEZING: 1
SHORTNESS OF BREATH: 1
ALLERGIC/IMMUNOLOGIC NEGATIVE: 1
BOWEL INCONTINENCE: 0
ALLERGIC/IMMUNOLOGIC NEGATIVE: 1
WHEEZING: 1
CHEST TIGHTNESS: 1
CHOKING: 0
ABDOMINAL PAIN: 0
CHEST TIGHTNESS: 0
VOICE CHANGE: 0
NAUSEA: 0
WHEEZING: 1
WHEEZING: 1
COUGH: 1
RHINORRHEA: 0
APNEA: 1
WHEEZING: 1
CHEST TIGHTNESS: 0
DIARRHEA: 0
DIARRHEA: 0
NAUSEA: 0
CONSTIPATION: 1
SORE THROAT: 0
RHINORRHEA: 0
ANAL BLEEDING: 0
RHINORRHEA: 0
TROUBLE SWALLOWING: 0
WHEEZING: 1
ABDOMINAL PAIN: 0
DIARRHEA: 0
VOMITING: 0
VOMITING: 0
VOMITING: 1
SHORTNESS OF BREATH: 1
ABDOMINAL PAIN: 0
TROUBLE SWALLOWING: 0
SHORTNESS OF BREATH: 1
COUGH: 1
RESPIRATORY NEGATIVE: 1
RHINORRHEA: 0
ABDOMINAL PAIN: 0
PHOTOPHOBIA: 0
EYES NEGATIVE: 1
SINUS PRESSURE: 1
SINUS PRESSURE: 0
COUGH: 1
STRIDOR: 0
SHORTNESS OF BREATH: 1
SHORTNESS OF BREATH: 1
ALLERGIC/IMMUNOLOGIC NEGATIVE: 1
DIARRHEA: 0
EYE ITCHING: 0
WHEEZING: 1
EYE ITCHING: 0
VOMITING: 0
ABDOMINAL PAIN: 0
SHORTNESS OF BREATH: 1
ABDOMINAL PAIN: 0
SINUS PRESSURE: 0
VOICE CHANGE: 0
EYE DISCHARGE: 0
ABDOMINAL PAIN: 0
EYES NEGATIVE: 1
PHOTOPHOBIA: 0
EYE DISCHARGE: 0
BLOOD IN STOOL: 0
ABDOMINAL DISTENTION: 0
SORE THROAT: 0
TROUBLE SWALLOWING: 0
GASTROINTESTINAL NEGATIVE: 1
CHEST TIGHTNESS: 0
CHEST TIGHTNESS: 1
COUGH: 1
VOMITING: 1
SORE THROAT: 0
DIARRHEA: 0
SINUS PRESSURE: 0
VOICE CHANGE: 0

## 2020-01-01 ASSESSMENT — PAIN SCALES - GENERAL
PAINLEVEL_OUTOF10: 0
PAINLEVEL_OUTOF10: 5
PAINLEVEL_OUTOF10: 0

## 2020-01-01 ASSESSMENT — PATIENT HEALTH QUESTIONNAIRE - PHQ9
SUM OF ALL RESPONSES TO PHQ QUESTIONS 1-9: 0
SUM OF ALL RESPONSES TO PHQ QUESTIONS 1-9: 0
SUM OF ALL RESPONSES TO PHQ9 QUESTIONS 1 & 2: 0
1. LITTLE INTEREST OR PLEASURE IN DOING THINGS: 0
2. FEELING DOWN, DEPRESSED OR HOPELESS: 0

## 2020-01-01 ASSESSMENT — PAIN DESCRIPTION - LOCATION
LOCATION: ANKLE
LOCATION: ABDOMEN

## 2020-01-01 ASSESSMENT — PAIN DESCRIPTION - FREQUENCY: FREQUENCY: CONTINUOUS

## 2020-01-01 ASSESSMENT — PAIN DESCRIPTION - ORIENTATION: ORIENTATION: LEFT

## 2020-01-01 ASSESSMENT — PAIN DESCRIPTION - DESCRIPTORS: DESCRIPTORS: THROBBING;BURNING

## 2020-01-01 ASSESSMENT — PAIN DESCRIPTION - PAIN TYPE: TYPE: ACUTE PAIN

## 2020-01-13 NOTE — PROGRESS NOTES
entrapment neuropathy of left side    Midline low back pain with left-sided sciatica    Bilateral low back pain with sciatica    Rib pain on right side    Myalgia    High risk medication use - 11/07/17 OARRS PM&R, 02/07/18 OARRS PM&R, 05/30/17 Urine Drug Screen: negative PM&R, MED CONTRACT 1/26/17    Rib sprain    Acute pain of right knee    Acute right ankle pain    Chronic bilateral low back pain with sciatica    History of MRSA infection of lungs 9/2016    Osteoporosis    DDD (degenerative disc disease), lumbar    Chronic midline low back pain with left-sided sciatica    Bilateral carpal tunnel syndrome    Neck pain    Chronic thoracic spine pain    Moderate persistent asthma without complication    Right carpal tunnel syndrome    Former smoker, stopped smoking in distant past    Encounter for postoperative wound care    Hypoxia    Class 3 severe obesity due to excess calories with serious comorbidity and body mass index (BMI) of 40.0 to 44.9 in adult ([facility] [address]    Morbid obesity with BMI of 40.0-44.9, adult ([facility] [address]    Malignant neoplasm of lower lobe of right lung (HCC)    Postoperative hypothyroidism    Uncontrolled type 2 diabetes mellitus with hyperglycemia (HCC)    Ulnar neuropathy of both upper extremities    Dyspepsia    Esophagitis    Right lower lobe lung mass     Allergies   Allergen Reactions    Biaxin [Clarithromycin] Nausea Only     Nausea with diarrhea    Invokana [Canagliflozin]      Yeast infection     Victoza [Liraglutide] Nausea And Vomiting         Review of Systems    Vitals:    01/09/20 1536   BP: 138/73   Site: Left Upper Arm   Position: Sitting   Cuff Size: Large Adult   Pulse: 93   Resp: 16   SpO2: 93%   Weight: 177 lb (80.3 kg)   Height: 5' 1\" (1.549 m)       Objective:   Physical Exam  Constitutional:       Appearance: Normal appearance. She is obese. HENT:      Head: Normocephalic and atraumatic.    Neck:      Musculoskeletal: Normal range of motion and neck supple. Cardiovascular:      Rate and Rhythm: Normal rate. Musculoskeletal:        Feet:    Neurological:      Mental Status: She is alert. Assessment:       Diagnosis Orders   1. Uncontrolled type 2 diabetes mellitus with hyperglycemia (HCC)  POCT Glucose    HM DIABETES FOOT EXAM    Hemoglobin J7P    Basic Metabolic Panel   2. Postoperative hypothyroidism  TSH Without Reflex    T4, Free   3.  Ingrowing nail, left great toe             Plan:      Orders Placed This Encounter   Procedures    TSH Without Reflex     Standing Status:   Future     Standing Expiration Date:   2021    T4, Free     Standing Status:   Future     Standing Expiration Date:   2021    Hemoglobin A1C     Standing Status:   Future     Standing Expiration Date:   2021    Basic Metabolic Panel     Standing Status:   Future     Standing Expiration Date:   2021    POCT Glucose    HM DIABETES FOOT EXAM     Increase novolog dose   Orders Placed This Encounter   Medications    insulin aspart (NOVOLOG FLEXPEN) 100 UNIT/ML injection pen     Si units with each meals     Dispense:  30 pen     Refill:  3    cephALEXin (KEFLEX) 500 MG capsule     Sig: Take 1 capsule by mouth 3 times daily     Dispense:  30 capsule     Refill:  0     Continue Basaglar 100 units at bedtime  Continue Synthroid 150 mcg daily        Ivy Maciel MD

## 2020-01-13 NOTE — PROGRESS NOTES
1/10/2020    Johnny Mendez (:  1950) is a 78 y.o. female, here for a preventive medicine evaluation. Patient Active Problem List   Diagnosis    Asthma-COPD overlap syndrome ([facility]    Asthma    Diabetes mellitus type 2 in obese ([facility]    ROGELIO on CPAP    CAD (coronary artery disease)    HTN (hypertension)    Hyperlipidemia with target LDL less than 70    Renal artery stenosis (HCC)    Obesity (BMI 30-39. 9)    Osteoporosis    Anxiety    Suprascapular entrapment neuropathy of left side    Midline low back pain with left-sided sciatica    Bilateral low back pain with sciatica    Rib pain on right side    Myalgia    High risk medication use - 17 OARRS PM&R, 18 OARRS PM&R, 17 Urine Drug Screen: negative PM&R, MED CONTRACT 17    Rib sprain    Acute pain of right knee    Acute right ankle pain    Chronic bilateral low back pain with sciatica    History of MRSA infection of lungs 2016    Osteoporosis    DDD (degenerative disc disease), lumbar    Chronic midline low back pain with left-sided sciatica    Bilateral carpal tunnel syndrome    Neck pain    Chronic thoracic spine pain    Moderate persistent asthma without complication    Right carpal tunnel syndrome    Former smoker, stopped smoking in distant past    Encounter for postoperative wound care    Hypoxia    Class 3 severe obesity due to excess calories with serious comorbidity and body mass index (BMI) of 40.0 to 44.9 in adult ([facility]    Morbid obesity with BMI of 40.0-44.9, adult ([facility]    Malignant neoplasm of lower lobe of right lung (HCC)    Postoperative hypothyroidism    Uncontrolled type 2 diabetes mellitus with hyperglycemia (HCC)    Ulnar neuropathy of both upper extremities    Dyspepsia    Esophagitis    Right lower lobe lung mass       Review of Systems   Constitutional: Negative. Negative for activity change, appetite change, fatigue and unexpected weight change.    HENT: UNIT/ML injection pen Inject 100 units at night  Noel Tolliver MD   PAMELA-24 300 MG extended release capsule   Historical Provider, MD   diclofenac sodium (VOLTAREN) 1 % GEL Apply 4 g topically 4 times daily Generic equivalent acceptable, unless otherwise noted.   Lauren Scullin, DO   topiramate (TOPAMAX) 25 MG tablet TAKE 1 TABLET NIGHTLY  Lauren Scullin, DO   diclofenac sodium 1 % GEL Apply 4 g topically 4 times daily  Lauren Scullin, DO   levothyroxine (SYNTHROID) 150 MCG tablet Take 1 tablet by mouth Daily  Noel Tolliver MD   Umeclidinium Bromide 62.5 MCG/INH AEPB Inhale 1 puff into the lungs daily  Alejandra Ring MD   Cholecalciferol (VITAMIN D3) 24189 units CAPS 1 po q  weekly  Cassidy Finch MD   Roflumilast (DALIRESP) 500 MCG tablet Take 1 tablet by mouth daily  Alejandra iRng MD   NOVOFINE 32G X 6 MM MISC qid  Noel Tolliver MD   Liraglutide (VICTOZA) 18 MG/3ML SOPN SC injection Inject 1.8 mg into the skin daily  Lyn Davidson MD   CPAP Machine MISC by Does not apply route Change CPAP pressure to 10 cm  Alejandra Ring MD   guaiFENesin (MUCINEX) 600 MG extended release tablet Take 1 tablet by mouth 2 times daily  Amada Wright MD   ipratropium-albuterol (DUONEB) 0.5-2.5 (3) MG/3ML SOLN nebulizer solution Inhale 3 mLs into the lungs every 6 hours  Alejandra Ring MD   blood glucose test strips (ONE TOUCH ULTRA TEST) strip USE TO TEST TWICE A DAY AND AS NEEDED, IDDM - Dx: E11.9  Lyn Davidson MD   OXYGEN Inhale into the lungs 2 liters at night and during the day 3 liters  Historical Provider, MD   fluocinonide (LIDEX) 0.05 % ointment APPLY OINTMENT TOPICALLY TWICE DAILY FOR 2 WEEKS  Cassidy Finch MD   formoterol (PERFOROMIST) 20 MCG/2ML nebulizer solution Inhale 2 mLs into the lungs daily  Historical Provider, MD   fluticasone (FLONASE) 50 MCG/ACT nasal spray 2 sprays by Nasal route daily  Gloria L Lucak, PA-C   Insulin Pen Needle (B-D UF III MINI PEN NEEDLES) 31G X 5 MM MISC USE 1 DAILY TO INJECT Ric Ortiz MD   nystatin (MYCOSTATIN) 074792 UNIT/ML suspension Take 5 mLs by mouth 4 times daily  Monica Avila MD   Nebulizers (COMPRESSOR/NEBULIZER) MISC Dispense Nebulizer supplies  Monica Avila MD   atorvastatin (LIPITOR) 80 MG tablet Take 1 tablet by mouth nightly  Historical Provider, MD   nitroGLYCERIN (NITROLINGUAL) 0.4 MG/SPRAY 0.4 mg spray   Historical Provider, MD   albuterol sulfate (PROAIR RESPICLICK) 799 (90 Base) MCG/ACT aerosol powder inhalation Inhale 2 puffs into the lungs every 6 hours as needed for Wheezing or Shortness of Breath  Ashok Radene Hodgkin, MD   mepolizumab (NUCALA) 100 MG SOLR injection Inject 100 mg into the skin Received from: [facility] Received Sig: Inject 100 mg subcutaneously one time only. monthly  Historical Provider, MD   ONE TOUCH LANCETS MISC USE TO TEST TWICE A DAY AND AS NEEDED, IDDM - Dx: E11.9  Alfreida Sacks, MD   Blood Glucose Monitoring Suppl MARCELLUS One Touch Ultra - To Test BID - IDDM - Dx: E11.9  Alfreida Sacks, MD   isosorbide mononitrate (IMDUR) 60 MG CR tablet Take 60 mg by mouth daily   Historical Provider, MD   budesonide (PULMICORT) 0.5 MG/2ML nebulizer suspension Take 1 ampule by nebulization 2 times daily  Historical Provider, MD   digoxin (DIGOX) 125 MCG tablet Take 125 mcg by mouth Daily with lunch   Historical Provider, MD   clopidogrel (PLAVIX) 75 MG tablet Take 75 mg by mouth daily.     Historical Provider, MD   fenofibrate (TRICOR) 145 MG tablet Take 145 mg by mouth every evening   Historical Provider, MD   verapamil (VERELAN PM) 240 MG CR capsule Take 240 mg by mouth 2 times daily   Historical Provider, MD        Allergies   Allergen Reactions    Biaxin [Clarithromycin] Nausea Only     Nausea with diarrhea    Invokana [Canagliflozin]      Yeast infection     Victoza [Liraglutide] Nausea And Vomiting       Past Medical History:   Diagnosis Date    Anxiety     Asthma     Back pain     Bronchitis     CAD (coronary artery ISELA DIGITAL SCREEN W CAD BILATERAL     Standing Status:   Future     Standing Expiration Date:   2021     Order Specific Question:   Reason for exam:     Answer:   v76.12    Pap Smear     Patient History:    Patient's last menstrual period was 2001. OBGYN Status: Postmenopausal  Past Surgical History:  No date: ANKLE SURGERY; Left      Comment:  hardware in & removed  No date: ANKLE SURGERY; Right      Comment:  Plate in right ankle  2015: [address]; Left  No date: CHOLECYSTECTOMY  2012: COLONOSCOPY  No date: CORONARY ANGIOPLASTY WITH STENT PLACEMENT  No date: ENDOSCOPY, COLON, DIAGNOSTIC  2017: LUNG BIOPSY      Comment:  pleural biopsy  2018: [place] REVISE MEDIAN N/CARPAL TUNNEL SURG; Right      Comment:  RIGHT WRIST CARPAL TUNNEL RELEASE, ( TO BE IN ROOM 12                WITH ROOM 2 TEAM) performed by Escobar Singh MD at               [facility]  2018: [place] THYROIDECTOMY; Left      Comment:  LEFT JOSELIN THYROIDECTOMY performed by Rose Mary Montenegro MD at                [facility]  No date: PTCA  No date: THYROIDECTOMY      Comment:  partial  2012: UPPER GASTROINTESTINAL ENDOSCOPY  14 : UPPER GASTROINTESTINAL ENDOSCOPY      Comment:  DR. MAE  2019: UPPER GASTROINTESTINAL ENDOSCOPY; N/A      Comment:  EGD ESOPHAGOGASTRODUODENOSCOPY performed by Stormy Morales MD at [address] [place] [address] History    Tobacco Use      Smoking status: Former Smoker        Packs/day: 1.50        Years: 32.00        Pack years: 50        Types: Cigarettes        Quit date: 2001        Years since quittin.8      Smokeless tobacco: Never Used       Standing Status:   Future     Number of Occurrences:   1     Standing Expiration Date:   1/10/2021     Order Specific Question:   Collection Type     Answer:   SurePath     Order Specific Question:   Prior Abnormal Pap Test     Answer:   No     Order Specific Question:   Screening or Diagnostic     Answer: Screening     Order Specific Question:   HPV Requested? Answer:   N/A     Order Specific Question:   High Risk Patient     Answer:   N/A     No orders of the defined types were placed in this encounter. There are no discontinued medications. Return in about 1 year (around 1/10/2021). Reviewed with the patient: current clinical status, medications, activities and diet. Side effects, adverse effects of the medication prescribed today, as well as treatment plan/ rationale and result expectations have been discussed with the patient who expresses understanding and desires to proceed. Close follow up to evaluate treatment results and for coordination of care. I have reviewed the patient's medical history in detail and updated the computerized patient record.     Christopher Mckeon, APRN - CNP

## 2020-01-16 NOTE — PROGRESS NOTES
 Type II or unspecified type diabetes mellitus without mention of complication, not stated as uncontrolled     Unspecified sleep apnea     UTI (urinary tract infection)      Past Surgical History:   Procedure Laterality Date    ANKLE SURGERY Left     hardware in & removed    ANKLE SURGERY Right     Plate in right ankle    CARPAL TUNNEL RELEASE Left 2015    CHOLECYSTECTOMY      COLONOSCOPY  09/11/2012    CORONARY ANGIOPLASTY WITH STENT PLACEMENT      ENDOSCOPY, COLON, DIAGNOSTIC      LUNG BIOPSY  09/07/2017    pleural biopsy    OR REVISE MEDIAN N/CARPAL TUNNEL SURG Right 5/24/2018    RIGHT WRIST CARPAL TUNNEL RELEASE, ( TO BE IN ROOM 12 WITH ROOM 2 TEAM) performed by Trish Palomares MD at [address] [place] [address] Left 6/19/2018    LEFT JOSELIN THYROIDECTOMY performed by Funmilayo Henley MD at [place] [address]      partial    UPPER GASTROINTESTINAL ENDOSCOPY  09/11/2012    UPPER GASTROINTESTINAL ENDOSCOPY  12/4/14     DR. MAE    UPPER GASTROINTESTINAL ENDOSCOPY N/A 8/1/2019    EGD ESOPHAGOGASTRODUODENOSCOPY performed by Greg Levy MD at [facility]     Family History   Problem Relation Age of Onset    High Blood Pressure Mother     Heart Disease Mother     Cancer Father         LUNG    High Blood Pressure Brother     COPD Brother     Heart Disease Brother     Heart Attack Brother     COPD Sister     Heart Disease Sister     Arthritis Maternal [de-identified]     COPD Sister     No Known Problems Daughter     No Known Problems Son      Social History     Socioeconomic History    Marital status:      Spouse name: Not on file    Number of children: Not on file    Years of education: Not on file    Highest education level: Not on file   Occupational History    Occupation: Retired   Social Needs    Financial resource strain: Not on file    Food insecurity:     Worry: Not on file     Inability: Not on file   SupplyFrame needs:     Medical: Not on file     Non-medical: Not on file   Tobacco Use    Smoking status: Former Smoker     Packs/day: 1.50     Years: 32.00     Pack years: 48.00     Types: Cigarettes     Last attempt to quit: 2001     Years since quittin.9    Smokeless tobacco: Never Used   Substance and Sexual Activity    Alcohol use: No     Alcohol/week: 0.0 standard drinks    Drug use: No    Sexual activity: Yes     Partners: Male   Lifestyle    Physical activity:     Days per week: Not on file     Minutes per session: Not on file    Stress: Not on file   Relationships    Social connections:     Talks on phone: Not on file     Gets together: Not on file     Attends Yazidism service: Not on file     Active member of club or organization: Not on file     Attends meetings of clubs or organizations: Not on file     Relationship status: Not on file    Intimate partner violence:     Fear of current or ex partner: Not on file     Emotionally abused: Not on file     Physically abused: Not on file     Forced sexual activity: Not on file   Other Topics Concern    Not on file   Social History Narrative    Not on file         Review of Systems   Constitutional: Negative for chills, diaphoresis, fatigue and fever. HENT: Negative for congestion, mouth sores, nosebleeds, postnasal drip, rhinorrhea, sinus pressure, sneezing, sore throat, trouble swallowing and voice change. Eyes: Negative for discharge, itching and visual disturbance. Respiratory: Positive for cough, shortness of breath and wheezing. Negative for chest tightness. Cardiovascular: Negative for chest pain, palpitations and leg swelling. Gastrointestinal: Negative for abdominal pain, diarrhea, nausea and vomiting. Genitourinary: Negative for difficulty urinating and hematuria. Musculoskeletal: Negative for arthralgias, joint swelling and myalgias. Skin: Negative for rash. Allergic/Immunologic: Negative for environmental allergies and food allergies. Neurological: Negative for dizziness, tremors, weakness and headaches. Psychiatric/Behavioral: Negative for behavioral problems and sleep disturbance.         :     Vitals:    01/16/20 1356 01/16/20 1411   BP: 130/62    Pulse: 81    Resp: 16    Temp: 98.3 °F (36.8 °C)    TempSrc: Tympanic    SpO2: (!) 89% 96%   Weight: 167 lb (75.8 kg)    Height: 5' 1\" (1.549 m)      Wt Readings from Last 3 Encounters:   01/16/20 167 lb (75.8 kg)   01/10/20 177 lb (80.3 kg)   01/09/20 177 lb (80.3 kg)         Physical Exam  Constitutional:       General: She is not in acute distress. Appearance: She is well-developed. She is not diaphoretic. HENT:      Head: Normocephalic and atraumatic. Nose: Nose normal.   Eyes:      Pupils: Pupils are equal, round, and reactive to light. Neck:      Thyroid: No thyromegaly. Vascular: No JVD. Trachea: No tracheal deviation. Cardiovascular:      Rate and Rhythm: Normal rate and regular rhythm. Heart sounds: No murmur. No friction rub. No gallop. Pulmonary:      Effort: No respiratory distress. Breath sounds: No wheezing or rales. Comments: diminished Breath sound bilaterally. Chest:      Chest wall: No tenderness. Abdominal:      General: There is no distension. Tenderness: There is no tenderness. There is no rebound. Musculoskeletal: Normal range of motion. Lymphadenopathy:      Cervical: No cervical adenopathy. Skin:     General: Skin is warm and dry. Neurological:      Mental Status: She is alert and oriented to person, place, and time.       Coordination: Coordination normal.         Current Outpatient Medications   Medication Sig Dispense Refill    insulin aspart (NOVOLOG FLEXPEN) 100 UNIT/ML injection pen 20 units with each meals 30 pen 3    cephALEXin (KEFLEX) 500 MG capsule Take 1 capsule by mouth 3 times daily 30 capsule 0    predniSONE (DELTASONE) 10 MG tablet TAKE 2 TABLETS FOR 7 DAYS, THEN 1 TABLET DAILY 67 tablet 1    100 each 1    nystatin (MYCOSTATIN) 221644 UNIT/ML suspension Take 5 mLs by mouth 4 times daily 200 mL 1    Nebulizers (COMPRESSOR/NEBULIZER) MISC Dispense Nebulizer supplies 1 each 3    atorvastatin (LIPITOR) 80 MG tablet Take 1 tablet by mouth nightly      nitroGLYCERIN (NITROLINGUAL) 0.4 MG/SPRAY 0.4 mg spray       albuterol sulfate (PROAIR RESPICLICK) 383 (90 Base) MCG/ACT aerosol powder inhalation Inhale 2 puffs into the lungs every 6 hours as needed for Wheezing or Shortness of Breath 1 Inhaler 5    mepolizumab (NUCALA) 100 MG SOLR injection Inject 100 mg into the skin Received from: [facility] Received Sig: Inject 100 mg subcutaneously one time only. monthly      ONE TOUCH LANCETS MISC USE TO TEST TWICE A DAY AND AS NEEDED, IDDM - Dx: E11.9 200 each 3    Blood Glucose Monitoring Suppl MARCELLUS One Touch Ultra - To Test BID - IDDM - Dx: E11.9 1 Device 0    isosorbide mononitrate (IMDUR) 60 MG CR tablet Take 60 mg by mouth daily       budesonide (PULMICORT) 0.5 MG/2ML nebulizer suspension Take 1 ampule by nebulization 2 times daily      digoxin (DIGOX) 125 MCG tablet Take 125 mcg by mouth Daily with lunch       clopidogrel (PLAVIX) 75 MG tablet Take 75 mg by mouth daily.  fenofibrate (TRICOR) 145 MG tablet Take 145 mg by mouth every evening       verapamil (VERELAN PM) 240 MG CR capsule Take 240 mg by mouth 2 times daily       LORazepam (ATIVAN) 0.5 MG tablet Take 1 tablet by mouth every 12 hours as needed for Anxiety for up to 30 days. 24 tablet 0    zoledronic acid (RECLAST) 5 MG/100ML SOLN Infuse 100 mLs intravenously once for 1 dose 100 mL 0    diclofenac sodium (VOLTAREN) 1 % GEL Apply 4 g topically 4 times daily Generic equivalent acceptable, unless otherwise noted. 1500 g 3     No current facility-administered medications for this visit.              [facility]   0541  -  CT CHEST WO IVCON  / ACCESSION #  [id number]    PROCEDURE REASON:          * * * * Physician Interpretation * * * *     * * * * * * * * ORIGINAL REPORT * * * * * * * *  EXAMINATION:  CHEST CT WITHOUT CONTRAST    CLINICAL HISTORY: History obtained from EMR: 78-year-old female with   radiographic malignant neoplasm showing serial CT growth, mild increase   in FDG avidity on PET in the right lower lobe of lung, status post   radiation therapy completed in February 2019    Technique:  Spiral CT acquisition of the chest from the thoracic inlet to   the upper abdomen without contrast.  MQ:  [id number]  CT Dose-Length Product:  272 mGy*cm  CT Dose Reduction Employed: Automated exposure control (AEC)    Comparison: Type of study and date/time      RESULT:    Limitations:  None. Lines, tubes, and devices:  None. Lung parenchyma and pleura: Moderate to severe upper lobe predominant   centrilobular emphysema and diffuse smooth central and peripheral   bronchial wall thickening in both lungs.  No endobronchial soft tissue   densities in the trachea or major bronchi.  There is evidence of treated   malignancy in the right lower lobe associated with increase in irregular   fibrotic-type consolidations, architectural distortion and traction   bronchiectasis (image 115).  The spiculated right lower lobe nodule which   measured 9 x 14 mm on the immediate prior scan, now measures 7 x 13 mm   (image 100).  Stable 5 mm nodule in the middle lobe (image 115), with a   triangular morphology suggestive of possible lymph node.  A lobulated   subpleural nodule in the lateral left lower lobe (image 130), appears   unchanged and measures approximately 10 x 16 mm.  Scattered linear   atelectases is seen in the bilateral lower lobes and anterior left upper   lobe.  No pleural effusion or pneumothorax. Thoracic inlet, heart, and mediastinum:  Thyroid gland is not visualized.     There is minimal soft tissue thickening along the left thyroid bed   (image 6), stable and nonspecific.  No supraclavicular lymphadenopathy in   the field-of-view.  No axillary imaging study such as   ultrasound for further evaluation. No thoracic lymphadenopathy. Moderate severe centrilobular emphysema and diffuse airway wall   inflammatory changes in both lungs. Diffuse coronary artery atherosclerotic calcifications status post PCI. Results for orders placed during the hospital encounter of 12/28/18   XR CHEST STANDARD (2 VW)    Narrative EXAMINATION: CHEST TWO VIEWS     CLINICAL HISTORY: Short of breath    COMPARISONS: August 9, 2018     FINDINGS:    Two views of the chest are submitted. The cardiac silhouette is of normal size configuration. The mediastinum is unremarkable. Pulmonary vascular is attenuated, lungs are hyperinflated and there is some widening of the AP diameter the chest and coarsening of the interstitium. Right sided trachea. There is areas of atelectasis, superimposed groundglass infiltrates in both bases. .  No effusions. Pneumothoraces. Clinical note: The nodule(s) seen on the previous chest x-ray, CT scan of August 20, 2018 is not well appreciated. Impression BIBASILAR AREAS OF ATELECTASIS, PATCHY GROUNDGLASS INFILTRATES IN BOTH BASES SUPERIMPOSED UPON COPD. THE NODULES APPRECIATED ON THE CT SCAN OF CHEST AUGUST 20, 2018 ARE NOT WELL APPRECIATED BY PLAIN FILM. GIVEN THE INTERVAL TIME RECOMMEND CT SCAN THE CHEST WITH IV CONTRAST TO FURTHER EVALUATE. .   ]  Results for orders placed during the hospital encounter of 10/13/16   XR Chest Portable    Narrative EXAMINATION: CHEST PORTABLE VIEW  CLINICAL HISTORY: Short of breath  COMPARISONS: September 6, 2016  FINDINGS:  Single  views of the chest is submitted. The cardiac silhouette is enlarged, unchanged configuration. The mediastinum is unremarkable. Pulmonary vascular unremarkable. Right sided trachea. Small areas of atelectasis both bases  No focal infiltrates. No Pneumothoraces.     Impression NO ACUTE ACTIVE thoracic and abdominal aorta. Severe diffuse fatty infiltration liver status post previous cholecystectomy. Visualized esophagus,   stomach, spleen, pancreas, adrenal glands and kidneys are unremarkable. No large lytic or blastic destructive bony lesion is identified. No acute fracture is seen. No right rib fractures noted. Impression 1. Spiculated mass right lower lobe seen on previous exam is diminished in size but remains highly suspicious for primary lung malignancy. 2. Interval development of a second area of parenchymal abnormality noted, measuring 2.0 x 1.3 cm, concerning for possible satellite malignancy/adjacent lesion. PET/CT recommended  3. Severe diffuse fatty infiltration of liver status post previous cholecystectomy  4. I cannot identify an acute rib fracture or abnormal lesion in the region of concern on the nuclear medicine bone scan. This does not exclude potential metastatic disease involving the right ribs as demonstrated on the nuclear medicine bone scan.   ]                           [address]                                [place], [place] [address]                                                                                      Test Date:     2020  Pat Name:     Juan Hernandez         Department:      Patient ID:    [id number]                 Room:            Gender:        Female                    Technician:    Genoveva WATTERS  :           1950                Requested By:    Order Number:  [id number]       Reading MD: Aries Doran M.D.                               Interpretive Statements  ATS acceptability and repeatability standards for spirometry met. ATS  acceptability and repeatability standards for DLCO met. DLCO is not hemoglobin  corrected.  The discard volume was reduced to 0.5 L; the sample volume was reduced  to 0.5 L. // SA     IMPRESSION:  Spirometry indicates moderately severe obstruction. The diffusing capacity is normal.    Electronically Signed On 1-7-2020 16:08:54 EST by Pankaj Rangel M.D.                     Nereida   LLN  Pred   ULN     %  FVC          L   2.29  1.83  2.50  3.21  91.6  FEV1         L   1.02  1.42  1.96  2.47  52.2  FEV1/FVC     %     45    65    79    90  56.7  PEF        L/s   3.19  3.59  5.12  6.65  62.3  FEF50%     L/s   0.33  1.42  2.77  4.11  11.9  FIF50%     L/s   4.67                          FE%FIF       %      7                          FXM11-56%  L/s   0.27  0.80  1.72  3.04  15.8  FEV6         L   1.86  1.85  2.46  3.07  75.7  AZW570%    sec  14.99                          FETPEF     sec   0.05                          VBe%FV       %      1                          VBEex        L   0.03                                          ----- ----- ----- ----- -----                  ----- ----- ----- ----- -----                  ----- ----- ----- ----- -----  DLCO_SBml/(min*m13.08 12.58 19.08 25.58  68.6  VA_SB        L   3.57  3.39  4.49  5.59  79.5  DL/VAml/(min*mmHg3.67  3.13  4.45  5.77  82.4  IVC_SB       L   2.27  1.83  2.50  3.21  90.6  IC_SB        L   1.72  1.59  1.59  1.59 108.5    Assessment/Plan:     1. Asthma-COPD overlap syndrome ([address]  She is on bronchodilator with DuoNeb 4 times a day when necessary, Perforomist twice a day, Pulmicort twice a day, Incuse ellipta,  Theophylline 600 mg daily, daliresp 250 mcg daily. prednisone 10 mg daily. She is following with [facility] oncology. Last Ct chest 10/9 /19 at [facility]  She is on Nucala shot every 3 weeks. C/o shortness of breath , worse with exertion. No new changes  On and off Wheezing , Cough with clear  Sputum. Continue O2 , bronchodilator and prednisone as before. 2. Malignant neoplasm of lower lobe of right lung [facility])  She is following at [facility] , CT chest done at [facility] reviewed. 3. Hypoxia  She is on 2-3 lit O2 during daytime ,  Lit with sleep     4.  ROGELIO on CPAP  She is using CPAP with  8 centimeters

## 2020-01-22 PROBLEM — M54.17 LUMBOSACRAL RADICULOPATHY AT S1: Status: ACTIVE | Noted: 2020-01-01

## 2020-02-03 NOTE — PROGRESS NOTES
abused: None     Forced sexual activity: None   Other Topics Concern    None   Social History Narrative    None     Current Outpatient Medications   Medication Sig Dispense Refill    insulin aspart (NOVOLOG FLEXPEN) 100 UNIT/ML injection pen 20 units with each meals 30 pen 3    cephALEXin (KEFLEX) 500 MG capsule Take 1 capsule by mouth 3 times daily 30 capsule 0    predniSONE (DELTASONE) 10 MG tablet TAKE 2 TABLETS FOR 7 DAYS, THEN 1 TABLET DAILY 67 tablet 1    esomeprazole (NEXIUM) 40 MG delayed release capsule TAKE 1 CAPSULE DAILY 90 capsule 1    gabapentin (NEURONTIN) 300 MG capsule PLEASE SEE ATTACHED FOR DETAILED DIRECTIONS  2    predniSONE (DELTASONE) 10 MG tablet 2 TABLETS DAILY FOR 7 DAYS, THEN 1 TAB DAILY.  67 tablet 0    insulin glargine (BASAGLAR KWIKPEN) 100 UNIT/ML injection pen Inject 100 units at night 90 pen 3    PAMELA-24 300 MG extended release capsule       topiramate (TOPAMAX) 25 MG tablet TAKE 1 TABLET NIGHTLY 60 tablet 3    diclofenac sodium 1 % GEL Apply 4 g topically 4 times daily 5 Tube 5    levothyroxine (SYNTHROID) 150 MCG tablet Take 1 tablet by mouth Daily 90 tablet 3    Umeclidinium Bromide 62.5 MCG/INH AEPB Inhale 1 puff into the lungs daily 90 each 3    Cholecalciferol (VITAMIN D3) 37900 units CAPS 1 po q  weekly 4 capsule 1    Roflumilast (DALIRESP) 500 MCG tablet Take 1 tablet by mouth daily 90 tablet 3    NOVOFINE 32G X 6 MM MISC qid 400 each 3    Liraglutide (VICTOZA) 18 MG/3ML SOPN SC injection Inject 1.8 mg into the skin daily 9 pen 3    CPAP Machine MISC by Does not apply route Change CPAP pressure to 10 cm 1 each 0    guaiFENesin (MUCINEX) 600 MG extended release tablet Take 1 tablet by mouth 2 times daily 60 tablet 1    ipratropium-albuterol (DUONEB) 0.5-2.5 (3) MG/3ML SOLN nebulizer solution Inhale 3 mLs into the lungs every 6 hours 360 mL 3    blood glucose test strips (ONE TOUCH ULTRA TEST) strip USE TO TEST TWICE A DAY AND AS NEEDED, IDDM - Dx: E11.9 100 each 1    OXYGEN Inhale into the lungs 2 liters at night and during the day 3 liters      fluocinonide (LIDEX) 0.05 % ointment APPLY OINTMENT TOPICALLY TWICE DAILY FOR 2 WEEKS 30 g 1    formoterol (PERFOROMIST) 20 MCG/2ML nebulizer solution Inhale 2 mLs into the lungs daily      fluticasone (FLONASE) 50 MCG/ACT nasal spray 2 sprays by Nasal route daily 1 Bottle 0    Insulin Pen Needle (B-D UF III MINI PEN NEEDLES) 31G X 5 MM MISC USE 1 DAILY TO INJECT LANTUS 100 each 1    nystatin (MYCOSTATIN) 989255 UNIT/ML suspension Take 5 mLs by mouth 4 times daily 200 mL 1    Nebulizers (COMPRESSOR/NEBULIZER) MISC Dispense Nebulizer supplies 1 each 3    atorvastatin (LIPITOR) 80 MG tablet Take 1 tablet by mouth nightly      nitroGLYCERIN (NITROLINGUAL) 0.4 MG/SPRAY 0.4 mg spray       albuterol sulfate (PROAIR RESPICLICK) 496 (90 Base) MCG/ACT aerosol powder inhalation Inhale 2 puffs into the lungs every 6 hours as needed for Wheezing or Shortness of Breath 1 Inhaler 5    mepolizumab (NUCALA) 100 MG SOLR injection Inject 100 mg into the skin Received from: [facility] [place] Received Sig: Inject 100 mg subcutaneously one time only. monthly      ONE TOUCH LANCETS MISC USE TO TEST TWICE A DAY AND AS NEEDED, IDDM - Dx: E11.9 200 each 3    Blood Glucose Monitoring Suppl MARCELLUS One Touch Ultra - To Test BID - IDDM - Dx: E11.9 1 Device 0    isosorbide mononitrate (IMDUR) 60 MG CR tablet Take 60 mg by mouth daily       budesonide (PULMICORT) 0.5 MG/2ML nebulizer suspension Take 1 ampule by nebulization 2 times daily      digoxin (DIGOX) 125 MCG tablet Take 125 mcg by mouth Daily with lunch       clopidogrel (PLAVIX) 75 MG tablet Take 75 mg by mouth daily.         fenofibrate (TRICOR) 145 MG tablet Take 145 mg by mouth every evening       verapamil (VERELAN PM) 240 MG CR capsule Take 240 mg by mouth 2 times daily       LORazepam (ATIVAN) 0.5 MG tablet Take 1 tablet by mouth every 12 hours as needed for Anxiety for up

## 2020-02-10 NOTE — ED PROVIDER NOTES
[address] [facility] ED  eMERGENCY dEPARTMENT eNCOUnter      Pt Name: Mateusz Tapia  MRN: [id number]  [place] 1950  Date of evaluation: 2/10/2020  Provider: JUAN Patten CNP      HISTORY OF PRESENT ILLNESS    Mateusz Tapia is a 78 y.o. female who presents to the Emergency Department with L ankle pain since inverting ankle while going up the stairs PTA. Patient is amble to ambulate but pain is worse with ambulation. Pain is moderate. She states she has pain medication at home that she can take. REVIEW OF SYSTEMS       Review of Systems   Constitutional: Negative for activity change, appetite change and fever. HENT: Negative for congestion. Respiratory: Negative for cough and shortness of breath. Cardiovascular: Negative for chest pain. Gastrointestinal: Negative for abdominal pain. Genitourinary: Negative for dysuria. Musculoskeletal: Negative for arthralgias and back pain. L ankle pain   Skin: Negative for rash. All other systems reviewed and are negative.         PAST MEDICAL HISTORY     Past Medical History:   Diagnosis Date    Anxiety     Asthma     Back pain     Bronchitis     CAD (coronary artery disease)     Cancer ([facility])     RLL lung CA    Carpal tunnel syndrome     COPD (chronic obstructive pulmonary disease) ([facility])     uses CPAP at night    Emphysema of lung (HCC)     Fibromyalgia     GERD (gastroesophageal reflux disease)     Hyperlipidemia     Hypertension     Hypothyroidism     Obesity     ROGELIO (obstructive sleep apnea)     Osteoarthritis     Osteoporosis     PONV (postoperative nausea and vomiting)     Restless legs syndrome     SOB (shortness of breath)     Type II or unspecified type diabetes mellitus without mention of complication, not stated as uncontrolled     Unspecified sleep apnea     UTI (urinary tract infection)          SURGICAL HISTORY       Past Surgical History:   Procedure Laterality Date    ANKLE times daily Generic equivalent acceptable, unless otherwise noted. DICLOFENAC SODIUM 1 % GEL    Apply 4 g topically 4 times daily    DIGOXIN (DIGOX) 125 MCG TABLET    Take 125 mcg by mouth Daily with lunch     ESOMEPRAZOLE (NEXIUM) 40 MG DELAYED RELEASE CAPSULE    TAKE 1 CAPSULE DAILY    FENOFIBRATE (TRICOR) 145 MG TABLET    Take 145 mg by mouth every evening     FLUOCINONIDE (LIDEX) 0.05 % OINTMENT    APPLY OINTMENT TOPICALLY TWICE DAILY FOR 2 WEEKS    FLUTICASONE (FLONASE) 50 MCG/ACT NASAL SPRAY    2 sprays by Nasal route daily    FORMOTEROL (PERFOROMIST) 20 MCG/2ML NEBULIZER SOLUTION    Inhale 2 mLs into the lungs daily    GABAPENTIN (NEURONTIN) 300 MG CAPSULE    PLEASE SEE ATTACHED FOR DETAILED DIRECTIONS    GUAIFENESIN (MUCINEX) 600 MG EXTENDED RELEASE TABLET    Take 1 tablet by mouth 2 times daily    INSULIN ASPART (NOVOLOG FLEXPEN) 100 UNIT/ML INJECTION PEN    20 units with each meals    INSULIN GLARGINE (BASAGLAR KWIKPEN) 100 UNIT/ML INJECTION PEN    Inject 100 units at night    INSULIN PEN NEEDLE (B-D UF III MINI PEN NEEDLES) 31G X 5 MM MISC    USE 1 DAILY TO INJECT LANTUS    IPRATROPIUM-ALBUTEROL (DUONEB) 0.5-2.5 (3) MG/3ML SOLN NEBULIZER SOLUTION    Inhale 3 mLs into the lungs every 6 hours    ISOSORBIDE MONONITRATE (IMDUR) 60 MG CR TABLET    Take 60 mg by mouth daily     LEVOTHYROXINE (SYNTHROID) 150 MCG TABLET    Take 1 tablet by mouth Daily    LIRAGLUTIDE (VICTOZA) 18 MG/3ML SOPN SC INJECTION    Inject 1.8 mg into the skin daily    LORAZEPAM (ATIVAN) 0.5 MG TABLET    Take 1 tablet by mouth every 12 hours as needed for Anxiety for up to 30 days. MEPOLIZUMAB (NUCALA) 100 MG SOLR INJECTION    Inject 100 mg into the skin Received from: [id number] [facility] [address]: Inject 100 mg subcutaneously one time only.  monthly    NEBULIZERS (COMPRESSOR/NEBULIZER) MISC    Dispense Nebulizer supplies    NITROGLYCERIN (NITROLINGUAL) 0.4 MG/SPRAY 0.4 MG SPRAY        NOVOFINE 32G X 6 MM MISC    qid    NYSTATIN status: Former Smoker     Packs/day: 1.50     Years: 32.00     Pack years: 48.00     Types: Cigarettes     Last attempt to quit: 2001     Years since quittin.9    Smokeless tobacco: Never Used   Substance and Sexual Activity    Alcohol use: No     Alcohol/week: 0.0 standard drinks    Drug use: No    Sexual activity: Yes     Partners: Male   Lifestyle    Physical activity:     Days per week: None     Minutes per session: None    Stress: None   Relationships    Social connections:     Talks on phone: None     Gets together: None     Attends Denominational service: None     Active member of club or organization: None     Attends meetings of clubs or organizations: None     Relationship status: None    Intimate partner violence:     Fear of current or ex partner: None     Emotionally abused: None     Physically abused: None     Forced sexual activity: None   Other Topics Concern    None   Social History Narrative    None       SCREENINGS      @FLOW([id number])@      PHYSICAL EXAM    (up to 7 for level 4, 8 or more for level 5)     ED Triage Vitals [02/10/20 1417]   BP Temp Temp Source Pulse Resp SpO2 Height Weight   (!) 140/57 98.2 °F (36.8 °C) Oral 86 20 97 % 5' 1\" (1.549 m) 171 lb (77.6 kg)       Physical Exam  Vitals signs and nursing note reviewed. Constitutional:       Appearance: She is well-developed. HENT:      Head: Normocephalic and atraumatic. Right Ear: External ear normal.      Left Ear: External ear normal.   Eyes:      Conjunctiva/sclera: Conjunctivae normal.      Pupils: Pupils are equal, round, and reactive to light. Neck:      Musculoskeletal: Normal range of motion and neck supple. Cardiovascular:      Rate and Rhythm: Normal rate and regular rhythm. Pulmonary:      Effort: Pulmonary effort is normal.      Breath sounds: Normal breath sounds. Abdominal:      General: Bowel sounds are normal. There is no distension. Palpations: Abdomen is soft. Tenderness:  There is no abdominal tenderness. Musculoskeletal: Normal range of motion. Left foot: Normal range of motion and normal capillary refill. Tenderness and swelling present. No bony tenderness, crepitus, deformity or laceration. Feet:    Skin:     General: Skin is warm and dry. Neurological:      Mental Status: She is alert and oriented to person, place, and time. Deep Tendon Reflexes: Reflexes are normal and symmetric. Psychiatric:         Judgment: Judgment normal.           All other labs were within normal range or not returned as of this dictation. EMERGENCY DEPARTMENT COURSE and DIFFERENTIALDIAGNOSIS/MDM:   Vitals:    Vitals:    02/10/20 1417   BP: (!) 140/57   Pulse: 86   Resp: 20   Temp: 98.2 °F (36.8 °C)   TempSrc: Oral   SpO2: 97%   Weight: 171 lb (77.6 kg)   Height: 5' 1\" (1.549 m)            78 yr old female with L ankle sprain. Xray was negative. F/U with orthopedics in 2 days. ACE wrap applied for comfort. Patient verbalizes understanding. 36- Spoke with patient via telephone and updated on 5th metatarsal fracture. Patient is hesitant to have splint put on d/t to having an unsteady gait. She is a patient of Dr. Mali Echeverria is is planning to F/U in the AM at his office if she decides not to have her foot splinted tonight. Discussed risks and need for immobilization with patient. Patient verbalizes understanding. PROCEDURES:  Unless otherwise noted below, none     Procedures      FINAL IMPRESSION      1.  Sprain of left ankle, unspecified ligament, initial encounter          DISPOSITION/PLAN   DISPOSITION Decision To Discharge 02/10/2020 03:26:00 JUAN Garcia CNP (electronically signed)  Attending Emergency Physician     JUAN Paula CNP  02/10/20 1210 [place], APRN - CNP  02/10/20 [id number]

## 2020-02-10 NOTE — ED TRIAGE NOTES
Pt c/o left ankle/foot pain after twisting her ankle today, Pt is A&OX3, calm, afebrile, breathes are equal and unlabored, sensation and movement intact, in LLE, pulses palpable, skin PWD and intact, no edema or redness noted.

## 2020-02-17 NOTE — PROGRESS NOTES
TEST) strip USE TO TEST TWICE A DAY AND AS NEEDED, IDDM - Dx: E11.9 100 each 1    OXYGEN Inhale into the lungs 2 liters at night and during the day 3 liters      fluocinonide (LIDEX) 0.05 % ointment APPLY OINTMENT TOPICALLY TWICE DAILY FOR 2 WEEKS 30 g 1    formoterol (PERFOROMIST) 20 MCG/2ML nebulizer solution Inhale 2 mLs into the lungs daily      fluticasone (FLONASE) 50 MCG/ACT nasal spray 2 sprays by Nasal route daily 1 Bottle 0    Insulin Pen Needle (B-D UF III MINI PEN NEEDLES) 31G X 5 MM MISC USE 1 DAILY TO INJECT LANTUS 100 each 1    nystatin (MYCOSTATIN) 443374 UNIT/ML suspension Take 5 mLs by mouth 4 times daily 200 mL 1    Nebulizers (COMPRESSOR/NEBULIZER) MISC Dispense Nebulizer supplies 1 each 3    atorvastatin (LIPITOR) 80 MG tablet Take 1 tablet by mouth nightly      nitroGLYCERIN (NITROLINGUAL) 0.4 MG/SPRAY 0.4 mg spray       albuterol sulfate (PROAIR RESPICLICK) 077 (90 Base) MCG/ACT aerosol powder inhalation Inhale 2 puffs into the lungs every 6 hours as needed for Wheezing or Shortness of Breath 1 Inhaler 5    mepolizumab (NUCALA) 100 MG SOLR injection Inject 100 mg into the skin Received from: [facility] Received Sig: Inject 100 mg subcutaneously one time only. monthly      ONE TOUCH LANCETS MISC USE TO TEST TWICE A DAY AND AS NEEDED, IDDM - Dx: E11.9 200 each 3    Blood Glucose Monitoring Suppl MARCELLUS One Touch Ultra - To Test BID - IDDM - Dx: E11.9 1 Device 0    isosorbide mononitrate (IMDUR) 60 MG CR tablet Take 60 mg by mouth daily       budesonide (PULMICORT) 0.5 MG/2ML nebulizer suspension Take 1 ampule by nebulization 2 times daily      digoxin (DIGOX) 125 MCG tablet Take 125 mcg by mouth Daily with lunch       clopidogrel (PLAVIX) 75 MG tablet Take 75 mg by mouth daily.         fenofibrate (TRICOR) 145 MG tablet Take 145 mg by mouth every evening       verapamil (VERELAN PM) 240 MG CR capsule Take 240 mg by mouth 2 times daily       LORazepam (ATIVAN) 0.5 MG tablet

## 2020-02-17 NOTE — TELEPHONE ENCOUNTER
Left diagnostic with left breast US if needed.  Dx: abnormal mammogram    Please order for [facility]

## 2020-02-25 PROBLEM — N28.1 RENAL CYST, RIGHT: Status: ACTIVE | Noted: 2020-01-01

## 2020-02-25 NOTE — PROGRESS NOTES
to bear weight, loss of motion, muscle weakness or tingling. She reports no foreign bodies present. The symptoms are aggravated by palpation, movement and weight bearing. She has tried ice for the symptoms. The treatment provided mild relief. Past Medical History:   Diagnosis Date    Anxiety     Asthma     Back pain     Bronchitis     CAD (coronary artery disease)     Cancer (HCC)     RLL lung CA    Carpal tunnel syndrome     COPD (chronic obstructive pulmonary disease) ([facility])     uses CPAP at night    Emphysema of lung (HCC)     Fibromyalgia     GERD (gastroesophageal reflux disease)     Hyperlipidemia     Hypertension     Hypothyroidism     Obesity     ROGELIO (obstructive sleep apnea)     Osteoarthritis     Osteoporosis     PONV (postoperative nausea and vomiting)     Restless legs syndrome     SOB (shortness of breath)     Type II or unspecified type diabetes mellitus without mention of complication, not stated as uncontrolled     Unspecified sleep apnea     UTI (urinary tract infection)      Past Surgical History:   Procedure Laterality Date    ANKLE SURGERY Left     hardware in & removed    ANKLE SURGERY Right     Plate in right ankle    CARPAL TUNNEL RELEASE Left 2015    CHOLECYSTECTOMY      COLONOSCOPY  09/11/2012    CORONARY ANGIOPLASTY WITH STENT PLACEMENT      ENDOSCOPY, COLON, DIAGNOSTIC      LUNG BIOPSY  09/07/2017    pleural biopsy    ND REVISE MEDIAN N/CARPAL TUNNEL SURG Right 5/24/2018    RIGHT WRIST CARPAL TUNNEL RELEASE, ( TO BE IN ROOM 12 WITH ROOM 2 TEAM) performed by Trish Palomares MD at [facility] Left 6/19/2018    LEFT JOSELIN THYROIDECTOMY performed by Funmilayo Henley MD at [address] [place] [address] PTCA      THYROIDECTOMY      partial    UPPER GASTROINTESTINAL ENDOSCOPY  09/11/2012    UPPER GASTROINTESTINAL ENDOSCOPY  12/4/14     DR. MAE    UPPER GASTROINTESTINAL ENDOSCOPY N/A 8/1/2019    EGD ESOPHAGOGASTRODUODENOSCOPY performed by Greg Levy MD at [address] [place] [address] Marital status:      Spouse name: None    Number of children: None    Years of education: None    Highest education level: None   Occupational History    Occupation: Retired   Social Needs    Financial resource strain: Not very hard    Food insecurity:     Worry: Never true     Inability: Never true    Transportation needs:     Medical: No     Non-medical: No   Tobacco Use    Smoking status: Former Smoker     Packs/day: 1.50     Years: 32.00     Pack years: 48.00     Types: Cigarettes     Last attempt to quit: 2001     Years since quittin.0    Smokeless tobacco: Never Used   Substance and Sexual Activity    Alcohol use: No     Alcohol/week: 0.0 standard drinks    Drug use: No    Sexual activity: Yes     Partners: Male   Lifestyle    Physical activity:     Days per week: None     Minutes per session: None    Stress: None   Relationships    Social connections:     Talks on phone: None     Gets together: None     Attends Spiritism service: None     Active member of club or organization: None     Attends meetings of clubs or organizations: None     Relationship status: None    Intimate partner violence:     Fear of current or ex partner: None     Emotionally abused: None     Physically abused: None     Forced sexual activity: None   Other Topics Concern    None   Social History Narrative    None     Family History   Problem Relation Age of Onset    High Blood Pressure Mother     Heart Disease Mother     Cancer Father         LUNG    High Blood Pressure Brother     COPD Brother     Heart Disease Brother     Heart Attack Brother     COPD Sister     Heart Disease Sister     Arthritis Maternal Aunt     COPD Sister     No Known Problems Daughter     No Known Problems Son        Allergies   Allergen Reactions    Biaxin [Clarithromycin] Nausea Only     Nausea with diarrhea    Invokana [Canagliflozin] and they may require opiates dosing for life. All efforts will be made to wean to the lowest effective dose. Other therapies for pain have not been effective including nonopiate medications. Injections and exercises are only partially effective. A Rx for Narcan was offered to help prevent accidental overdose. RX Monitoring 2/25/2020   Attestation -   Acute Pain Prescriptions -   Periodic Controlled Substance Monitoring Possible medication side effects, risk of tolerance/dependence & alternative treatments discussed. ;No signs of potential drug abuse or diversion identified. ;Assessed functional status. ;Obtaining appropriate analgesic effect of treatment. Chronic Pain > 50 MEDD Re-evaluated the status of the patient's underlying condition causing pain. ;Considered consultation with a specialist.;Obtained or confirmed \"Consent for Opioid Use\" on file. Chronic Pain > 80 MEDD -       Periodic Controlled Substance Monitoring: Possible medication side effects, risk of tolerance/dependence & alternative treatments discussed., No signs of potential drug abuse or diversion identified. , Assessed functional status., Obtaining appropriate analgesic effect of treatment. (Rory Hooks, )       Patient is currently taking:       I am having Jimena Duranki maintain her verapamil, clopidogrel, fenofibrate, digoxin, budesonide, isosorbide mononitrate, ONE TOUCH LANCETS, blood glucose monitor kit and supplies, mepolizumab, albuterol sulfate, atorvastatin, nitroGLYCERIN, Compressor/Nebulizer, nystatin, Insulin Pen Needle, fluticasone, formoterol, OXYGEN, fluocinonide, blood glucose test strips, ipratropium-albuterol, CPAP Machine, guaiFENesin, Liraglutide, NovoFine, Roflumilast, Vitamin D3, Umeclidinium Bromide, diclofenac sodium, levothyroxine, topiramate, diclofenac sodium, Harry-24, insulin glargine, predniSONE, zoledronic acid, gabapentin, esomeprazole, LORazepam, predniSONE, insulin aspart, cephALEXin, predniSONE, and oxyCODONE HCl. I also recommend the following Medications:    Orders Placed This Encounter   Medications    oxyCODONE HCl (OXY-IR) 10 MG immediate release tablet     Sig: Take 1 tablet by mouth every 8 hours as needed for Pain for up to 30 days. Intended supply: 30 days     Dispense:  80 tablet     Refill:  0     Reduce doses taken as pain becomes manageable        -which helps with pain and function. Otherwise, continue the current pain medications that I have prescibed. Radiologic:   Old films reviewed    T11-12 and T12-L1 discs are included on the exam and appears unremarkable with no sign of canal or foraminal stenosis. Very subtle anterior disc margin spurring on the right at these levels.       L1-2 (L1): Mild disc space narrowing and subtle loss of T2 signal indicating very mild desiccation. Slight right anterior disc bulging and endplate spurring. Mild diffuse bulging of the disc causing very little mass effect on the anterior aspect of the    thecal sac. No canal or foraminal stenosis.       L2-3 (L2): Disc space height and degree of hydration well maintained. No significant disc related anterior extradural defect, canal or foraminal stenosis.       L3-4 (L3): Disc space height and degree of hydration relatively well-maintained. Very subtle disc bulging with slightly more pronounced bulging laterally on the left. On the left along with mild facet joint arthrosis this results in mild foraminal    narrowing. Minimal foraminal narrowing on the right.       L4-5 (L4): Disc space height and degree of hydration relatively well-maintained. Subtle anterolisthesis of L4 relative to 5 secondary to severe facet joint arthrosis. Pseudobulging of the disc is a result without significant canal stenosis. The facet    joint arthrosis does cause some mass effect on the lateral aspect of the thecal sac bilaterally but overall the volume of the spinal canal is relatively well-maintained. EMG.    Previous studies requested,     I discussed results with patient. See follow-up plans for new studies. Labs:  Previous labs reviewed     Lab Results   Component Value Date     01/08/2020    K 3.7 01/08/2020    K 4.6 01/07/2019     01/08/2020    CO2 27 01/08/2020    BUN 18 01/08/2020    CREATININE 0.78 01/08/2020    CALCIUM 9.6 01/08/2020    LABALBU 3.8 11/18/2019    BILITOT 0.3 11/18/2019    ALKPHOS 65 11/18/2019    AST 18 11/18/2019    ALT 38 11/18/2019     Lab Results   Component Value Date    WBC 8.0 11/18/2019    WBC 15.7 01/07/2019    RBC 4.57 11/18/2019    RBC 4.98 11/09/2018    HGB 13.2 11/18/2019    HCT 42.5 11/18/2019    MCV 93 11/18/2019    MCH 30.8 01/07/2019    MCHC 31.1 11/18/2019    RDW 13.7 01/07/2019     11/18/2019    MPV 9.7 11/09/2018       Lab Results   Component Value Date    LABAMPH Neg 03/08/2019    BARBSCNU Neg 03/08/2019    LABBENZ Neg 03/08/2019    CANSU Neg 03/08/2019    COCAIMETSCRU Neg 03/08/2019    PHENCYCLIDINESCREENURINE Neg 70/10/8307    TRICYCLIC not available 33/42/6368    DSCOMMENT see below 03/08/2019       No results found for: CODEINE, MORPHINE, ACETYLMORPHI, OXYCODONE, NOROXYCODONE, NOROXYMU, HYDRCO, NORHYDU, HYDROMO, BUPREN, NORBUPRNOR, FENTA, NORFENT, MEPERIDINE, TAPENU, TAPOSULFUR, METHADONE, LABPROP, TRAM, AMPH, METHAMP, MDMA, ECMDA    Lab Results   Component Value Date    LABCREA 264.9 09/09/2019         , I discussed results with patient. See follow-up plans for new studies. Therapies:  HEP-gentle stretching and relaxation techniques-demonstrated with patient-they are to do them twice a day.   They are also advised to make the following lifestyle changes:  Goals      HEMOGLOBIN A1C < 7.0      SOAPP-R GOAL LESS THAN 9      10/26/16 SCORE: 6-LOW RISK   01/26/17 score: 8-low risk   03/23/17 score: 6-low risk  05/23/17 score: 7-low risk  08/08/17 score: 8-low risk  11/08/17 score: 6-low risk  8/1/18 score:  3/8/19 score ; 10-

## 2020-03-03 NOTE — PROGRESS NOTES
Subjective:      Patient ID: rBaeden Davalos is a 78 y.o. female    HPI  Here in follow up for slightly abnormal mamm done recently. Also was told she had pneumonia 2 weeks ago by allergist.  Just finished antibiotics-still with occasional slightly discolored phlegm. No fever or chills. Has had radiation therapy for recently dx'd lung cancer    Review of Systems   Constitutional: Negative for chills and unexpected weight change. Respiratory: Negative for shortness of breath. Gastrointestinal: Negative for abdominal distention. Neurological: Negative for weakness.      Reviewed allergy, medical, social, surgical, family and med list changes and updated   Files-reviewed mamm which was stable      Social History     Socioeconomic History    Marital status:      Spouse name: None    Number of children: None    Years of education: None    Highest education level: None   Occupational History    Occupation: Retired   Social Needs    Financial resource strain: Not very hard    Food insecurity:     Worry: Never true     Inability: Never true    Transportation needs:     Medical: No     Non-medical: No   Tobacco Use    Smoking status: Former Smoker     Packs/day: 1.50     Years: 32.00     Pack years: 48.00     Types: Cigarettes     Last attempt to quit: 2001     Years since quittin.0    Smokeless tobacco: Never Used   Substance and Sexual Activity    Alcohol use: No     Alcohol/week: 0.0 standard drinks    Drug use: No    Sexual activity: Yes     Partners: Male   Lifestyle    Physical activity:     Days per week: None     Minutes per session: None    Stress: None   Relationships    Social connections:     Talks on phone: None     Gets together: None     Attends Sabianist service: None     Active member of club or organization: None     Attends meetings of clubs or organizations: None     Relationship status: None    Intimate partner violence:     Fear of current or ex partner: None     Emotionally abused: None     Physically abused: None     Forced sexual activity: None   Other Topics Concern    None   Social History Narrative    None     Current Outpatient Medications   Medication Sig Dispense Refill    amoxicillin-clavulanate (AUGMENTIN) 875-125 MG per tablet TAKE 1 TABLET BY MOUTH TWICE DAILY TILL GONE      diclofenac sodium 1 % GEL       digoxin (LANOXIN) 250 MCG tablet       oxyCODONE HCl (OXY-IR) 10 MG immediate release tablet Take 1 tablet by mouth every 8 hours as needed for Pain for up to 30 days. Intended supply: 30 days 80 tablet 0    predniSONE (DELTASONE) 10 MG tablet 40mg daily x 4d, 30mg daily x 3d, 20mg x 2d, 10mg x 1d, then d/c 30 tablet 0    insulin aspart (NOVOLOG FLEXPEN) 100 UNIT/ML injection pen 20 units with each meals 30 pen 3    cephALEXin (KEFLEX) 500 MG capsule Take 1 capsule by mouth 3 times daily 30 capsule 0    predniSONE (DELTASONE) 10 MG tablet TAKE 2 TABLETS FOR 7 DAYS, THEN 1 TABLET DAILY 67 tablet 1    esomeprazole (NEXIUM) 40 MG delayed release capsule TAKE 1 CAPSULE DAILY 90 capsule 1    gabapentin (NEURONTIN) 300 MG capsule PLEASE SEE ATTACHED FOR DETAILED DIRECTIONS  2    predniSONE (DELTASONE) 10 MG tablet 2 TABLETS DAILY FOR 7 DAYS, THEN 1 TAB DAILY.  67 tablet 0    insulin glargine (BASAGLAR KWIKPEN) 100 UNIT/ML injection pen Inject 100 units at night 90 pen 3    PAMELA-24 300 MG extended release capsule       topiramate (TOPAMAX) 25 MG tablet TAKE 1 TABLET NIGHTLY 60 tablet 3    diclofenac sodium 1 % GEL Apply 4 g topically 4 times daily 5 Tube 5    levothyroxine (SYNTHROID) 150 MCG tablet Take 1 tablet by mouth Daily 90 tablet 3    Umeclidinium Bromide 62.5 MCG/INH AEPB Inhale 1 puff into the lungs daily 90 each 3    Cholecalciferol (VITAMIN D3) 30119 units CAPS 1 po q  weekly 4 capsule 1    Roflumilast (DALIRESP) 500 MCG tablet Take 1 tablet by mouth daily 90 tablet 3    NOVOFINE 32G X 6 MM MISC qid 400 each 3    Liraglutide uncontrolled     Unspecified sleep apnea     UTI (urinary tract infection)    chest xry showing chronic changes-haziness RML? Objective:   BP (!) 140/78   Pulse 64   Temp 98.1 °F (36.7 °C)   Resp 14   Ht 5' 1\" (1.549 m)   Wt 176 lb (79.8 kg)   LMP 05/17/2001   SpO2 94%   BMI 33.25 kg/m²     Physical Exam  Heent: T.M normal bilat no  pharyngeal injection. No exudate                Nares patent but swollen mucosa noted  Neck: no anter cervical adenopathy. No masses. No thyroid asymmetry. Lungs:  Few faint rhonchi bilaterally. No wheezes. Breath sounds equal    Heart: Rate reg 1/6 syst murmur across precordium   Gen: In no acute distress  Assessment:       Diagnosis Orders   1. Acute bronchitis, unspecified organism     2.  Chronic obstructive pulmonary disease, unspecified COPD type ([facility]           Plan:      Orders Placed This Encounter   Procedures    XR CHEST STANDARD (2 VW)     Standing Status:   Future     Number of Occurrences:   1     Standing Expiration Date:   3/3/2021     Orders Placed This Encounter   Medications    levoFLOXacin (LEVAQUIN) 500 MG tablet     Sig: Take 1 tablet by mouth daily for 7 days     Dispense:  7 tablet     Refill:  0   continue current medications   F/u dependent on chest xray 0-does have ct chest planned and is seeing oncology for this after recently completing radiation therapy

## 2020-03-05 PROBLEM — J18.9 PNEUMONIA OF RIGHT LOWER LOBE DUE TO INFECTIOUS ORGANISM: Status: ACTIVE | Noted: 2020-01-01

## 2020-03-05 PROBLEM — J70.0 POST-RADIATION PNEUMONITIS (HCC): Status: ACTIVE | Noted: 2020-01-01

## 2020-03-05 NOTE — PROGRESS NOTES
(chronic obstructive pulmonary disease) (HCC)     uses CPAP at night    Emphysema of lung (HCC)     Fibromyalgia     GERD (gastroesophageal reflux disease)     Hyperlipidemia     Hypertension     Hypothyroidism     Obesity     ROGELIO (obstructive sleep apnea)     Osteoarthritis     Osteoporosis     PONV (postoperative nausea and vomiting)     Restless legs syndrome     SOB (shortness of breath)     Type II or unspecified type diabetes mellitus without mention of complication, not stated as uncontrolled     Unspecified sleep apnea     UTI (urinary tract infection)      Past Surgical History:   Procedure Laterality Date    ANKLE SURGERY Left     hardware in & removed    ANKLE SURGERY Right     Plate in right ankle    CARPAL TUNNEL RELEASE Left 2015    CHOLECYSTECTOMY      COLONOSCOPY  09/11/2012    CORONARY ANGIOPLASTY WITH STENT PLACEMENT      ENDOSCOPY, COLON, DIAGNOSTIC      LUNG BIOPSY  09/07/2017    pleural biopsy    ND REVISE MEDIAN N/CARPAL TUNNEL SURG Right 5/24/2018    RIGHT WRIST CARPAL TUNNEL RELEASE, ( TO BE IN ROOM 12 WITH ROOM 2 TEAM) performed by Peter Peres MD at [address] [place] [address] Left 6/19/2018    LEFT JOSELIN THYROIDECTOMY performed by Terrell Singh MD at [address] [place] [address] PTCA      THYROIDECTOMY      partial    UPPER GASTROINTESTINAL ENDOSCOPY  09/11/2012    UPPER GASTROINTESTINAL ENDOSCOPY  12/4/14     DR. MAE    UPPER GASTROINTESTINAL ENDOSCOPY N/A 8/1/2019    EGD ESOPHAGOGASTRODUODENOSCOPY performed by Tri Lockhart [facility] at [facility]     Family History   Problem Relation Age of Onset    High Blood Pressure Mother     Heart Disease Mother     Cancer Father         LUNG    High Blood Pressure Brother     COPD Brother     Heart Disease Brother     Heart Attack Brother     COPD Sister     Heart Disease Sister     Arthritis Maternal [de-identified]     COPD Sister     No Known Problems Daughter     No Known Problems Son      Social History Socioeconomic History    Marital status:      Spouse name: Not on file    Number of children: Not on file    Years of education: Not on file    Highest education level: Not on file   Occupational History    Occupation: Retired   Social Needs    Financial resource strain: Not very hard    Food insecurity:     Worry: Never true     Inability: Never true    Transportation needs:     Medical: No     Non-medical: No   Tobacco Use    Smoking status: Former Smoker     Packs/day: 1.50     Years: 32.00     Pack years: 48.00     Types: Cigarettes     Last attempt to quit: 2001     Years since quittin.0    Smokeless tobacco: Never Used   Substance and Sexual Activity    Alcohol use: No     Alcohol/week: 0.0 standard drinks    Drug use: No    Sexual activity: Yes     Partners: Male   Lifestyle    Physical activity:     Days per week: Not on file     Minutes per session: Not on file    Stress: Not on file   Relationships    Social connections:     Talks on phone: Not on file     Gets together: Not on file     Attends Christianity service: Not on file     Active member of club or organization: Not on file     Attends meetings of clubs or organizations: Not on file     Relationship status: Not on file    Intimate partner violence:     Fear of current or ex partner: Not on file     Emotionally abused: Not on file     Physically abused: Not on file     Forced sexual activity: Not on file   Other Topics Concern    Not on file   Social History Narrative    Not on file         Review of Systems   Constitutional: Negative for chills, diaphoresis, fatigue and fever. HENT: Negative for congestion, mouth sores, nosebleeds, postnasal drip, rhinorrhea, sinus pressure, sneezing, sore throat, trouble swallowing and voice change. Eyes: Negative for discharge, itching and visual disturbance. Respiratory: Positive for cough, shortness of breath and wheezing. Negative for chest tightness. units CAPS 1 po q  weekly 4 capsule 1    Roflumilast (DALIRESP) 500 MCG tablet Take 1 tablet by mouth daily 90 tablet 3    NOVOFINE 32G X 6 MM MISC qid 400 each 3    Liraglutide (VICTOZA) 18 MG/3ML SOPN SC injection Inject 1.8 mg into the skin daily 9 pen 3    CPAP Machine MISC by Does not apply route Change CPAP pressure to 10 cm 1 each 0    guaiFENesin (MUCINEX) 600 MG extended release tablet Take 1 tablet by mouth 2 times daily 60 tablet 1    ipratropium-albuterol (DUONEB) 0.5-2.5 (3) MG/3ML SOLN nebulizer solution Inhale 3 mLs into the lungs every 6 hours 360 mL 3    blood glucose test strips (ONE TOUCH ULTRA TEST) strip USE TO TEST TWICE A DAY AND AS NEEDED, IDDM - Dx: E11.9 100 each 1    OXYGEN Inhale into the lungs 2 liters at night and during the day 3 liters      fluocinonide (LIDEX) 0.05 % ointment APPLY OINTMENT TOPICALLY TWICE DAILY FOR 2 WEEKS 30 g 1    formoterol (PERFOROMIST) 20 MCG/2ML nebulizer solution Inhale 2 mLs into the lungs daily      fluticasone (FLONASE) 50 MCG/ACT nasal spray 2 sprays by Nasal route daily 1 Bottle 0    Insulin Pen Needle (B-D UF III MINI PEN NEEDLES) 31G X 5 MM MISC USE 1 DAILY TO INJECT LANTUS 100 each 1    nystatin (MYCOSTATIN) 244974 UNIT/ML suspension Take 5 mLs by mouth 4 times daily 200 mL 1    Nebulizers (COMPRESSOR/NEBULIZER) MISC Dispense Nebulizer supplies 1 each 3    atorvastatin (LIPITOR) 80 MG tablet Take 1 tablet by mouth nightly      nitroGLYCERIN (NITROLINGUAL) 0.4 MG/SPRAY 0.4 mg spray       albuterol sulfate (PROAIR RESPICLICK) 085 (90 Base) MCG/ACT aerosol powder inhalation Inhale 2 puffs into the lungs every 6 hours as needed for Wheezing or Shortness of Breath 1 Inhaler 5    mepolizumab (NUCALA) 100 MG SOLR injection Inject 100 mg into the skin Received from: [facility] Received Sig: Inject 100 mg subcutaneously one time only.  monthly      ONE TOUCH LANCETS MISC USE TO TEST TWICE A DAY AND AS NEEDED, IDDM - Dx: E11.9 200 each 3 OPACITY IN THE RIGHT LOWER LOBE. Aneita Zakia MASSES WERE IDENTIFIED ON THE CT SCAN OF 2019. PLEASE SEE REPORT FOR ADDITIONAL DETAILS. RECOMMEND REPEAT CT SCAN WITH IV CONTRAST TO FURTHER EVALUATE. UNDERLYING   MALIGNANCY IS NOT EXCLUDED.   ]  Results for orders placed during the hospital encounter of 10/13/16   XR Chest Portable    Narrative EXAMINATION: CHEST PORTABLE VIEW  CLINICAL HISTORY: Short of breath  COMPARISONS: 2016  FINDINGS:  Single  views of the chest is submitted. The cardiac silhouette is enlarged, unchanged configuration. The mediastinum is unremarkable. Pulmonary vascular unremarkable. Right sided trachea. Small areas of atelectasis both bases  No focal infiltrates. No Pneumothoraces. Impression NO ACUTE ACTIVE CARDIOPULMONARY PROCESS   ]  No results found for this or any previous visit.]  No results found for this or any previous visit.]  Results for orders placed during the hospital encounter of 19   CT CHEST W CONTRAST    Narrative Patient MRN: [id number]    : 1950    Age:  70 years    Order Date: 2019 3:20 PM.    Gender: Female    Exam: CT CHEST W CONTRAST    Number of Images: [id number]    Indication:   Lung cancer follow-up, upper back pain, abnormal bone scan. Malignant neoplasm of lower lobe of right lung. Right-sided rib pain. Right lower lobe lung mass. Contrast dosage: Isovue-300, 75 mL, IV. Comparison: CT chest 2018. Nuclear medicine bone scan 2019. MRI cervical spine 2019. Findings: This examination was performed on a CT scanner with dose reduction. Technique: Low-dose CT  acquisition technique included one of following options; 1 . Automated exposure control, 2. Adjustment of MA and or KV according to patient's size or 3. Use of iterative reconstruction. Right lun.  Spiculated mass (series 3, image 31), measured at approximately 1.6 x 1.0 cm, as compared to previous exam at which time it measured approximately 1.7 x not having any fever. Changes could be due to postradiation. She had lung cancer and underwent radiation therapy last year. I will request follow-up CT chest for further evaluation    - CT CHEST W CONTRAST; Future    2. Post-radiation pneumonitis (HCC)  He has been having cough and wheezing. Likely radiation pneumonitis and I will give her a slow taper prednisone. - predniSONE (DELTASONE) 10 MG tablet; 4 tablets daily for 7 days, 3 tablets daily for 7days, 2 tablets daily for 7 days, then 1 tablet daily for 7 days  Dispense: 70 tablet; Refill: 0    3. Asthma-COPD overlap syndrome ([facility]  She is having cough unable to sleep well. And having diffuse wheezing and more shortness of breath and will usually last few days. She is on bronchodilator with DuoNeb 4 times a day when necessary, Perforomist twice a day, Pulmicort twice a day, Incuse ellipta,  Theophylline 600 mg daily, daliresp 250 mcg daily. prednisone 10 mg daily. She will be started on taper dose of prednisone    4. Hypoxia  She is on liters O2 via nasal cannula 24 hours a day. Continue O2 to keep saturation 88-90% or above. 5. ROGELIO on CPAP  She is using CPAP with 8  centimeters of H2O with heated humidity and 3 L O2. She is using CPAP for about  8-9hours every night with nasal pillow . She said  sleep is restful with the CPAP use. She is compliant with CPAP therapy and benefiting with CPAP use. Continue CPAP therapy as before    Time spent with patient over 40-minute    Return in about 2 weeks (around 3/19/2020) for asthma, COPD, Ct chest f/u, hypoxia on O2, rogelio.       Pao Austin MD

## 2020-03-10 NOTE — PROGRESS NOTES
Patient here for right sciatic injection with U/S. Patient taken back to exam room, and placed on drape locking stool. Areas to be injected marked appropriately, and cleansed with alcohol. 15cc of 1% Lidocaine, and 5cc of 2% Lidocaine, and 1cc of B12 to be injected by provider.

## 2020-03-26 NOTE — PROGRESS NOTES
Subjective:     Shantelle Delong is a 78 y.o. female who complains today of:     Chief Complaint   Patient presents with    COPD     review ct scan f/u phone visit       HPI  Patient  Had CT chest done. C/o shortness of breath with exertion. C/o  Wheezing   Cough with clear  Sputum  No Hemoptysis  No Chest tightness   No Chest pain with radiation  or pleuritic pain  No Fever or chills. No Rhinorrhea and postnasal drip. Using bronchodilator with bronchodilator with DuoNeb 4 times a day when necessary, Perforomist twice a day, Pulmicort twice a day, Incuse ellipta,  Theophylline 600 mg daily, daliresp 250 mcg daily. prednisone 10 mg daily. She is using CPAP with  8  centimeters of H2O with heated humidity on 3 lit   She is using CPAP for about  8-10 hours every night. She is using CPAP with    Nasal pillow. She said  sleep is restful with the CPAP use. She is compliant with CPAP therapy and benefiting with CPAP use. No snoring with CPAP use. She denies taking naps. No sleepiness with driving. Allergies:  Biaxin [clarithromycin];  Jackson Bugler; and Victoza [liraglutide]  Past Medical History:   Diagnosis Date    Anxiety     Asthma     Back pain     Bronchitis     CAD (coronary artery disease)     Cancer (HCC)     RLL lung CA    Carpal tunnel syndrome     COPD (chronic obstructive pulmonary disease) (HCC)     uses CPAP at night    Emphysema of lung (HCC)     Fibromyalgia     GERD (gastroesophageal reflux disease)     Hyperlipidemia     Hypertension     Hypothyroidism     Obesity     ROGELIO (obstructive sleep apnea)     Osteoarthritis     Osteoporosis     PONV (postoperative nausea and vomiting)     Restless legs syndrome     SOB (shortness of breath)     Type II or unspecified type diabetes mellitus without mention of complication, not stated as uncontrolled     Unspecified sleep apnea     UTI (urinary tract infection)      Past Surgical History:   Procedure Laterality Date    ANKLE SURGERY Left     hardware in & removed    ANKLE SURGERY Right     Plate in right ankle    CARPAL TUNNEL RELEASE Left 2015    CHOLECYSTECTOMY      COLONOSCOPY  2012    CORONARY ANGIOPLASTY WITH STENT PLACEMENT      ENDOSCOPY, COLON, DIAGNOSTIC      LUNG BIOPSY  2017    pleural biopsy    WA REVISE MEDIAN N/CARPAL TUNNEL SURG Right 2018    RIGHT WRIST CARPAL TUNNEL RELEASE, ( TO BE IN ROOM 12 WITH ROOM 2 TEAM) performed by Casimiro Foreman MD at [facility] Left 2018    LEFT JOSELIN THYROIDECTOMY performed by Uriah Fox MD at [address] [place] [address] [facility]      THYROIDECTOMY      partial    UPPER GASTROINTESTINAL ENDOSCOPY  2012    UPPER GASTROINTESTINAL ENDOSCOPY  14     DR. MAE    UPPER GASTROINTESTINAL ENDOSCOPY N/A 2019    EGD ESOPHAGOGASTRODUODENOSCOPY performed by Bryan Nguyen MD at [facility]     Family History   Problem Relation Age of Onset    High Blood Pressure Mother     Heart Disease Mother     Cancer Father         LUNG    High Blood Pressure Brother     COPD Brother     Heart Disease Brother     Heart Attack Brother     COPD Sister     Heart Disease Sister     Arthritis Maternal [de-identified]     COPD Sister     No Known Problems Daughter     No Known Problems Son      Social History     Socioeconomic History    Marital status:      Spouse name: Not on file    Number of children: Not on file    Years of education: Not on file    Highest education level: Not on file   Occupational History    Occupation: Retired   Social Needs    Financial resource strain: Not very hard    Food insecurity     Worry: Never true     Inability: Never true    Transportation needs     Medical: No     Non-medical: No   Tobacco Use    Smoking status: Former Smoker     Packs/day: 1.50     Years: 32.00     Pack years: 48.00     Types: Cigarettes     Last attempt to quit: 2001     Years since quittin.1    Bromide 62.5 MCG/INH AEPB Inhale 1 puff into the lungs daily 90 each 3    Cholecalciferol (VITAMIN D3) 77695 units CAPS 1 po q  weekly 4 capsule 1    Roflumilast (DALIRESP) 500 MCG tablet Take 1 tablet by mouth daily 90 tablet 3    NOVOFINE 32G X 6 MM MISC qid 400 each 3    CPAP Machine MISC by Does not apply route Change CPAP pressure to 10 cm 1 each 0    guaiFENesin (MUCINEX) 600 MG extended release tablet Take 1 tablet by mouth 2 times daily 60 tablet 1    ipratropium-albuterol (DUONEB) 0.5-2.5 (3) MG/3ML SOLN nebulizer solution Inhale 3 mLs into the lungs every 6 hours 360 mL 3    blood glucose test strips (ONE TOUCH ULTRA TEST) strip USE TO TEST TWICE A DAY AND AS NEEDED, IDDM - Dx: E11.9 100 each 1    OXYGEN Inhale into the lungs 2 liters at night and during the day 3 liters      fluocinonide (LIDEX) 0.05 % ointment APPLY OINTMENT TOPICALLY TWICE DAILY FOR 2 WEEKS 30 g 1    formoterol (PERFOROMIST) 20 MCG/2ML nebulizer solution Inhale 2 mLs into the lungs daily      fluticasone (FLONASE) 50 MCG/ACT nasal spray 2 sprays by Nasal route daily 1 Bottle 0    Insulin Pen Needle (B-D UF III MINI PEN NEEDLES) 31G X 5 MM MISC USE 1 DAILY TO INJECT LANTUS 100 each 1    nystatin (MYCOSTATIN) 933708 UNIT/ML suspension Take 5 mLs by mouth 4 times daily 200 mL 1    Nebulizers (COMPRESSOR/NEBULIZER) MISC Dispense Nebulizer supplies 1 each 3    atorvastatin (LIPITOR) 80 MG tablet Take 1 tablet by mouth nightly      nitroGLYCERIN (NITROLINGUAL) 0.4 MG/SPRAY 0.4 mg spray       albuterol sulfate (PROAIR RESPICLICK) 290 (90 Base) MCG/ACT aerosol powder inhalation Inhale 2 puffs into the lungs every 6 hours as needed for Wheezing or Shortness of Breath 1 Inhaler 5    mepolizumab (NUCALA) 100 MG SOLR injection Inject 100 mg into the skin Received from: [facility] Received Sig: Inject 100 mg subcutaneously one time only.  monthly      ONE TOUCH LANCETS MISC USE TO TEST TWICE A DAY AND AS NEEDED, IDDM - Dx: EXAMINATION: CHEST PORTABLE VIEW  CLINICAL HISTORY: Short of breath  COMPARISONS: September 6, 2016  FINDINGS:  Single  views of the chest is submitted. The cardiac silhouette is enlarged, unchanged configuration. The mediastinum is unremarkable. Pulmonary vascular unremarkable. Right sided trachea. Small areas of atelectasis both bases  No focal infiltrates. No Pneumothoraces. Impression NO ACUTE ACTIVE CARDIOPULMONARY PROCESS   ]  No results found for this or any previous visit.]  No results found for this or any previous visit.]  Results for orders placed during the hospital encounter of 03/19/20   CT CHEST W CONTRAST    Narrative CT of the Chest with intravenous contrast medium    History:  Right lower lobe pneumonia. Cough for several months. Right lower lobe malignancy. Postradiation, completed February 2019. Technical Factors:    CT imaging of the chest was obtained and formatted as 5 mm contiguous axial images from the thoracic inlet through the adrenal glands. Sagittal, and coronal reconstruction obtained during postprocessing. Intravenous contrast medium:  None    Comparison:  CT chest, September 27, 2019. Findings:    Right lung shows continued stranding, and right lower lobe. The previously described 8mm nodule in the right lower lobe is no longer identified similar 12 mm finding found caudal to it is no longer evident. No new nodules or masses are visualized. No new   areas of consolidation. No pleural effusion. Left lung shows the ill-defined 4 mm dumbbell area of noncalcified partially pleural-based nodularity laterally left lower lobe is unchanged. No consolidation or pleural effusion identified. No hilar, mediastinal, or axillary lymph node enlargement. Cardiac size normal. No pericardial effusion. Thoracic aorta normal in course and caliber. Limited imaging upper abdomen shows gallbladder surgically absent. No osteoblastic, no osteolytic lesions.       Impression Resolution of nodular opacities in right lower lung. Persistent stranding right lower lung. No new foci of masses or nodules identified. Peripheral left lung nodules as described unchanged. No new nodules or masses. No consolidation. Cholecystectomy      All CT scans at this facility use dose modulation, iterative reconstruction, and/or weight based dosing when appropriate to reduce radiation dose to as low as reasonably achievable.   ]    Assessment/Plan:     1. ROGELIO on CPAP  She is using CPAP with 8  centimeters of H2O with heated humidity and 3 L O2. She is using CPAP for about  8-9 hours every night with nasal pillow . She said  sleep is restful with the CPAP use. continue CPAP as before. 2.Asthma-COPD overlap syndrome . She is having short of breath with exertion complain of wheezing and cough with clear mucus. She is  on bronchodilator with DuoNeb 4 times a day when necessary, Perforomist twice a day, Pulmicort twice a day, Incuse ellipta,  Theophylline 600 mg daily, daliresp 250 mcg daily. prednisone 10 mg daily. Continue same     3. Malignant neoplasm of lower lobe of right lung [facility])  She is being treated at [facility] clinic for right lower lobe cancer. CT chest follow-up was done which reported resolution of nodular opacities right lung. Persistent stranding right lower lung. No foci or masses or nodules identified. I discussed CT chest result with patient     Patient notified that this is a billable service and has given verbal consent for telehealth services. Time spent with patient 26 Minutes. Return in about 2 months (around 6/1/2020) for Ct chest f/u, rogelio, asthma.       Mary Lindo MD

## 2020-04-14 NOTE — PROGRESS NOTES
Patient here for right sciatic injection with U/S. Patient taken back to exam room, and placed on drape locking stool. Areas to be injected marked appropriately, and cleansed with alcohol. 24cc of 1% Lidocaine, and 5cc of 2% Lidocaine to be injected by provider.

## 2020-04-30 NOTE — TELEPHONE ENCOUNTER
Called and spoke with patient's , patient was not currently home.  reviewed medication bottles - atorvastatin 80 mg at home filled on 12/26/19 - has about half a bottle left. Provided  with contact information for patient to return my call. Per chart review in media tab:   11/20/19 - OV summary with Dr. Speedy Polanco states \"Zetia 10 mg daily at bedtime and Repatha 140 mg every 2 weeks will be reviewed. Possible via research program\". Atorvastatin listed on home med list - unsure if patient is taking? Will continue to follow. Patient will need refills sent to [facility] [address]      Anabelle Roberson, DerejeD, [facility]  Direct: [phone]  Department, toll free [phone], option 7

## 2020-05-05 PROBLEM — M47.812 SPONDYLOSIS OF CERVICAL REGION WITHOUT MYELOPATHY OR RADICULOPATHY: Status: ACTIVE | Noted: 2020-01-01

## 2020-05-05 PROBLEM — G62.9 PERIPHERAL POLYNEUROPATHY: Status: ACTIVE | Noted: 2020-01-01

## 2020-05-05 PROBLEM — Z95.5 H/O HEART ARTERY STENT: Status: ACTIVE | Noted: 2020-01-01

## 2020-05-05 PROBLEM — Z79.01 ANTICOAGULATED: Status: ACTIVE | Noted: 2020-01-01

## 2020-05-05 PROBLEM — M47.817 LUMBAR AND SACRAL SPONDYLARTHRITIS: Status: ACTIVE | Noted: 2020-01-01

## 2020-05-14 NOTE — PROGRESS NOTES
Unspecified sleep apnea     UTI (urinary tract infection)      Past Surgical History:   Procedure Laterality Date    ANKLE SURGERY Left     hardware in & removed    ANKLE SURGERY Right     Plate in right ankle    CARPAL TUNNEL RELEASE Left 2015    CHOLECYSTECTOMY      COLONOSCOPY  09/11/2012    CORONARY ANGIOPLASTY WITH STENT PLACEMENT      ENDOSCOPY, COLON, DIAGNOSTIC      LUNG BIOPSY  09/07/2017    pleural biopsy    NM REVISE MEDIAN N/CARPAL TUNNEL SURG Right 5/24/2018    RIGHT WRIST CARPAL TUNNEL RELEASE, ( TO BE IN ROOM 12 WITH ROOM 2 TEAM) performed by Wang Vincent MD at [facility] Left 6/19/2018    LEFT JOSELIN THYROIDECTOMY performed by Alex Washington MD at [address] [place] [address] [facility]      THYROIDECTOMY      partial    UPPER GASTROINTESTINAL ENDOSCOPY  09/11/2012    UPPER GASTROINTESTINAL ENDOSCOPY  12/4/14     DR. MAE    UPPER GASTROINTESTINAL ENDOSCOPY N/A 8/1/2019    EGD ESOPHAGOGASTRODUODENOSCOPY performed by Dick Morel MD at [facility]     Family History   Problem Relation Age of Onset    High Blood Pressure Mother     Heart Disease Mother     Cancer Father         LUNG    High Blood Pressure Brother     COPD Brother     Heart Disease Brother     Heart Attack Brother     COPD Sister     Heart Disease Sister     Arthritis Maternal [de-identified]     COPD Sister     No Known Problems Daughter     No Known Problems Son      Social History     Socioeconomic History    Marital status:      Spouse name: Not on file    Number of children: Not on file    Years of education: Not on file    Highest education level: Not on file   Occupational History    Occupation: Retired   Social Needs    Financial resource strain: Not very hard    Food insecurity     Worry: Never true     Inability: Never true    Transportation needs     Medical: No     Non-medical: No   Tobacco Use    Smoking status: Former Smoker     Packs/day: 1.50     Years: 32.00     Pack years: 48.00     Types: Cigarettes     Last attempt to quit: 2001     Years since quittin.2    Smokeless tobacco: Never Used   Substance and Sexual Activity    Alcohol use: No     Alcohol/week: 0.0 standard drinks    Drug use: No    Sexual activity: Yes     Partners: Male   Lifestyle    Physical activity     Days per week: Not on file     Minutes per session: Not on file    Stress: Not on file   Relationships    Social connections     Talks on phone: Not on file     Gets together: Not on file     Attends Jainism service: Not on file     Active member of club or organization: Not on file     Attends meetings of clubs or organizations: Not on file     Relationship status: Not on file    Intimate partner violence     Fear of current or ex partner: Not on file     Emotionally abused: Not on file     Physically abused: Not on file     Forced sexual activity: Not on file   Other Topics Concern    Not on file   Social History Narrative    Not on file         Review of Systems   Constitutional: Negative for chills, diaphoresis, fatigue and fever. HENT: Negative for congestion, mouth sores, nosebleeds, postnasal drip, rhinorrhea, sinus pressure, sneezing, sore throat, trouble swallowing and voice change. Eyes: Negative for discharge, itching and visual disturbance. Respiratory: Positive for cough, shortness of breath and wheezing. Negative for chest tightness. Cardiovascular: Negative for chest pain, palpitations and leg swelling. Gastrointestinal: Negative for abdominal pain, diarrhea, nausea and vomiting. Genitourinary: Negative for difficulty urinating and hematuria. Musculoskeletal: Negative for arthralgias, joint swelling and myalgias. Skin: Negative for rash. Allergic/Immunologic: Negative for environmental allergies and food allergies. Neurological: Negative for dizziness, tremors, weakness and headaches. Psychiatric/Behavioral: Positive for sleep disturbance.  Negative for  LORazepam (ATIVAN) 0.5 MG tablet Take 1 tablet by mouth every 12 hours as needed for Anxiety for up to 30 days. 24 tablet 0    gabapentin (NEURONTIN) 300 MG capsule PLEASE SEE ATTACHED FOR DETAILED DIRECTIONS  2    zoledronic acid (RECLAST) 5 MG/100ML SOLN Infuse 100 mLs intravenously once for 1 dose 100 mL 0    PAMELA-24 300 MG extended release capsule       diclofenac sodium (VOLTAREN) 1 % GEL Apply 4 g topically 4 times daily Generic equivalent acceptable, unless otherwise noted.  1500 g 3    diclofenac sodium 1 % GEL Apply 4 g topically 4 times daily 5 Tube 5    Cholecalciferol (VITAMIN D3) 29067 units CAPS 1 po q  weekly 4 capsule 1    Roflumilast (DALIRESP) 500 MCG tablet Take 1 tablet by mouth daily 90 tablet 3    CPAP Machine MISC by Does not apply route Change CPAP pressure to 10 cm 1 each 0    guaiFENesin (MUCINEX) 600 MG extended release tablet Take 1 tablet by mouth 2 times daily 60 tablet 1    ipratropium-albuterol (DUONEB) 0.5-2.5 (3) MG/3ML SOLN nebulizer solution Inhale 3 mLs into the lungs every 6 hours 360 mL 3    blood glucose test strips (ONE TOUCH ULTRA TEST) strip USE TO TEST TWICE A DAY AND AS NEEDED, IDDM - Dx: E11.9 100 each 1    OXYGEN Inhale into the lungs 2 liters at night and during the day 3 liters      fluocinonide (LIDEX) 0.05 % ointment APPLY OINTMENT TOPICALLY TWICE DAILY FOR 2 WEEKS 30 g 1    formoterol (PERFOROMIST) 20 MCG/2ML nebulizer solution Inhale 2 mLs into the lungs daily      fluticasone (FLONASE) 50 MCG/ACT nasal spray 2 sprays by Nasal route daily 1 Bottle 0    Insulin Pen Needle (B-D UF III MINI PEN NEEDLES) 31G X 5 MM MISC USE 1 DAILY TO INJECT LANTUS 100 each 1    nystatin (MYCOSTATIN) 265137 UNIT/ML suspension Take 5 mLs by mouth 4 times daily 200 mL 1    Nebulizers (COMPRESSOR/NEBULIZER) MISC Dispense Nebulizer supplies 1 each 3    atorvastatin (LIPITOR) 80 MG tablet Take 1 tablet by mouth nightly      nitroGLYCERIN (NITROLINGUAL) 0.4 MG/SPRAY 0.4 mg spray       albuterol sulfate (PROAIR RESPICLICK) 891 (90 Base) MCG/ACT aerosol powder inhalation Inhale 2 puffs into the lungs every 6 hours as needed for Wheezing or Shortness of Breath 1 Inhaler 5    mepolizumab (NUCALA) 100 MG SOLR injection Inject 100 mg into the skin Received from: [facility] Received Sig: Inject 100 mg subcutaneously one time only. monthly      ONE TOUCH LANCETS MISC USE TO TEST TWICE A DAY AND AS NEEDED, IDDM - Dx: E11.9 200 each 3    Blood Glucose Monitoring Suppl MARCELLUS One Touch Ultra - To Test BID - IDDM - Dx: E11.9 1 Device 0    isosorbide mononitrate (IMDUR) 60 MG CR tablet Take 60 mg by mouth daily       budesonide (PULMICORT) 0.5 MG/2ML nebulizer suspension Take 1 ampule by nebulization 2 times daily      digoxin (DIGOX) 125 MCG tablet Take 125 mcg by mouth Daily with lunch       clopidogrel (PLAVIX) 75 MG tablet Take 75 mg by mouth daily.  fenofibrate (TRICOR) 145 MG tablet Take 145 mg by mouth every evening       verapamil (VERELAN PM) 240 MG CR capsule Take 240 mg by mouth 2 times daily        No current facility-administered medications for this visit. Results for orders placed in visit on 03/03/20   XR CHEST STANDARD (2 VW)    Narrative EXAMINATION: CHEST TWO VIEWS     CLINICAL HISTORY: J18.9 Pneumonia due to infectious organism, unspecified laterality, unspecified part of lung ICD10    COMPARISONS: 1 CT scans chest September 27, 2019 and chest x-ray December 20, 2018     FINDINGS:    Two views of the chest are submitted. The cardiac silhouette is of enlarged unchanged configuration. Pulmonary vascular is attenuated, lungs are hyperinflated and there is some widening of the AP diameter the chest and coarsening of the interstitium. Right sided trachea. There is bibasilar areas of atelectasis, scarring both bases. Vague opacity is seen within the right lower lobe. No focal infiltrates. No effusions. Pneumothoraces. Impression RADIOGRAPHIC FINDINGS OF COPD WITH A VAGUE OPACITY IN THE RIGHT LOWER LOBE. Janas Alessandro MASSES WERE IDENTIFIED ON THE CT SCAN OF SEPTEMBER 27, 2019. PLEASE SEE REPORT FOR ADDITIONAL DETAILS. RECOMMEND REPEAT CT SCAN WITH IV CONTRAST TO FURTHER EVALUATE. UNDERLYING   MALIGNANCY IS NOT EXCLUDED.   ]  Results for orders placed during the hospital encounter of 10/13/16   XR Chest Portable    Narrative EXAMINATION: CHEST PORTABLE VIEW  CLINICAL HISTORY: Short of breath  COMPARISONS: September 6, 2016  FINDINGS:  Single  views of the chest is submitted. The cardiac silhouette is enlarged, unchanged configuration. The mediastinum is unremarkable. Pulmonary vascular unremarkable. Right sided trachea. Small areas of atelectasis both bases  No focal infiltrates. No Pneumothoraces. Impression NO ACUTE ACTIVE CARDIOPULMONARY PROCESS   ]  No results found for this or any previous visit.]  No results found for this or any previous visit.]  Results for orders placed during the hospital encounter of 03/19/20   CT CHEST W CONTRAST    Narrative CT of the Chest with intravenous contrast medium    History:  Right lower lobe pneumonia. Cough for several months. Right lower lobe malignancy. Postradiation, completed February 2019. Technical Factors:    CT imaging of the chest was obtained and formatted as 5 mm contiguous axial images from the thoracic inlet through the adrenal glands. Sagittal, and coronal reconstruction obtained during postprocessing. Intravenous contrast medium:  None    Comparison:  CT chest, September 27, 2019. Findings:    Right lung shows continued stranding, and right lower lobe. The previously described 8mm nodule in the right lower lobe is no longer identified similar 12 mm finding found caudal to it is no longer evident. No new nodules or masses are visualized. No new   areas of consolidation. No pleural effusion.     Left lung shows the

## 2020-05-15 NOTE — PROGRESS NOTES
Nails: There is no clubbing. Neurological:      Mental Status: She is alert and oriented to person, place, and time. Cranial Nerves: No cranial nerve deficit. Sensory: No sensory deficit. Motor: No tremor, atrophy or abnormal muscle tone. Coordination: Coordination normal.      Deep Tendon Reflexes: Reflexes abnormal. Babinski sign absent on the right side. Babinski sign absent on the left side. Psychiatric:         Attention and Perception: She is attentive. Mood and Affect: Mood is not anxious or depressed. Affect is not labile, blunt, angry or inappropriate. Speech: She is communicative. Speech is not rapid and pressured, delayed, slurred or tangential.         Behavior: Behavior normal. Behavior is not agitated, slowed, aggressive, withdrawn, hyperactive or combative. Thought Content: Thought content normal. Thought content is not paranoid or delusional. Thought content does not include homicidal or suicidal ideation. Thought content does not include homicidal or suicidal plan. Cognition and Memory: Memory is not impaired. She does not exhibit impaired recent memory or impaired remote memory. Judgment: Judgment normal. Judgment is not impulsive or inappropriate. Ortho Exam  Neurologic Exam     Mental Status   Oriented to person, place, and time. Speech: not slurred     Cranial Nerves     CN III, IV, VI   Pupils are equal, round, and reactive to light. After a thorough review and discussion of the previous medical records, patient comprehensive medical, surgical, and family and social history, Review of Systems, their OARRS, their Screener and Opioid Assessment for Patients with Pain (SOAPP®-R), recent diagnostics, and symptomatic results to previous treatment, it is my impression that the patients is suffering with progressive and severe:     Diagnosis Orders   1. Myalgia     2.  Chronic bilateral low back pain with right-sided sodium, diclofenac sodium, Harry-24, zoledronic acid, gabapentin, LORazepam, cephALEXin, amoxicillin-clavulanate, diclofenac sodium, digoxin, topiramate, Victoza, NovoLOG FlexPen, NovoFine, Basaglar KwikPen, esomeprazole, ezetimibe, predniSONE, levothyroxine, Incruse Ellipta, and predniSONE. I also recommend the following Medications:    Orders Placed This Encounter   Medications    DISCONTD: oxyCODONE (OXY-IR) 15 MG immediate release tablet     Sig: Take 1 tablet by mouth every 8 hours as needed for Pain (Do not get narcotics from other providers.) for up to 30 days. Dispense:  80 tablet     Refill:  0     Reduce doses taken as pain becomes manageable    DISCONTD: oxyCODONE (OXY-IR) 15 MG immediate release tablet     Sig: Take 1 tablet by mouth every 8 hours as needed for Pain (Do not get narcotics from other providers.) for up to 30 days. Dispense:  80 tablet     Refill:  0     Reduce doses taken as pain becomes manageable    oxyCODONE (OXYCONTIN) 10 MG extended release tablet     Sig: Take 1 tablet by mouth every 8 hours as needed for Pain for up to 30 days. Intended supply: 30 days     Dispense:  80 tablet     Refill:  0     Reduce doses taken as pain becomes manageable    lidocaine (XYLOCAINE) 5 % ointment     Sig: Apply topically as needed. Dispense:  1 Tube     Refill:  11    hydroxychloroquine (PLAQUENIL) 200 MG tablet     Sig: Take 1 tablet by mouth 2 times daily     Dispense:  60 tablet     Refill:  2        -which helps with pain and function. Otherwise, continue the current pain medications that I have prescibed. Radiologic:   Old films reviewed right L5 radiculopathy,     I discussed results with patients. see Follow up plans below  For any new studies. Care Everywhere Updates:  requested and reviewed. Neuro spine notes reviewed patient saw-Dr. Keven Lam thought that she was not a surgical candidate for either her neck or her low back.   He is returning her to

## 2020-05-20 NOTE — TELEPHONE ENCOUNTER
Juan Riddle was contacted to set up a video visit with Keerthi Maldonado MD.     Toni Baezao with spouse, he will give message to patient to call me to schedule appt.     Angela Gave

## 2020-06-02 NOTE — PROGRESS NOTES
Subjective:      Patient ID: Katy Hollis is a 78 y.o. female. 5-month follow-up on type 2 diabetes A1c is up from 9.1-9.5 requesting refills on insulin  Diabetes   She presents for her follow-up diabetic visit. She has type 2 diabetes mellitus. Symptoms are worsening. Current diabetic treatment includes insulin injections. She is currently taking insulin pre-breakfast, pre-dinner, at bedtime and pre-lunch. Her overall blood glucose range is >200 mg/dl. (Lab Results       Component                Value               Date                       LABA1C                   9.5 (H)             06/01/2020            )         Results for Yudy Spaulding (MRN [id number]) as of 6/2/2020 14:35   Ref. Range 6/7/2019 13:52 9/9/2019 11:31 1/8/2020 11:54 6/1/2020 11:46   Hemoglobin A1C Latest Ref Range: 4.8 - 5.9 % 8.4 (H) 9.9 (H) 9.1 (H) 9.5 (H)     Patient Active Problem List   Diagnosis    [facility]COPD overlap [facility] ([facility] [address]    Asthma    Diabetes mellitus type 2 in obese ([facility] [address] [facility]    ROGELIO on CPAP    CAD (coronary artery disease)    HTN (hypertension)    Hyperlipidemia with target LDL less than 70    Renal artery stenosis (HCC)    Obesity (BMI 30-39. 9)    Osteoporosis    Anxiety    Suprascapular entrapment neuropathy of left side    Midline low back pain with left-sided sciatica    Bilateral low back pain with sciatica    Rib pain on right side    Myalgia    High risk medication use - 11/07/17 OARRS PM&R, 02/07/18 OARRS PM&R, 05/30/17 Urine Drug Screen: negative PM&R, MED CONTRACT 1/26/17    Rib sprain    Acute pain of right knee    Acute right ankle pain    Chronic bilateral low back pain with sciatica    History of MRSA infection of lungs 9/2016    Osteoporosis    DDD (degenerative disc disease), lumbar    Chronic midline low back pain with left-sided sciatica    Bilateral carpal tunnel syndrome    Neck pain    Chronic thoracic spine pain    Moderate persistent asthma without complication   

## 2020-06-03 NOTE — PROGRESS NOTES
unspecified type diabetes mellitus without mention of complication, not stated as uncontrolled     Unspecified sleep apnea     UTI (urinary tract infection)      Past Surgical History:   Procedure Laterality Date    ANKLE SURGERY Left     hardware in & removed    ANKLE SURGERY Right     Plate in right ankle    CARPAL TUNNEL RELEASE Left 2015    CHOLECYSTECTOMY      COLONOSCOPY  09/11/2012    CORONARY ANGIOPLASTY WITH STENT PLACEMENT      ENDOSCOPY, COLON, DIAGNOSTIC      LUNG BIOPSY  09/07/2017    pleural biopsy    NV REVISE MEDIAN N/CARPAL TUNNEL SURG Right 5/24/2018    RIGHT WRIST CARPAL TUNNEL RELEASE, ( TO BE IN ROOM 12 WITH ROOM 2 TEAM) performed by Lorena Delarosa MD at [address] [place] [address] Left 6/19/2018    LEFT JOSELIN THYROIDECTOMY performed by Jennifer Vasquez MD at [place] [address]      partial    UPPER GASTROINTESTINAL ENDOSCOPY  09/11/2012    UPPER GASTROINTESTINAL ENDOSCOPY  12/4/14     DR. MAE    UPPER GASTROINTESTINAL ENDOSCOPY N/A 8/1/2019    EGD ESOPHAGOGASTRODUODENOSCOPY performed by Paco Goode [facility] at [facility]     Family History   Problem Relation Age of Onset    High Blood Pressure Mother     Heart Disease Mother     Cancer Father         LUNG    High Blood Pressure Brother     COPD Brother     Heart Disease Brother     Heart Attack Brother     COPD Sister     Heart Disease Sister     Arthritis Maternal [de-identified]     COPD Sister     No Known Problems Daughter     No Known Problems Son      Social History     Socioeconomic History    Marital status:      Spouse name: Not on file    Number of children: Not on file    Years of education: Not on file    Highest education level: Not on file   Occupational History    Occupation: Retired   Social Needs    Financial resource strain: Not very hard    Food insecurity     Worry: Never true     Inability: Never true    Transportation needs     Medical: No     Non-medical: No weakness and headaches. Psychiatric/Behavioral: Negative for behavioral problems and sleep disturbance.         :     Vitals:    06/03/20 1352 06/03/20 1401   BP: (!) 162/84 (!) 162/84   Pulse: 86    Resp: 16    Temp: 98.5 °F (36.9 °C)    TempSrc: Tympanic    SpO2: 91%    Weight: 182 lb (82.6 kg)    Height: 5' 1\" (1.549 m)      Wt Readings from Last 3 Encounters:   06/03/20 182 lb (82.6 kg)   06/02/20 180 lb (81.6 kg)   05/15/20 177 lb (80.3 kg)         Physical Exam  Constitutional:       General: She is not in acute distress. Appearance: She is well-developed. She is not diaphoretic. HENT:      Head: Normocephalic and atraumatic. Nose: Nose normal.   Eyes:      Pupils: Pupils are equal, round, and reactive to light. Neck:      Thyroid: No thyromegaly. Vascular: No JVD. Trachea: No tracheal deviation. Cardiovascular:      Rate and Rhythm: Normal rate and regular rhythm. Heart sounds: No murmur. No friction rub. No gallop. Pulmonary:      Effort: No respiratory distress. Breath sounds: Wheezing and rhonchi present. No rales. Comments: diminished Breath sound bilaterally. Chest:      Chest wall: No tenderness. Abdominal:      General: There is no distension. Tenderness: There is no abdominal tenderness. There is no rebound. Musculoskeletal: Normal range of motion. Lymphadenopathy:      Cervical: No cervical adenopathy. Skin:     General: Skin is warm and dry. Neurological:      Mental Status: She is alert and oriented to person, place, and time.       Coordination: Coordination normal.         Current Outpatient Medications   Medication Sig Dispense Refill    predniSONE (DELTASONE) 10 MG tablet 4 tablets daily for 4 days, 3 tablets daily for 4 days, 2 tablets daily for 4 days, then 1 tablet daily for 4 days 40 tablet 0    doxycycline hyclate (VIBRA-TABS) 100 MG tablet Take 1 tablet by mouth 2 times daily for 14 days 28 tablet 0    insulin aspart (NOVOLOG FLEXPEN) 100 UNIT/ML injection pen 10 units am and 15-20  units at lunch and dinner 30 mL 3    insulin glargine (BASAGLAR KWIKPEN) 100 UNIT/ML injection pen INJECT 85 UNITS    SUBCUTANEOUSLY EVERY NIGHT 75 mL 1    blood glucose test strips (ONETOUCH ULTRA) strip qid 600 each 3    diclofenac sodium (VOLTAREN) 1 % GEL Apply 4 g topically 4 times daily 1500 g 3    diclofenac sodium (VOLTAREN) 1 % GEL APPLY 4 GRAMS TOPICALLY 4  TIMES A DAY; 18609 g 3    oxyCODONE (OXYCONTIN) 10 MG extended release tablet Take 1 tablet by mouth every 8 hours as needed for Pain for up to 30 days. Intended supply: 30 days 80 tablet 0    lidocaine (XYLOCAINE) 5 % ointment Apply topically as needed.  1 Tube 11    hydroxychloroquine (PLAQUENIL) 200 MG tablet Take 1 tablet by mouth 2 times daily 60 tablet 2    predniSONE (DELTASONE) 10 MG tablet Take 1 tablet by mouth daily 90 tablet 1    INCRUSE ELLIPTA 62.5 MCG/INH AEPB USE 1 INHALATION ORALLY    INTO THE LUNGS DAILY 1 each 3    predniSONE (DELTASONE) 10 MG tablet 4 tablets daily for 7 days, 3 tablets daily for 7days, 2 tablets daily for 7 days, then 1 tablet daily for 7 days 30 tablet 0    ezetimibe (ZETIA) 10 MG tablet Take 10 mg by mouth daily      levothyroxine (SYNTHROID) 150 MCG tablet Take 1 tablet by mouth Daily 90 tablet 3    NOVOFINE 32G X 6 MM MISC USE 4 TIMES DAILY 400 each 1    esomeprazole (NEXIUM) 40 MG delayed release capsule TAKE 1 CAPSULE DAILY 90 capsule 1    topiramate (TOPAMAX) 25 MG tablet TAKE 1 TABLET NIGHTLY 90 tablet 1    digoxin (LANOXIN) 250 MCG tablet       cephALEXin (KEFLEX) 500 MG capsule Take 1 capsule by mouth 3 times daily 30 capsule 0    gabapentin (NEURONTIN) 300 MG capsule PLEASE SEE ATTACHED FOR DETAILED DIRECTIONS  2    PAMELA-24 300 MG extended release capsule       diclofenac sodium 1 % GEL Apply 4 g topically 4 times daily 5 Tube 5    Cholecalciferol (VITAMIN D3) 05612 units CAPS 1 po q  weekly 4 capsule 1    Roflumilast normal in course and caliber. Limited imaging upper abdomen shows gallbladder surgically absent. No osteoblastic, no osteolytic lesions. Impression Resolution of nodular opacities in right lower lung. Persistent stranding right lower lung. No new foci of masses or nodules identified. Peripheral left lung nodules as described unchanged. No new nodules or masses. No consolidation. Cholecystectomy      All CT scans at this facility use dose modulation, iterative reconstruction, and/or weight based dosing when appropriate to reduce radiation dose to as low as reasonably achievable.   ]    Assessment/Plan:     1. Asthma-COPD overlap syndrome ([facility] [address]  . She e is having  c/o shortness of breath , worse with exertion. C/o  Wheezing  C/o Cough with thick sputum. She is on  bronchodilator with bronchodilator with DuoNeb 4 times a day when necessary, Perforomist twice a day, Pulmicort twice a day, Incuse ellipta,  Theophylline 600 mg daily, daliresp 250 mcg daily. prednisone 10 mg daily. she is on 3 lit O2 via  24 hour a day. She  is on Nucala shot every 3 red week    2. Malignant neoplasm of lower lobe of right lung [facility])  She is following at [facility] [employer] [facility] clinic regarding right lung cancer    3. ROGELIO on CPAP  She is using CPAP with   8centimeters of H2O with heated humidity. She is using CPAP for about 8  hours every night. She is using CPAP with  Nasal  Mask. She said  sleep is restful with the CPAP use. She is compliant with CPAP therapy . Continue CPAP as before. 4. Hypoxia  On 3 L O2 via nasal cannula 24 hours a day. Continue O2 to keep saturation 90% or above. 5. Acute bronchitis, unspecified organism  Cough with thick mucus and wheezing. She will have p.o. doxycycline and taper dose of prednisone. - predniSONE (DELTASONE) 10 MG tablet; 4 tablets daily for 4 days, 3 tablets daily for 4 days, 2 tablets daily for 4 days, then 1 tablet daily for 4 days  Dispense: 40 tablet;  Refill: 0  - doxycycline hyclate (VIBRA-TABS) 100 MG tablet; Take 1 tablet by mouth 2 times daily for 14 days  Dispense: 28 tablet; Refill: 0      Return in about 2 months (around 8/3/2020) for COPD, asthma, lung nodule f/u.       Betsy Jauregui MD

## 2020-06-09 NOTE — PROGRESS NOTES
Patient here for trigger point injections. Patient taken back to exam room, and placed on drape locking stool. Areas to be injected marked appropriately, and cleansed with alcohol. 23cc of 1% Lidocaine and 1cc Vitamin B12 to be injected by provider.

## 2020-07-10 PROBLEM — A41.9 SEPSIS (HCC): Status: ACTIVE | Noted: 2020-01-01

## 2020-07-10 NOTE — ED NOTES
Bed: 06  Expected date:   Expected time:   Means of arrival:   Comments:  Room 110 [facility] [place], [facility]  07/10/20 3017

## 2020-07-10 NOTE — PROGRESS NOTES
Spiritual Care Services     Summary of Visit:   called to ER to provide support for family of a patient being intubated. Spiritual Assessment/Intervention/Outcomes:    Encounter Summary  Services provided to[de-identified] Patient and family together  Support System: Spouse, Children, Family members  Complexity of Encounter: High  Length of Encounter: 1 hour     Crisis  Type: ED Alert  Assessment: Tearful, Anxious, Shock  Intervention: Sustaining presence/ Ministry of presence  Outcome: Coping, Less anxious, less agitated                            Care Plan:        Spiritual Care Services   Electronically signed by Addy Ochoa on 7/10/20 at 6:05 PM EDT     To reach a  for emotional and spiritual support, place an [facility] consult request.   If a  is needed immediately, dial 0 and ask to page the on-call .

## 2020-07-10 NOTE — ED PROVIDER NOTES
[address] [facility] ED  eMERGENCY dEPARTMENT eNCOUnter      Pt Name: Carie Forde  MRN: [id number]  [place] 1950  Date of evaluation: 7/10/2020  Provider: Srinivas Singh MD        HISTORY OF PRESENT ILLNESS    Carie Forde is a 78 y.o. female per chart review has a h/o depression/anxiety, Lung Ca s/p chemo and radiation in remission, COPD/asthma, low back pain, HTN, Hpl, chronic low back pain presents to the ED with sob, n/v/d. Per , pt has had n/v/d/ x 2 days. Pt also complaining of severe sob. Upon EMS arrival, pt was 60s on pulse ox. Pt given albuterol nebs, IV Mg, IV solumedrol and placed on CPAP. Pt's be declined to 62s. Pt with severe respiratory distress and only able to speak 1 word answers. REVIEW OF SYSTEMS       Review of Systems   Unable to perform ROS: Acuity of condition       Except as noted above the remainder of the review of systems was reviewed and negative.        PAST MEDICAL HISTORY     Past Medical History:   Diagnosis Date    Anxiety     Asthma     Back pain     Bronchitis     CAD (coronary artery disease)     Cancer (HCC)     RLL lung CA    Carpal tunnel syndrome     COPD (chronic obstructive pulmonary disease) (HCC)     uses CPAP at night    Emphysema of lung (HCC)     Fibromyalgia     GERD (gastroesophageal reflux disease)     Hyperlipidemia     Hypertension     Hypothyroidism     Obesity     ROGELIO (obstructive sleep apnea)     Osteoarthritis     Osteoporosis     PONV (postoperative nausea and vomiting)     Restless legs syndrome     SOB (shortness of breath)     Type II or unspecified type diabetes mellitus without mention of complication, not stated as uncontrolled     Unspecified sleep apnea     UTI (urinary tract infection)          SURGICAL HISTORY       Past Surgical History:   Procedure Laterality Date    ANKLE SURGERY Left     hardware in & removed    ANKLE SURGERY Right     Plate in right ankle    CARPAL TUNNEL RELEASE Left 2015    CHOLECYSTECTOMY      COLONOSCOPY  09/11/2012    CORONARY ANGIOPLASTY WITH STENT PLACEMENT      ENDOSCOPY, COLON, DIAGNOSTIC      LUNG BIOPSY  09/07/2017    pleural biopsy    GA REVISE MEDIAN N/CARPAL TUNNEL SURG Right 5/24/2018    RIGHT WRIST CARPAL TUNNEL RELEASE, ( TO BE IN ROOM 12 WITH ROOM 2 TEAM) performed by David Barrientos MD at [address] Left 6/19/2018    LEFT JOSELIN THYROIDECTOMY performed by Brodie Robledo MD at [address] [place] [address] PTCA      THYROIDECTOMY      partial    UPPER GASTROINTESTINAL ENDOSCOPY  09/11/2012    UPPER GASTROINTESTINAL ENDOSCOPY  12/4/14     DR. MAE    UPPER GASTROINTESTINAL ENDOSCOPY N/A 8/1/2019    EGD ESOPHAGOGASTRODUODENOSCOPY performed by Fátima Mata MD at [address]       Previous Medications    ALBUTEROL SULFATE (PROAIR RESPICLICK) 850 (90 BASE) MCG/ACT AEROSOL POWDER INHALATION    Inhale 2 puffs into the lungs every 6 hours as needed for Wheezing or Shortness of Breath    ATORVASTATIN (LIPITOR) 80 MG TABLET    Take 1 tablet by mouth nightly    BLOOD GLUCOSE MONITORING SUPPL MARCELLUS    One Touch Ultra - To Test BID - IDDM - Dx: E11.9    BLOOD GLUCOSE TEST STRIPS (ONE TOUCH ULTRA TEST) STRIP    USE TO TEST TWICE A DAY AND AS NEEDED, IDDM - Dx: E11.9    BLOOD GLUCOSE TEST STRIPS (ONETOUCH ULTRA) STRIP    qid    BUDESONIDE (PULMICORT) 0.5 MG/2ML NEBULIZER SUSPENSION    Take 1 ampule by nebulization 2 times daily    CEPHALEXIN (KEFLEX) 500 MG CAPSULE    Take 1 capsule by mouth 3 times daily    CHOLECALCIFEROL (VITAMIN D3) 52997 UNITS CAPS    1 po q  weekly    CLOPIDOGREL (PLAVIX) 75 MG TABLET    Take 75 mg by mouth daily. CPAP MACHINE MISC    by Does not apply route Change CPAP pressure to 10 cm    DICLOFENAC SODIUM (VOLTAREN) 1 % GEL    Apply 4 g topically 4 times daily Generic equivalent acceptable, unless otherwise noted.     DICLOFENAC SODIUM (VOLTAREN) 1 % GEL    APPLY 4 GRAMS TOPICALLY 4 TIMES A DAY;    DICLOFENAC SODIUM (VOLTAREN) 1 % GEL    Apply 4 g topically 4 times daily    DICLOFENAC SODIUM 1 % GEL    Apply 4 g topically 4 times daily    DIGOXIN (DIGOX) 125 MCG TABLET    Take 125 mcg by mouth Daily with lunch     DIGOXIN (LANOXIN) 250 MCG TABLET        ESOMEPRAZOLE (NEXIUM) 40 MG DELAYED RELEASE CAPSULE    TAKE 1 CAPSULE DAILY    EZETIMIBE (ZETIA) 10 MG TABLET    Take 10 mg by mouth daily    FENOFIBRATE (TRICOR) 145 MG TABLET    Take 145 mg by mouth every evening     FLUOCINONIDE (LIDEX) 0.05 % OINTMENT    APPLY OINTMENT TOPICALLY TWICE DAILY FOR 2 WEEKS    FLUTICASONE (FLONASE) 50 MCG/ACT NASAL SPRAY    2 sprays by Nasal route daily    FORMOTEROL (PERFOROMIST) 20 MCG/2ML NEBULIZER SOLUTION    Inhale 2 mLs into the lungs daily    GABAPENTIN (NEURONTIN) 300 MG CAPSULE    PLEASE SEE ATTACHED FOR DETAILED DIRECTIONS    GUAIFENESIN (MUCINEX) 600 MG EXTENDED RELEASE TABLET    Take 1 tablet by mouth 2 times daily    HYDROXYCHLOROQUINE (PLAQUENIL) 200 MG TABLET    Take 1 tablet by mouth 2 times daily    INCRUSE ELLIPTA 62.5 MCG/INH AEPB    USE 1 INHALATION ORALLY    INTO THE LUNGS DAILY    INSULIN ASPART (NOVOLOG FLEXPEN) 100 UNIT/ML INJECTION PEN    10 units am and 15-20  units at lunch and dinner    INSULIN GLARGINE (BASAGLAR KWIKPEN) 100 UNIT/ML INJECTION PEN    INJECT 85 UNITS    SUBCUTANEOUSLY EVERY NIGHT    INSULIN PEN NEEDLE (B-D UF III MINI PEN NEEDLES) 31G X 5 MM MISC    USE 1 DAILY TO INJECT LANTUS    IPRATROPIUM-ALBUTEROL (DUONEB) 0.5-2.5 (3) MG/3ML SOLN NEBULIZER SOLUTION    Inhale 3 mLs into the lungs every 6 hours    ISOSORBIDE MONONITRATE (IMDUR) 60 MG CR TABLET    Take 60 mg by mouth daily     LEVOTHYROXINE (SYNTHROID) 150 MCG TABLET    Take 1 tablet by mouth Daily    LIDOCAINE (XYLOCAINE) 5 % OINTMENT    Apply topically as needed. LORAZEPAM (ATIVAN) 0.5 MG TABLET    Take 1 tablet by mouth every 12 hours as needed for Anxiety for up to 30 days.     MEPOLIZUMAB (NUCALA) 100 MG SOLR INJECTION    Inject 100 mg into the skin Received from: [facility] Received Sig: Inject 100 mg subcutaneously one time only. monthly    NEBULIZERS (COMPRESSOR/NEBULIZER) MISC    Dispense Nebulizer supplies    NITROGLYCERIN (NITROLINGUAL) 0.4 MG/SPRAY 0.4 MG SPRAY        NOVOFINE 32G X 6 MM MISC    USE 4 TIMES DAILY    NYSTATIN (MYCOSTATIN) 282567 UNIT/ML SUSPENSION    Take 5 mLs by mouth 4 times daily    ONE TOUCH LANCETS MISC    USE TO TEST TWICE A DAY AND AS NEEDED, IDDM - Dx: E11.9    OXYGEN    Inhale into the lungs 2 liters at night and during the day 3 liters    PREDNISONE (DELTASONE) 10 MG TABLET    4 tablets daily for 7 days, 3 tablets daily for 7days, 2 tablets daily for 7 days, then 1 tablet daily for 7 days    PREDNISONE (DELTASONE) 10 MG TABLET    Take 1 tablet by mouth daily    PREDNISONE (DELTASONE) 10 MG TABLET    4 tablets daily for 4 days, 3 tablets daily for 4 days, 2 tablets daily for 4 days, then 1 tablet daily for 4 days    ROFLUMILAST (DALIRESP) 500 MCG TABLET    Take 1 tablet by mouth daily    PAMELA-24 300 MG EXTENDED RELEASE CAPSULE        TOPIRAMATE (TOPAMAX) 25 MG TABLET    TAKE 1 TABLET NIGHTLY    VERAPAMIL (VERELAN PM) 240 MG CR CAPSULE    Take 240 mg by mouth 2 times daily     ZOLEDRONIC ACID (RECLAST) 5 MG/100ML SOLN    Infuse 100 mLs intravenously once for 1 dose       ALLERGIES     Biaxin [clarithromycin];  Invokana [canagliflozin]; and Victoza [liraglutide]    FAMILY HISTORY       Family History   Problem Relation Age of Onset    High Blood Pressure Mother     Heart Disease Mother     Cancer Father         LUNG    High Blood Pressure Brother     COPD Brother     Heart Disease Brother     Heart Attack Brother     COPD Sister     Heart Disease Sister     Arthritis Maternal [de-identified]     COPD Sister     No Known Problems Daughter     No Known Problems Son           SOCIAL HISTORY       Social History     Socioeconomic History    Marital status:      Spouse name: None  Number of children: None    Years of education: None    Highest education level: None   Occupational History    Occupation: Retired   Social Needs    Financial resource strain: Not very hard    Food insecurity     Worry: Never true     Inability: Never true    Transportation needs     Medical: No     Non-medical: No   Tobacco Use    Smoking status: Former Smoker     Packs/day: 1.50     Years: 32.00     Pack years: 48.00     Types: Cigarettes     Last attempt to quit: 2001     Years since quittin.3    Smokeless tobacco: Never Used   Substance and Sexual Activity    Alcohol use: No     Alcohol/week: 0.0 standard drinks    Drug use: No    Sexual activity: Yes     Partners: Male   Lifestyle    Physical activity     Days per week: None     Minutes per session: None    Stress: None   Relationships    Social connections     Talks on phone: None     Gets together: None     Attends [employer] witness service: None     Active member of club or organization: None     Attends meetings of clubs or organizations: None     Relationship status: None    Intimate partner violence     Fear of current or ex partner: None     Emotionally abused: None     Physically abused: None     Forced sexual activity: None   Other Topics Concern    None   Social History Narrative    None         PHYSICAL EXAM        ED Triage Vitals [07/10/20 1652]   BP Temp Temp src Pulse Resp SpO2 Height Weight   -- -- -- -- -- -- -- 182 lb (82.6 kg)       Physical Exam  Vitals signs and nursing note reviewed. Constitutional:       Appearance: She is well-developed. Comments: Minimally responsive, only able to speak 1 word sentences   HENT:      Head: Normocephalic. Right Ear: External ear normal.      Left Ear: External ear normal.   Eyes:      Conjunctiva/sclera: Conjunctivae normal.      Pupils: Pupils are equal, round, and reactive to light. Neck:      Musculoskeletal: Normal range of motion and neck supple. Cardiovascular:      Comments: Tachycardic, no rubs, murmurs, clicks, gallops  Pulmonary:      Comments: Diffuse end expiratory wheezing, poor air mvmt. Severe respiratory distress, diaphoretic, only able to speak 1 word sentences. Abdominal:      General: Bowel sounds are normal.      Palpations: Abdomen is soft. Musculoskeletal: Normal range of motion. Skin:     General: Skin is warm and dry. Neurological:      Comments: Pt speaks 1 word sentences. Eyes open spontaneously. Localizes to pain. GCS 11.      Psychiatric:      Comments: Anxious           LABS:  Labs Reviewed   COMPREHENSIVE METABOLIC PANEL - Abnormal; Notable for the following components:       Result Value    Chloride 94 (*)     CO2 14 (*)     Anion Gap 32 (*)     Glucose 210 (*)     BUN 31 (*)     CREATININE 4.35 (*)     GFR Non- 10.0 (*)     GFR  12.1 (*)     Calcium 10.9 (*)     Total Protein 8.5 (*)     ALT 61 (*)     AST 75 (*)     Globulin 3.9 (*)     All other components within normal limits   URINE DRUG SCREEN - Abnormal; Notable for the following components:    Oxycodone Urine POSITIVE (*)     All other components within normal limits   CBC WITH AUTO DIFFERENTIAL - Abnormal; Notable for the following components:    WBC 20.7 (*)     RBC 6.56 (*)     Hemoglobin 19.1 (*)     Hematocrit 59.2 (*)     MCHC 32.2 (*)     RDW 14.6 (*)     Neutrophils Absolute 14.7 (*)     Monocytes Absolute 2.7 (*)     Bands Relative 35 (*)     All other components within normal limits   TROPONIN - Abnormal; Notable for the following components:    Troponin 0.184 (*)     All other components within normal limits    Narrative:     CALL  [place]  [facility] tel. [phone],  Troponin result called to [facility], 07/10/2020 18:12, by Simona Pardo   URINALYSIS - Abnormal; Notable for the following components:    Color, UA DARK YELLOW (*)     Clarity, UA TURBID (*)     Glucose, Ur 100 (*)     Bilirubin Urine MODERATE (*)     Ketones, Urine TRACE (*)     Blood, Urine LARGE (*)     Protein, UA >=300 (*)     Leukocyte Esterase, Urine MODERATE (*)     All other components within normal limits   LIPASE - Abnormal; Notable for the following components:    Lipase 9 (*)     All other components within normal limits   LACTIC ACID, PLASMA - Abnormal; Notable for the following components:    Lactic Acid 7.6 (*)     All other components within normal limits    Narrative:     CALL  [place]  [facility] tel. [phone],  Lactic acid result called to [facility], 07/10/2020 18:12, by Skyla Reyes   TSH WITHOUT REFLEX - Abnormal; Notable for the following components:    TSH 9.470 (*)     All other components within normal limits    Narrative:     CALL  [facility] tel. [phone],  Troponin result called to [facility], 07/10/2020 18:12, by Jamilah Tariq - Abnormal; Notable for the following components:    Protime 15.0 (*)     All other components within normal limits   MICROSCOPIC URINALYSIS - Abnormal; Notable for the following components:    RBC, UA 5-10 (*)     Bacteria, UA FEW (*)     All other components within normal limits   POCT ARTERIAL - Abnormal; Notable for the following components:    POC Potassium 3.1 (*)     POC Chloride 112 (*)     POC Glucose 221 (*)     POC Creatinine 4.2 (*)     GFR Non- 10 (*)     GFR African American 13 (*)     pH, Arterial 7.078 (*)     pCO2, Arterial 50 (*)     pO2, Arterial 397 (*)     HCO3, Arterial 14.7 (*)     Base Excess, Arterial -15 (*)     O2 Sat, Arterial 100 (*)     TCO2, Arterial 16 (*)     Lactate 3.31 (*)     POC Hematocrit 54 (*)     Hemoglobin 18.5 (*)     All other components within normal limits   CULTURE, BLOOD 1   CULTURE, BLOOD 2   LEGIONELLA ANTIGEN, URINE   ETHANOL    Narrative:     CALL  [facility]  ED tel. [phone],  Lactic acid result called to  [facility], 07/10/2020 18:12, by Skyla Reyes   MAGNESIUM   APTT   COVID-19   LACTIC ACID, PLASMA         MDM:   Vitals:    Vitals:    07/10/20 1730 07/10/20 1752 07/10/20 1822 07/10/20 1838   BP: (!) 152/71 136/67 125/76 118/69   Pulse: 121 121 122 124   Resp: 20 22     Temp:       TempSrc:       SpO2: 100% 95%     Weight:           79 yo female presents to the ED with sob, n/v/d. Pt is severely hypoxic with GCS 11 upon arrival however noted to be normotensive and hypoxic to the 60s on 2 L NC. Case discussed with pt's  who stated that pt has a DNR however he would like to revoke it due to him not ready to let go of the patient. Intubation discussed with patient and she was ok with intubation. Pt intubated in the ED due to severe respiratory distress, hypoxia, GCS 11. ABG done which shows pH 7.078, pCO2 50, pO2 397. Due to acidosis, pt given IV bicarb in the ED. Labs remarkable for glucose 210, BUN 31, Cr 4.35, AST 75, ALT 61, WBC 21, Hb 19.1, troponin 0.184, lactic acidosis, 7.6, TSH 9.470, INR 1.2. Repeat lactic acid ordered. UA shows UTI. CXR shows adequate ETT tube with possible RML pneumonia. EKG shows sinus tach with , left axis, normal intervals, no ST changes. Pt started on propofol gtt for sedation. Due to presumed sepsis, pt given 3rd L NS in the ED. Pt given IV levaquin for pneumonia and UTI. Given respiratory failure, renal failure, elevated troponin, lactic acidosis, UTI/pneumonia, case discussed with Dr. Orlando Bryson and pt admitted to ICU in critical condition. Family understands plan. CRITICAL CARE TIME   Total CriticalCare time was 86 minutes, excluding separately reportable procedures. There was a high probability of clinically significant/life threatening deterioration in the patient's condition which required my urgent intervention.         PROCEDURES:  Unlessotherwise noted below, none      Intubation  Date/Time: 7/10/2020 5:20 PM  Performed by: JUAN Ryan CNP  Authorized by: Carolyn Severino MD     Consent:     Consent obtained:  Verbal    Consent given by:  Patient    Risks discussed:  Death and brain injury    Alternatives discussed:  No treatment  Pre-procedure details:     Patient status:  Altered mental status    Mallampati score: I    Pretreatment medications:  None (Etomidate)    Paralytics:  Rocuronium  Procedure details:     Preoxygenation:  Bag valve mask    CPR in progress: no      Intubation method:  Oral    Oral intubation technique:  Video-assisted    Laryngoscope blade: Mac 3    Tube size (mm):  7.0    Tube type:  Cuffed    Number of attempts:  1    Ventilation between attempts: no      Cricoid pressure: no      Tube visualized through cords: yes    Placement assessment:     ETT to lip:  24    ETT to teeth:  23    Tube secured with:  ETT briceno    Breath sounds:  Equal    Placement verification: chest rise, condensation, CXR verification, direct visualization, equal breath sounds and ETCO2 detector      CXR findings:  ETT in proper place  Post-procedure details:     Patient tolerance of procedure: Tolerated well, no immediate complications          FINAL IMPRESSION      1. Acute respiratory failure with hypoxia ([address]    2. Lactic acidosis    3. Acute cystitis without hematuria    4. Acute renal failure, unspecified acute renal failure type ([address]    5. Pneumonia due to organism    6.  Elevated troponin          DISPOSITION/PLAN   DISPOSITION            Livia Melendez MD (electronically signed)  Attending Emergency Physician          Livia Melendez MD  07/10/20 7702

## 2020-07-10 NOTE — ED NOTES
Home medications put in computer as they appear on home medication list provided by .  states the last few days pt has been going to the bathroom frequently and breathing \"funny. \"  Pt's  told her if it is worse by tomorrow morning he was bringing her to ER. They had an appointment yesterda of some sort (cannot recall details), but did not make it.  states today pt stated she needed to go to the bathroom, when he heard a loud thump.  went into the bathroom and found pt on the floor struggling to breathe.  immediately called 46.        Wanda Anaya RN  07/10/20 1932

## 2020-07-10 NOTE — H&P
Hospital Medicine  History and Physical    Patient:  Issa Butler  MRN: [id number]    CHIEF COMPLAINT:    Chief Complaint   Patient presents with    Respiratory Distress       History Obtained From:  Patient, EMR  Primary Care Physician: Torin Ferreira MD    HISTORY OF PRESENT ILLNESS:   The patient is a 78 y.o. female who presents with acute respiratory failure. Unable to obtain history from patient as patient is sedated and intubated.     Past Medical History:      Diagnosis Date    Anxiety     Asthma     Back pain     Bronchitis     CAD (coronary artery disease)     Cancer (HCC)     RLL lung CA    Carpal tunnel syndrome     COPD (chronic obstructive pulmonary disease) (HCC)     uses CPAP at night    Emphysema of lung (HCC)     Fibromyalgia     GERD (gastroesophageal reflux disease)     Hyperlipidemia     Hypertension     Hypothyroidism     Obesity     ROGELIO (obstructive sleep apnea)     Osteoarthritis     Osteoporosis     PONV (postoperative nausea and vomiting)     Restless legs syndrome     SOB (shortness of breath)     Type II or unspecified type diabetes mellitus without mention of complication, not stated as uncontrolled     Unspecified sleep apnea     UTI (urinary tract infection)        Past Surgical History:      Procedure Laterality Date    ANKLE SURGERY Left     hardware in & removed    ANKLE SURGERY Right     Plate in right ankle    CARPAL TUNNEL RELEASE Left 2015    CHOLECYSTECTOMY      COLONOSCOPY  09/11/2012    CORONARY ANGIOPLASTY WITH STENT PLACEMENT      ENDOSCOPY, COLON, DIAGNOSTIC      LUNG BIOPSY  09/07/2017    pleural biopsy    MT REVISE MEDIAN N/CARPAL TUNNEL SURG Right 5/24/2018    RIGHT WRIST CARPAL TUNNEL RELEASE, ( TO BE IN ROOM 12 WITH ROOM 2 TEAM) performed by Andrey Duncan MD at [facility] Left 6/19/2018    LEFT JOSELIN THYROIDECTOMY performed by Elaine Conway MD at [address] [place] [address] PTCA      THYROIDECTOMY      partial    UPPER GASTROINTESTINAL ENDOSCOPY  09/11/2012    UPPER GASTROINTESTINAL ENDOSCOPY  12/4/14     DR. MAE    UPPER GASTROINTESTINAL ENDOSCOPY N/A 8/1/2019    EGD ESOPHAGOGASTRODUODENOSCOPY performed by Mamadou Yeboah MD at [facility]       Medications Prior to Admission:    Prior to Admission medications    Medication Sig Start Date End Date Taking? Authorizing Provider   predniSONE (DELTASONE) 10 MG tablet 4 tablets daily for 4 days, 3 tablets daily for 4 days, 2 tablets daily for 4 days, then 1 tablet daily for 4 days 6/3/20   Mary Marte MD   insulin aspart (NOVOLOG FLEXPEN) 100 UNIT/ML injection pen 10 units am and 15-20  units at lunch and dinner 6/2/20   Anna Marie Mallory MD   insulin glargine (BASAGLAR KWIKPEN) 100 UNIT/ML injection pen INJECT 85 UNITS    SUBCUTANEOUSLY EVERY NIGHT 6/2/20   Anna Marie Mallory MD   blood glucose test strips (ONETOUCH ULTRA) strip qid 6/2/20   Anna Marie Mallory MD   diclofenac sodium (VOLTAREN) 1 % GEL Apply 4 g topically 4 times daily 6/1/20   Lauren Adams DO   diclofenac sodium (VOLTAREN) 1 % GEL APPLY 4 GRAMS TOPICALLY 4  TIMES A DAY; 5/27/20   Lauren Adams, DO   lidocaine (XYLOCAINE) 5 % ointment Apply topically as needed.  5/15/20   Lauren Adams DO   hydroxychloroquine (PLAQUENIL) 200 MG tablet Take 1 tablet by mouth 2 times daily 5/15/20   Lauren Adams DO   predniSONE (DELTASONE) 10 MG tablet Take 1 tablet by mouth daily 5/14/20 11/10/20  Mary Marte MD   INCRUSE ELLIPTA 62.5 MCG/INH AEPB USE 1 INHALATION ORALLY    INTO THE LUNGS DAILY 5/13/20   Mary Marte MD   predniSONE (DELTASONE) 10 MG tablet 4 tablets daily for 7 days, 3 tablets daily for 7days, 2 tablets daily for 7 days, then 1 tablet daily for 7 days 5/4/20   Mary Marte MD   ezetimibe (ZETIA) 10 MG tablet Take 10 mg by mouth daily    Historical Provider, MD   levothyroxine (SYNTHROID) 150 MCG tablet Take 1 tablet by mouth Daily 4/30/20   Anna Marie Mallory MD   NOVOFINE 32G X 6 MM MISC USE 4 TIMES DAILY 4/20/20   Radha Szymanski MD   esomeprazole (NEXIUM) 40 MG delayed release capsule TAKE 1 CAPSULE DAILY 4/20/20   Alexis Jean MD   topiramate (TOPAMAX) 25 MG tablet TAKE 1 TABLET NIGHTLY 3/12/20   Lauren Adams DO   digoxin (LANOXIN) 250 MCG tablet  2/23/20   Historical Provider, MD   cephALEXin (KEFLEX) 500 MG capsule Take 1 capsule by mouth 3 times daily 1/9/20   Radha Szymanski MD   LORazepam (ATIVAN) 0.5 MG tablet Take 1 tablet by mouth every 12 hours as needed for Anxiety for up to 30 days. 11/25/19 12/25/19  Fadi Enriquez MD   gabapentin (NEURONTIN) 300 MG capsule PLEASE SEE ATTACHED FOR DETAILED DIRECTIONS 10/9/19   Historical Provider, MD   zoledronic acid (RECLAST) 5 MG/100ML SOLN Infuse 100 mLs intravenously once for 1 dose 10/2/19 10/2/19  Fadi Enriquez MD   PAMELA-24 300 MG extended release capsule  7/28/19   Historical Provider, MD   diclofenac sodium (VOLTAREN) 1 % GEL Apply 4 g topically 4 times daily Generic equivalent acceptable, unless otherwise noted.  7/18/19 10/16/19  Lauren Adams DO   diclofenac sodium 1 % GEL Apply 4 g topically 4 times daily 6/12/19   Keisha Adams DO   Cholecalciferol (VITAMIN D3) 62073 units CAPS 1 po q  weekly 5/1/19   Fadi Enriquez MD   Roflumilast (DALIRESP) 500 MCG tablet Take 1 tablet by mouth daily 4/18/19   Janet Mercado MD   CPAP Machine MISC by Does not apply route Change CPAP pressure to 10 cm 1/7/19   Janet Mercado MD   guaiFENesin (MUCINEX) 600 MG extended release tablet Take 1 tablet by mouth 2 times daily 1/7/19   Dannielle Edouard MD   ipratropium-albuterol (DUONEB) 0.5-2.5 (3) MG/3ML SOLN nebulizer solution Inhale 3 mLs into the lungs every 6 hours 11/26/18   Janet Mercado MD   blood glucose test strips (ONE TOUCH ULTRA TEST) strip USE TO TEST TWICE A DAY AND AS NEEDED, IDDM - Dx: E11.9 10/24/18   Radha Szymanski MD   OXYGEN Inhale into the lungs 2 liters at night and during the day 3 liters    Historical Provider, MD   fluocinonide (TRICOR) 145 MG tablet Take 145 mg by mouth every evening     Historical Provider, MD   verapamil (VERELAN PM) 240 MG CR capsule Take 240 mg by mouth 2 times daily     Historical Provider, MD       Allergies:  Biaxin [clarithromycin]; Invokana [canagliflozin]; and Victoza [liraglutide]    Social History:   TOBACCO:   reports that she quit smoking about 19 years ago. Her smoking use included cigarettes. She has a 48.00 pack-year smoking history. She has never used smokeless tobacco.  ETOH:   reports no history of alcohol use. Family History:       Problem Relation Age of Onset    High Blood Pressure Mother     Heart Disease Mother     Cancer Father         LUNG    High Blood Pressure Brother     COPD Brother     Heart Disease Brother     Heart Attack Brother     COPD Sister     Heart Disease Sister     Arthritis Maternal [de-identified]     COPD Sister     No Known Problems Daughter     No Known Problems Son        REVIEW OF SYSTEMS:  Ten systems reviewed and negative except as stated in the HPI    Physical Exam:    Vitals: /69   Pulse 124   Temp 98.6 °F (37 °C) (Temporal)   Resp 22   Wt 182 lb (82.6 kg)   LMP  (LMP Unknown)   SpO2 95%   BMI 34.39 kg/m²   General appearance: alert, appears stated age and cooperative  Skin: Skin color, texture, turgor normal. No rashes or lesions  HEENT: Head: Normocephalic, no lesions, without obvious abnormality. . No dental issues   Neck: no jugular venous distention,   Lungs: Scattered crackles, ET tube present. Heart: regular rate and rhythm, S1, S2 normal, no murmur, click, rub or gallop  Abdomen: soft, non-tender; bowel sounds normal; no masses,  no organomegaly  Extremities: extremities normal, atraumatic, no cyanosis or edema  Neurologic: Mental status: Sedated, intubated.        Recent Labs     07/10/20  1700 07/10/20  1738   WBC 20.7*  --    HGB 19.1* 18.5*     --      Recent Labs     07/10/20  1700 07/10/20  1738     --    K 4.0  --    CL 94*  --    CO2 14*  --    BUN 31*  --    CREATININE 4.35* 4.2*   GLUCOSE 210*  --    AST 75*  --    ALT 61*  --    BILITOT 0.6  --    ALKPHOS 78  --      Troponin T:   Recent Labs     07/10/20  170   TROPONINI 0.184*       ABGs:   Lab Results   Component Value Date    PHART 7.078 07/10/2020    PO2ART 397 07/10/2020    BDO0JJO 50 07/10/2020     INR:   Recent Labs     07/10/20  1700   INR 1.2     URINALYSIS:  Recent Labs     07/10/20  170   COLORU DARK YELLOW*   PHUR 5.0   WBCUA 6-9   RBCUA 5-10*   BACTERIA FEW*   CLARITYU TURBID*   SPECGRAV 1.024   LEUKOCYTESUR MODERATE*   UROBILINOGEN 1.0   BILIRUBINUR MODERATE*   BLOODU LARGE*   GLUCOSEU 100*     -----------------------------------------------------------------   Xr Chest Portable    Result Date: 7/10/2020  Patient MRN: [id number] : 1950 Age:  78 years Gender: Female Order Date: 7/10/2020 5:00 PM. Exam: XR CHEST PORTABLE Number of Views: 1 Indication:  Intubation Comparison: 3/3/2020 Findings: Vascular calcifications thoracic aorta. Interval placement endotracheal tube distal tip at the level of the clavicular heads. Interval placement NG tube distal tip past the gastroesophageal junction. Asymmetric right hilar enlargement compared with the left in indeterminate finding, given the patient's history of extensive tobacco usage. No pneumothorax. Nonspecific airspace disease right lung base. Impression:  1. Asymmetric right hilar enlargement, compared with the left, given the patient's history of extensive tobacco usage, further evaluation with CT scan the chest is recommended to exclude any potential underlying lymphadenopathy or mass/malignancy. Mass or malignancy is not excluded based on this chest x-ray. 2. Interval placement of an endotracheal tube and NG tube as described above. 3. Vascular calcifications thoracic aorta.  4. Nonspecific right basilar airspace disease could be reflective of atelectasis or scarring or infiltrate/pneumonia. Assessment and Plan   1. Sepsis secondary to pneumonia with organ dysfunction as evidenced by lactic acidosis and BOBBY. IV antibiotics, pulmonary consult. 2. BOBBY: Baseline 0.7  3. Mixed acidosis secondary to sepsis and acute hypoxic and hypercapnic respiratory failure. Intubated,sedated. 4. History of lung cancer, former smoker. 5. DVT ppx  6. Disposition: Dependent on hospital course. Will discharge once medically stable. SW on board for discharge planning.        Fish Ambrosio MD

## 2020-07-10 NOTE — ED NOTES
Bedside report given to Deonna GOULD. Family remains at bedside and updated on visiting regulations and plan of care. Family appreciative of care and understanding of plan. Questions answered at this time. Propofol rate @ 37OIO/QB/QSH to keep systolic > 90 per Dr. Bret Campbell.      Unique Tan RN  07/10/20 1920

## 2020-07-11 NOTE — PROGRESS NOTES
Comprehensive Nutrition Assessment    Type and Reason for Visit:  Initial, Consult(New vent, TF O/M)    Nutrition Recommendations/Plan: Food and/or Nutrient Delivery:  Continue NPO, Start Tube Feeding(Low calorie high protein formula (Vital HP) via OGT with goal rate of 20 mL/hr x23 hrs (hold 30 minutes before and after synthroid if home med restarted). 100 mL fluid flush QID. Add 1 proteinex with flush BID). Vital HP better meets pt's nutrition needs compared to Nepro. This low volume of Vital HP only provides 644 mg sodium, 736 mg potassium, and 368 mg phosphorus. Nutrition Assessment:  Pt admitted with sepsis. Consult received from intensivist for tube feeding ordering and managing. Will start tube feeding. Pt is currently in droplet plus isolation preventing hands on NFPE. Malnutrition Assessment:  Malnutrition Status:  Insufficient data    Findings of the 6 clinical characteristics of malnutrition:    Estimated Daily Nutrient Needs:  Energy (kcal):  6122-4362 (13-15 kcals/kg); Weight Used for Energy Requirements:  Current     Protein (g):  110-121 (2-2.2 g/kg); Weight Used for Protein Requirements:  Ideal(55 kg)        Fluid (ml/day):  1375 mL (25 mL/kg) or as per MD; Weight Used for Fluid Requirements:  [place]      Nutrition Related Findings:  No significant weight loss over the past year. Intubated 7/10. Last BM PTA. Labs reviewed: Cr 4.19, BUN 14, GFR 10.5, POC glucose 221-361. I/O: 1055/1100. Meds include propofol at 19.3 mL/hr (510 kcals), solumedrol, synthroid at home. ABD WNL. No edema present per nsg. OGT in place to LIS with green/brown drainage. PMH includes cholecystectomy, lung CA, COPD, GERD, T2DM.       Wounds:  None       Current Nutrition Therapies:    · Current Tube Feeding (TF) Orders:   · Feeding Route: Orogastric  · Formula: Low Calorie, High Protein  · Schedule: Cyclic(x23 hours, hold 30 minutes before and after synthroid if restarted (home med))  · Additives/Modulars: Protein(BID)  · Water Flushes: 100 mL QID (400 mL)  · Current TF & Flush Orders Provides: None currently running  · Goal TF & Flush Orders Provides: 668 kcals (+ kcals from propofol), 92 gm protein, 785 mL free fluid (plus IVF, med flushes)    Anthropometric Measures:  · Height: 5' 4\" (162.6 cm)  · Current Body Weight: 177 lb (80.3 kg)(No source; unable to update due to droplet plus precautions)   · Admission Body Weight: 182 lb (82.6 kg)(7/10 estimated)    · Usual Body Weight: 184 lb (83.5 kg)(7/2019 no source; 177 lb 5/15 no source; 176 lbs. 9/2019 no source;)     · Ideal Body Weight: 120 lbs; 147.5 lbs   · BMI: 30.4  · BMI Categories: Obese Class 1 (BMI 30.0-34. 9)         Nutrition Diagnosis:   · Inadequate oral intake related to impaired respiratory funtion, other (comment)(inability to consume food) as evidenced by NPO or clear liquid status due to medical condition, intubation    Nutrition Interventions:   Food and/or Nutrient Delivery:  Continue NPO, Start Tube Feeding(Low calorie high protein formula (Vital HP) via OGT with goal rate of 20 mL/hr x23 hrs (hold 30 minutes before and after synthroid if home med restarted). 100 mL fluid flush QID. Add 1 proteinex with flush BID). Vital HP better meets pt's nutrition needs compared to Nepro. This low volume of Vital HP only provides 644 mg sodium, 736 mg potassium, and 368 mg phosphorus. Nutrition Education/Counseling:  No recommendation at this time   Coordination of Nutrition Care:  Continued Inpatient Monitoring    Goals: Tolerance of EN, blood glucose 140-180 mg/dL       Nutrition Monitoring and Evaluation:   Food/Nutrient Intake Outcomes:  Enteral Nutrition Intake/Tolerance  Physical Signs/Symptoms Outcomes:  Biochemical Data, GI Status, Hemodynamic Status, Skin, Weight     Discharge Planning:     Too soon to determine     Electronically signed by Trent Díaz, MS, RD, LD on 7/11/20 at 1:41 PM EDT

## 2020-07-11 NOTE — FLOWSHEET NOTE
0900: Dr. Anna Chu rounded on pt - pt in room, sedation weaned - see new orders.  Electronically signed by Zina Sifuentes RN on 7/11/2020 at 9:10 AM

## 2020-07-11 NOTE — ACP (ADVANCE CARE PLANNING)
Pt has a completed advance directive on the chart indicating that her  Keysha Arocs [phone] is her first contact and daughter Maria Fernanda Bishop [phone] or cell [phone].

## 2020-07-11 NOTE — CARE COORDINATION
PATIENT INTUBATED, CALL PLACED TO [facility] TO START DC PLANNING  Debbie Velazquez Case Management Initial Discharge Assessment    Met with Family to discuss discharge plan. PCP: Demarco Friedman MD                                Date of Last Visit: ABOUT A MONTH AND A HALF    If no PCP, list provided? N/A    Discharge Planning    Living Arrangements: independently at home    Who do you live with? SPOUSE    Who helps you with your care:  self    If lives at home:     Do you have any barriers navigating in your home? no    Patient can perform ADL? Yes    Current Services (outpatient and in home) :  None    Dialysis: No    Is transportation available to get to your appointments? Yes    DME Equipment:  no    Respiratory equipment: Continuous Oxygen  2-3 Liters     Respiratory provider:  yes - [facility]     Pharmacy:  yes - [facility]    Consult with Medication Assistance Program?  No      Patient agreeable to [facility]? Yes, [facility] [employer] -IF NEEDED    Patient agreeable to SNF/Rehab? N/A    Other discharge needs identified? N/A    Freedom of choice list provided with basic dialogue that supports the patient's individualized plan of care/goals and shares the quality data associated with the providers. Yes    Does Patient Have a High-Risk for Readmission Diagnosis (CHF, PN, MI, COPD)? Yes    If Yes,     Consult with pulmonologist? Yes   Consult with cardiologist? No   Cardiac Rehab referral if EF <35%? No   Consult with Pharmacy for medication assessment prior to discharge? No   Consult with Behavioral health to aid in depression, anxiety, or coping issues? No   Palliative Care Consult? No   Pulmonary Rehab order for COPD, PN, and CHF (if EF > 35%)? No    Does patient have a reliable scale and know how to read it (for CHF)? N/A   Nutrition consult for CHF?  No   Respiratory therapy consult that includes bedside instruction on administration of nebulizers and/or inhalers, and assessment of oxygen and equipment needs in the home? Yes    The plan for Transition of Care is related to the following treatment goals:INTUBATED, SEDATED, IV ABX    Initial Discharge Plan? (Note: please see concurrent daily documentation for any updates after initial note).     [address] [place] [address]    The Patient and/or patient representative: Aracelis Conn was provided with choice of any post-acute providers for care and equipment and agrees with discharge plan  Yes    Electronically signed by Graham Shah RN on 7/11/2020 at 4:24 PM

## 2020-07-11 NOTE — PROGRESS NOTES
right lower lobe due to infectious organism ([facility] [address]    Post-radiation pneumonitis (HCC)    Lumbar and sacral spondylarthritis    Spondylosis of cervical region without myelopathy or radiculopathy    Peripheral polyneuropathy    H/O heart artery stent    Anticoagulated    Sepsis (HCC)       Allergies:  Biaxin [clarithromycin]; Invokana [canagliflozin]; and Victoza [liraglutide]     Temp max: 99.3 *F    Recent Labs     07/10/20  1700   BUN 31*       Recent Labs     07/10/20  1700 07/10/20  1738   CREATININE 4.35* 4.2*       Recent Labs     07/10/20  1700   WBC 20.7*       No intake or output data in the 24 hours ending 07/11/20 0305    Culture Date      Source                       Results  7/10/20               blood x2                      pending        Ht Readings from Last 1 Encounters:   07/10/20 5' 4\" (1.626 m)        Wt Readings from Last 1 Encounters:   07/10/20 177 lb 4 oz (80.4 kg)         Body mass index is 30.42 kg/m². Estimated Creatinine Clearance: 13 mL/min (A) (based on SCr of 4.2 mg/dL (H)). Assessment/Plan:  TBW: 80.4 kg   IBW: 54.7 kg   ABW: 65 kg  Vancomycin started 7/10/20 at 1,000 mg IV every 24 hours. Continue Vancomycin 1,000 mg IV with decreased frequency of every 48 hours Initial trough scheduled for prior to the 3rd dose on 7/14/20 at approx 2100. Timing of future trough levels will be determined based on culture results, renal function, and clinical response. Thank you for the consult. Will continue to follow.

## 2020-07-11 NOTE — PLAN OF CARE
Nutrition Problem #1: Inadequate oral intake  Intervention: Food and/or Nutrient Delivery: Continue NPO, Start Tube Feeding(Low calorie high protein formula (Vital HP) via OGT with goal rate of 20 mL/hr x23 hrs (hold 30 minutes before and after synthroid if home med restarted). 100 mL fluid flush QID. Add 1 proteinex with flush BID)  Nutritional Goals:  Tolerance of EN, blood glucose 140-180 mg/dL

## 2020-07-11 NOTE — PROGRESS NOTES
Skin assessed upon admission with troy GOULD. No wounds rashes or abnormal findings noted.   mepliex applied to coccyx  For prophylaxis

## 2020-07-11 NOTE — CONSULTS
Pulmonary and Critical Care Medicine  Consult Note  Encounter Date: 2020 10:45 AM    Ms. Mary Ramesh is a 78 y.o. female  : 1950  Requesting Provider: Vanessa Dickens MD    Reason for request: Acute respiratory failure            HISTORY OF PRESENT ILLNESS:    Patient is 78 y.o. presents with shortness of breath, nausea and vomiting. Patient currently intubated, she is awake but unable to provide history, she denies pain or difficulty breathing, she follows commands. She has history of lung cancer post chemo and radiation currently in remission, she history of COPD and asthma. Per patient  patient had symptoms for 2 days, mainly nausea vomiting and diarrhea, along with severe shortness of breath. In ED patient saturation was 60%, she did not respond to noninvasive ventilation and was intubated.           Past Medical History:        Diagnosis Date    Anxiety     Asthma     Back pain     Bronchitis     CAD (coronary artery disease)     Cancer (HCC)     RLL lung CA    Carpal tunnel syndrome     COPD (chronic obstructive pulmonary disease) (HCC)     uses CPAP at night    Emphysema of lung (HCC)     Fibromyalgia     GERD (gastroesophageal reflux disease)     Hyperlipidemia     Hypertension     Hypothyroidism     Obesity     ROGELIO (obstructive sleep apnea)     Osteoarthritis     Osteoporosis     PONV (postoperative nausea and vomiting)     Restless legs syndrome     SOB (shortness of breath)     Type II or unspecified type diabetes mellitus without mention of complication, not stated as uncontrolled     Unspecified sleep apnea     UTI (urinary tract infection)        Past Surgical History:        Procedure Laterality Date    ANKLE SURGERY Left     hardware in & removed    ANKLE SURGERY Right     Plate in right ankle    CARPAL TUNNEL RELEASE Left     CHOLECYSTECTOMY      COLONOSCOPY  2012    CORONARY ANGIOPLASTY WITH STENT PLACEMENT      ENDOSCOPY, COLON, DIAGNOSTIC      LUNG BIOPSY  09/07/2017    pleural biopsy    [place] REVISE MEDIAN N/CARPAL TUNNEL SURG Right 5/24/2018    RIGHT WRIST CARPAL TUNNEL RELEASE, ( TO BE IN ROOM 12 WITH ROOM 2 TEAM) performed by Karen Hurd MD at [facility] Left 6/19/2018    LEFT JOSELIN THYROIDECTOMY performed by Qian Triana MD at [address] [place] [address] PTCA      THYROIDECTOMY      partial    UPPER GASTROINTESTINAL ENDOSCOPY  09/11/2012    UPPER GASTROINTESTINAL ENDOSCOPY  12/4/14     DR. MAE    UPPER GASTROINTESTINAL ENDOSCOPY N/A 8/1/2019    EGD ESOPHAGOGASTRODUODENOSCOPY performed by Yayo Luciano MD at [address] [place] [address] History:     reports that she quit smoking about 19 years ago. Her smoking use included cigarettes. She has a 48.00 pack-year smoking history. She has never used smokeless tobacco. She reports that she does not drink alcohol or use drugs. Family History:       Problem Relation Age of Onset    High Blood Pressure Mother     Heart Disease Mother     Cancer Father         LUNG    High Blood Pressure Brother     COPD Brother     Heart Disease Brother     Heart Attack Brother     COPD Sister     Heart Disease Sister     Arthritis Maternal [de-identified]     COPD Sister     No Known Problems Daughter     No Known Problems Son        Allergies:  Biaxin [clarithromycin];  Invokana [canagliflozin]; and Victoza [liraglutide]        MEDICATIONS during current hospitalization:    Continuous Infusions:   propofol 30 mcg/kg/min (07/11/20 0840)    sodium chloride 75 mL/hr at 07/10/20 2120       Scheduled Meds:   [START ON 7/12/2020] vancomycin  1,000 mg Intravenous Q48H    vancomycin (VANCOCIN) intermittent dosing (placeholder)   Other RX Placeholder    sodium chloride flush  10 mL Intravenous 2 times per day    enoxaparin  30 mg Subcutaneous Daily    piperacillin-tazobactam  3.375 g Intravenous Q12H       PRN Meds:etomidate, rocuronium, sodium chloride flush, acetaminophen **OR** acetaminophen, polyethylene glycol, promethazine **OR** ondansetron        REVIEW OF SYSTEMS: Not obtainable, patient currently on vent and sedated  PHYSICAL EXAM:    Vitals:  BP (!) 161/81   Pulse 122   Temp 98.6 °F (37 °C) (Oral)   Resp 23   Ht 5' 4\" (1.626 m)   Wt 177 lb 4 oz (80.4 kg)   LMP  (LMP Unknown)   SpO2 96%   BMI 30.42 kg/m²     General: alert, on vent, no distress  Head: normocephalic, atraumatic  Eyes:No gross abnormalities. ENT:  MMM no lesions, ET and OG tube in  Neck:  supple and no masses  Chest : Good air movement, few rales at the bases, no wheezing, nontender, tympanic  Heart[de-identified] Heart sounds are normal.  Regular rate and rhythm without murmur, gallop or rub. ABD:  symmetric, soft, non-tender  Musculoskeletal : no cyanosis, no clubbing and no edema  Neuro: Moves all 4 extremities  Skin: No rashes or nodules noted. Lymph node:  no cervical nodes  Urology: yes [facility]   Psychiatric: appropriate        Data Review  Recent Labs     07/10/20  1700 07/10/20  1738   WBC 20.7*  --    HGB 19.1* 18.5*   HCT 59.2*  --      --       Recent Labs     07/10/20  1700 07/10/20  1738 07/11/20  0515     --  138   K 4.0  --  4.0   CL 94*  --  102   CO2 14*  --  14*   BUN 31*  --  42*   CREATININE 4.35* 4.2* 4.19*   GLUCOSE 210*  --  324*       MV Settings:     Vent Mode: AC/VC  Vt Ordered: 400 mL  Rate Set: 22 bmp  PEEP/CPAP: 5  Peak Inspiratory Pressure: 18 cmH2O  Mean Airway Pressure: 9 cmH20  I:E Ratio: 1:2    ABGs:   Recent Labs     07/10/20  1738   PHART 7.078*   RKD5OEB 50*   PO2ART 397*   UIN3UXX 14.7*   BEART -15*   Z5EIPQBR 100*   MXG9ETD 16*     O2 Device: Ventilator  Lab Results   Component Value Date    LACTA 3.2 07/10/2020    LACTA 7.6 07/10/2020    LACTA 2.0 10/14/2016       Radiology  I personally reviewed imaging studies and no clear infiltrate on chest x-ray    Spirometry [facility] on January 7, 2020, shows FEV1 1.02 L, 52%.     CT report from [facility] shows improvement in the right lower lobe nodule, with worsening radiation pneumonitis and fibrosis on the right lower lobe. ,  She had a stable 5 mm nodules also reported. Assessment, plan:   Patient is at risk due to    · Acute hypercapnic respiratory failure  ·  likely COPD with acute exacerbation  · Need to rule out venous thromboembolic event  · Leukocytosis, no clear source of infection  · Polycythemia, could be secondary? Due to chronic hypoxia  · Obstructive sleep apnea on CPAP at home  · Chronic hypoxic respiratory failure on 3 L O2 at home  · History of lung cancer right lower lobe status post chemo and radiation, with residual radiation pneumonitis  Recommendations  · Vent support lung protective strategy  · head of the bed 30°  · Daily sedation holidays and breathing trials  · Sedation with combination propofol and fentanyl target R ASS of 0 to -1  · Watch for ICU delirium: TV on, natural light, avoid benzos, pain control, early mobility, and family engagement  · PUD prophylaxis  · DVT prophylaxis  · Continue Zosyn  · Sputum Gram stain and culture  · CT chest without contrast  · Echo and bilateral lower extremity ultrasound  · Check procalcitonin  · Solu-Medrol 40 mg IV daily  · Watch volume status, avoid overload  · Monitor renal function and urine output  · Start tube feed  · Monitor blood sugar target 140-180  · PRN labetalol and hydralazine for blood pressure control    Due to the immediate potential for life-threatening deterioration due to respiratory failure, I spent 49 minutes providing critical care. This time is excluding time spent performing procedures.       Thank you for consultation    Electronically signed by Elton Spence MD, [facility],  on 7/11/2020 at 10:45 AM

## 2020-07-11 NOTE — FLOWSHEET NOTE
Shift assessment: Pt covid (-) x2 - urine output 100 for 12 hours - Dr. Orlando Bryson and Dr. Nikky Mcclendon notified - see new orders. Pt remains on vent - handoff of care given to Saige Henley RN at bedside.  Electronically signed by Brittny Cisneros RN on 7/11/2020 at 7:20 PM

## 2020-07-12 NOTE — PROGRESS NOTES
Hospitalist Progress Note      Date of Admission: 7/10/2020  Chief Complaint:    Chief Complaint   Patient presents with    Respiratory Distress     Subjective:   Intubated, sedated    Medications:    Infusion Medications    propofol 51.41 mcg/kg/min (07/11/20 2240)    dextrose      fentaNYL (SUBLIMAZE) infusion 75 mcg/hr (07/11/20 2200)    IV infusion builder 75 mL/hr at 07/11/20 1930     Scheduled Medications    [START ON 7/12/2020] vancomycin  1,000 mg Intravenous Q48H    vancomycin (VANCOCIN) intermittent dosing (placeholder)   Other RX Placeholder    insulin lispro  0-6 Units Subcutaneous Q4H    [START ON 7/12/2020] pantoprazole  40 mg Oral QAM AC    methylPREDNISolone  40 mg Intravenous Daily    sodium chloride flush  10 mL Intravenous 2 times per day    enoxaparin  30 mg Subcutaneous Daily    piperacillin-tazobactam  3.375 g Intravenous Q12H     PRN Meds: labetalol, hydrALAZINE, glucose, dextrose, glucagon (rDNA), dextrose, etomidate, rocuronium, sodium chloride flush, acetaminophen **OR** acetaminophen, polyethylene glycol, promethazine **OR** ondansetron    Intake/Output Summary (Last 24 hours) at 7/11/2020 2355  Last data filed at 7/11/2020 2300  Gross per 24 hour   Intake 3377.39 ml   Output 1375 ml   Net 2002.39 ml     Exam:  BP (!) 175/69 Comment: sedation was weaned and pt is agitated  Pulse 112   Temp 99.5 °F (37.5 °C) (Axillary)   Resp 30   Ht 5' 4\" (1.626 m)   Wt 177 lb 4 oz (80.4 kg)   LMP  (LMP Unknown)   SpO2 97%   BMI 30.42 kg/m²   Head: Normocephalic, atraumatic  Sclera clear  Neck supple, nontender  Lungs: Scattered crackles    Labs:   Recent Labs     07/10/20  1700 07/10/20  1738 07/11/20  1728   WBC 20.7*  --   --    HGB 19.1* 18.5* 17.6*   HCT 59.2*  --   --      --   --      Recent Labs     07/10/20  1700 07/10/20  1738 07/11/20  0515 07/11/20  1728     --  138  --    K 4.0  --  4.0  --    CL 94*  --  102  --    CO2 14*  --  14*  --    BUN 31*  --  42*  -- CREATININE 4.35* 4.2* 4.19* 5.3*   CALCIUM 10.9*  --  8.4*  --    AST 75*  --  942*  --    ALT 61*  --  549*  --    BILIDIR  --   --  0.5*  --    BILITOT 0.6  --  0.7  --    ALKPHOS 78  --  67  --      Recent Labs     07/10/20  1700   INR 1.2     Recent Labs     07/10/20  1700   TROPONINI 0.184*     Radiology:  US DUP LOWER EXTREMITIES BILATERAL VENOUS   Final Result   NO SONOGRAPHIC EVIDENCE OF DEEP VENOUS THROMBOSIS WITHIN THE  RIGHT OR LEFT  LOWER EXTREMITY. CT CHEST WO CONTRAST   Final Result      Right lower lobe linear atelectasis/scarring and/or mild pneumonia. Moderate emphysema. Enteric tube terminates in the upper esophagus. All CT scans at this facility use dose modulation, iterative reconstruction, and/or weight based dosing when appropriate to reduce radiation dose to as low as reasonably achievable. XR CHEST PORTABLE   Final Result   Impression:     1. Asymmetric right hilar enlargement, compared with the left, given the patient's history of extensive tobacco usage, further evaluation with CT scan the chest is recommended to exclude any potential underlying lymphadenopathy or mass/malignancy. Mass    or malignancy is not excluded based on this chest x-ray. 2. Interval placement of an endotracheal tube and NG tube as described above. 3. Vascular calcifications thoracic aorta. 4. Nonspecific right basilar airspace disease could be reflective of atelectasis or scarring or infiltrate/pneumonia. Assessment/Plan:    Sepsis secondary to pneumonia: IV antibiotics, following with pulmonology. BOBBY: Baseline 0.7. Creatinine plateau-continue to monitor along with urine output.   Blood pressure remains stable without pressor support    Acute hypoxic and hypercapnic respiratory failure, intubated/sedated    History of lung cancer, former smoker    35 minutes total care time, >1/2 in unit/floor time and care coordination     Electronically signed by Anne Solitario Aaliyah Hamilton MD on 7/11/2020 at 11:55 PM

## 2020-07-12 NOTE — CONSULTS
[address]                      [address], [address] [facility]                                  CONSULTATION    PATIENT NAME: Toni Bagley                :        1950  MED REC NO:   [id number]                            ROOM:       11  ACCOUNT NO:   [de-identified]                           ADMIT DATE: 07/10/2020  PROVIDER:     Brianna Best DO    CONSULT DATE:  2020    RENAL CONSULT    HISTORY OF PRESENT ILLNESS:  A 65-year-old  female admitted to  the hospital for respiratory distress. The patient had been having 3  days of constant nausea, vomiting and diarrhea. Family thought this was  suggestive of a flu and did not wait until the day of admission when the  patient became extremely weak. On admission to the hospital, the  patient had a low-grade temperature and a white blood cell count that  was elevated. The patient is being seen because of a drastic change in  renal function with a serum creatinine of 4.35 and a GFR of 10, which on  the day of my evaluation had worsened to a serum creatinine of 6.9 and a  GFR of 6. The patient has potassium of 3.4 and sodium of 138. According to the , there is no history of kidney disease in the  past.  Six months ago, the patient's renal function was normal.  She  does have a history of lung cancer. She has had radiation therapy to  the lung approximately 6 months ago with her last treatment. The  patient has been following up with the [facility] for evidence of  any recurrence. She has known coronary artery disease, COPD with  asthma, hyperlipidemia, hypertension, and known history of right lung  mass proved to be cancer. She has had coronary stents in the past.    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     PAST SURGICAL HISTORY:  Upper and lower endoscopy, left thyroidectomy,  right carpal tunnel surgery, left ankle surgery, right ankle surgery,  cholecystectomy, coronary angioplasty with stents. FAMILY HISTORY:  Cannot be obtained due to her being on a ventilator. HABITS:  50 pack-year smoking history. No alcohol of significance. No  nonsteroidals, but she does use opioids for pain management. MEDICATIONS:  At the time of admission to the hospital; Verapamil,  Nucala injection, nitroglycerin, oxygen, Voltaren gel, Reclast,  Neurontin, Lanoxin, Deltasone, weaning dose TriCor, Plavix, Pulmicort,  Imdur, Flonase, Daliresp, Ativan, Topamax, Synthroid, Zetia, Ellipta,  oxycodone. ALLERGIES TO MEDICATIONS:  Ofilia Yonis, [address] [place]. REVIEW OF SYSTEMS:  Cannot be obtained due to her being on a ventilator. PHYSICAL EXAMINATION:  GENERAL:  The patient is on a ventilator at this time,  in the  room and sedated. VITAL SIGNS:  Temperature 98.7, heart rate of 100, respirations 30,  blood pressure 160/60.  _____ 96%. HEENT:  Normocephalic. No tenderness. No swelling of the scalp. Pupils mid plane. Sclerae is clear. ET tube in place. CHEST:  Lungs anteriorly and laterally. No wheezing or rales. CARDIOVASCULAR:  Heart is regular with distant with 1/6 systolic murmur. ABDOMEN:  Soft. No guarding or rigidity. No distention. EXTREMITIES:  The patient has no edema. SKIN:  Warm and dry.  _____ palpation. IMPRESSION:  1. BOBBY, etiology possibly related to sepsis, infectious process  underlying. 2.  History of lung cancer, right lower base without recurrence. 3.  Organic heart disease with previous stents. 4.  COPD with ROGELIO. 5.  Hypothyroidism. 6.  Chronic low back pain. 7.  Osteoporosis. 8.  Diabetes mellitus type 2. PLAN:  I have discussed with  the need due to the azotemia and  suspected rapid decline in renal function, acute dialysis treatment.    With blood pressure stable, we will do intermittent dialysis and not  CRRT. Antibiotics to continue in addition to ventilator support. Dr. Jason Minaya to insert dialysis catheter for treatment today.         Marci Gan DO    D: 07/12/2020 9:49:07       T: 07/12/2020 9:54:08     [id number]  Job#: [id number]     Doc#: [id number]    CC:

## 2020-07-12 NOTE — FLOWSHEET NOTE
Handoff of care received from Yoan, [address] [place] [address] at bedside - message sent to Dr. Alexa Ryan regarding pt critical labs - see new orders. Electronically signed by Mary Ann Garvin RN on 7/12/2020 at 8:03 AM  0900: Dr. Rita Salinas rounded and spoke with pt  - discussed dialysis and current renal situation with  -  stated that he needs to think about this decision. 1000: Pt  requested to speak with Dr. Garry Page regarding pt -  stated he wishes to have a moment to make decision. 1030: Pt  returned - decided on having dialysis catheter placed. Dr. Garry Page notified. 1130: Central line/Trialysis catheter placed by Dr. Garry aPge   [phone]: Pt  and daughter brought back to see pt - discussion about code status had with pt  - pt to be full code - if pt were to code, he wishes that we do everything until he were here to make decision. This was discussed with Dr. Garry Page. 1430: Dialysis RN in to initiate treatment. 1600: Pt tolerating treatment well.   1900: Handoff of care given to BRYANNA Milton.  Electronically signed by Mary Ann Garvin RN on 7/12/2020 at 7:57 PM

## 2020-07-12 NOTE — PROGRESS NOTES
208  --     < > = values in this interval not displayed. Recent Labs     07/10/20  1700  07/11/20  0515  07/12/20  0519 07/12/20  0726 07/12/20  1530     --  138  --  138  --  140   K 4.0  --  4.0  --  3.4  --  3.7   CL 94*  --  102  --  102  --  102   CO2 14*  --  14*  --  14*  --  19*   BUN 31*  --  42*  --  76*  --  70*   CREATININE 4.35*   < > 4.19*   < > 6.89* 6.4* 6.15*   CALCIUM 10.9*  --  8.4*  --  7.1*  --  7.3*   PHOS  --   --   --   --  7.4*  --  5.9*   AST 75*  --  942*  --   --   --   --    ALT 61*  --  549*  --   --   --   --    BILIDIR  --   --  0.5*  --   --   --   --    BILITOT 0.6  --  0.7  --   --   --   --    ALKPHOS 78  --  67  --   --   --   --     < > = values in this interval not displayed. Recent Labs     07/10/20  1700   INR 1.2     Recent Labs     07/12/20  0116 07/12/20  0519 07/12/20  1047   TROPONINI 0.302* 0.289* 0.249*     Radiology:  XR CHEST PORTABLE   Final Result      Interval placement of a central line. No pneumothorax or significant interval change. US DUP LOWER EXTREMITIES BILATERAL VENOUS   Final Result   NO SONOGRAPHIC EVIDENCE OF DEEP VENOUS THROMBOSIS WITHIN THE  RIGHT OR LEFT  LOWER EXTREMITY. CT CHEST WO CONTRAST   Final Result      Right lower lobe linear atelectasis/scarring and/or mild pneumonia. Moderate emphysema. Enteric tube terminates in the upper esophagus. All CT scans at this facility use dose modulation, iterative reconstruction, and/or weight based dosing when appropriate to reduce radiation dose to as low as reasonably achievable. XR CHEST PORTABLE   Final Result   Impression:     1. Asymmetric right hilar enlargement, compared with the left, given the patient's history of extensive tobacco usage, further evaluation with CT scan the chest is recommended to exclude any potential underlying lymphadenopathy or mass/malignancy. Mass    or malignancy is not excluded based on this chest x-ray.    2. Interval placement of an endotracheal tube and NG tube as described above. 3. Vascular calcifications thoracic aorta. 4. Nonspecific right basilar airspace disease could be reflective of atelectasis or scarring or infiltrate/pneumonia. Assessment/Plan:    Sepsis secondary to pneumonia: IV antibiotics, following with pulmonology. BOBBY: Baseline 0.7. Creatinine plateau-continue to monitor along with urine output. Blood pressure remains stable without pressor support. Jump in creatinine today concerning, nephrology on consult.     Acute hypoxic and hypercapnic respiratory failure, intubated/sedated    History of lung cancer, former smoker    35 minutes total care time, >1/2 in unit/floor time and care coordination     Electronically signed by Cherelle Tenorio MD on 7/12/2020 at 5:32 PM

## 2020-07-12 NOTE — PROGRESS NOTES
1930:   OG at 25cm; Tube feeding stoped; OG removed and replaced. 1940:   Bicarb in D5 infusion started at 75mL/hr; Nephrology consult placed;  updated on results of Covid screening. Eudora Primrose (the ) will stop by in the morning to be with his wife. 2100:   Restarted TF at GOAL 20mL/hr; requested Propofol (@ 2116) for tubing change and low remaining volume. 2200:   Prop placed on hold; pt waking, increased agitation, hypertensive; Accu check 300s and treated    2240:   New bag of prop arrived to the floor and restarted; hemodynamic stability achieved after restarting sedation. 0200:  Critical Lab:  Trop 0.302; Notified Dr Mary Miguel, Obtained EKG, and Increased bicarb infusion from 75 to 150 mL/hr.    0400:   Bathed and linens changed

## 2020-07-12 NOTE — PROGRESS NOTES
Pulmonary & Critical Care Medicine ICU Progress Note  Chief complaint : Acute respiratory failure    Subjunctive/24 hour events :   Patient seen and examined during multidisciplinary rounds with RN, charge nurse, RT, pharmacy, dietitian, and social service.    Patient on vent, occasionally open her eyes, no fever overnight, urine output minimal, blood sugar is not controlled, no bowel movement,      Social History     Tobacco Use    Smoking status: Former Smoker     Packs/day: 1.50     Years: 32.00     Pack years: 48.00     Types: Cigarettes     Last attempt to quit: 2001     Years since quittin.4    Smokeless tobacco: Never Used   Substance Use Topics    Alcohol use: No     Alcohol/week: 0.0 standard drinks         Problem Relation Age of Onset    High Blood Pressure Mother     Heart Disease Mother     Cancer Father         LUNG    High Blood Pressure Brother     COPD Brother     Heart Disease Brother     Heart Attack Brother     COPD Sister     Heart Disease Sister     Arthritis Maternal Aunt     COPD Sister     No Known Problems Daughter     No Known Problems Son        Recent Labs     20  1728 20  0726   PHART 7.206* 7.343*   XOD8MAF 42 30*   PO2ART 81* 84*       MV Settings:  Vent Mode: (S) CPAP Rate Set: 30 bmp/Vt Ordered: 400 mL/ /FiO2 : 40 %           IV:   dextrose 5 % and 0.45 % NaCl      insulin 8.04 Units/hr (20 0857)    propofol 40 mcg/kg/min (20 0816)    dextrose      fentaNYL (SUBLIMAZE) infusion 75 mcg/hr (20 2200)       Vitals:  BP (!) 132/51   Pulse 83   Temp 98.7 °F (37.1 °C) (Axillary)   Resp (!) 34   Ht 5' 4\" (1.626 m)   Wt 177 lb 4 oz (80.4 kg)   LMP  (LMP Unknown)   SpO2 95%   BMI 30.42 kg/m²    Tmax:        Intake/Output Summary (Last 24 hours) at 2020 1023  Last data filed at 2020 0600  Gross per 24 hour   Intake 4534 ml   Output 295 ml   Net 4239 ml       EXAM:  General: On vent, no distress  Head: normocephalic, atraumatic  Eyes:No gross abnormalities. ENT:  MMM no lesions, ET and OG tube in  Neck:  supple and no masses  Chest : Few basal rales, otherwise clear no wheezing, nontender, tympanic  Heart[de-identified] Heart sounds are normal.  Regular rate and rhythm without murmur, gallop or rub. ABD:  symmetric, soft, non-tender  Musculoskeletal : no cyanosis, no clubbing and no edema  Neuro:  Open her eyes, moves all 4 extremities  Skin: No rashes or nodules noted. Lymph node:  no cervical nodes  Urology: Yes Bello   Psychiatric: Calm    Medications:  Scheduled Meds:   IV infusion builder   Intravenous Once    heparin (porcine)  2,000 Units Intravenous Once    heparin (porcine)  4,000 Units Intercatheter Once    vancomycin  1,000 mg Intravenous Q48H    vancomycin (VANCOCIN) intermittent dosing (placeholder)   Other RX Placeholder    pantoprazole  40 mg Oral QAM AC    methylPREDNISolone  40 mg Intravenous Daily    sodium chloride flush  10 mL Intravenous 2 times per day    enoxaparin  30 mg Subcutaneous Daily    piperacillin-tazobactam  3.375 g Intravenous Q12H       PRN Meds:  dextrose, dextrose 5 % and 0.45 % NaCl, labetalol, hydrALAZINE, glucose, dextrose, glucagon (rDNA), dextrose, etomidate, rocuronium, sodium chloride flush, acetaminophen **OR** acetaminophen, polyethylene glycol, promethazine **OR** ondansetron    Results: reviewed by me   CBC:   Recent Labs     07/10/20  1700  07/11/20  1728 07/12/20  0519 07/12/20  0726   WBC 20.7*  --   --  12.4*  --    HGB 19.1*   < > 17.6* 13.6 17.1*   HCT 59.2*  --   --  40.7  --    MCV 90.3  --   --  88.2  --      --   --  208  --     < > = values in this interval not displayed.      BMP:   Recent Labs     07/10/20  1700  07/11/20  0515 07/11/20  1728 07/12/20  0519 07/12/20  0726     --  138  --  138  --    K 4.0  --  4.0  --  3.4  --    CL 94*  --  102  --  102  --    CO2 14*  --  14*  --  14*  --    PHOS  --   --   --   --  7.4*  --    BUN 31*  --  42*  --  76*

## 2020-07-12 NOTE — FLOWSHEET NOTE
tx complete 1000 ml removed tolerated well       07/12/20 1840   Vital Signs   BP (!) 133/58   Pulse 82   Weight 178 lb 2.1 oz (80.8 kg)   Percent Weight Change -1.22   Post-Hemodialysis Assessment   Post-Treatment Procedures Blood returned;Catheter capped, clamped and heparinized x 2 ports   Machine Disinfection Process Acid/Vinegar Clean;Bleach; Exterior Machine Disinfection   Rinseback Volume (ml) 200 ml   Total Liters Processed (l/min) 53.8 l/min   Dialyzer Clearance Lightly streaked   Duration of Treatment (minutes) 180 minutes   Heparin amount administered during treatment (units) 0 units   Hemodialysis Intake (ml) 600 ml   Hemodialysis Output (ml) 1600 ml   NET Removed (ml) -1000 ml   Tolerated Treatment Good   Bilateral Breath Sounds Diminished   Edema Generalized;Right upper extremity; Left upper extremity;Right lower extremity; Left lower extremity   Edema Generalized +1   RUE Edema +1   LUE Edema +1   RLE Edema +1   LLE Edema +1

## 2020-07-12 NOTE — CONSULTS
[facility] Heart Consult Note      Patient:  Terry Quan  YOB: 1950  MRN: [id number]   Acct: [de-identified]  Admit Date:  7/10/2020  Primary Cardiologist:Dr. Fannie Malik    Reason for Consult: Elevated troponin    Rounding Cardiologist: Diane Meade      Impression/Plan:     1. Elevated troponin: Pattern is plateau and can be explained by her severe acidosis and hypoxia on presentation. She is at risk of coronary events and that she has had a stent several years ago but had normal perfusion just a few months ago. Echocardiogram done yesterday shows no new wall motion abnormalities. Would favor an expectant approach at this point. ECG shows significant inferolateral T inversion but this can also be seen in severe metabolic abnormalities and has been seen in her in the past is simply more prominent at this time. Will follow serial ECG  2. Hypertension: Had been begun on losartan June 17. Though it is likely her deterioration is because of profound volume depletion from nausea vomiting and diarrhea would certainly avoid AR-2 blocker at this time. Would also repeat renal artery duplex when she is more stable to assure there is been no progression of renal artery stenosis described as quite mild years ago. Treat with IV hydralazine and dialysis for now  3. Acute kidney injury: Likely related to profound volume depletion perhaps exacerbated by AR-2 blocker instituted 3 weeks ago. As described above eventually will need further evaluation of renal arteries. To have dialysis later today  4. Acute kidney injury with previous digoxin use: I am very concerned of digoxin toxicity but fortunately ECG per se does not demonstrate findings thereof and she is had no dysrhythmia. Will obtain digoxin level. If extremely high may need to consider Digibind as digoxin does not adequately dialyzed. Also, had been on verapamil and digoxin.    5. Coronary artery disease: ECG is abnormal but not with ST change. T wave inversion only which can be explained by metabolic abnormalities. Myocardial perfusion imaging several months ago was normal.  Though she has a risk of progression at this time I think most of her issues are metabolic as opposed to coronary disease progression. 6. If bacteremia would obtain transesophageal echo. In that imaging of the LV was difficult would obtain Optison on contrast study to better define LV. 7. Hypothyroid (surgical) replace synthroid. 8. Nausea vom: dig may have contributed, to check level. 5. Discussed with , prognosis guarded    Chief Complaint/Active Symptoms: 70-year-old woman admitted through the emergency room on July 10 with complex medical history including lung cancer in remission after chemotherapy and radiation, COPD, hypertension, coronary artery disease. She had presented with shortness of breath nausea vomiting and diarrhea. GI symptoms for at least 48 hours. Initial O2 sat 60% via pulse ox and COPD was treated. She could not complete a sentence secondary to severity of dyspnea. Also found to have creatinine of 4.35 with creatinine normal 1 month ago. Troponin also elevated hence cardiology consult. Lactic acid was elevated at 7.6 and she was profoundly acidotic with pH of 7.08. Patient had been intubated in the emergency room. Also felt to have had pneumonia. N/V/D constantly last 3 days. Has had no meds for those 3 days. He also notes that she has had total thryoidectomy so will need synthroid. On June 17, losartan at 48 a day had been started for her hypertension. Remains on vent, to have dialysis today. ICU staff feels likely with chronic hypoxic respiratory failure having had acute hypercapnic failure. Also felt to have COPD with exacerbation without PE being likely. Has chronic polycythemia also consistent with chronic hypoxia.     Review of Systems:   Unobtainable due to sedation    CARDIAC HISTORY:  Coronary artery disease: 2001 LAD PTCA with stent. 2005 coronary angiography: LM-normal.  LAD-stent patent. 20% irregularity. Circumflex/RCA: 20-30% irregularities. March 2016 [facility] perfusion study: Normal with LVEF 53%    Hypertension  Hyperlipidemia      Past Medical History:   Diagnosis Date    Anxiety     Asthma     Back pain     Bronchitis     CAD (coronary artery disease)     Cancer (HCC)     RLL lung CA    Carpal tunnel syndrome     COPD [facility]chronic obstructive pulmonary disease[facility])     uses CPAP at night    Emphysema of lung (HCC)     Fibromyalgia     GERD (gastroesophageal reflux disease)     Hyperlipidemia     Hypertension     Hypothyroidism     Obesity     ROGELIO (obstructive sleep apnea)     Osteoarthritis     Osteoporosis     PONV (postoperative nausea and vomiting)     Restless legs syndrome     SOB (shortness of breath)     Type II or unspecified type diabetes mellitus without mention of complication, not stated as uncontrolled     Unspecified sleep apnea     UTI (urinary tract infection)      Family History   1. Family history of atrial fibrillation (V17.49) (Z82.49) : Mother   2. Family history of myocardial infarction (V17.3) (Z82.49) : Mother, Brother   3. Family history of Prolapse of mitral valve : Sister   3. Family history of Tachycardia : Brother    Social History   · Daily caffeine consumption, 2-3 servings a day   · Former smoker (V15.82) (D34.696)   · No alcohol use   · No drug use    Surgical History   1. History of Ankle Surgery   2. History of Biopsy Pleural   3. History of carpal tunnel surgery   4. History of Complete Colonoscopy   5. History of Gallbladder Surgery   6. History of Thyroid Surgery   7. History of thyroidectomy   8. History of Wrist Surgery    Current Meds   1. Albuterol Sulfate (2.5 MG/3ML) 0.083% Inhalation Nebulization Solution; USE 1 UNIT   DOSE EVERY 4-6 HOURS AS NEEDED FOR WHEEZING ; Therapy: 54GPS6887 to Recorded   2.  Atorvastatin Calcium 80 MG Oral Tablet; TAKE 1 TABLET BEDTIME; Therapy: 64AOH6394 to (Evaluate:14Nov2020)  Requested for: 40Frt1093; Last   Rx:20Nov2019; Status: ACTIVE - Renewal Denied Ordered   3. Basaglar KwikPen 100 UNIT/ML Subcutaneous Solution Pen-injector; INJECT   SUBCUTANEOUSLY AS DIRECTED; Therapy: (Recorded:57Mct6884) to Recorded   4. Clopidogrel Bisulfate 75 MG Oral Tablet; TAKE  1  TABLET Daily; Therapy: 46PIH3140 to (Evaluate:96Rxm5912)  Requested for: 57UPZ4958; Last   Rx:01Jan2020 Ordered   5. C-PAP; at bedtime; Therapy: (Recorded:21Nov2016) to Recorded   6. Daliresp 250 MCG Oral Tablet; take 1 tablet daily; Therapy: (Recorded:12Nov2018) to Recorded   7. Diclofenac Sodium 1 % Transdermal Gel; APPLY SPARINGLY TO AFFECTED AREA(S)   3-4 times daily; Therapy: 58MDO2073 to Recorded   8. Digoxin 250 MCG Oral Tablet; take 1 tablet daily; Therapy: 40LTG5651 to (Evaluate:82Rob6530)  Requested for: 25Vaw6705; Last   Rx:41Jxr2515 Ordered   9. DuoNeb 0.5-2.5 (3) MG/3ML SOLN; USE 1 UNIT DOSE IN NEBULIZER 3-4 TIMES DAILY; Therapy: (Recorded:12Nov2018) to Recorded   10. Esomeprazole Magnesium 40 MG Oral Capsule Delayed Release; TAKE 1 CAPSULE    DAILY; Therapy: (Recorded:30Mar2016) to Recorded   11. Ezetimibe 10 MG Oral Tablet; take 1 tablet at bedtime; Therapy: 89SRP9090 to (Evaluate:14Nov2020)  Requested for: 93LHF2925; Last    Rx:20Nov2019 Ordered   12. Fenofibrate 145 MG Oral Tablet; take 1 tablet daily; Therapy: 33RGX9498 to (Evaluate:14Nov2020)  Requested for: 44Ndw8384; Last    Rx:20Nov2019; Status: ACTIVE - Renewal Denied Ordered   13. Fluocinonide 0.05 % External Ointment; APPLY SPARINGLY TO AFFECTED AREA(S) 3    TIMES A DAY AS NEEDED; Therapy: 27Dlh9127 to Recorded   14. Gabapentin 300 MG Oral Capsule; TAKE 1 CAPSULE 3 TIMES DAILY; Therapy: 10CEW8050 to Recorded   15. Heart FID Trial; as directed; Therapy: 73AJB1989 to (Evaluate:14Nov2020); Last Rx:20Nov2019 Ordered   16.  HumaLOG KwikPen 100 UNIT/ML Subcutaneous Solution Pen-injector; INJECT    SUBCUTANEOUSLY 12-15 units twice daily; Therapy: (Recorded:12Nov2018) to Recorded   17. Incruse Ellipta 62.5 MCG/INH Inhalation Aerosol Powder Breath Activated; INHALE 1    PUFF DAILY; Therapy: 22PDA3609 to Recorded   18. Isosorbide Mononitrate ER 60 MG Oral Tablet Extended Release 24 Hour; take 1 tablet    daily; Therapy: 71IVA4514 to (Evaluate:41Rlg7642)  Requested for: 97Iln0427; Last    Rx:20Nov2019; Status: ACTIVE - Renewal Denied Ordered   19. Levothyroxine Sodium 150 MCG Oral Tablet; TAKE 1 TABLET DAILY; Therapy: 48DEH6257 to Recorded   20. LORazepam 0.5 MG Oral Tablet; AS NEEDED; Therapy: 09MHZ3865 to  Requested for: 26Jun2006 Recorded   21. Mucinex 600 MG Oral Tablet Extended Release 12 Hour; TAKE 1 TABLET TWICE DAILY    as needed; Therapy: (Recorded:20Mar2017) to Recorded   22. Nitroglycerin 0.4 MG/SPRAY Translingual Solution; SPRAY 1 SPRAY SUBLINGUAL AS    NEEDED FOR CHEST PAIN, MAY REPEAT EVERY 5 MINUTES UP TO 3X; Therapy: 61VAL3418 to (Evaluate:53Sxx6975)  Requested for: 94IJG5608; Last    Rx:89Kcz0139 Ordered   23. NovoLOG FlexPen 100 UNIT/ML Subcutaneous Solution Pen-injector; INJECT    SUBCUTANEOUSLY AS DIRECTED; Therapy: 08Apr2019 to Recorded   24. Nucala 100 MG Subcutaneous Solution Reconstituted; IM inj once a month for asthma; Therapy: 42ERH9629 to Recorded   25. Nystatin 303190 UNIT/ML Mouth/Throat Suspension; APPLY 1 ML 4 TIMES DAILY TO    EACH CHEEK WITH DROPPER, THEN MASSAGE WITH CLEAN FINGER; Therapy: 96EGQ3504 to Recorded   26. Oxygen; Pt uses 2-3 liters at bedtime and throughout the day as needed; Therapy: (Recorded:21Nov2016) to Recorded   27. Perforomist 20 MCG/2ML Inhalation Nebulization Solution; USE 1 VIAL IN NEBULIZER    EVERY 12 HOURS; Therapy: (Recorded:21Nov2016) to Recorded   28. predniSONE 10 MG Oral Tablet; TAKE 1 TABLET  AS NEEDED; Therapy: (Recorded:54Ban3984) to Recorded   29.  ProAir RespiClick 752 (90 Base) MCG/ACT Inhalation Aerosol Powder Breath Activated;    1-2 puff per day; Therapy: (Recorded:20Mar2017) to Recorded   30. Pulmicort 0.5 MG/2ML Inhalation Suspension; USE 1 UNIT DOSE VIA NEBULIZER TWO    TIMES A DAY; Therapy: (Recorded:21Nov2016) to Recorded   31. Reclast 5 MG/100ML Intravenous Solution; INFUSE 5MG INTRAVENOUSLY OVER 15    MINUTES AS DIRECTED ONCE PER YEAR; Therapy: (Recorded:30Mar2016) to Recorded   32. Harry-24 300 MG Oral Capsule Extended Release 24 Hour; TAKE 1 CAPSULE DAILY; Therapy: (Recorded:21Nov2016) to Recorded   33. Topiramate 25 MG Oral Tablet; TAKE 1 TABLET DAILY; Therapy: 17KRT6296 to Recorded   34. Tylenol 325 MG Oral Tablet; 1-2 TABLETS 2-3 TIMES DAILY AS NEEDED; Therapy: (Yolande Gale) to Recorded   35. Verapamil HCl  MG Oral Tablet Extended Release; 2 tablets twice a day; Therapy: 60YCV1308 to (Silvino Zhang)  Requested for: 74VXX6352; Last    Rx:11Mar2020 Ordered   36. Vitamin D3 25 MCG (1000 UT) Oral Capsule; Take 1 cap daily; Therapy: (Recorded:20Nov2019) to Recorded    Reviewed medications with med list.      Allergies   1. Biaxin TABS   Recorded By: Junie Iraheta; 12/24/2003 9:06:42 AM        Objective:   Vitals:    07/12/20 0300 07/12/20 0400 07/12/20 0500 07/12/20 0600   BP: (!) 159/60 (!) 140/59  (!) 132/51   Pulse: 100 93 89 83   Resp: 30 (!) 34 (!) 33 (!) 34   Temp: 98.7 °F (37.1 °C)      TempSrc: Axillary      SpO2: 96% 96% 94% 95%   Weight:       Height:          Wt Readings from Last 3 Encounters:   07/10/20 177 lb 4 oz (80.4 kg)   06/03/20 182 lb (82.6 kg)   06/02/20 180 lb (81.6 kg)       TELEMETRY: sinus tachycardia                            Rhythm Strip: no ectopy    Physical Exam:  General Appearance: sedated on the vent. Cardiovascular: normal rate, regular rhythm, normal S1 and S2, no murmurs, rubs, clicks, or gallops,  no JVD.  Distant heart sounds as on vent  Pulmonary/Chest: clear to auscultation bilaterally- however, very distant breath sounds. Abdomen: soft, , non-distended but tympanitic, normal bowel sounds, no masses   Extremities: no cyanosis, clubbing or edema  Skin: warm and dry, no rash or erythema  Eyes: EOMI  Neck: supple and non-tender without mass, no thyromegaly   Neurological: sedated on vent. Allergies: Allergies   Allergen Reactions    Biaxin [Clarithromycin] Nausea Only     Nausea with diarrhea    Invokana [Canagliflozin]      Yeast infection     Victoza [Liraglutide] Nausea And Vomiting        Medications:    IV infusion builder   Intravenous Once    heparin (porcine)  2,000 Units Intravenous Once    heparin (porcine)  4,000 Units Intercatheter Once    vancomycin  1,000 mg Intravenous Q48H    vancomycin (VANCOCIN) intermittent dosing (placeholder)   Other RX Placeholder    pantoprazole  40 mg Oral QAM AC    methylPREDNISolone  40 mg Intravenous Daily    sodium chloride flush  10 mL Intravenous 2 times per day    enoxaparin  30 mg Subcutaneous Daily    piperacillin-tazobactam  3.375 g Intravenous Q12H      dextrose 5 % and 0.45 % NaCl      insulin 8.04 Units/hr (20 0857)    propofol 40 mcg/kg/min (20 0816)    dextrose      fentaNYL (SUBLIMAZE) infusion 75 mcg/hr (20 2200)     dextrose, 12.5 g, PRN  dextrose 5 % and 0.45 % NaCl, , Continuous PRN  labetalol, 10 mg, Q3H PRN  hydrALAZINE, 10 mg, Q6H PRN  glucose, 15 g, PRN  dextrose, 12.5 g, PRN  glucagon (rDNA), 1 mg, PRN  dextrose, 100 mL/hr, PRN  etomidate, , Daily PRN  rocuronium, , Daily PRN  sodium chloride flush, 10 mL, PRN  acetaminophen, 650 mg, Q6H PRN    Or  acetaminophen, 650 mg, Q6H PRN  polyethylene glycol, 17 g, Daily PRN  promethazine, 12.5 mg, Q6H PRN    Or  ondansetron, 4 mg, Q6H PRN        Diagnostics:  EK2020  Sinus tachy at 102, Marked t inversion inferolateral leads.  These findings are new since 2020 but have been seen on ecg 2019 but to lesser degree    Echo: 7/10/2020  No valvular vegetations but pulmonic and aortic valves not optimally   visualized, suggest TRAY   Left ventricular ejection fraction is visually estimated at 60%. There is unusually large pap muscle. LVH with most prominent thickening post lateral wall. Needs TRAY or bubble contrast to better define. Normal RV function   RVH   Normal right ventricle systolic pressure. Around RV, peric thickening and small pericardial effusion. Normal valvular structure and function. No significant regurgitant or stenotic valvular lesions. CXR:               Portable:   Results for orders placed during the hospital encounter of 07/10/20   XR CHEST PORTABLE    Narrative Patient MRN: [id number]    : 1950    Age:  78 years    Gender: Female    Order Date: 7/10/2020 5:00 PM.     Exam: XR CHEST PORTABLE    Number of Views: 1     Indication:  Intubation    Comparison: 3/3/2020    Findings: Vascular calcifications thoracic aorta. Interval placement endotracheal tube distal tip at the level of the clavicular heads. Interval placement NG tube distal tip past the gastroesophageal junction. Asymmetric right hilar enlargement compared   with the left in indeterminate finding, given the patient's history of extensive tobacco usage. No pneumothorax. Nonspecific airspace disease right lung base. Impression Impression:    1. Asymmetric right hilar enlargement, compared with the left, given the patient's history of extensive tobacco usage, further evaluation with CT scan the chest is recommended to exclude any potential underlying lymphadenopathy or mass/malignancy. Mass   or malignancy is not excluded based on this chest x-ray. 2. Interval placement of an endotracheal tube and NG tube as described above. 3. Vascular calcifications thoracic aorta. 4. Nonspecific right basilar airspace disease could be reflective of atelectasis or scarring or infiltrate/pneumonia. XCXR/CT reviewed personally. I do not feel chf is present. Lab Data:    Cardiac Enzymes:  Recent Labs     07/10/20  1700 07/12/20  0116 07/12/20 0519   TROPONINI 0.184* 0.302* 0.289*     ProBNP:   Lab Results   Component Value Date    PROBNP 143 01/01/2019       CBC:   Recent Labs     07/10/20  1700  07/11/20  1728 07/12/20  0519 07/12/20  0726   WBC 20.7*  --   --  12.4*  --    RBC 6.56*  --   --  4.62  --    HGB 19.1*   < > 17.6* 13.6 17.1*   HCT 59.2*  --   --  40.7  --      --   --  208  --     < > = values in this interval not displayed. CMP:    Recent Labs     07/10/20  1700  07/11/20  0515 07/11/20  1728 07/12/20  0519 07/12/20  0726     --  138  --  138  --    K 4.0  --  4.0  --  3.4  --    CL 94*  --  102  --  102  --    CO2 14*  --  14*  --  14*  --    BUN 31*  --  42*  --  76*  --    CREATININE 4.35*   < > 4.19* 5.3* 6.89* 6.4*   GFRAA 12.1*   < > 12.7* 10* 7.1* 8*   LABGLOM 10.0*   < > 10.5* 8* 5.9* 6*   GLUCOSE 210*  --  324*  --  500*  --    CALCIUM 10.9*  --  8.4*  --  7.1*  --     < > = values in this interval not displayed. Hepatic Function Panel:    Recent Labs     07/10/20  1700 07/11/20  0515 07/12/20  0519   ALKPHOS 78 67  --    ALT 61* 549*  --    AST 75* 942*  --    PROT 8.5* 7.2  --    BILITOT 0.6 0.7  --    BILIDIR  --  0.5*  --    IBILI  --  0.2  --    LABALBU 4.6 3.5 2.6*       Magnesium:    Recent Labs     07/10/20  1700   MG 2.4       PT/INR:    Recent Labs     07/10/20  1700   PROTIME 15.0*   INR 1.2       Lipids:  No results for input(s): CHOL, TRIG, HDL, LDLCALC, LABVLDL in the last 72 hours. Active Problems:    Sepsis ([address]    Acute on chronic respiratory failure with hypoxia and hypercapnia (HCC)  Resolved Problems:    * No resolved hospital problems.  *           Electronically signed by Jame Castro MD on 7/12/2020 at 12:04 PM

## 2020-07-12 NOTE — PROCEDURES
Internal jugular central venous trialysis catheter; Ultrasound guided. [id number]                                                             [id number]    INDICATION:    Acute kidney injury, anuric needs for dialysis      CONSENT:  The spouse was counseled regarding the procedure, it's indications, risks, potential complications and alternatives and any questions were answered. Consent was obtained. PROCEDURE SUMMARY:   A time-out was performed. The patient's right neck region was prepped and draped in sterile fashion. The right internal jugular vein was accessed under ultrasound guidance using a finder needle and sheath. U/S images were permanently documented. Anesthesia was achieved with 1% lidocaine. The finder needle was inserted into the right neck under direct ultrasound guidance, and venous blood was withdrawn. The introducer needle was then inserted under direct ultrasound guidance and venous blood was withdrawn into the syringe. The syringe was removed and the guidewire was advanced through the introducer needle. A small incision was made with a scalpel and the introducer needle was removed. A dilator was advanced over the guidewire until appropriate dilation was obtained. The dilator was removed and an central venous  catheter was advanced over the guidewire and secured into place with 2 sutures at 16 cm. At time of procedure completion, all ports aspirated and flushed properly.        Estimated Blood loss   less than 5 cc    COMPLICATIONS:  None

## 2020-07-13 NOTE — PROGRESS NOTES
Nephrology Progress Note    Assessment:  BOBBY hypotension  Azotemia with oligo-anuria  Respiratory failure  OHDX  Hypertension Hx  DM type-2  COPD       Plan: intermittant dialysis today consider stopping insulin drip  Follow I&O and labs    Patient Active Problem List:     Asthma-COPD overlap syndrome (HCC)     Asthma     Diabetes mellitus type 2 in obese (HCC)     ROGELIO on CPAP     CAD (coronary artery disease)     HTN (hypertension)     Hyperlipidemia with target LDL less than 70     Renal artery stenosis (HCC)     Obesity (BMI 30-39. 9)     Osteoporosis     Anxiety     Suprascapular entrapment neuropathy of left side     Midline low back pain with left-sided sciatica     Bilateral low back pain with sciatica     Rib pain on right side     Myalgia     High risk medication use - 11/07/17 OARRS PM&R, 02/07/18 OARRS PM&R, 05/30/17 Urine Drug Screen: negative PM&R, MED CONTRACT 1/26/17     Rib sprain     Acute pain of right knee     Acute right ankle pain     Chronic bilateral low back pain with sciatica     History of MRSA infection of lungs 9/2016     Osteoporosis     DDD (degenerative disc disease), lumbar     Chronic midline low back pain with left-sided sciatica     Bilateral carpal tunnel syndrome     Neck pain     Chronic thoracic spine pain     Moderate persistent asthma without complication     Right carpal tunnel syndrome     Former smoker, stopped smoking in distant past     Encounter for postoperative wound care     Hypoxia     Class 3 severe obesity due to excess calories with serious comorbidity and body mass index (BMI) of 40.0 to 44.9 in adult [facility])     Morbid obesity with BMI of 40.0-44.9, adult ([facility] [address]     Malignant neoplasm of lower lobe of right lung (HCC)     Postoperative hypothyroidism     Uncontrolled type 2 diabetes mellitus with hyperglycemia (HCC)     Ulnar neuropathy of both upper extremities     Dyspepsia     Esophagitis     Right lower lobe lung mass     Lumbosacral radiculopathy at S1 Renal cyst, right     Pneumonia of right lower lobe due to infectious organism ([facility] [address]     Post-radiation pneumonitis (HCC)     Lumbar and sacral spondylarthritis     Spondylosis of cervical region without myelopathy or radiculopathy     Peripheral polyneuropathy     H/O heart artery stent     Anticoagulated     Sepsis (HCC)     Acute on chronic respiratory failure with hypoxia and hypercapnia (HCC)      Subjective:  Admit Date: 7/10/2020    Interval History: not responding on ventilator sedated    Medications:  Scheduled Meds:   IV infusion builder   Intravenous Once    heparin (porcine)  2,000 Units Intravenous Once    heparin (porcine)  4,000 Units Intercatheter Once    levothyroxine  150 mcg Oral Daily    vancomycin  1,000 mg Intravenous Q48H    vancomycin (VANCOCIN) intermittent dosing (placeholder)   Other RX Placeholder    pantoprazole  40 mg Oral QAM AC    methylPREDNISolone  40 mg Intravenous Daily    sodium chloride flush  10 mL Intravenous 2 times per day    enoxaparin  30 mg Subcutaneous Daily    piperacillin-tazobactam  3.375 g Intravenous Q12H     Continuous Infusions:   dexmedetomidine 1 mcg/kg/hr (07/13/20 0653)    norepinephrine 2 mcg/min (07/13/20 0500)    dextrose 5 % and 0.45 % NaCl 150 mL/hr at 07/12/20 2306    insulin 9 Units/hr (07/13/20 0805)    propofol Stopped (07/13/20 0325)    dextrose      fentaNYL (SUBLIMAZE) infusion 150 mcg/hr (07/13/20 0652)       CBC:   Recent Labs     07/12/20  0519 07/12/20  0726 07/13/20  0600   WBC 12.4*  --  16.1*   HGB 13.6 17.1* 12.3     --  204     CMP:    Recent Labs     07/12/20  1530 07/12/20  2338 07/13/20  0600    135 133*   K 3.7 3.2* 3.8    94* 96   CO2 19* 19* 21   BUN 70* 44* 50*   CREATININE 6.15* 4.90* 5.55*   GLUCOSE 235* 267* 162*   CALCIUM 7.3* 6.8* 6.8*   LABGLOM 6.7* 8.7* 7.6*     Troponin:   Recent Labs     07/12/20  1047   TROPONINI 0.249*     BNP: No results for input(s): BNP in the last 72 hours.   INR: Recent Labs     07/10/20  1700   INR 1.2     Lipids:   Recent Labs     07/10/20  1700   LIPASE 9*     Liver:   Recent Labs     07/11/20  0515  07/13/20  0600   *  --   --    *  --   --    ALKPHOS 67  --   --    PROT 7.2  --   --    LABALBU 3.5   < > 2.8*   BILITOT 0.7  --   --     < > = values in this interval not displayed. Iron:  No results for input(s): IRONS, FERRITIN in the last 72 hours. Invalid input(s): LABIRONS  Urinalysis: No results for input(s): UA in the last 72 hours.     Objective:  Vitals: BP (!) 113/48   Pulse 70   Temp 98.4 °F (36.9 °C) (Oral)   Resp 30   Ht 5' 4\" (1.626 m)   Wt 178 lb 2.1 oz (80.8 kg)   LMP  (LMP Unknown)   SpO2 100%   BMI 30.58 kg/m²    Wt Readings from Last 3 Encounters:   07/12/20 178 lb 2.1 oz (80.8 kg)   06/03/20 182 lb (82.6 kg)   06/02/20 180 lb (81.6 kg)      24HR INTAKE/OUTPUT:      Intake/Output Summary (Last 24 hours) at 7/13/2020 0856  Last data filed at 7/13/2020 6525  Gross per 24 hour   Intake 5123.66 ml   Output 1760 ml   Net 3363.66 ml       General: sedated, in no apparent distress  HEENT: normocephalic, atraumatic, anicteric  Neck: supple, no mass ET in place  Lungs: non-labored respirations, clear to auscultation bilaterally  Heart: regular rate and rhythm, 1/6 systolic murmurs or rubs  Abdomen: soft, non-tender, non-distended  Ext: no cyanosis, no peripheral edema  Neuro: not alert or oriented, no gross abnormalities  Psych: normal mood and affect  Skin: no rash      Electronically signed by Yessica Ragsdale MD

## 2020-07-13 NOTE — PROGRESS NOTES
Pulmonary & Critical Care Medicine ICU Progress Note  Chief complaint : Acute respiratory failure    Subjunctive/24 hour events :   Patient seen and examined during multidisciplinary rounds with RN, charge nurse, RT, pharmacy, dietitian, and social service. Patient started dialysis yesterday, tolerated well, blood sugar is controlled, she is on Levophed at 0.024 mcg kilogram per minute, on fentanyl and Precedex, when sedation is light she was anxious, her  also became anxious and nervous which did help calm her down so we had to increase sedation again, we did not do breathing trial yet. No bowel movement, no fever, tolerating tube feed.       Social History     Tobacco Use    Smoking status: Former Smoker     Packs/day: 1.50     Years: 32.00     Pack years: 48.00     Types: Cigarettes     Last attempt to quit: 2001     Years since quittin.4    Smokeless tobacco: Never Used   Substance Use Topics    Alcohol use: No     Alcohol/week: 0.0 standard drinks         Problem Relation Age of Onset    High Blood Pressure Mother     Heart Disease Mother     Cancer Father         LUNG    High Blood Pressure Brother     COPD Brother     Heart Disease Brother     Heart Attack Brother     COPD Sister     Heart Disease Sister     Arthritis Maternal Aunt     COPD Sister     No Known Problems Daughter     No Known Problems Son        Recent Labs     20  1728 20  0726   PHART 7.206* 7.343*   [id number] 42 30*   PO2ART 81* 84*       MV Settings:  Vent Mode: AC/VC Rate Set: 30 bmp/Vt Ordered: 400 mL/ /FiO2 : 40 %           IV:   dexmedetomidine 1.4 mcg/kg/hr (20 1045)    norepinephrine 2 mcg/min (20 0500)    dextrose 5 % and 0.45 % NaCl 150 mL/hr at 20 2306    insulin 9 Units/hr (20 0906)    propofol 50 mcg/kg/min (20 1050)    dextrose      fentaNYL (SUBLIMAZE) infusion 12.5 mcg/hr (20 0945)       Vitals:  BP (!) 127/54   Pulse 95   Temp 98.4 °F (36.9 °C) (Oral)   Resp 30   Ht 5' 4\" (1.626 m)   Wt 178 lb 2.1 oz (80.8 kg)   LMP  (LMP Unknown)   SpO2 95%   BMI 30.58 kg/m²    Tmax:        Intake/Output Summary (Last 24 hours) at 7/13/2020 1107  Last data filed at 7/13/2020 0656  Gross per 24 hour   Intake 5123.66 ml   Output 1760 ml   Net 3363.66 ml       EXAM:  General: On vent, no distress  Head: normocephalic, atraumatic  Eyes:No gross abnormalities. ENT:  MMM no lesions, ET and OG tube in  Neck:  supple and no masses, right neck trialysis catheter  Chest : Few basal rales, otherwise clear no wheezing, nontender, tympanic  Heart[de-identified] Heart sounds are normal.  Regular rate and rhythm without murmur, gallop or rub. ABD:  symmetric, soft, non-tender  Musculoskeletal : no cyanosis, no clubbing and no edema  Neuro: Woke up, moves all 4 extremities  Skin: No rashes or nodules noted.   Lymph node:  no cervical nodes  Urology: Yes Bello   Psychiatric: Calm when sedated, anxious if sedation is light    Medications:  Scheduled Meds:   insulin glargine  75 Units Subcutaneous Daily    lactulose  20 g Oral Once    polyethylene glycol  17 g Oral Daily    docusate  100 mg Oral BID    insulin lispro  0-18 Units Subcutaneous TID WC    insulin lispro  0-9 Units Subcutaneous Nightly    IV infusion builder   Intravenous Once    heparin (porcine)  2,000 Units Intravenous Once    heparin (porcine)  4,000 Units Intercatheter Once    levothyroxine  150 mcg Oral Daily    vancomycin  1,000 mg Intravenous Q48H    vancomycin (VANCOCIN) intermittent dosing (placeholder)   Other RX Placeholder    pantoprazole  40 mg Oral QAM AC    methylPREDNISolone  40 mg Intravenous Daily    sodium chloride flush  10 mL Intravenous 2 times per day    enoxaparin  30 mg Subcutaneous Daily    piperacillin-tazobactam  3.375 g Intravenous Q12H       PRN Meds:  dextrose, dextrose 5 % and 0.45 % NaCl, labetalol, hydrALAZINE, glucose, dextrose, glucagon (rDNA), dextrose, etomidate, rocuronium, sodium chloride flush, acetaminophen **OR** acetaminophen, polyethylene glycol, promethazine **OR** ondansetron    Results: reviewed by me   CBC:   Recent Labs     07/10/20  1700  07/12/20  0519 07/12/20  0726 07/13/20  0600   WBC 20.7*  --  12.4*  --  16.1*   HGB 19.1*   < > 13.6 17.1* 12.3   HCT 59.2*  --  40.7  --  37.0   MCV 90.3  --  88.2  --  86.3     --  208  --  204    < > = values in this interval not displayed. BMP:   Recent Labs     07/12/20  1530 07/12/20  2338 07/13/20  0600 07/13/20  0658    135 133*  --    K 3.7 3.2* 3.8  --     94* 96  --    CO2 19* 19* 21  --    PHOS 5.9* 4.2 4.3 4.5   BUN 70* 44* 50*  --    CREATININE 6.15* 4.90* 5.55*  --      LIVER PROFILE:   Recent Labs     07/10/20  1700 07/11/20  0515   AST 75* 942*   ALT 61* 549*   LIPASE 9*  --    BILIDIR  --  0.5*   BILITOT 0.6 0.7   ALKPHOS 78 67     PT/INR:   Recent Labs     07/10/20  1700   PROTIME 15.0*   INR 1.2     APTT:   Recent Labs     07/10/20  1700   APTT 33.2     UA:  Recent Labs     07/10/20  1700   COLORU DARK YELLOW*   PHUR 5.0   WBCUA 6-9   RBCUA 5-10*   BACTERIA FEW*   CLARITYU TURBID*   SPECGRAV 1.024   LEUKOCYTESUR MODERATE*   UROBILINOGEN 1.0   BILIRUBINUR MODERATE*   BLOODU LARGE*   GLUCOSEU 100*       Cultures:  Negative so far  Films:  CT chest reviewed by me, shows right lower lobe small atelectatic/chronic changes, along with a small left lower lobe effusion, with emphysema. Assessment: This is a critically ill patient at risk of deterioration / death , needing close ICU monitoring and intervention due to below noted problems   · Acute hypercapnic respiratory failure with underlying chronic hypoxic respiratory failure.   ·  COPD with acute exacerbation  · Polycythemia, likely secondary to chronic hypoxia, and improved  · Obstructive sleep apnea on CPAP at home and compliant  · History of lung cancer status post chemo and radiation, changes in the right lower lobe likely post radiation pneumonitis report from [facility] clinic indicate chronic changes in that same area  · Acute kidney injury and worse today  · Hyperglycemia  · Increased troponin likely demand ischemia    Recommendation  · Vent support lung protective strategy  · head of the bed 30°  · Daily sedation holidays and breathing trials  · Sedation with combination propofol and fentanyl target R ASS of 0 to -1  · Watch for ICU delirium: TV on, natural light, avoid benzos, pain control, early mobility, and family engagement  · PUD prophylaxis  · DVT prophylaxis  · Continue Solu-Medrol  · Insulin drip, target blood sugar 1 40-1 8  · Continue empiric antibiotics  · Changed to Lantus, with insulin sliding scale and continue to monitor blood sugar  · Will reattempt sedation holiday today slowly and carefully and then CPAP trial  · Continue tube feed  · Appreciate  · Levophed to maintain mean arterial pressure 65-70             Due to the immediate potential for life-threatening deterioration due to acute respiratory failure I spent 41  minutes providing critical care.  This time is excluding time spent performing procedures.           Electronically signed by Mimi Dykes MD,  [facility] ,on 7/13/2020 at 11:07 AM

## 2020-07-13 NOTE — PROGRESS NOTES
HEMODIALYSIS POST TREATMENT NOTE    Treatment time ordered: 3 HRS    Actual treatment time: 3HRS    UltraFiltration Goal: 1000mL  UltraFiltration Removed: 1000mL      Pre Treatment weight:84.3 KG  Post Treatment weight: 83.3 KG  Estimated Dry Weight: UNKNOWN    Access used:     Central Venous Catheter: Right Neck HD CATH     Internal Access: N/A       Access Flow: Fair-Positionional. Patients BFR decreased during Tx d/t increasing AP/. Patients HD cath Activase dwelled over night       Sign and symptoms of infection: N/A       If YES: N/A    Medications or blood products given: Heparin/ Vancomycin/ HD cath Activased Locked x2    Regular outpatient schedule: BOBBY PT     Summary of response to treatment:Patient tolerated the dialysis Tx fairly well. Patients target goal met. Patients right neck HD cath activased locked and clamped x2 per orders. Patients HD activased locked d/t increased AP/ during the dialysis Tx theat interfered with Tx. Patients BFR was decreased d/t increased /AP. Patient remains at baseline-patient is intubated, restrained. sedated, vented, only responsive to painful stimuli. Patient is currently on Propofol, and Predex, and Fentanyl gtts. Patients VSS No s/s of distress. Patient is NSR on the monitor. Explain if orders NOT met, was physician notified:N/A      ACES flowsheet faxed to patient unit/ placed in patient chart: YES     Post assessment completed: YES     Report given to: [address] documented Safety Checks include the followin) Access and face visible at all times. 2) All connections and blood lines are secure with no kinks. 3) NVL alarm engaged. 4) Hemosafe device applied (if applicable). 5) No collapse of Arterial or Venous blood chambers. 6) All blood lines / pump segments in the air detectors.

## 2020-07-13 NOTE — PROGRESS NOTES
0800 Assessment complete. Patient remains intubated and sedate, Withdraws to painful stimuli, but not following any commands at this time. Tube feeding cont, and june well, with minimal residual noted. . Patient suctioned and mouth care done. 0830 Seen by Dr Deanna Giraldo, update given. 0945 Seen by Dr Marta Wright on morning rounds, update given. See orders. 1045 Sedation decreased and patient awakening, very restless and anxious, Patients  also very anxious, and crying over patient. Dr Marta Wright decided to postpone CPAP trial and resume sedation and try at a different time. Sedation restarted per order. Patient repositioned for comfort and resting much more comfortable at present time.

## 2020-07-13 NOTE — PROGRESS NOTES
Pharmacy Vancomycin Consult     Vancomycin Day: 4  Current Dosin mg IV post HD    Recent Labs     20  2338 20  0600   BUN 44* 50*       Recent Labs     20  2338 20  0600   CREATININE 4.90* 5.55*       Recent Labs     20  0519 20  0600   WBC 12.4* 16.1*       Ht Readings from Last 1 Encounters:   20 5' 4\" (1.626 m)        Wt Readings from Last 1 Encounters:   20 178 lb 2.1 oz (80.8 kg)         Body mass index is 30.58 kg/m². Estimated Creatinine Clearance: 10 mL/min (A) (based on SCr of 5.55 mg/dL (H)). Trough:   Recent Labs     20  2030   [address] 7.8*       Assessment/Plan:  Dialysis ordered for . Will dose vancomycin 1000 mg IV post dialysis and order random level in am on 7/15 prior to next treatment.

## 2020-07-13 NOTE — PROGRESS NOTES
CARDIOLOGY [address] PROGRESS NOTE         7/13/2020      Favio Prescott    [id number]  1950    Rounding MD: Keri Del Real MD ,[facility]    Primary Cardiologist:  Latesha Bautista MD, [address]      SUBJECTIVE:    Patient sedated on Vent support.  in Room. On Levophed IV Support and Antibiotics. BPs low, looks dry. Cardiac and general ROS otherwise negative and unchanged. ASSESSMENT:    1. Elevated troponin: Pattern is plateau and can be explained by her severe acidosis and hypoxia on presentation. She is at risk of coronary events and that she has had a stent several years ago but had normal perfusion just a few months ago. Echocardiogram done yesterday shows no new wall motion abnormalities. Would favor an expectant approach at this point. ECG shows significant inferolateral T inversion but this can also be seen in severe metabolic abnormalities and has been seen in her in the past is simply more prominent at this time. Will follow serial ECG  2. Hypertension: Had been begun on losartan June 17. Though it is likely her deterioration is because of profound volume depletion from nausea vomiting and diarrhea would certainly avoid AR-2 blocker at this time. Would also repeat renal artery duplex when she is more stable to assure there is been no progression of renal artery stenosis described as quite mild years ago. Treat with IV hydralazine and dialysis for now  3. Acute kidney injury: Likely related to profound volume depletion perhaps exacerbated by AR-2 blocker instituted 3 weeks ago. As described above eventually will need further evaluation of renal arteries. To have dialysis later today  4. Acute kidney injury with previous digoxin use: I am very concerned of digoxin toxicity but fortunately ECG per se does not demonstrate findings thereof and she is had no dysrhythmia. Will obtain digoxin level.   If extremely high may need to consider Digibind as digoxin does not adequately dialyzed. Also, had been on verapamil and digoxin. 5. Coronary artery disease: ECG is abnormal but not with ST change. T wave inversion only which can be explained by metabolic abnormalities. Myocardial perfusion imaging several months ago was normal.  Though she has a risk of progression at this time I think most of her issues are metabolic as opposed to coronary disease progression. 6. If bacteremia would obtain transesophageal echo. In that imaging of the LV was difficult would obtain Optison on contrast study to better define LV. 7. Hypothyroid (surgical) replace synthroid. 8. Nausea vom: dig may have contributed, to check level. 5. Discussed with , prognosis guarded      PLAN:    1. Cardiovascular supportive care  2. Intensive care inotropic support. 3. Hydration. 4. Nephrology care with hemodialysis. 5. Adjust medications as needed. 6. As per above comments. 7. Further recommendations to follow. 8. See orders.    ==================     Chief Complaint/Active Symptoms: 67-year-old woman admitted through the emergency room on July 10 with complex medical history including lung cancer in remission after chemotherapy and radiation, COPD, hypertension, coronary artery disease. She had presented with shortness of breath nausea vomiting and diarrhea. GI symptoms for at least 48 hours. Initial O2 sat 60% via pulse ox and COPD was treated. She could not complete a sentence secondary to severity of dyspnea. Also found to have creatinine of 4.35 with creatinine normal 1 month ago. Troponin also elevated hence cardiology consult. Lactic acid was elevated at 7.6 and she was profoundly acidotic with pH of 7.08. Patient had been intubated in the emergency room. Also felt to have had pneumonia.     N/V/D constantly last 3 days. Has had no meds for those 3 days.  He also notes that she has had total thryoidectomy so will need synthroid.     On June 17, losartan at 48 a day had been started for etomidate, rocuronium, sodium chloride flush, acetaminophen **OR** acetaminophen, polyethylene glycol, promethazine **OR** ondansetron    PHYSICAL EXAM:    CURRENT VITALS: BP (!) 127/54   Pulse 73   Temp 98.4 °F (36.9 °C) (Oral)   Resp 30   Ht 5' 4\" (1.626 m)   Wt 178 lb 2.1 oz (80.8 kg)   LMP  (LMP Unknown)   SpO2 100%   BMI 30.58 kg/m²     CONSTITUTIONAL: Sedated on ventilator. ENT:  Normocephalic, without obvious abnormality, atraumatic, sinuses nontender on palpation, external ears without lesions,  NECK:  Supple, symmetrical, trachea midline, no adenopathy, thyroid symmetric, not enlarged and no tenderness, skin normal  LUNGS:  No increased work of breathing, good air exchange, bilateral wheezing /rhonchi. CARDIOVASCULAR:  Normal apical impulse, regular rate and rhythm, normal S1 and S2,  and 1/6 murmur noted  ABDOMEN: Obese, normal bowel sounds, soft, non-distended, non-tender, no masses palpated, no hepatosplenomegally  EXTREMITIES:  No edema, Pulses strong throughout. NEUROLOGIC: Sedated on ventilator.    SKIN:  no bruising or bleeding, normal skin color, texture, turgor and no rashes     Data:       LABS:  Recent Results (from the past 24 hour(s))   Troponin    Collection Time: 07/12/20 10:47 AM   Result Value Ref Range    Troponin 0.249 (HH) 0.000 - 0.010 ng/mL   POCT Glucose    Collection Time: 07/12/20 11:51 AM   Result Value Ref Range    POC Glucose 380 (H) 60 - 115 mg/dl    Performed on ACCU-CHEK    POCT Glucose    Collection Time: 07/12/20 12:34 PM   Result Value Ref Range    POC Glucose 356 (H) 60 - 115 mg/dl    Performed on ACCU-CHEK    Digoxin Level    Collection Time: 07/12/20  1:50 PM   Result Value Ref Range    Digoxin Lvl 1.0 0.8 - 2.0 ng/mL   POCT Glucose    Collection Time: 07/12/20  2:45 PM   Result Value Ref Range    POC Glucose 301 (H) 60 - 115 mg/dl    Performed on ACCU-CHEK    Basic Metabolic Panel    Collection Time: 07/12/20  3:30 PM   Result Value Ref Range    Sodium 140 135 - 144 mEq/L    Potassium 3.7 3.4 - 4.9 mEq/L    Chloride 102 95 - 107 mEq/L    CO2 19 (L) 20 - 31 mEq/L    Anion Gap 19 (H) 9 - 15 mEq/L    Glucose 235 (H) 70 - 99 mg/dL    BUN 70 (H) 8 - 23 mg/dL    CREATININE 6.15 (HH) 0.50 - 0.90 mg/dL    GFR Non-African American 6.7 (L) >60    GFR  8.1 (L) >60    Calcium 7.3 (L) 8.5 - 9.9 mg/dL   Magnesium    Collection Time: 07/12/20  3:30 PM   Result Value Ref Range    Magnesium 2.3 1.7 - 2.4 mg/dL   Phosphorus    Collection Time: 07/12/20  3:30 PM   Result Value Ref Range    Phosphorus 5.9 (H) 2.3 - 4.8 mg/dL   POCT Glucose    Collection Time: 07/12/20  3:54 PM   Result Value Ref Range    POC Glucose 202 (H) 60 - 115 mg/dl    Performed on ACCU-CHEK    POCT Glucose    Collection Time: 07/12/20  6:20 PM   Result Value Ref Range    POC Glucose 148 (H) 60 - 115 mg/dl    Performed on ACCU-CHEK    POCT Glucose    Collection Time: 07/12/20  7:25 PM   Result Value Ref Range    POC Glucose 182 (H) 60 - 115 mg/dl    Performed on ACCU-CHEK    VANCOMYCIN, TROUGH    Collection Time: 07/12/20  8:30 PM   Result Value Ref Range    Vancomycin Tr 7.8 (L) 10.0 - 20.0 ug/mL   POCT Glucose    Collection Time: 07/12/20  8:41 PM   Result Value Ref Range    POC Glucose 223 (H) 60 - 115 mg/dl    Performed on ACCU-CHEK    HEPATITIS B SURFACE ANTIGEN    Collection Time: 07/12/20  8:49 PM   Result Value Ref Range    Hep B S Ag Interp Non-reactive    HEPATITIS B SURFACE ANTIBODY    Collection Time: 07/12/20  8:49 PM   Result Value Ref Range    Hep B S Ab Non-reactive mIU/mL   HEPATITIS B CORE ANTIBODY, IGM    Collection Time: 07/12/20  8:49 PM   Result Value Ref Range    Hep B Core Ab, IgM Non-reactive    POCT Glucose    Collection Time: 07/12/20  9:30 PM   Result Value Ref Range    POC Glucose 235 (H) 60 - 115 mg/dl    Performed on ACCU-CHEK    POCT Glucose    Collection Time: 07/12/20 10:25 PM   Result Value Ref Range    POC Glucose 280 (H) 60 - 115 mg/dl    Performed on ACCU-CHEK POCT Glucose    Collection Time: 07/12/20 11:28 PM   Result Value Ref Range    POC Glucose 256 (H) 60 - 115 mg/dl    Performed on ACCU-CHEK    Basic Metabolic Panel    Collection Time: 07/12/20 11:38 PM   Result Value Ref Range    Sodium 135 135 - 144 mEq/L    Potassium 3.2 (L) 3.4 - 4.9 mEq/L    Chloride 94 (L) 95 - 107 mEq/L    CO2 19 (L) 20 - 31 mEq/L    Anion Gap 22 (H) 9 - 15 mEq/L    Glucose 267 (H) 70 - 99 mg/dL    BUN 44 (H) 8 - 23 mg/dL    CREATININE 4.90 (H) 0.50 - 0.90 mg/dL    GFR Non-African American 8.7 (L) >60    GFR  10.6 (L) >60    Calcium 6.8 (L) 8.5 - 9.9 mg/dL   Magnesium    Collection Time: 07/12/20 11:38 PM   Result Value Ref Range    Magnesium 1.8 1.7 - 2.4 mg/dL   Phosphorus    Collection Time: 07/12/20 11:38 PM   Result Value Ref Range    Phosphorus 4.2 2.3 - 4.8 mg/dL   POCT Glucose    Collection Time: 07/13/20 12:28 AM   Result Value Ref Range    POC Glucose 208 (H) 60 - 115 mg/dl    Performed on ACCU-CHEK    POCT Glucose    Collection Time: 07/13/20  1:20 AM   Result Value Ref Range    POC Glucose 190 (H) 60 - 115 mg/dl    Performed on ACCU-CHEK    POCT Glucose    Collection Time: 07/13/20  2:27 AM   Result Value Ref Range    POC Glucose 178 (H) 60 - 115 mg/dl    Performed on ACCU-CHEK    POCT Glucose    Collection Time: 07/13/20  3:28 AM   Result Value Ref Range    POC Glucose 159 (H) 60 - 115 mg/dl    Performed on ACCU-CHEK    POCT Glucose    Collection Time: 07/13/20  4:29 AM   Result Value Ref Range    POC Glucose 155 (H) 60 - 115 mg/dl    Performed on ACCU-CHEK    POCT Glucose    Collection Time: 07/13/20  5:43 AM   Result Value Ref Range    POC Glucose 152 (H) 60 - 115 mg/dl    Performed on ACCU-CHEK    CBC Auto Differential    Collection Time: 07/13/20  6:00 AM   Result Value Ref Range    WBC 16.1 (H) 4.8 - 10.8 K/uL    RBC 4.29 4. 20 - 5.40 M/uL    Hemoglobin 12.3 12.0 - 16.0 g/dL    Hematocrit 37.0 37.0 - 47.0 %    MCV 86.3 82.0 - 100.0 fL    MCH 28.7 27.0 - 31.3 pg    MCHC 33.2 33.0 - 37.0 %    RDW 14.2 11.5 - 14.5 %    Platelets 075 072 - 498 K/uL    PLATELET SLIDE REVIEW Adequate     Neutrophils % 84.0 %    Lymphocytes % 5.0 %    Monocytes % 2.7 %    Eosinophils % 0.0 %    Basophils % 0.5 %    Neutrophils Absolute 14.5 (H) 1.4 - 6.5 K/uL    Lymphocytes Absolute 1.1 1.0 - 4.8 K/uL    Monocytes Absolute 0.5 0.2 - 0.8 K/uL    Eosinophils Absolute 0.0 0.0 - 0.7 K/uL    Basophils Absolute 0.0 0.0 - 0.2 K/uL    Bands Relative 6 5 - 11 %    Atypical Lymphocytes Relative 2 %    Anisocytosis 1+     Microcytes 1+    RENAL FUNCTION PANEL    Collection Time: 07/13/20  6:00 AM   Result Value Ref Range    Sodium 133 (L) 135 - 144 mEq/L    Potassium 3.8 3.4 - 4.9 mEq/L    Chloride 96 95 - 107 mEq/L    CO2 21 20 - 31 mEq/L    Anion Gap 16 (H) 9 - 15 mEq/L    Glucose 162 (H) 70 - 99 mg/dL    BUN 50 (H) 8 - 23 mg/dL    CREATININE 5.55 (H) 0.50 - 0.90 mg/dL    GFR Non-African American 7.6 (L) >60    GFR  9.2 (L) >60    Calcium 6.8 (L) 8.5 - 9.9 mg/dL    Phosphorus 4.3 2.3 - 4.8 mg/dL    Alb 2.8 (L) 3.5 - 4.6 g/dL   POCT Glucose    Collection Time: 07/13/20  6:51 AM   Result Value Ref Range    POC Glucose 150 (H) 60 - 115 mg/dl    Performed on ACCU-CHEK    Magnesium    Collection Time: 07/13/20  6:58 AM   Result Value Ref Range    Magnesium 1.9 1.7 - 2.4 mg/dL   Phosphorus    Collection Time: 07/13/20  6:58 AM   Result Value Ref Range    Phosphorus 4.5 2.3 - 4.8 mg/dL   POCT Glucose    Collection Time: 07/13/20  7:53 AM   Result Value Ref Range    POC Glucose 140 (H) 60 - 115 mg/dl    Performed on ACCU-CHEK    POCT Glucose    Collection Time: 07/13/20  9:01 AM   Result Value Ref Range    POC Glucose 140 (H) 60 - 115 mg/dl    Performed on Will Dover MD ,[address] [facility]

## 2020-07-13 NOTE — PLAN OF CARE
Problem: Restraint Use - Nonviolent/Non-Self-Destructive Behavior:  Goal: Absence of restraint indications  Description: Absence of restraint indications  Outcome: Ongoing  Goal: Absence of restraint-related injury  Description: Absence of restraint-related injury  Outcome: Ongoing     Problem: Falls - Risk of:  Goal: Will remain free from falls  Description: Will remain free from falls  Outcome: Ongoing  Goal: Absence of physical injury  Description: Absence of physical injury  Outcome: Ongoing     Problem: Skin Integrity:  Goal: Will show no infection signs and symptoms  Description: Will show no infection signs and symptoms  Outcome: Ongoing  Goal: Absence of new skin breakdown  Description: Absence of new skin breakdown  Outcome: Ongoing     Problem: Nutrition  Goal: Optimal nutrition therapy  Outcome: Ongoing

## 2020-07-13 NOTE — FLOWSHEET NOTE
Spiritual Care Services     Summary of Visit:  Pt was not alert at this time, spoke with the spouse concerning any religous needs, no rel support needed at this time,     Spiritual Assessment/Intervention/Outcomes:    Encounter Summary  Services provided to[de-identified] (P) Family  Support System: (P) Spouse  Continue Visiting: (P) Yes  Complexity of Encounter: (P) Moderate  Length of Encounter: (P) 15 minutes  Spiritual Assessment Completed: (P) Yes  Routine  Type: (P) Initial  Assessment: (P) Approachable  Intervention: (P) Sustaining presence/ Ministry of presence  Outcome: (P) Receptive  Crisis  Type: ED Alert  Assessment: Tearful, Anxious, Shock  Intervention: Sustaining presence/ Ministry of presence  Outcome: Coping, Less anxious, less agitated           Advance Directives (For Healthcare)  Healthcare Directive: No, patient does not have an advance directive for healthcare treatment  Information on Healthcare Directives Requested: No  Patient Requests Assistance: No           Values / Beliefs  Do you have any ethnic, cultural, sacramental, or spiritual Yazidi needs you would like us to be aware of while you are in the hospital?: No    Care Plan:        [id number] [address]   Electronically signed by Heena Valdovinos on 7/13/20 at 9:33 AM EDT     To reach a  for emotional and spiritual support, place an [facility] consult request.   If a  is needed immediately, dial 0 and ask to page the on-call .

## 2020-07-13 NOTE — PROGRESS NOTES
Department of Internal Medicine  General Internal Medicine  Attending Progress Note      SUBJECTIVE:  Pt intubated and sedated    OBJECTIVE      Medications    Current Facility-Administered Medications: dexmedetomidine (PRECEDEX) 400 mcg in sodium chloride 0.9 % 100 mL infusion, 0.2 mcg/kg/hr, Intravenous, Continuous  norepinephrine (LEVOPHED) 16 mg in dextrose 5 % 250 mL infusion, 2 mcg/min, Intravenous, Continuous  insulin glargine (LANTUS) injection vial 75 Units, 75 Units, Subcutaneous, Daily  polyethylene glycol (GLYCOLAX) packet 17 g, 17 g, Oral, Daily  docusate (COLACE) 50 MG/5ML liquid 100 mg, 100 mg, Oral, BID  insulin lispro (HUMALOG) injection vial 0-18 Units, 0-18 Units, Subcutaneous, TID WC  insulin lispro (HUMALOG) injection vial 0-9 Units, 0-9 Units, Subcutaneous, Nightly  sodium bicarbonate 100 mEq in sodium chloride 0.45 % 1,000 mL infusion, , Intravenous, Once  dextrose 50 % IV solution, 12.5 g, Intravenous, PRN  dextrose 5 % and 0.45 % sodium chloride infusion, , Intravenous, Continuous PRN  insulin regular (HUMULIN R;NOVOLIN R) 100 Units in sodium chloride 0.9 % 100 mL infusion, 0.1 Units/kg/hr, Intravenous, Continuous  heparin (porcine) injection 2,000 Units, 2,000 Units, Intravenous, Once  heparin (porcine) injection 4,000 Units, 4,000 Units, Intercatheter, Once  levothyroxine (SYNTHROID) tablet 150 mcg, 150 mcg, Oral, Daily  vancomycin 1000 mg IVPB in 250 mL D5W addavial, 1,000 mg, Intravenous, Q48H  vancomycin (VANCOCIN) intermittent dosing (placeholder), , Other, RX Placeholder  propofol injection, 10 mcg/kg/min, Intravenous, Titrated  labetalol (NORMODYNE;TRANDATE) injection syringe 10 mg, 10 mg, Intravenous, Q3H PRN  hydrALAZINE (APRESOLINE) injection 10 mg, 10 mg, Intravenous, Q6H PRN  glucose (GLUTOSE) 40 % oral gel 15 g, 15 g, Oral, PRN  dextrose 50 % IV solution, 12.5 g, Intravenous, PRN  glucagon (rDNA) injection 1 mg, 1 mg, Intramuscular, PRN  dextrose 5 % solution, 100 mL/hr, Intravenous, PRN  fentaNYL (SUBLIMAZE) 1,000 mcg in sodium chloride 0.9 % 100 mL infusion, 25 mcg/hr, Intravenous, Continuous  pantoprazole (PROTONIX) tablet 40 mg, 40 mg, Oral, QAM AC  methylPREDNISolone sodium (SOLU-MEDROL) injection 40 mg, 40 mg, Intravenous, Daily  etomidate (AMIDATE) injection, , , Daily PRN  rocuronium (ZEMURON) injection, , , Daily PRN  sodium chloride flush 0.9 % injection 10 mL, 10 mL, Intravenous, 2 times per day  sodium chloride flush 0.9 % injection 10 mL, 10 mL, Intravenous, PRN  acetaminophen (TYLENOL) tablet 650 mg, 650 mg, Oral, Q6H PRN **OR** acetaminophen (TYLENOL) suppository 650 mg, 650 mg, Rectal, Q6H PRN  polyethylene glycol (GLYCOLAX) packet 17 g, 17 g, Oral, Daily PRN  promethazine (PHENERGAN) tablet 12.5 mg, 12.5 mg, Oral, Q6H PRN **OR** ondansetron (ZOFRAN) injection 4 mg, 4 mg, Intravenous, Q6H PRN  enoxaparin (LOVENOX) injection 30 mg, 30 mg, Subcutaneous, Daily  piperacillin-tazobactam (ZOSYN) 3.375 g in dextrose 5 % 50 mL IVPB extended infusion (mini-bag), 3.375 g, Intravenous, Q12H  Physical    VITALS:  /61   Pulse 83   Temp 98.9 °F (37.2 °C) (Oral)   Resp 28   Ht 5' 4\" (1.626 m)   Wt 178 lb 2.1 oz (80.8 kg)   LMP  (LMP Unknown)   SpO2 98%   BMI 30.58 kg/m²   Constitutional: intubated and sedated  Head: Normocephalic, atraumatic  Eyes: EOMI, PERRLA  ENT: moist mucous membranes, ETT in place  Neck: neck supple, trachea midline  Lungs: coarse breath sounds bilaterally  Heart: RRR, normal S1 and S2, no murmurs  GI: Soft, non-distended, +BS  Skin: warm, dry     Data    CBC:   Lab Results   Component Value Date    WBC 16.1 07/13/2020    RBC 4.29 07/13/2020    RBC 4.98 11/09/2018    HGB 12.3 07/13/2020    HCT 37.0 07/13/2020    MCV 86.3 07/13/2020    MCH 28.7 07/13/2020    MCHC 33.2 07/13/2020    RDW 14.2 07/13/2020     07/13/2020    MPV 9.7 11/09/2018     CMP:    Lab Results   Component Value Date     07/13/2020    K 3.8 07/13/2020    K 4.0 07/11/2020    CL 96 07/13/2020    CO2 21 07/13/2020    BUN 50 07/13/2020    CREATININE 5.55 07/13/2020    GFRAA 9.2 07/13/2020    AGRATIO 1.2 05/07/2018    LABGLOM 7.6 07/13/2020    GLUCOSE 162 07/13/2020    GLUCOSE 186 11/18/2019    PROT 7.2 07/11/2020    LABALBU 2.8 07/13/2020    LABALBU 3.8 11/18/2019    CALCIUM 6.8 07/13/2020    BILITOT 0.7 07/11/2020    ALKPHOS 67 07/11/2020     07/11/2020     07/11/2020       ASSESSMENT AND PLAN      # Sepsis secondary to pneumonia:   - IV antibiotics, following with pulmonology.  - BCx with GNR - following final culture and sensitivities      # BOBBY: Baseline 0.7.    - Creatinine plateau-continue to monitor along with urine output. Blood pressure remains stable without pressor support. - nephrology on consult. Started on HD     # Acute hypoxic and hypercapnic respiratory failure,   - intubated/sedated     # History of lung cancer, former smoker    Disposition: Pt continues to be intubated and requiring pressors.      Estefani Bustos, DO  Internal Medicine

## 2020-07-14 NOTE — PROGRESS NOTES
Nephrology Progress Note    Assessment:  BOBBY sepsis hypotension  DM type-2  COPD   OHDX CAD  Respratory failure on ventilator    Plan dialysis today    Patient Active Problem List:     Asthma-COPD overlap syndrome (HCC)     Asthma     Diabetes mellitus type 2 in obese (HCC)     ROGELIO on CPAP     CAD (coronary artery disease)     HTN (hypertension)     Hyperlipidemia with target LDL less than 70     Renal artery stenosis (HCC)     Obesity (BMI 30-39. 9)     Osteoporosis     Anxiety     Suprascapular entrapment neuropathy of left side     Midline low back pain with left-sided sciatica     Bilateral low back pain with sciatica     Rib pain on right side     Myalgia     High risk medication use - 11/07/17 OARRS PM&R, 02/07/18 OARRS PM&R, 05/30/17 Urine Drug Screen: negative PM&R, MED CONTRACT 1/26/17     Rib sprain     Acute pain of right knee     Acute right ankle pain     Chronic bilateral low back pain with sciatica     History of MRSA infection of lungs 9/2016     Osteoporosis     DDD (degenerative disc disease), lumbar     Chronic midline low back pain with left-sided sciatica     Bilateral carpal tunnel syndrome     Neck pain     Chronic thoracic spine pain     Moderate persistent asthma without complication     Right carpal tunnel syndrome     Former smoker, stopped smoking in distant past     Encounter for postoperative wound care     Hypoxia     Class 3 severe obesity due to excess calories with serious comorbidity and body mass index (BMI) of 40.0 to 44.9 in adult [facility])     Morbid obesity with BMI of 40.0-44.9, adult ([facility]     Malignant neoplasm of lower lobe of right lung (HCC)     Postoperative hypothyroidism     Uncontrolled type 2 diabetes mellitus with hyperglycemia (HCC)     Ulnar neuropathy of both upper extremities     Dyspepsia     Esophagitis     Right lower lobe lung mass     Lumbosacral radiculopathy at S1     Renal cyst, right     Pneumonia of right lower lobe due to infectious organism ([facility] Post-radiation pneumonitis (HCC)     Lumbar and sacral spondylarthritis     Spondylosis of cervical region without myelopathy or radiculopathy     Peripheral polyneuropathy     H/O heart artery stent     Anticoagulated     Sepsis (HCC)     Acute on chronic respiratory failure with hypoxia and hypercapnia (HCC)      Subjective:  Admit Date: 7/10/2020    Interval History: alert answers appropriatley    Medications:  Scheduled Meds:   insulin glargine  75 Units Subcutaneous Daily    polyethylene glycol  17 g Oral Daily    docusate  100 mg Oral BID    vancomycin  1,000 mg Intravenous Once per day on Mon Wed Fri    insulin lispro  0-18 Units Subcutaneous Q4H    IV infusion builder   Intravenous Once    heparin (porcine)  4,000 Units Intercatheter Once    levothyroxine  150 mcg Oral Daily    vancomycin (VANCOCIN) intermittent dosing (placeholder)   Other RX Placeholder    pantoprazole  40 mg Oral QAM AC    methylPREDNISolone  40 mg Intravenous Daily    sodium chloride flush  10 mL Intravenous 2 times per day    enoxaparin  30 mg Subcutaneous Daily    piperacillin-tazobactam  3.375 g Intravenous Q12H     Continuous Infusions:   dexmedetomidine 0.8 mcg/kg/hr (07/14/20 0811)    norepinephrine 2 mcg/min (07/13/20 2254)    insulin Stopped (07/13/20 1217)    propofol Stopped (07/14/20 0813)    dextrose      fentaNYL (SUBLIMAZE) infusion 25 mcg/hr (07/14/20 0812)       CBC:   Recent Labs     07/13/20  0600 07/14/20  0600   WBC 16.1* 15.6*   HGB 12.3 12.6    161     CMP:    Recent Labs     07/13/20  1530 07/13/20  2330 07/14/20  0600   * 130* 136   K 4.3 4.0 4.1   CL 94* 94* 99   CO2 22 21 20   BUN 39* 49* 60*   CREATININE 4.32* 5.07* 5.60*   GLUCOSE 229* 190* 159*   CALCIUM 7.0* 6.9* 7.5*   LABGLOM 10.1* 8.4* 7.5*     Troponin:   Recent Labs     07/14/20  0600   TROPONINI 1.170*     BNP: No results for input(s): BNP in the last 72 hours.   INR: No results for input(s): INR in the last 72 hours. Lipids: No results for input(s): CHOL, LDLDIRECT, TRIG, HDL, AMYLASE, LIPASE in the last 72 hours. Liver:   Recent Labs     07/14/20  0600   *   *   ALKPHOS 67   PROT 5.7*   LABALBU 3.0*   BILITOT 0.3     Iron:  No results for input(s): IRONS, FERRITIN in the last 72 hours. Invalid input(s): LABIRONS  Urinalysis: No results for input(s): UA in the last 72 hours.     Objective:  Vitals: BP (!) 142/43   Pulse 74   Temp 100.3 °F (37.9 °C) (Axillary)   Resp 30   Ht 5' 4\" (1.626 m)   Wt 186 lb 11.7 oz (84.7 kg)   LMP  (LMP Unknown)   SpO2 95%   BMI 32.05 kg/m²    Wt Readings from Last 3 Encounters:   07/14/20 186 lb 11.7 oz (84.7 kg)   06/03/20 182 lb (82.6 kg)   06/02/20 180 lb (81.6 kg)      24HR INTAKE/OUTPUT:      Intake/Output Summary (Last 24 hours) at 7/14/2020 0858  Last data filed at 7/14/2020 2191  Gross per 24 hour   Intake 5346.22 ml   Output 120 ml   Net 5226.22 ml       General: alert, in no apparent distress  HEENT: normocephalic, atraumatic, anicteric  Neck: supple, no mass  ET in place  Lungs: non-labored respirations, clear to auscultation bilaterally  Heart: regular rate and rhythm, 1/6 systolic murmurs or rubs  Abdomen: soft, non-tender, non-distended  Ext: no cyanosis, no peripheral edema  Neuro: alert and oriented, no gross abnormalities  Psych: normal mood and affect  Skin: no rash      Electronically signed by Carolyn Baron MD

## 2020-07-14 NOTE — FLOWSHEET NOTE
Pt returned from CT. RT in room. Pt showing signs of respiratory distress. Hypertensive. RR of 40 and HR 140s. Hospitalist made aware via perfect serve and call to Dr. Linda Steele; orders to increase sedation as seen in [facility]. Clarified orders and obtained confirmation of code status from [address] [place] [address]. Pt to remain DNR-CCA. No other orders at this time.

## 2020-07-14 NOTE — PROGRESS NOTES
Department of Internal Medicine  General Internal Medicine  Attending Progress Note      SUBJECTIVE:  Pt intubated and sedated. Family made pt DNR-CCA and are discussing withdrawing care.  Hospice consulted    OBJECTIVE      Medications    Current Facility-Administered Medications: ipratropium-albuterol (DUONEB) nebulizer solution 1 ampule, 1 ampule, Inhalation, Q6H WA  sodium bicarbonate 8.4 % injection 5 mEq, 5 mEq, Intravenous, Once  amiodarone (CORDARONE) 150 mg in dextrose 5 % 100 mL bolus, 150 mg, Intravenous, Once  sodium chloride 0.9 % infusion, , ,   dexmedetomidine (PRECEDEX) 400 mcg in sodium chloride 0.9 % 100 mL infusion, 0.2 mcg/kg/hr, Intravenous, Continuous  norepinephrine (LEVOPHED) 16 mg in dextrose 5 % 250 mL infusion, 2 mcg/min, Intravenous, Continuous  insulin glargine (LANTUS) injection vial 75 Units, 75 Units, Subcutaneous, Daily  polyethylene glycol (GLYCOLAX) packet 17 g, 17 g, Oral, Daily  docusate (COLACE) 50 MG/5ML liquid 100 mg, 100 mg, Oral, BID  vancomycin 1000 mg IVPB in 250 mL D5W addavial, 1,000 mg, Intravenous, Once per day on Mon Wed Fri  insulin lispro (HUMALOG) injection vial 0-18 Units, 0-18 Units, Subcutaneous, Q4H  sodium bicarbonate 100 mEq in sodium chloride 0.45 % 1,000 mL infusion, , Intravenous, Once  dextrose 50 % IV solution, 12.5 g, Intravenous, PRN  heparin (porcine) injection 4,000 Units, 4,000 Units, Intercatheter, Once  levothyroxine (SYNTHROID) tablet 150 mcg, 150 mcg, Oral, Daily  vancomycin (VANCOCIN) intermittent dosing (placeholder), , Other, RX Placeholder  propofol injection, 10 mcg/kg/min, Intravenous, Titrated  labetalol (NORMODYNE;TRANDATE) injection syringe 10 mg, 10 mg, Intravenous, Q3H PRN  hydrALAZINE (APRESOLINE) injection 10 mg, 10 mg, Intravenous, Q6H PRN  glucose (GLUTOSE) 40 % oral gel 15 g, 15 g, Oral, PRN  dextrose 50 % IV solution, 12.5 g, Intravenous, PRN  glucagon (rDNA) injection 1 mg, 1 mg, Intramuscular, PRN  dextrose 5 % solution, 100 mL/hr, Intravenous, PRN  fentaNYL (SUBLIMAZE) 1,000 mcg in sodium chloride 0.9 % 100 mL infusion, 25 mcg/hr, Intravenous, Continuous  pantoprazole (PROTONIX) tablet 40 mg, 40 mg, Oral, QAM AC  methylPREDNISolone sodium (SOLU-MEDROL) injection 40 mg, 40 mg, Intravenous, Daily  etomidate (AMIDATE) injection, , , Daily PRN  rocuronium (ZEMURON) injection, , , Daily PRN  sodium chloride flush 0.9 % injection 10 mL, 10 mL, Intravenous, 2 times per day  sodium chloride flush 0.9 % injection 10 mL, 10 mL, Intravenous, PRN  acetaminophen (TYLENOL) tablet 650 mg, 650 mg, Oral, Q6H PRN **OR** acetaminophen (TYLENOL) suppository 650 mg, 650 mg, Rectal, Q6H PRN  polyethylene glycol (GLYCOLAX) packet 17 g, 17 g, Oral, Daily PRN  promethazine (PHENERGAN) tablet 12.5 mg, 12.5 mg, Oral, Q6H PRN **OR** ondansetron (ZOFRAN) injection 4 mg, 4 mg, Intravenous, Q6H PRN  enoxaparin (LOVENOX) injection 30 mg, 30 mg, Subcutaneous, Daily  piperacillin-tazobactam (ZOSYN) 3.375 g in dextrose 5 % 50 mL IVPB extended infusion (mini-bag), 3.375 g, Intravenous, Q12H  Physical    VITALS:  BP (!) 96/53   Pulse 124   Temp 98.2 °F (36.8 °C) (Oral)   Resp (!) 44   Ht 5' 4\" (1.626 m)   Wt 186 lb 11.7 oz (84.7 kg)   LMP  (LMP Unknown)   SpO2 94%   BMI 32.05 kg/m²   Constitutional: intubated and sedated  Head: Normocephalic, atraumatic  Eyes: EOMI, PERRLA  ENT: moist mucous membranes, ETT in place  Neck: neck supple, trachea midline  Lungs: coarse breath sounds bilaterally  Heart: RRR, normal S1 and S2, no murmurs  GI: Soft, non-distended, +BS  Skin: warm, dry     Data    CBC:   Lab Results   Component Value Date    WBC 15.6 07/14/2020    RBC 4.38 07/14/2020    RBC 4.98 11/09/2018    HGB 13.8 07/14/2020    HCT 37.7 07/14/2020    MCV 86.2 07/14/2020    MCH 28.8 07/14/2020    MCHC 33.5 07/14/2020    RDW 14.3 07/14/2020     07/14/2020    MPV 9.7 11/09/2018     CMP:    Lab Results   Component Value Date     07/14/2020    K 4.1 07/14/2020 K 4.0 07/11/2020    CL 99 07/14/2020    CO2 20 07/14/2020    BUN 60 07/14/2020    CREATININE 5.9 07/14/2020    CREATININE 5.60 07/14/2020    GFRAA 9 07/14/2020    AGRATIO 1.2 05/07/2018    LABGLOM 7 07/14/2020    GLUCOSE 159 07/14/2020    GLUCOSE 186 11/18/2019    PROT 5.7 07/14/2020    LABALBU 3.0 07/14/2020    LABALBU 3.8 11/18/2019    CALCIUM 7.5 07/14/2020    BILITOT 0.3 07/14/2020    ALKPHOS 67 07/14/2020     07/14/2020     07/14/2020       ASSESSMENT AND PLAN      # Salmonella bacteremia and sepsis:   - IV antibiotics, following with pulmonology.  - BCx with salmonella  - ID consulted - if family decides against withdrawing care, will consider CT abd      # BOBBY: Baseline 0.7.    - Creatinine plateau-continue to monitor along with urine output. Blood pressure remains stable without pressor support. - nephrology on consult. Started on HD. Family requested that HD be stopped at this time     # Acute hypoxic and hypercapnic respiratory failure,   - intubated/sedated     # History of lung cancer, former smoker    Disposition: Pt continues to be intubated. Family deciding on withdrawing care. Hospice consulted.      Estefani Bustos DO  Internal Medicine

## 2020-07-14 NOTE — PLAN OF CARE
Nutrition Problem #1: Inadequate oral intake  Intervention: Food and/or Nutrient Delivery: Continue Current Tube Feeding(Continue with Low Calorie/High Protien TF @ 20 ml/hr x 23 hrs leah OG. Monitor medical plan of care. Monitor renal status for further EN adjustments.  Advance TF to goal, pending goals of care, when okay with intenvisit ( Current goal 50 ml/hr Vital HP))  Nutritional Goals: Progression of TF to goal, honor pt/family decision regarding goals of care

## 2020-07-14 NOTE — FLOWSHEET NOTE
Call from Lab; BC+ salmonella. Message to Dr. HERNÁNDEZ [facility] [employer] [facility] via perfect serve. Awaiting response.

## 2020-07-14 NOTE — FLOWSHEET NOTE
Spiritual Care Services     Summary of Visit:   provided spiritual care support, pt is still on vent and talked with the  briefly, he stated that he was doing ok, I let him know we are here for them, he appreciated he visit,     Spiritual Assessment/Intervention/Outcomes:    Encounter Summary  Services provided to[de-identified] (P) Patient, Family  Referral/Consult From[de-identified] (P) Rounding  Support System: (P) Spouse  Continue Visiting: Yes  Complexity of Encounter: (P) Moderate  Length of Encounter: (P) 15 minutes  Spiritual Assessment Completed: (P) Yes  Advance Care Planning: Yes  Routine  Type: (P) Follow up  Assessment: (P) Approachable, Anxious  Intervention: (P) Active listening, Sustaining presence/ Ministry of presence  Outcome: (P) Receptive  Crisis  Type: ED Alert  Assessment: Tearful, Anxious, Shock  Intervention: Sustaining presence/ Ministry of presence  Outcome: Coping, Less anxious, less agitated           Advance Directives (For Healthcare)  Healthcare Directive: Yes, patient has an advance directive for healthcare treatment  Type of Healthcare Directive: Durable power of  for health care, Living will  Copy in Chart: Yes, copy in chart(copies in [employer] Tab)  Chart Copy Status [de-identified] Active, Current  Date Reviewed and Current[de-identified] 07/13/20  Information on Healthcare Directives Requested: No  Patient Requests Assistance: No  Healthcare Agent Appointed: [address] [place] [address] Agent's Name: [address] [place] [address] Agent's Phone Number: [phone])  If you are unable to speak for yourself, does your Healthcare Agent or Legal Spokesperson know your healthcare wishes?: Yes           Values / Beliefs  Do you have any ethnic, cultural, sacramental, or spiritual Yazidi needs you would like us to be aware of while you are in the hospital?: No    Care Plan:    Follow as requested     [id number] [address]   Electronically signed by Nate Beatty on 7/14/20 at 11:05 AM EDT     To reach a  for emotional and spiritual support, place an [facility] consult request.   If a  is needed immediately, dial 0 and ask to page the on-call .

## 2020-07-14 NOTE — PROGRESS NOTES
Comprehensive Nutrition Assessment    Type and Reason for Visit:  Reassess    Nutrition Recommendations/Plan:  Continue with Low Calorie/High Protien TF @ 20 ml/hr x 23 hrs via OG. Hold TF 30 minutes before and after synthroid  Continue with water flush 100 ml 4 x daily  D/c protein modular until renal plan of care decided  Monitor medical plan of care. Monitor renal status for further EN adjustments. Advance TF to goal, pending goals of care, when okay with intenvisit ( Current goal 50 ml/hr Vital HP))    Nutrition Assessment:  Pt admitted with sepsis , vent day 4, attempted weaning today and pt did not tolerate. During rounds, intensivist stated EN could advacne to goal. Currently goals of care uncertain, family may desire compassionate extubation, at this time intensivist belives more testing to be beneficial, continue to monitor course of medical managment    Malnutrition Assessment:  Malnutrition Status:  Insufficient data          Estimated Daily Nutrient Needs:  Energy (kcal):  7266-4794 kcals ( 13-15 kcal/kg); Weight Used for Energy Requirements:  Admission(80 kg)     Protein (g):   g protein ( 1.5-2 g/kg IBW * monitor for initiation of HD); Weight Used for Protein Requirements:  Ideal(55 kg)        Fluid (ml/day):  ~1375 ( 23 ml/kg IBW) or as per MD; Weight Used for Fluid Requirements:  Ideal(55 kg)      Nutrition Related Findings:  I/O = 5346/120, needs dialysis pending decisions re: goals of care, 1+ non-pitting BUE edema, last BM 7/14, noted as loose.  Labs noted ( BUN= 60, creat = 5.5, GLuc =160), Mes include synthroid, no propofol, low dose levophed, OG in place      Wounds:  None       Current Nutrition Therapies:    · Current Tube Feeding (TF) Orders:   · Feeding Route: Orogastric  · Formula: Low Calorie, High Protein  · Schedule: Cyclic(x23 hours, hold 30 minutes before and after synthroid if restarted (home med))  · Water Flushes: 100 mL QID (400 mL)  · Current TF & Flush Orders

## 2020-07-14 NOTE — PROCEDURES
PROCEDURE:   Radial artery line placement. (A-line)  [id number]    INDICATION:       CONSENT: The spouse was counseled regarding the procedure, it's indications, risks, potential complications and alternatives and any questions were answered. Consent was obtained. nt. PROCEDURE SUMMARY:   Radial arteries were assessed by palpation and ultrasound localization. Ocie Beagle test was done to asses collateral circulation. The patient was prepped and draped in the usual sterile manner using chlorhexidine scrub. 1% lidocaine was used to numb the region. The right  radial artery was palpated and successfully cannulated on the first pass. Pulsatile, arterial blood was visualized and the artery was then threaded using the Seldinger technique and a catheter was then sutured into place. Good wave-form was obtained. The patient tolerated the procedure well without any immediate complications. The area was cleaned and Tegaderm was applied.      ESTIMATED BLOOD LOSS:   Minimal    COMPLICATIONS:  No immediate complication     Jai Day

## 2020-07-14 NOTE — PROGRESS NOTES
Pulmonary & Critical Care Medicine ICU Progress Note  Chief complaint : Acute respiratory failure    Subjunctive/24 hour events :   Patient seen and examined during multidisciplinary rounds with RN, charge nurse, RT, pharmacy, dietitian, and social service. Patient eyes open on vent, follows simple commands, she is off pressors, blood sugar is okay, plan for dialysis today, no fever or chills, did have a bowel movement today, no urine output. She is off insulin drip and blood sugar is better. Had a breathing trial today less than an hour into her breathing trial she became unresponsive, blood pressure dropped, almost coded, we aborted breathing trial, Levophed started, pulse was never lost, patient responded well and improved within 5 to 10 minutes, blood pressure was high and Levophed discontinued immediately. She did become tachycardic and had a brief moment of nonsustained V. tach for which I gave her amiodarone.       Social History     Tobacco Use    Smoking status: Former Smoker     Packs/day: 1.50     Years: 32.00     Pack years: 48.00     Types: Cigarettes     Last attempt to quit: 2001     Years since quittin.4    Smokeless tobacco: Never Used   Substance Use Topics    Alcohol use: No     Alcohol/week: 0.0 standard drinks         Problem Relation Age of Onset    High Blood Pressure Mother     Heart Disease Mother     Cancer Father         LUNG    High Blood Pressure Brother     COPD Brother     Heart Disease Brother     Heart Attack Brother     COPD Sister     Heart Disease Sister     Arthritis Maternal Aunt     COPD Sister     No Known Problems Daughter     No Known Problems Son        Recent Labs     20  1022 20  1044   PHART 7.122* 7.261*   QUA2BLI 69* 47*   PO2ART 35* 330*       MV Settings:  Vent Mode: AC/VC Rate Set: 30 bmp/Vt Ordered: 400 mL/ /FiO2 : 40 %           IV:   sodium chloride      dexmedetomidine 0.6 mcg/hr (20 0900)    30 mg Subcutaneous Daily    piperacillin-tazobactam  3.375 g Intravenous Q12H       PRN Meds:  dextrose, labetalol, hydrALAZINE, glucose, dextrose, glucagon (rDNA), dextrose, etomidate, rocuronium, sodium chloride flush, acetaminophen **OR** acetaminophen, polyethylene glycol, promethazine **OR** ondansetron    Results: reviewed by me   CBC:   Recent Labs     07/12/20  0519  07/13/20  0600 07/14/20  0600 07/14/20  1022 07/14/20  1044   WBC 12.4*  --  16.1* 15.6*  --   --    HGB 13.6   < > 12.3 12.6 14.1 13.8   HCT 40.7  --  37.0 37.7  --   --    MCV 88.2  --  86.3 86.2  --   --      --  204 161  --   --     < > = values in this interval not displayed. BMP:   Recent Labs     07/13/20  1530 07/13/20  2330 07/14/20  0600 07/14/20  0624 07/14/20  1022 07/14/20  1044   * 130* 136  --   --   --    K 4.3 4.0 4.1  --   --   --    CL 94* 94* 99  --   --   --    CO2 22 21 20  --   --   --    PHOS 4.1 5.1* 5.6* 5.6*  --   --    BUN 39* 49* 60*  --   --   --    CREATININE 4.32* 5.07* 5.60*  --  6.3* 5.9*     LIVER PROFILE:   Recent Labs     07/14/20  0600   *   *   BILITOT 0.3   ALKPHOS 67     PT/INR:   No results for input(s): PROTIME, INR in the last 72 hours. APTT:   No results for input(s): APTT in the last 72 hours. UA:  No results for input(s): NITRITE, COLORU, PHUR, LABCAST, WBCUA, RBCUA, MUCUS, TRICHOMONAS, YEAST, BACTERIA, CLARITYU, SPECGRAV, LEUKOCYTESUR, UROBILINOGEN, BILIRUBINUR, BLOODU, GLUCOSEU, AMORPHOUS in the last 72 hours. Invalid input(s): KETONESU    Cultures:  Negative so far  Films:  Chest x-ray stable, no significant change     Assessment: This is a critically ill patient at risk of deterioration / death , needing close ICU monitoring and intervention due to below noted problems   · Acute hypercapnic respiratory failure with underlying chronic hypoxic respiratory failure.   · Acute instability during breathing trial today likely due to severe acidosis and hypercapnia  ·  COPD with acute exacerbation  · Polycythemia, likely secondary to chronic hypoxia, and improved  · Obstructive sleep apnea on CPAP at home and compliant  · History of lung cancer status post chemo and radiation, changes in the right lower lobe likely post radiation [facility] indicate chronic changes in that same area  · Acute kidney injury now dialysis dependent  · Hyperglycemia  · Increased troponin worse today    Recommendation  · Vent support lung protective strategy  · head of the bed 30°  · Daily sedation holidays and breathing trials  · Sedation with Precedex and if needed fentanyl target R ASS of 0 to -1  · Watch for ICU delirium: TV on, natural light, avoid benzos, pain control, early mobility, and family engagement  · PUD prophylaxis  · DVT prophylaxis  · Continue Solu-Medrol  · Insulin drip, target blood sugar 1 40-1 8  · Continue empiric antibiotics  · Continue tube feed  · Appreciate  · Patient has been requested DNR CCA, later he came back and he asked that we stop everything. He called his family they are on their way and he would like to proceed with compassionate extubation after that. He does not feel that she would wish to continue with this and she did express to him in the past that she does not want any of that done to her. Due to the immediate potential for life-threatening deterioration due to acute respiratory failure I spent 46  minutes providing critical care.  This time is excluding time spent performing procedures. Addendum  · Blood cultures today grew Salmonella, patient already on Zosyn, possibly food poisoning explaining her nausea vomiting and diarrhea. Will plan for CT abdomen and pelvis and ID consult if patient family wish to proceed with current treatment otherwise the  is contemplating stopping care. I am awaiting final decision.     Electronically signed by Pavel Ortiz MD,  FCCP ,on 7/14/2020 at 11:15 AM

## 2020-07-14 NOTE — PROGRESS NOTES
CARDIOLOGY [address] [facility] PROGRESS NOTE         7/14/2020      Brian Side    [id number]  1950    Rounding MD: Gonzalo Howard MD ,[facility] [employer]    Primary Cardiologist:  Srinivas Jeter MD, [address]      SUBJECTIVE:    Patient still sedated on Vent support.  in Room. Off Levophed IV Support now with SBP in 90s. On Antibiotics. Discussed with intensivist.    BPs low, looks dry. Echo reviewed. EF 60%. Apical Hypokinesis otherwise preserved LV function. Cardiac and general ROS otherwise negative and unchanged. ASSESSMENT:    1. Elevated troponin:  In face of Sepsis and hypotension. 2. Hypotension  3. Sepsis  4. Diarrhea  5. COPD  6. Hypotension: Stablized Off Pressors now. 7. Acute kidney injury: post dialysis    8. Coronary artery disease:   Myocardial perfusion imaging several months ago was normal.  Though she has a risk of progression at this time, most of her issues are metabolic as opposed to coronary disease progression. 9. Cardiomyopathy with apical DK, LVEF 65%. 10. Bacteremia would obtain transesophageal echo once stabilized. 6. Hypothyroid (surgical) replace synthroid. 12. Prognosis guarded      PLAN:    1. Cardiovascular supportive care  2. Intensive care antibiotics, inotropic support as needed. 3. Hydration. 4. Nephrology care with hemodialysis sd warranted. .  5. Adjust medications as needed. 6. Further recommendations to follow. 7. See orders.    ==================     Chief Complaint/Active Symptoms: 77-year-old woman admitted through the emergency room on July 10 with complex medical history including lung cancer in remission after chemotherapy and radiation, COPD, hypertension, coronary artery disease. She had presented with shortness of breath nausea vomiting and diarrhea. GI symptoms for at least 48 hours. Initial O2 sat 60% via pulse ox and COPD was treated. She could not complete a sentence secondary to severity of dyspnea.   Also found to have creatinine of 4.35 with creatinine normal 1 month ago. Troponin also elevated hence cardiology consult. Lactic acid was elevated at 7.6 and she was profoundly acidotic with pH of 7.08. Patient had been intubated in the emergency room. Also felt to have had pneumonia.     N/V/D constantly last 3 days. Has had no meds for those 3 days. He also notes that she has had total thryoidectomy so will need synthroid.     On June 17, losartan at 48 a day had been started for her hypertension.      Remains on vent, to have dialysis today. ICU staff feels likely with chronic hypoxic respiratory failure having had acute hypercapnic failure. Also felt to have COPD with exacerbation without PE being likely. Has chronic polycythemia also consistent with chronic hypoxia.     Review of Systems:   Unobtainable due to sedation     CARDIAC HISTORY:  Coronary artery disease: 2001 LAD PTCA with stent. 2005 coronary angiography: LM-normal.  LAD-stent patent. 20% irregularity. Circumflex/RCA: 20-30% irregularities.   March 2016 Lexiscan perfusion study: Normal with LVEF 53%     Hypertension  Hyperlipidemia    OBJECTIVE:     MEDICATIONS:     Scheduled Meds:   ipratropium-albuterol  1 ampule Inhalation Q6H WA    sodium bicarbonate  5 mEq Intravenous Once    amiodarone bolus  150 mg Intravenous Once    insulin glargine  75 Units Subcutaneous Daily    polyethylene glycol  17 g Oral Daily    docusate  100 mg Oral BID    vancomycin  1,000 mg Intravenous Once per day on Mon Wed Fri    insulin lispro  0-18 Units Subcutaneous Q4H    IV infusion builder   Intravenous Once    heparin (porcine)  4,000 Units Intercatheter Once    levothyroxine  150 mcg Oral Daily    vancomycin (VANCOCIN) intermittent dosing (placeholder)   Other RX Placeholder    pantoprazole  40 mg Oral QAM AC    methylPREDNISolone  40 mg Intravenous Daily    sodium chloride flush  10 mL Intravenous 2 times per day    enoxaparin  30 mg Subcutaneous Daily    piperacillin-tazobactam  3.375 g Intravenous Q12H     Continuous Infusions:   sodium chloride      dexmedetomidine 0.8 mcg/hr (07/14/20 1100)    norepinephrine 2 mcg/min (07/13/20 2254)    propofol Stopped (07/14/20 0813)    dextrose      fentaNYL (SUBLIMAZE) infusion 25 mcg/hr (07/14/20 0812)     PRN Meds:dextrose, labetalol, hydrALAZINE, glucose, dextrose, glucagon (rDNA), dextrose, etomidate, rocuronium, sodium chloride flush, acetaminophen **OR** acetaminophen, polyethylene glycol, promethazine **OR** ondansetron    PHYSICAL EXAM:    CURRENT VITALS: BP (!) 96/53   Pulse 124   Temp 98.2 °F (36.8 °C) (Oral)   Resp (!) 44   Ht 5' 4\" (1.626 m)   Wt 186 lb 11.7 oz (84.7 kg)   LMP  (LMP Unknown)   SpO2 94%   BMI 32.05 kg/m²     CONSTITUTIONAL: Sedated on ventilator. ENT:  Normocephalic, without obvious abnormality, atraumatic, sinuses nontender on palpation, external ears without lesions,  NECK:  Supple, symmetrical, trachea midline, no adenopathy, thyroid symmetric, not enlarged and no tenderness, skin normal  LUNGS:  No increased work of breathing, good air exchange, bilateral wheezing /rhonchi. CARDIOVASCULAR:  Normal apical impulse, regular rate and rhythm, normal S1 and S2,  and 1/6 murmur noted  ABDOMEN: Obese, normal bowel sounds, soft, non-distended, non-tender, no masses palpated, no hepatosplenomegally  EXTREMITIES:  No edema, Pulses strong throughout. NEUROLOGIC: Sedated on ventilator. SKIN:  no bruising or bleeding, normal skin color, texture, turgor and no rashes     Data:       LABS:  Recent Results (from the past 24 hour(s))   Culture, Respiratory    Collection Time: 07/13/20  3:04 PM    Specimen: Sputum, Suctioned;  Respiratory   Result Value Ref Range    Gram Stain Result Moderate WBC's  Moderate Yeast with pseudohyphae      Basic Metabolic Panel    Collection Time: 07/13/20  3:30 PM   Result Value Ref Range    Sodium 130 (L) 135 - 144 mEq/L    Potassium 4.3 3.4 - 4.9 mEq/L Chloride 94 (L) 95 - 107 mEq/L    CO2 22 20 - 31 mEq/L    Anion Gap 14 9 - 15 mEq/L    Glucose 229 (H) 70 - 99 mg/dL    BUN 39 (H) 8 - 23 mg/dL    CREATININE 4.32 (H) 0.50 - 0.90 mg/dL    GFR Non-African American 10.1 (L) >60    GFR  12.2 (L) >60    Calcium 7.0 (L) 8.5 - 9.9 mg/dL   Magnesium    Collection Time: 07/13/20  3:30 PM   Result Value Ref Range    Magnesium 1.7 1.7 - 2.4 mg/dL   Phosphorus    Collection Time: 07/13/20  3:30 PM   Result Value Ref Range    Phosphorus 4.1 2.3 - 4.8 mg/dL   POCT Glucose    Collection Time: 07/13/20  5:45 PM   Result Value Ref Range    POC Glucose 238 (H) 60 - 115 mg/dl    Performed on ACCU-CHEK    POCT Glucose    Collection Time: 07/13/20 10:14 PM   Result Value Ref Range    POC Glucose 203 (H) 60 - 115 mg/dl    Performed on ACCU-CHEK    Basic Metabolic Panel    Collection Time: 07/13/20 11:30 PM   Result Value Ref Range    Sodium 130 (L) 135 - 144 mEq/L    Potassium 4.0 3.4 - 4.9 mEq/L    Chloride 94 (L) 95 - 107 mEq/L    CO2 21 20 - 31 mEq/L    Anion Gap 15 9 - 15 mEq/L    Glucose 190 (H) 70 - 99 mg/dL    BUN 49 (H) 8 - 23 mg/dL    CREATININE 5.07 (H) 0.50 - 0.90 mg/dL    GFR Non-African American 8.4 (L) >60    GFR  10.2 (L) >60    Calcium 6.9 (L) 8.5 - 9.9 mg/dL   Magnesium    Collection Time: 07/13/20 11:30 PM   Result Value Ref Range    Magnesium 1.8 1.7 - 2.4 mg/dL   Phosphorus    Collection Time: 07/13/20 11:30 PM   Result Value Ref Range    Phosphorus 5.1 (H) 2.3 - 4.8 mg/dL   EKG 12 Lead    Collection Time: 07/14/20  3:21 AM   Result Value Ref Range    Ventricular Rate 63 BPM    Atrial Rate 63 BPM    P-R Interval 154 ms    QRS Duration 84 ms    Q-T Interval 380 ms    QTc Calculation (Bazett) 388 ms    P Axis 88 degrees    R Axis -10 degrees    T Axis 217 degrees   POCT Glucose    Collection Time: 07/14/20  4:59 AM   Result Value Ref Range    POC Glucose 181 (H) 60 - 115 mg/dl    Performed on ACCU-CHEK    POCT Glucose    Collection Time: (HH) 0.000 - 0.010 ng/mL   Phosphorus    Collection Time: 07/14/20  6:24 AM   Result Value Ref Range    Phosphorus 5.6 (H) 2.3 - 4.8 mg/dL   Sedimentation Rate    Collection Time: 07/14/20  6:27 AM   Result Value Ref Range    Sed Rate 62 (H) 0 - 30 mm   POCT Arterial    Collection Time: 07/14/20 10:22 AM   Result Value Ref Range    POC Sodium 132 (L) 136 - 145 mEq/L    POC Potassium 4.5 3.5 - 5.1 mEq/L    POC Chloride 101 99 - 110 mEq/L    POC Glucose 146 (H) 60 - 115 mg/dl    POC Creatinine 6.3 (HH) 0.6 - 1.2 mg/dL    GFR Non-African American 7 (A) >60    GFR  8 (A) >60    Calcium, Ion 0.98 (L) 1.12 - 1.32 mmol/L    pH, Arterial 7.122 (LL) 7.350 - 7.450    pCO2, Arterial 69 (H) 35 - 45 mm Hg    pO2, Arterial 35 (LL) 75 - 108 mm Hg    HCO3, Arterial 22.4 21.0 - 29.0 mmol/L    Base Excess, Arterial -7 (L) -3 - 3    O2 Sat, Arterial 47 (LL) 93 - 100 %    TCO2, Arterial 25 22 - 29    Lactate 2.27 (H) 0.40 - 2.00 mmol/L    POC Hematocrit 41 36 - 48 %    Hemoglobin 14.1 12.0 - 16.0 gm/dL    FIO2 100.000     Sample Type ART     Performed on SEE BELOW    POCT Arterial    Collection Time: 07/14/20 10:44 AM   Result Value Ref Range    POC Sodium 130 (L) 136 - 145 mEq/L    POC Potassium 4.1 3.5 - 5.1 mEq/L    POC Chloride 101 99 - 110 mEq/L    POC Glucose 186 (H) 60 - 115 mg/dl    POC Creatinine 5.9 (HH) 0.6 - 1.2 mg/dL    GFR Non-African American 7 (A) >60    GFR  9 (A) >60    Calcium, Ion 0.98 (L) 1.12 - 1.32 mmol/L    pH, Arterial 7.261 (L) 7.350 - 7.450    pCO2, Arterial 47 (H) 35 - 45 mm Hg    pO2, Arterial 330 (HH) 75 - 108 mm Hg    HCO3, Arterial 21.0 21.0 - 29.0 mmol/L    Base Excess, Arterial -6 (L) -3 - 3    O2 Sat, Arterial 100 (HH) 93 - 100 %    TCO2, Arterial 23 22 - 29    Lactate 1.03 0.40 - 2.00 mmol/L    POC Hematocrit 41 36 - 48 %    Hemoglobin 13.8 12.0 - 16.0 gm/dL    FIO2 100.000     Sample Type ART     Performed on SEE BELOW      ECHO: 7/13/20   Limited study to assess lv function/wall motion    Left ventricular ejection fraction is visually estimated at 55%.  Tachycardic at 120 throughout    distal inferoapical wall dyskinetic consis with small aneurysm    Basilar portion inferior wall akinetic    Very prominent papillary muscles.          Diana Hamilton MD ,[facility]

## 2020-07-14 NOTE — FLOWSHEET NOTE
Call from [facility]). Updated on POC. Pt was ruled out by Spencer Stinson and this nurse instructed to call back with cardiac time of death. Updated by Dr. Rubia Quiñones, d/t [facility] results more testing may be indicated. He spoke to family. Hospice consult delayed. Compassionate wean still being discussed.  notified, Director of ICU aware.

## 2020-07-14 NOTE — FLOWSHEET NOTE
Spiritual Care Services     Summary of Visit:   provided spiritual care as requested from nursing staff, pt family was going to meet with  concerning medical decisions, spoke with  and asked if he would like for a  to visit, Father Jaleesa Lee was called upon request of pt spouse,       Spiritual Assessment/Intervention/Outcomes:    Encounter Summary  Services provided to[de-identified] (P) Family, Patient  Referral/Consult From[de-identified] (P) Nurse  Support System: (P) Spouse, Children, Family members  Continue Visiting: Yes  Complexity of Encounter: (P) Moderate  Length of Encounter: (P) 45 minutes  Spiritual Assessment Completed: (P) Yes  Advance Care Planning: Yes  Routine  Type: (P) Follow up  Assessment: (P) Approachable, Grieving  Intervention: (P) Active listening, Sustaining presence/ Ministry of presence, Discussed meaning/purpose  Outcome: (P) Comfort, Receptive, Engaged in conversation  Crisis  Type: ED Alert  Assessment: Tearful, Anxious, Shock  Intervention: Sustaining presence/ Ministry of presence  Outcome: Coping, Less anxious, less agitated     Sacraments  Sacrament of Sick-Anointing: (P) Anointed(Father Jaleesa Lee)     Advance Directives (For Healthcare)  Healthcare Directive: Yes, patient has an advance directive for healthcare treatment  Type of Healthcare Directive: Durable power of  for health care, Living will  Copy in Chart: Yes, copy in chart(copies in Epic Media Tab)  Chart Copy Status [de-identified] Active, Current  Date Reviewed and Current[de-identified] 07/13/20  Information on Healthcare Directives Requested: No  Patient Requests Assistance: No  Healthcare Agent Appointed: [address] [place] [address] Agent's Name: [address] [place] [address] Agent's Phone Number: [phone])  If you are unable to speak for yourself, does your Healthcare Agent or Legal Spokesperson know your healthcare wishes?: Yes           Values / Beliefs  Do you have any ethnic, cultural, sacramental, or spiritual Yazidism needs you would like us to be aware of while you are in the hospital?: No    Care Plan:      [id number] [address]   Electronically signed by Kasandra Sim on 7/14/20 at 1:37 PM EDT     To reach a  for emotional and spiritual support, place an [facility] consult request.   If a  is needed immediately, dial 0 and ask to page the on-call .

## 2020-07-14 NOTE — FLOWSHEET NOTE
Many discussions regarding POC and code status changes. Family undecided until they talked with Dr. Garry Page this evening. Previous orders to withdraw care delayed until further notice. Hospice made aware and requesting call if anything changes. Oncoming RN to be notified at change of shift.

## 2020-07-15 NOTE — PROGRESS NOTES
Patient has declined over shift with significant decrease in blood pressure and episode of 02 desaturation. All sedation stopped and Levophed restarted. Suctioned for large amounts of red/pink thick secretions. Also has had an extreme  increase in temperature. Rectal/core temperature probe inserted. Tylenol rectal suppository administered and was not effective. Patient is unresponsive. Perfect serve to Dr. Prudencio Carrasco at this time with update of patient condition.

## 2020-07-15 NOTE — PROGRESS NOTES
0745 Assessment complete see flow sheet. Patient remains intubated , no sedation, no response noted. Core temp noted to be 108.5. Ice packs placed under bilateral arms, in bilateral groins and under her neck, to early to give prn tylenol. Mouth care done and patient repositioned for comfort. BP low titrating Levo see MAR.  0815 Seen by Dr Rachel Barry, update given. 0930 Seen by Dr Nidhi Paul, on morning rounds, complete update given.  at bedside, discussed plan of care, patients prognosis and code status. [address] patient on cooling blanket, temp 107.4 at present time. No changes in assessment noted. 1300 Temp now 105.9 Tylenol given per prn order, cooling blanket maintained. Patient repositioned, suctioned and mouth care done. No changes in assessment noted. 1545 Patients BP dropping into the 70's at current levo infusion as per ordered by Dr Nidhi Paul and husbands wishes,  contacted and notified. He is in the hospital awaiting for his children to arrive and than they will be up. 80 Patients daughter and son at bedside update given, questions answered. BP remains low 60/37 at present time. 1640 Patients  and another daughter at bedside, update given and questions answered. Dr Nidhi Paul aware that patients  is here and waiting to talk to him.  1700 Patient Code status changed to [facility] per family wishes, see orders. 1722 Morphine 4mg IV given prior to compassionate weaning, levo DCed. 1725 Patient extubated to room air per order.   36  at bedside, reassurance given  1740Patient without respirations or heartbeat

## 2020-07-15 NOTE — PROGRESS NOTES
0745 Assessment complete see flow sheet. Patient remains intubated , no sedation, no response noted. Core temp noted to be 108.5. Ice packs placed under bilateral arms, in bilateral groins and under her neck, to early to give prn tylenol. Mouth care done and patient repositioned for comfort. BP low titrating Levo see MAR.  0815 Seen by Dr Rachel Barry, update given. 0930 Seen by Dr Nidhi Paul, on morning rounds, complete update given.  at bedside, discussed plan of care, patients prognosis and code status. [address] patient on cooling blanket, temp 107.4 at present time. No changes in assessment noted. 1300 Temp now 105.9 Tylenol given per prn order, cooling blanket maintained. Patient repositioned, suctioned and mouth care done. No changes in assessment noted. 1545 Patients BP dropping into the 70's at current levo infusion as per ordered by Dr Nidhi Paul and husbands wishes,  contacted and notified. He is in the hospital awaiting for his children to arrive and than they will be up. [phone] 74 Patients daughter and son at bedside update given, questions answered. BP remains low 60/37 at present time. 1640 Patients  and another daughter at bedside, update given and questions answered. Dr Nidhi Paul aware that patients  is here and waiting to talk to him.  1700 Patient Code status changed to [facility] per family wishes, see orders. 1722 Morphine 4mg IV given prior to compassionate weaning, levo DCed. 1725 Patient extubated to room air per order. 1730  at bedside, reassurance given  1740Patient without respirations or heartbeat noted by this nurse and Rah Carroll Patients  notified.

## 2020-07-15 NOTE — PROGRESS NOTES
Nephrology Progress Note    Assessment:  BOBBY with hyperkalemia (talked with )  Major cognitice change  Hypotension  Sepsis-pneumonia  DM type-2      Plan: will wait to decide on family's choice of CRRT wish to talk with Agnes    Patient Active Problem List:     Asthma-COPD overlap syndrome ([facility]     Asthma     Diabetes mellitus type 2 in obese (HCC)     ROGELIO on CPAP     CAD (coronary artery disease)     HTN (hypertension)     Hyperlipidemia with target LDL less than 70     Renal artery stenosis (HCC)     Obesity (BMI 30-39. 9)     Osteoporosis     Anxiety     Suprascapular entrapment neuropathy of left side     Midline low back pain with left-sided sciatica     Bilateral low back pain with sciatica     Rib pain on right side     Myalgia     High risk medication use - 11/07/17 OARRS PM&R, 02/07/18 OARRS PM&R, 05/30/17 Urine Drug Screen: negative PM&R, MED CONTRACT 1/26/17     Rib sprain     Acute pain of right knee     Acute right ankle pain     Chronic bilateral low back pain with sciatica     History of MRSA infection of lungs 9/2016     Osteoporosis     DDD (degenerative disc disease), lumbar     Chronic midline low back pain with left-sided sciatica     Bilateral carpal tunnel syndrome     Neck pain     Chronic thoracic spine pain     Moderate persistent asthma without complication     Right carpal tunnel syndrome     Former smoker, stopped smoking in distant past     Encounter for postoperative wound care     Hypoxia     Class 3 severe obesity due to excess calories with serious comorbidity and body mass index (BMI) of 40.0 to 44.9 in adult [facility])     Morbid obesity with BMI of 40.0-44.9, adult ([facility]     Malignant neoplasm of lower lobe of right lung (HCC)     Postoperative hypothyroidism     Uncontrolled type 2 diabetes mellitus with hyperglycemia (HCC)     Ulnar neuropathy of both upper extremities     Dyspepsia     Esophagitis     Right lower lobe lung mass     Lumbosacral radiculopathy at S1     Renal cyst, right     Pneumonia of right lower lobe due to infectious organism ([address]     Post-radiation pneumonitis (HCC)     Lumbar and sacral spondylarthritis     Spondylosis of cervical region without myelopathy or radiculopathy     Peripheral polyneuropathy     H/O heart artery stent     Anticoagulated     Sepsis (HCC)     Acute on chronic respiratory failure with hypoxia and hypercapnia (HCC)      Subjective:  Admit Date: 7/10/2020    Interval History: not responding on ventilator    Medications:  Scheduled Meds:   propofol        ipratropium-albuterol  1 ampule Inhalation Q6H WA    sodium bicarbonate  5 mEq Intravenous Once    amiodarone bolus  150 mg Intravenous Once    insulin glargine  75 Units Subcutaneous Daily    polyethylene glycol  17 g Oral Daily    docusate  100 mg Oral BID    insulin lispro  0-18 Units Subcutaneous Q4H    IV infusion builder   Intravenous Once    heparin (porcine)  4,000 Units Intercatheter Once    levothyroxine  150 mcg Oral Daily    vancomycin (VANCOCIN) intermittent dosing (placeholder)   Other RX Placeholder    pantoprazole  40 mg Oral QAM AC    methylPREDNISolone  40 mg Intravenous Daily    sodium chloride flush  10 mL Intravenous 2 times per day    enoxaparin  30 mg Subcutaneous Daily    piperacillin-tazobactam  3.375 g Intravenous Q12H     Continuous Infusions:   dexmedetomidine Stopped (07/15/20 0450)    norepinephrine 14 mcg/min (07/15/20 0745)    propofol Stopped (07/15/20 0450)    dextrose      fentaNYL (SUBLIMAZE) infusion Stopped (07/15/20 0245)       CBC:   Recent Labs     07/14/20  0600  07/14/20  1738 07/15/20  0336   WBC 15.6*  --   --  23.5*   HGB 12.6   < > 12.7 14.4     --   --  133    < > = values in this interval not displayed.      CMP:    Recent Labs     07/13/20  2330 07/14/20  0600  07/14/20  1044 07/14/20  1738 07/15/20  0336   * 136  --   --   --  128*   K 4.0 4.1  --   --   --  5.7*   CL 94* 99  --   --   --  95   CO2 21 20 --   --   --  10*   BUN 49* 60*  --   --   --  94*   CREATININE 5.07* 5.60*   < > 5.9* 6.0* 8.09*   GLUCOSE 190* 159*  --   --   --  211*   CALCIUM 6.9* 7.5*  --   --   --  8.3*   LABGLOM 8.4* 7.5*   < > 7* 7* 4.9*    < > = values in this interval not displayed. Troponin:   Recent Labs     07/14/20  0600   TROPONINI 1.170*     BNP: No results for input(s): BNP in the last 72 hours. INR: No results for input(s): INR in the last 72 hours. Lipids: No results for input(s): CHOL, LDLDIRECT, TRIG, HDL, AMYLASE, LIPASE in the last 72 hours. Liver:   Recent Labs     07/14/20  0600 07/15/20  0336   *  --    *  --    ALKPHOS 67  --    PROT 5.7*  --    LABALBU 3.0* 2.7*   BILITOT 0.3  --      Iron:  No results for input(s): IRONS, FERRITIN in the last 72 hours. Invalid input(s): LABIRONS  Urinalysis: No results for input(s): UA in the last 72 hours.     Objective:  Vitals: BP (!) 96/41   Pulse 123   Temp (!) 107.8 °F (42.1 °C) (Core)   Resp (!) 32   Ht 5' 4\" (1.626 m)   Wt 186 lb 11.7 oz (84.7 kg)   LMP  (LMP Unknown)   SpO2 95%   BMI 32.05 kg/m²    Wt Readings from Last 3 Encounters:   07/14/20 186 lb 11.7 oz (84.7 kg)   06/03/20 182 lb (82.6 kg)   06/02/20 180 lb (81.6 kg)      24HR INTAKE/OUTPUT:      Intake/Output Summary (Last 24 hours) at 7/15/2020 0831  Last data filed at 7/15/2020 0641  Gross per 24 hour   Intake 1446.62 ml   Output 400 ml   Net 1046.62 ml       General:not alert, in no apparent distress  HEENT: normocephalic, atraumatic, anicteric  Neck: supple, no mass  ET in place  Lungs: non-labored respirations, clear to auscultation bilaterally  Heart: regular rate and rhythm, no murmurs or rubs  Abdomen: soft, non-tender, non-distended overweight   Ext: no cyanosis, trace peripheral edema  Neuro: nonalert or oriented, no gross abnormalities  Psych: normal mood and affect  Skin: no rash      Electronically signed by Stephen Esteves MD

## 2020-07-15 NOTE — PROGRESS NOTES
Pharmacy Vancomycin Consult     Vancomycin Day: 5  Current Dosinmg post HD    Temp max:  108.5 F    Recent Labs     20  0600 07/15/20  0336   BUN 60* 94*       Recent Labs     20  1738 07/15/20  0336   CREATININE 6.0* 8.09*       Recent Labs     20  0600 07/15/20  0336   WBC 15.6* 23.5*         Intake/Output Summary (Last 24 hours) at 7/15/2020 0950  Last data filed at 7/15/2020 0641  Gross per 24 hour   Intake 1446.62 ml   Output 400 ml   Net 1046.62 ml         Ht Readings from Last 1 Encounters:   20 5' 4\" (1.626 m)        Wt Readings from Last 1 Encounters:   20 186 lb 11.7 oz (84.7 kg)         Body mass index is 32.05 kg/m². Estimated Creatinine Clearance: 7 mL/min (A) (based on SCr of 8.09 mg/dL [facility])). Random level: 45.2    Assessment/Plan:  Vancomycin random level = 45.2, well above goal.   Hemodialysis was scheduled for yesterday, but last note in Epic for HD is from 20. However, vancomycin 1 g dose given yesterday evening. HD poss to be done today per ICU rounds discussion. Will hold vancomycin , follow daily, and evaluate after next HD completed for appropriate timing for next random vancomycin level.     Samir Cueva [facility]  07/15/20  10:01 AM

## 2020-07-15 NOTE — CARE COORDINATION
ICU team rounds done this am and spouse is here. Dr. Saran Barnes spoke about pts status and plan. Pt had temp 108.5 today and not responsive. Pts spouse does not want to cont aggressive tx and does not want hemodialysis. I met with . Jocelin Hendrix and he also does not wish to speak with Hospice. I offered support and let him know that hospice is also for him and his children/family. He only wishes for his children to come in and see her and then they will leave.  also speaking with him and offering support.

## 2020-07-15 NOTE — PROGRESS NOTES
Department of Internal Medicine  General Internal Medicine  Attending Progress Note      SUBJECTIVE:  Pt intubated unresponsive to verbal or tactile stimuli. Multiple severe fevers overnight up to 108F which was refractory to ice packs and tylenol. Family to possibly make decision for compassionate wean this afternoon.     OBJECTIVE      Medications    Current Facility-Administered Medications: ipratropium-albuterol (DUONEB) nebulizer solution 1 ampule, 1 ampule, Inhalation, Q6H WA  sodium bicarbonate 8.4 % injection 5 mEq, 5 mEq, Intravenous, Once  amiodarone (CORDARONE) 150 mg in dextrose 5 % 100 mL bolus, 150 mg, Intravenous, Once  dexmedetomidine (PRECEDEX) 400 mcg in sodium chloride 0.9 % 100 mL infusion, 0.2 mcg/kg/hr, Intravenous, Continuous  norepinephrine (LEVOPHED) 16 mg in dextrose 5 % 250 mL infusion, 2 mcg/min, Intravenous, Continuous  insulin glargine (LANTUS) injection vial 75 Units, 75 Units, Subcutaneous, Daily  polyethylene glycol (GLYCOLAX) packet 17 g, 17 g, Oral, Daily  docusate (COLACE) 50 MG/5ML liquid 100 mg, 100 mg, Oral, BID  insulin lispro (HUMALOG) injection vial 0-18 Units, 0-18 Units, Subcutaneous, Q4H  sodium bicarbonate 100 mEq in sodium chloride 0.45 % 1,000 mL infusion, , Intravenous, Once  dextrose 50 % IV solution, 12.5 g, Intravenous, PRN  heparin (porcine) injection 4,000 Units, 4,000 Units, Intercatheter, Once  levothyroxine (SYNTHROID) tablet 150 mcg, 150 mcg, Oral, Daily  vancomycin (VANCOCIN) intermittent dosing (placeholder), , Other, RX Placeholder  propofol injection, 10 mcg/kg/min, Intravenous, Titrated  labetalol (NORMODYNE;TRANDATE) injection syringe 10 mg, 10 mg, Intravenous, Q3H PRN  hydrALAZINE (APRESOLINE) injection 10 mg, 10 mg, Intravenous, Q6H PRN  glucose (GLUTOSE) 40 % oral gel 15 g, 15 g, Oral, PRN  dextrose 50 % IV solution, 12.5 g, Intravenous, PRN  glucagon (rDNA) injection 1 mg, 1 mg, Intramuscular, PRN  dextrose 5 % solution, 100 mL/hr, Intravenous, PRN  fentaNYL (SUBLIMAZE) 1,000 mcg in sodium chloride 0.9 % 100 mL infusion, 25 mcg/hr, Intravenous, Continuous  pantoprazole (PROTONIX) tablet 40 mg, 40 mg, Oral, QAM AC  methylPREDNISolone sodium (SOLU-MEDROL) injection 40 mg, 40 mg, Intravenous, Daily  etomidate (AMIDATE) injection, , , Daily PRN  rocuronium (ZEMURON) injection, , , Daily PRN  sodium chloride flush 0.9 % injection 10 mL, 10 mL, Intravenous, 2 times per day  sodium chloride flush 0.9 % injection 10 mL, 10 mL, Intravenous, PRN  acetaminophen (TYLENOL) tablet 650 mg, 650 mg, Oral, Q6H PRN **OR** acetaminophen (TYLENOL) suppository 650 mg, 650 mg, Rectal, Q6H PRN  polyethylene glycol (GLYCOLAX) packet 17 g, 17 g, Oral, Daily PRN  promethazine (PHENERGAN) tablet 12.5 mg, 12.5 mg, Oral, Q6H PRN **OR** ondansetron (ZOFRAN) injection 4 mg, 4 mg, Intravenous, Q6H PRN  enoxaparin (LOVENOX) injection 30 mg, 30 mg, Subcutaneous, Daily  piperacillin-tazobactam (ZOSYN) 3.375 g in dextrose 5 % 50 mL IVPB extended infusion (mini-bag), 3.375 g, Intravenous, Q12H  Physical    VITALS:  BP (!) 96/41   Pulse 124   Temp (!) 106.4 °F (41.3 °C) (Core)   Resp (!) 31   Ht 5' 4\" (1.626 m)   Wt 186 lb 11.7 oz (84.7 kg)   LMP  (LMP Unknown)   SpO2 91%   BMI 32.05 kg/m²   Constitutional: intubated and unresponsive on no sedation  Head: Normocephalic, atraumatic  Eyes: EOMI, PERRLA  ENT: moist mucous membranes, ETT in place  Neck: neck supple, trachea midline  Lungs: coarse breath sounds bilaterally  Heart: RRR, normal S1 and S2, no murmurs  GI: Soft, non-distended, +BS  Skin: warm, dry     Data    CBC:   Lab Results   Component Value Date    WBC 23.5 07/15/2020    RBC 5.02 07/15/2020    RBC 4.98 11/09/2018    HGB 14.4 07/15/2020    HCT 45.5 07/15/2020    MCV 90.7 07/15/2020    MCH 28.7 07/15/2020    MCHC 31.7 07/15/2020    RDW 14.5 07/15/2020     07/15/2020    MPV 9.7 11/09/2018     CMP:    Lab Results   Component Value Date     07/15/2020    K 5.7 07/15/2020    K 4.0 07/11/2020    CL 95 07/15/2020    CO2 10 07/15/2020    BUN 94 07/15/2020    CREATININE 8.09 07/15/2020    GFRAA 5.9 07/15/2020    AGRATIO 1.2 05/07/2018    LABGLOM 4.9 07/15/2020    GLUCOSE 211 07/15/2020    GLUCOSE 186 11/18/2019    PROT 5.7 07/14/2020    LABALBU 2.7 07/15/2020    LABALBU 3.8 11/18/2019    CALCIUM 8.3 07/15/2020    BILITOT 0.3 07/14/2020    ALKPHOS 67 07/14/2020     07/14/2020     07/14/2020       ASSESSMENT AND PLAN      # Salmonella bacteremia and septic shock:  - on pressors  - febrile - treating with tylenol and ice packs   - IV antibiotics, following with pulmonology.  - BCx with salmonella  - ID consulted      # BOBBY: Baseline 0.7.    - Creatinine plateau-continue to monitor along with urine output. Blood pressure remains stable without pressor support. - nephrology on consult. Started on HD. Family requested that HD be stopped at this time     # Acute hypoxic and hypercapnic respiratory failure,   - intubated on no sedation  - family to decide on possible compassionate wean this afternoon     # History of lung cancer, former smoker    Disposition: Pt continues to be intubated. Family deciding on withdrawing care. Hospice consulted.      Pat Loco DO  Internal Medicine

## 2020-07-15 NOTE — PROGRESS NOTES
CARDIOLOGY [facility] [employer] PROGRESS NOTE         7/15/2020      Severo Massa    [id number]  1950    Rounding MD: Cyndy See MD ,[facility] [place]    Primary Cardiologist:  Maria Del Carmen Dasilva MD, [address]      SUBJECTIVE:    Patient still sedated on Vent support. Off Levophed IV Support now with SBP in 90s. HRs up. On Antibiotics. BPs low, looks dry. Echo reviewed. EF 60%. Apical Hypokinesis otherwise preserved LV function. Cardiac and general ROS otherwise negative and unchanged. ASSESSMENT:    1. Elevated troponin:  In face of Sepsis and hypotension. 2. Hypotension  3. Sepsis  4. Diarrhea  5. COPD  6. Hypotension: Stablized Off Pressors now. 7. Acute kidney injury: post dialysis    8. Coronary artery disease:   Myocardial perfusion imaging several months ago was normal.  Though she has a risk of progression at this time, most of her issues are metabolic as opposed to coronary disease progression. 9. Cardiomyopathy with apical DK, LVEF 65%. 10. Bacteremia would obtain transesophageal echo once stabilized. 6. Hypothyroid (surgical) replace synthroid. 12. Prognosis guarded      PLAN:    1. Cardiovascular supportive care  2. Intensive care antibiotics, inotropic support as needed. 3. Hydration. 4. Nephrology care with hemodialysis sd warranted. .  5. Adjust medications as needed. 6. Further recommendations to follow. 7. See orders.    ==================     Chief Complaint/Active Symptoms: 50-year-old woman admitted through the emergency room on July 10 with complex medical history including lung cancer in remission after chemotherapy and radiation, COPD, hypertension, coronary artery disease. She had presented with shortness of breath nausea vomiting and diarrhea. GI symptoms for at least 48 hours. Initial O2 sat 60% via pulse ox and COPD was treated. She could not complete a sentence secondary to severity of dyspnea.   Also found to have creatinine of 4.35 with creatinine normal 1 month ago.  Troponin also elevated hence cardiology consult. Lactic acid was elevated at 7.6 and she was profoundly acidotic with pH of 7.08. Patient had been intubated in the emergency room. Also felt to have had pneumonia.     N/V/D constantly last 3 days. Has had no meds for those 3 days. He also notes that she has had total thryoidectomy so will need synthroid.     On June 17, losartan at 48 a day had been started for her hypertension.      Remains on vent, to have dialysis today. ICU staff feels likely with chronic hypoxic respiratory failure having had acute hypercapnic failure. Also felt to have COPD with exacerbation without PE being likely. Has chronic polycythemia also consistent with chronic hypoxia.     Review of Systems:   Unobtainable due to sedation     CARDIAC HISTORY:  Coronary artery disease: 2001 LAD PTCA with stent. 2005 coronary angiography: LM-normal.  LAD-stent patent. 20% irregularity. Circumflex/RCA: 20-30% irregularities.   March 2016 Lexiscan perfusion study: Normal with LVEF 53%     Hypertension  Hyperlipidemia    OBJECTIVE:     MEDICATIONS:     Scheduled Meds:   propofol        ipratropium-albuterol  1 ampule Inhalation Q6H WA    sodium bicarbonate  5 mEq Intravenous Once    amiodarone bolus  150 mg Intravenous Once    insulin glargine  75 Units Subcutaneous Daily    polyethylene glycol  17 g Oral Daily    docusate  100 mg Oral BID    insulin lispro  0-18 Units Subcutaneous Q4H    IV infusion builder   Intravenous Once    heparin (porcine)  4,000 Units Intercatheter Once    levothyroxine  150 mcg Oral Daily    vancomycin (VANCOCIN) intermittent dosing (placeholder)   Other RX Placeholder    pantoprazole  40 mg Oral QAM AC    methylPREDNISolone  40 mg Intravenous Daily    sodium chloride flush  10 mL Intravenous 2 times per day    enoxaparin  30 mg Subcutaneous Daily    piperacillin-tazobactam  3.375 g Intravenous Q12H     Continuous Infusions:   vasopressin (Septic Shock) infusion      dexmedetomidine Stopped (07/15/20 0450)    norepinephrine 22 mcg/min (07/15/20 0945)    propofol Stopped (07/15/20 0450)    dextrose      fentaNYL (SUBLIMAZE) infusion Stopped (07/15/20 0245)     PRN Meds:dextrose, labetalol, hydrALAZINE, glucose, dextrose, glucagon (rDNA), dextrose, etomidate, rocuronium, sodium chloride flush, acetaminophen **OR** acetaminophen, polyethylene glycol, promethazine **OR** ondansetron    PHYSICAL EXAM:    CURRENT VITALS: BP (!) 96/41   Pulse 129   Temp (!) 108.5 °F (42.5 °C) (Core)   Resp (!) 32   Ht 5' 4\" (1.626 m)   Wt 186 lb 11.7 oz (84.7 kg)   LMP  (LMP Unknown)   SpO2 94%   BMI 32.05 kg/m²     CONSTITUTIONAL: Sedated on ventilator. ENT:  Normocephalic, without obvious abnormality, atraumatic, sinuses nontender on palpation, external ears without lesions,  NECK:  Supple, symmetrical, trachea midline, no adenopathy, thyroid symmetric, not enlarged and no tenderness, skin normal  LUNGS:  No increased work of breathing, good air exchange, bilateral wheezing /rhonchi. CARDIOVASCULAR:  Normal apical impulse, regular rate and rhythm, normal S1 and S2,  and 1/6 murmur noted  ABDOMEN: Obese, normal bowel sounds, soft, non-distended, non-tender, no masses palpated, no hepatosplenomegally  EXTREMITIES:  No edema, Pulses strong throughout. NEUROLOGIC: Sedated on ventilator.    SKIN:  no bruising or bleeding, normal skin color, texture, turgor and no rashes     Data:       LABS:  Recent Results (from the past 24 hour(s))   POCT Arterial    Collection Time: 07/14/20 10:44 AM   Result Value Ref Range    POC Sodium 130 (L) 136 - 145 mEq/L    POC Potassium 4.1 3.5 - 5.1 mEq/L    POC Chloride 101 99 - 110 mEq/L    POC Glucose 186 (H) 60 - 115 mg/dl    POC Creatinine 5.9 (HH) 0.6 - 1.2 mg/dL    GFR Non-African American 7 (A) >60    GFR  9 (A) >60    Calcium, Ion 0.98 (L) 1.12 - 1.32 mmol/L    pH, Arterial 7.261 (L) 7.350 - 7.450    pCO2, Arterial 47 (H) 35 - 45 mm Hg    pO2, Arterial 330 (HH) 75 - 108 mm Hg    HCO3, Arterial 21.0 21.0 - 29.0 mmol/L    Base Excess, Arterial -6 (L) -3 - 3    O2 Sat, Arterial 100 (HH) 93 - 100 %    TCO2, Arterial 23 22 - 29    Lactate 1.03 0.40 - 2.00 mmol/L    POC Hematocrit 41 36 - 48 %    Hemoglobin 13.8 12.0 - 16.0 gm/dL    FIO2 100.000     Sample Type ART     Performed on SEE BELOW    POCT Arterial    Collection Time: 07/14/20  5:38 PM   Result Value Ref Range    POC Sodium 131 (L) 136 - 145 mEq/L    POC Potassium 4.0 3.5 - 5.1 mEq/L    POC Chloride 104 99 - 110 mEq/L    POC Glucose 130 (H) 60 - 115 mg/dl    POC Creatinine 6.0 (HH) 0.6 - 1.2 mg/dL    GFR Non-African American 7 (A) >60    GFR  8 (A) >60    Calcium, Ion 0.98 (L) 1.12 - 1.32 mmol/L    pH, Arterial 7.322 (L) 7.350 - 7.450    pCO2, Arterial 37 35 - 45 mm Hg    pO2, Arterial 79 (HH) 75 - 108 mm Hg    HCO3, Arterial 19.0 (L) 21.0 - 29.0 mmol/L    Base Excess, Arterial -7 (L) -3 - 3    O2 Sat, Arterial 95 (HH) 93 - 100 %    TCO2, Arterial 20 (L) 22 - 29    Lactate 0.81 0.40 - 2.00 mmol/L    POC Hematocrit 37 36 - 48 %    Hemoglobin 12.7 12.0 - 16.0 gm/dL    FIO2 50.000     Sample Type ART     Performed on SEE BELOW    POCT Glucose    Collection Time: 07/14/20 10:14 PM   Result Value Ref Range    POC Glucose 137 (H) 60 - 115 mg/dl    Performed on ACCU-CHEK    CBC Auto Differential    Collection Time: 07/15/20  3:36 AM   Result Value Ref Range    WBC 23.5 (H) 4.8 - 10.8 K/uL    RBC 5.02 4.20 - 5.40 M/uL    Hemoglobin 14.4 12.0 - 16.0 g/dL    Hematocrit 45.5 37.0 - 47.0 %    MCV 90.7 82.0 - 100.0 fL    MCH 28.7 27.0 - 31.3 pg    MCHC 31.7 (L) 33.0 - 37.0 %    RDW 14.5 11.5 - 14.5 %    Platelets 493 996 - 097 K/uL    PLATELET SLIDE REVIEW Adequate     Path Consult Yes     Neutrophils % 70.0 %    Lymphocytes % 13.0 %    Monocytes % 4.8 %    Eosinophils % 0.1 %    Basophils % 0.8 %    Neutrophils Absolute 18.8 (H) 1.4 - 6.5 K/uL    Lymphocytes

## 2020-07-15 NOTE — PROGRESS NOTES
glycol, promethazine **OR** ondansetron    Results: reviewed by me   CBC:   Recent Labs     07/13/20  0600 07/14/20  0600 07/14/20 1044 07/14/20  1738 07/15/20  0336   WBC 16.1* 15.6*  --   --   --  23.5*   HGB 12.3 12.6   < > 13.8 12.7 14.4   HCT 37.0 37.7  --   --   --  45.5   MCV 86.3 86.2  --   --   --  90.7    161  --   --   --  133    < > = values in this interval not displayed. BMP:   Recent Labs     07/13/20  2330 07/14/20  0600 07/14/20  0624  07/14/20  1044 07/14/20  1738 07/15/20  0336   * 136  --   --   --   --  128*   K 4.0 4.1  --   --   --   --  5.7*   CL 94* 99  --   --   --   --  95   CO2 21 20  --   --   --   --  10*   PHOS 5.1* 5.6* 5.6*  --   --   --  8.6*   BUN 49* 60*  --   --   --   --  94*   CREATININE 5.07* 5.60*  --    < > 5.9* 6.0* 8.09*    < > = values in this interval not displayed. LIVER PROFILE:   Recent Labs     07/14/20  0600   *   *   BILITOT 0.3   ALKPHOS 67     PT/INR:   No results for input(s): PROTIME, INR in the last 72 hours. APTT:   No results for input(s): APTT in the last 72 hours. UA:  No results for input(s): NITRITE, COLORU, PHUR, LABCAST, WBCUA, RBCUA, MUCUS, TRICHOMONAS, YEAST, BACTERIA, CLARITYU, SPECGRAV, LEUKOCYTESUR, UROBILINOGEN, BILIRUBINUR, BLOODU, GLUCOSEU, AMORPHOUS in the last 72 hours. Invalid input(s): Carolyn Davidson    Cultures:  Salmonella and blood  Respiratory cultures grew yeast  Films:  CT abdomen, base of the lung with bibasilar atelectasis, no abscess, no gallbladder disease     Assessment: This is a critically ill patient at risk of deterioration / death , needing close ICU monitoring and intervention due to below noted problems   · Acute hypercapnic respiratory failure with underlying chronic hypoxic respiratory failure.   · Septic shock secondary to Salmonella septicemia worsening today, no intra-abdominal abscess or gallbladder disease  ·  COPD with acute exacerbation   · Acute kidney injury, dialysis

## 2020-07-16 LAB
CULTURE, RESPIRATORY: ABNORMAL
GRAM STAIN RESULT: ABNORMAL
ORGANISM: ABNORMAL

## 2020-07-17 LAB
CULTURE, BLOOD 2: ABNORMAL
CULTURE, BLOOD 2: ABNORMAL
ORGANISM: ABNORMAL

## 2020-07-17 NOTE — DISCHARGE SUMMARY
07/10/2020       Radiology:   Most recent    Chest CT      WITH CONTRAST:  Results for orders placed during the hospital encounter of 03/19/20   CT CHEST W CONTRAST    Narrative CT of the Chest with intravenous contrast medium    History:  Right lower lobe pneumonia. Cough for several months. Right lower lobe malignancy. Postradiation, completed February 2019. Technical Factors:    CT imaging of the chest was obtained and formatted as 5 mm contiguous axial images from the thoracic inlet through the adrenal glands. Sagittal, and coronal reconstruction obtained during postprocessing. Intravenous contrast medium:  None    Comparison:  CT chest, September 27, 2019. Findings:    Right lung shows continued stranding, and right lower lobe. The previously described 8mm nodule in the right lower lobe is no longer identified similar 12 mm finding found caudal to it is no longer evident. No new nodules or masses are visualized. No new   areas of consolidation. No pleural effusion. Left lung shows the ill-defined 4 mm dumbbell area of noncalcified partially pleural-based nodularity laterally left lower lobe is unchanged. No consolidation or pleural effusion identified. No hilar, mediastinal, or axillary lymph node enlargement. Cardiac size normal. No pericardial effusion. Thoracic aorta normal in course and caliber. Limited imaging upper abdomen shows gallbladder surgically absent. No osteoblastic, no osteolytic lesions. Impression Resolution of nodular opacities in right lower lung. Persistent stranding right lower lung. No new foci of masses or nodules identified. Peripheral left lung nodules as described unchanged. No new nodules or masses. No consolidation. Cholecystectomy      All CT scans at this facility use dose modulation, iterative reconstruction, and/or weight based dosing when appropriate to reduce radiation dose to as low as reasonably achievable.         WITHOUT CONTRAST: Results for orders placed during the hospital encounter of 07/10/20   CT CHEST WO CONTRAST    Narrative EXAM: CT CHEST WO CONTRAST    History: Respiratory failure    Technique: Multiple contiguous axial images of the chest were obtained from the thoracic inlet through the upper abdomen without contrast. Multiplanar reformats were obtained. Comparison: CT chest from March 19, 2020    Findings:     Visualized portion of the thyroid gland is within normal limits. No axillary, mediastinal, or hilar lymphadenopathy. Endotracheal tube is present and terminates 3.5 cm from the esteban. Enteric tube terminates in the upper esophagus. No thoracic aortic aneurysm. Atherosclerotic calcification of the thoracic aorta. Evidence of coronary artery disease. Heart size is at the upper limits of normal. No significant pericardial effusion. Esophagus is within normal limits. Bilateral dependent subsegmental atelectasis. Moderate emphysema. Linear opacities of the right lower lobe. No pneumothorax. No suspicious pulmonary nodule or mass. Mild degenerative changes of the spine. No acute osseous abnormality. Hepatic steatosis. Impression Right lower lobe linear atelectasis/scarring and/or mild pneumonia. Moderate emphysema. Enteric tube terminates in the upper esophagus. All CT scans at this facility use dose modulation, iterative reconstruction, and/or weight based dosing when appropriate to reduce radiation dose to as low as reasonably achievable. CXR      2-view:   Results for orders placed in visit on 03/03/20   XR CHEST STANDARD (2 VW)    Narrative EXAMINATION: CHEST TWO VIEWS     CLINICAL HISTORY: J18.9 Pneumonia due to infectious organism, unspecified laterality, unspecified part of lung ICD10    COMPARISONS: 1 CT scans chest September 27, 2019 and chest x-ray December 20, 2018     FINDINGS:    Two views of the chest are submitted.   The cardiac silhouette is of enlarged unchanged configuration. Pulmonary vascular is attenuated, lungs are hyperinflated and there is some widening of the AP diameter the chest and coarsening of the interstitium. Right sided trachea. There is bibasilar areas of atelectasis, scarring both bases. Vague opacity is seen within the right lower lobe. No focal infiltrates. No effusions. Pneumothoraces. Impression RADIOGRAPHIC FINDINGS OF COPD WITH A VAGUE OPACITY IN THE RIGHT LOWER LOBE. [place] MASSES WERE IDENTIFIED ON THE CT SCAN OF 2019. PLEASE SEE REPORT FOR ADDITIONAL DETAILS. RECOMMEND REPEAT CT SCAN WITH IV CONTRAST TO FURTHER EVALUATE. UNDERLYING   MALIGNANCY IS NOT EXCLUDED. Portable:   Results for orders placed during the hospital encounter of 07/10/20   XR CHEST PORTABLE    Narrative XR CHEST PORTABLE : 2020    CLINICAL HISTORY:  difficulty breathing . COMPARISON: 2020. TECHNIQUE: Two portable upright AP radiographs of the chest were obtained      FINDINGS:     Endotracheal, orogastric tubes and a right internal jugular approach hemodialysis catheter remain in expected position. Mild to moderate infiltrate/consolidation of both mid to lower lung fields appears mildly worsening, greater on the right. Emphysematous changes appear otherwise unchanged. The heart remains mildly enlarged. There is no significant pleural effusion, vascular congestion, or pneumothorax. Impression MILDLY WORSENING MID TO LOWER LUNG FIELD INFILTRATE/CONSOLIDATION, CONSISTENT WITH BRONCHOPNEUMONIA. Echo No results found for this or any previous visit.     Disposition:     In process/preliminary results:  Outstanding Order Results     Date and Time Order Name Status Description    2020 1454 Culture, Blood 2 Preliminary     2020 1454 Culture, Blood 1 Preliminary           Patient Instructions:   Discharge Medication List as of 7/15/2020 10:58 PM      CONTINUE these medications which have NOT CHANGED    Details   oxyCODONE HCl (OXY-IR) 10 MG immediate release tablet Take 10 mg by mouth every 8 hours as needed for Pain. Historical Med      insulin aspart (NOVOLOG FLEXPEN) 100 UNIT/ML injection pen 10 units am and 15-20  units at lunch and dinner, Disp-30 mL, R-3NO PRINT      insulin glargine (BASAGLAR KWIKPEN) 100 UNIT/ML injection pen INJECT 85 UNITS    SUBCUTANEOUSLY EVERY NIGHT, Disp-75 mL, R-1NO PRINT      !! blood glucose test strips (ONETOUCH ULTRA) strip qid, Disp-600 each, R-3Normal      !! diclofenac sodium (VOLTAREN) 1 % GEL Apply 4 g topically 4 times daily, Topical, 4 TIMES DAILY Starting Mon 6/1/2020, Disp-1500 g, R-3, Normal      !! diclofenac sodium (VOLTAREN) 1 % GEL APPLY 4 GRAMS TOPICALLY 4  TIMES A DAY;, Disp-74903 g, R-3, Normal      lidocaine (XYLOCAINE) 5 % ointment Apply topically as needed. , Disp-1 Tube, R-11, Normal      hydroxychloroquine (PLAQUENIL) 200 MG tablet Take 1 tablet by mouth 2 times daily, Disp-60 tablet, R-2Normal      predniSONE (DELTASONE) 10 MG tablet Take 1 tablet by mouth daily, Disp-90 tablet, R-1Normal      INCRUSE ELLIPTA 62.5 MCG/INH AEPB USE 1 INHALATION ORALLY    INTO THE LUNGS DAILY, Disp-1 each, R-3Normal      ezetimibe (ZETIA) 10 MG tablet Take 10 mg by mouth dailyHistorical Med      levothyroxine (SYNTHROID) 150 MCG tablet Take 1 tablet by mouth Daily, Disp-90 tablet, R-3Normal      !! NOVOFINE 32G X 6 MM MISC Disp-400 each, R-1, LOKI, NormalUSE 4 TIMES DAILY      topiramate (TOPAMAX) 25 MG tablet TAKE 1 TABLET NIGHTLY, Disp-90 tablet, R-1Normal      !! digoxin (LANOXIN) 250 MCG tablet Historical Med      cephALEXin (KEFLEX) 500 MG capsule Take 1 capsule by mouth 3 times daily, Disp-30 capsule, R-0Normal      gabapentin (NEURONTIN) 300 MG capsule 3 times daily.  , R-2Historical Med      zoledronic acid (RECLAST) 5 MG/100ML SOLN Infuse 100 mLs intravenously once for 1 dose, Disp-100 mL, RESPICLICK) 357 (90 Base) MCG/ACT aerosol powder inhalation Inhale 2 puffs into the lungs every 6 hours as needed for Wheezing or Shortness of Breath, Disp-1 Inhaler, R-5Normal      mepolizumab (NUCALA) 100 MG SOLR injection Inject 100 mg into the skin Received from: [facility] Received Sig: Inject 100 mg subcutaneously one time only. monthlyHistorical Med      ONE TOUCH LANCETS MISC Disp-200 each, R-3, NormalUSE TO TEST TWICE A DAY AND AS NEEDED, IDDM - Dx: E11.9      Blood Glucose Monitoring Suppl MARCELLUS Disp-1 Device, R-0, NormalOne Touch Ultra - To Test BID - IDDM - Dx: E11.9      isosorbide mononitrate (IMDUR) 60 MG CR tablet Take 60 mg by mouth daily       budesonide (PULMICORT) 0.5 MG/2ML nebulizer suspension Take 1 ampule by nebulization 2 times daily      !! digoxin (DIGOX) 125 MCG tablet Take 125 mcg by mouth Daily with lunch Historical Med      fenofibrate (TRICOR) 145 MG tablet Take 145 mg by mouth every evening Historical Med      verapamil (VERELAN PM) 240 MG CR capsule Take 120 mg by mouth 2 times daily Historical Med       !! - Potential duplicate medications found. Please discuss with provider.           DC time 35 minutes    Signed:  Electronically signed by Carlos Salguero DO on 7/17/2020 at 1:58 PM

## 2020-07-18 LAB
BLOOD CULTURE, ROUTINE: NORMAL
CULTURE, BLOOD 2: NORMAL

## 2020-08-31 NOTE — TELEPHONE ENCOUNTER
CLINICAL PHARMACY: STATIN THERAPY REVIEW    Identified care gap per [employer]: statin use in persons with diabetes  Per chart review, patient is prescribed atorvastatin 80 mg once daily. Will route to clinical pharmacy  to outreach to pharmacy to confirm most recent refill data as well as prescription information.      Falguni Deng, PharmD, [facility]  Direct: [phone]  Department, toll free [phone], option 7 Last Clinic Visit: 6/8/2020  [facility]

## (undated) DEVICE — SYRINGE IRRIG 60ML SFT PLIABLE BLB EZ TO GRP 1 HND USE W/

## (undated) DEVICE — SET KNF FOR MINI CRPL TUNN REL SYS SFGRD 5PK

## (undated) DEVICE — NEEDLE HYPO 25GA L1.5IN BLU POLYPR HUB S STL REG BVL STR

## (undated) DEVICE — SMARTGOWN BREATHABLE SPECIALTY GOWN: Brand: CONVERTORS

## (undated) DEVICE — CORD BPLR 2 PIN FLAT AND RND DISP

## (undated) DEVICE — SHEARS ENDOSCP L9CM CRV HARM FOCS +

## (undated) DEVICE — SECTO® DISSECTOR, KITTNER, 5/16 IN DIAMETER, (5 EA/POUCH, 24 POUCHES/PK, 4 PK/BX): Brand: SYMMETRY SURGICAL

## (undated) DEVICE — GAUZE,SPONGE,FLUFF,6"X6.75",STRL,10/TRAY: Brand: MEDLINE

## (undated) DEVICE — Z DISCONTINUED APPLICATOR SURG PREP 0.35OZ 2% CHG 70% ISO ALC W/ HI LT

## (undated) DEVICE — SUTURE ETHLN SZ 3-0 L18IN NONABSORBABLE BLK PS-2 L19MM 3/8 1669H

## (undated) DEVICE — INTENDED FOR TISSUE SEPARATION, AND OTHER PROCEDURES THAT REQUIRE A SHARP SURGICAL BLADE TO PUNCTURE OR CUT.: Brand: BARD-PARKER ® CARBON RIB-BACK BLADES

## (undated) DEVICE — GLOVE ORANGE PI 8   MSG9080

## (undated) DEVICE — ELECTRODE PT RET AD L9FT HI MOIST COND ADH HYDRGEL CORDED

## (undated) DEVICE — MAGNETIC DRAPE: Brand: DEVON

## (undated) DEVICE — SYRINGE MED 10ML LUERLOCK TIP W/O SFTY DISP

## (undated) DEVICE — SUTURE PERMA-HAND SZ 2-0 L30IN NONABSORBABLE BLK L26MM SH K833H

## (undated) DEVICE — PADDING CAST N ADH 4 YDX2 IN HIGHLY ABSORBENT EZ APPL SOFROL

## (undated) DEVICE — GLOVE SURG SZ 75 STD WHT LTX SYN POLYMER BEAD REINF ANTI RL

## (undated) DEVICE — DBD-PACK,EENT,SIRUS,PK II: Brand: MEDLINE

## (undated) DEVICE — TRI NEEDLE ELECTRODE RD, 15X1000MM

## (undated) DEVICE — BANDAGE ELASTIC COMPRSS REINF 2INX4.5YD

## (undated) DEVICE — COUNTER NDL 40 COUNT HLD 70 FOAM BLK ADH W/ MAG

## (undated) DEVICE — SUTURE ETHLN SZ 5-0 L18IN NONABSORBABLE BLK L13MM P-3 3/8 698H

## (undated) DEVICE — HAND II: Brand: MEDLINE INDUSTRIES, INC.

## (undated) DEVICE — DRAIN SURG 10FR 100% SIL RND END PERF W/ TRCR

## (undated) DEVICE — GAUZE,SPONGE,4"X4",16PLY,XRAY,STRL,LF: Brand: MEDLINE

## (undated) DEVICE — 1010 S-DRAPE TOWEL DRAPE 10/BX: Brand: STERI-DRAPE™

## (undated) DEVICE — LABEL MED MINI W/ MARKER

## (undated) DEVICE — CHLORAPREP 26ML ORANGE

## (undated) DEVICE — GOWN,AURORA,NONREINFORCED,LARGE: Brand: MEDLINE

## (undated) DEVICE — 3M™ STERI-DRAPE™ INSTRUMENT POUCH 1018: Brand: STERI-DRAPE™

## (undated) DEVICE — BLADE ES ELASTOMERIC COAT INSUL DURABLE BEND UPTO 90DEG

## (undated) DEVICE — JACKSON-PRATT 100CC BULB RESERVOIR: Brand: CARDINAL HEALTH

## (undated) DEVICE — NEEDLE HYPO 22GA L1 1/2IN PIVOTING SHLD FOR LUERLOCK SYR

## (undated) DEVICE — 3M™ STERI-STRIP™ REINFORCED ADHESIVE SKIN CLOSURES, R1547, 1/2 IN X 4 IN (12 MM X 100 MM), 6 STRIPS/ENVELOPE: Brand: 3M™ STERI-STRIP™

## (undated) DEVICE — DRESSING PETRO W3XL8IN N ADH KNIT CELOS ACETT ADPTC

## (undated) DEVICE — SUTURE PERMAHAND SZ 2-0 L12X18IN NONABSORBABLE BLK SILK A185H

## (undated) DEVICE — SUTURE MCRYL + SZ 4-0 L27IN ABSRB UD L19MM PS-2 3/8 CIR MCP426H

## (undated) DEVICE — PENCIL ES L3M BTTN SWCH HOLSTER W/ BLDE ELECTRD EDGE

## (undated) DEVICE — MARKER SURG SKIN GENTIAN VLT REG TIP W/ 6IN RUL

## (undated) DEVICE — SUTURE VCRL + SZ 3-0 L27IN ABSRB UD L26MM SH 1/2 CIR VCP416H

## (undated) DEVICE — GLOVE ORANGE PI 7 1/2   MSG9075

## (undated) DEVICE — NEEDLE ELECTRODE GN, 15X1000MM